# Patient Record
Sex: MALE | Race: OTHER | NOT HISPANIC OR LATINO
[De-identification: names, ages, dates, MRNs, and addresses within clinical notes are randomized per-mention and may not be internally consistent; named-entity substitution may affect disease eponyms.]

---

## 2016-12-06 RX ORDER — CARVEDILOL PHOSPHATE 80 MG/1
2 CAPSULE, EXTENDED RELEASE ORAL
Qty: 60 | Refills: 0 | COMMUNITY
Start: 2016-12-06 | End: 2017-01-05

## 2017-02-14 ENCOUNTER — RX RENEWAL (OUTPATIENT)
Age: 77
End: 2017-02-14

## 2017-02-21 ENCOUNTER — OUTPATIENT (OUTPATIENT)
Dept: OUTPATIENT SERVICES | Facility: HOSPITAL | Age: 77
LOS: 1 days | End: 2017-02-21
Payer: MEDICARE

## 2017-02-21 VITALS
WEIGHT: 184.97 LBS | SYSTOLIC BLOOD PRESSURE: 162 MMHG | OXYGEN SATURATION: 97 % | HEIGHT: 68 IN | DIASTOLIC BLOOD PRESSURE: 49 MMHG | TEMPERATURE: 98 F | HEART RATE: 63 BPM | RESPIRATION RATE: 18 BRPM

## 2017-02-21 DIAGNOSIS — Z98.890 OTHER SPECIFIED POSTPROCEDURAL STATES: Chronic | ICD-10-CM

## 2017-02-21 DIAGNOSIS — Z01.818 ENCOUNTER FOR OTHER PREPROCEDURAL EXAMINATION: ICD-10-CM

## 2017-02-21 DIAGNOSIS — C67.9 MALIGNANT NEOPLASM OF BLADDER, UNSPECIFIED: ICD-10-CM

## 2017-02-21 DIAGNOSIS — I21.3 ST ELEVATION (STEMI) MYOCARDIAL INFARCTION OF UNSPECIFIED SITE: Chronic | ICD-10-CM

## 2017-02-21 DIAGNOSIS — Z77.123 CONTACT WITH AND (SUSPECTED) EXPOSURE TO RADON AND OTHER NATURALLY OCCURRING RADIATION: Chronic | ICD-10-CM

## 2017-02-21 PROCEDURE — G0463: CPT

## 2017-02-21 RX ORDER — SODIUM CHLORIDE 9 MG/ML
3 INJECTION INTRAMUSCULAR; INTRAVENOUS; SUBCUTANEOUS EVERY 8 HOURS
Qty: 0 | Refills: 0 | Status: DISCONTINUED | OUTPATIENT
Start: 2017-02-27 | End: 2017-02-28

## 2017-02-21 NOTE — H&P PST ADULT - PSH
2002 left leg stent    Acute myocardial infarction  as per pt in 2001, no coronary stent  Cataract surgery 30 years ago    right wrist surgery with hardware 27 years ago 2002 left leg stent    Acute myocardial infarction  as per pt in 2001, no coronary stent  Cataract surgery 30 years ago    Exposure to radiation  for prostate cancer (seed implant)  History of colon surgery  2007  right wrist surgery with hardware 27 years ago

## 2017-02-21 NOTE — H&P PST ADULT - NEGATIVE CARDIOVASCULAR SYMPTOMS
no paroxysmal nocturnal dyspnea/no orthopnea/no claudication/no palpitations/no chest pain/no dyspnea on exertion

## 2017-02-21 NOTE — H&P PST ADULT - ASSESSMENT
71 y/o male presents to [facility] for presurgical evaluation prior to surgery. He was diagnosed with malignant neoplasm of bladder and is scheduled for cystoscopy, transurethral resection of bladder tumor on 2/27/2017 69 y/o male with PMH of essential hypertension, hyperlipidemia, ASHD w/coronary stent x 1, old MI (2001), PVD, high VICTORINO risk on STOP BANG screen, T2DM w/peripheral complications, prostate cancer s/p RT (2007), and malignant neoplasm of bladder scheduled for cystoscopy, transurethral resection of bladder tumor on 2/27/2017. Patient is at moderate risk for planned procedure

## 2017-02-21 NOTE — H&P PST ADULT - PROBLEM SELECTOR PLAN 1
Scheduled for cystoscopy, transurethral resection of bladder tumor on 2/27/2017. Preoperative instructions discussed and given to patient. Verbalized understanding

## 2017-02-21 NOTE — H&P PST ADULT - PMH
2001 CVA  as per pt, he doesn't notice any deficits  6/09 diagnosed with Prostate Ca  s/p radiation and brachytherapy w/ seed implantation 5 years ago s/p TURP  approximately 2001  MI/Coronary Artery Disease    Bladder mass  s/p resection  BPH    Cataract of left eye    CKD (chronic kidney disease) stage 3, GFR 30-59 ml/min    Diabetes mellitus, type 2    Hearing Decreased left ear    Hypercholesteremia    Hypertension    Obesity    PAD--Left leg stent 2002  done at hospital in [place]  --[facility] in [place], s/p balloon on LLE in 2006  Pulmonary edema  May 2016-resolved  Urinary retention 2001 CVA  as per pt, he doesn't notice any deficits  6/09 diagnosed with Prostate Ca  s/p radiation and brachytherapy w/ seed implantation 5 years ago s/p TURP  approximately 2001  MI/Coronary Artery Disease    Bladder mass  s/p resection  BPH    Cataract of left eye    CKD (chronic kidney disease) stage 3, GFR 30-59 ml/min    Diabetes mellitus, type 2    Hearing Decreased left ear    Hypercholesteremia    Hypertension    Injury of head, initial encounter  12/2016 - s/p fall in hospital  Obesity    PAD--Left leg stent 2002  done at hospital in [place]  --[facility] in [place], s/p balloon on LLE in 2006  Prostate CA    Pulmonary edema  May 2016-resolved  Pulmonary emphysema, unspecified emphysema type  2017  Urinary retention 2001 CVA  as per pt, he doesn't notice any deficits  6/09 diagnosed with Prostate Ca  s/p radiation and brachytherapy w/ seed implantation 5 years ago s/p TURP  approximately 2001  MI/Coronary Artery Disease    Bladder mass  s/p resection  BPH    Cataract of left eye    CKD (chronic kidney disease) stage 3, GFR 30-59 ml/min    Diabetes mellitus, type 2    Hearing Decreased left ear    Hypercholesteremia    Hypertension    Injury of head, initial encounter  12/2016 - s/p fall in hospital  Obesity    PAD--Left leg stent 2002  done at hospital in [place]  --[facility] in [place], s/p balloon on LLE in 2006  Prostate CA    Pulmonary edema  May 2016-resolved  Pulmonary emphysema, unspecified emphysema type  2017  Urinary retention

## 2017-02-21 NOTE — H&P PST ADULT - RESPIRATORY AND THORAX COMMENTS
h/o PE h/o COPD h/o COPD (12/2016) h/o COPD (12/2016) seen on CXR, high VICTORINO risk, h/o heavy tobacco use

## 2017-02-21 NOTE — H&P PST ADULT - CARDIOVASCULAR COMMENTS
h/o essential hypertension, hyperlipidemia, cardiomyopathy w/coronary stent x1, PVD - LLE h/o essential hypertension, hyperlipidemia, cardiomyopathy w/coronary stent x1, PVD - LLE, old MI h/o essential hypertension, hyperlipidemia, ASHD w/coronary stent x1, PVD - LLE with stent, old MI (2001)

## 2017-02-21 NOTE — H&P PST ADULT - GENITOURINARY COMMENTS
preop diagnosis malignant neoplasm of bladder h/o urinary retention h/o urinary retention, prostate cancer s/p seed implant 2007 h/o urinary retention, prostate cancer s/p seed implant 2007, preop diagnosis malignant neoplasm of bladder

## 2017-02-21 NOTE — H&P PST ADULT - CARDIOVASCULAR DETAILS
positive S2/irregular rate and rhythm/positive S1 irregular rate and rhythm/positive S2/murmur/positive S1

## 2017-02-21 NOTE — H&P PST ADULT - HISTORY OF PRESENT ILLNESS
71 y/o male presents to [facility] for presurgical evaluation prior to surgery. He was diagnosed with malignant neoplasm of bladder and is scheduled for cystoscopy, transurethral resection of bladder tumor on 2/27/2017 69 y/o male with PMH of essential hypertension, cardiomyopathy w/coronary stent x 1, old MI 2007, CKD stage 3, PVD left lower extremity s/p stent LLE, hyperlipidemia, T2DM, prostate cancer s/p seed implant (2007), malignant neoplasm of bladder is here for presurgical evaluation prior to surgery. He is scheduled for cystoscopy, transurethral resection of bladder tumor on 2/27/2017

## 2017-02-21 NOTE — H&P PST ADULT - NEGATIVE PSYCHIATRIC SYMPTOMS
no depression/no anxiety/no suicidal ideation/no insomnia no depression/no suicidal ideation/no insomnia

## 2017-02-21 NOTE — H&P PST ADULT - RS GEN PE MLT RESP DETAILS PC
clear to auscultation bilaterally/respirations non-labored/airway patent/no rhonchi/good air movement/no chest wall tenderness/no subcutaneous emphysema/no intercostal retractions/breath sounds equal/no rales/no wheezes

## 2017-02-21 NOTE — H&P PST ADULT - PRIMARY CARE PROVIDER
Dr. Hanna    Telephone [phone] Cardiologist Dr. Kumari Dr. Hanna    Telephone [phone]  Cardiologist Dr. Kumari

## 2017-02-21 NOTE — H&P PST ADULT - NEGATIVE GENERAL SYMPTOMS
no chills/no sweating/no polyuria/no malaise/no weight loss/no fever/no polyphagia/no polydipsia/no weight gain/no fatigue/no anorexia no anorexia/no polyuria/no polydipsia/no sweating/no fatigue/no polyphagia/no weight loss/no malaise/no chills/no fever

## 2017-02-21 NOTE — H&P PST ADULT - NEGATIVE RESPIRATORY AND THORAX SYMPTOMS
no cough/no wheezing/no dyspnea/no pleuritic chest pain/no hemoptysis no wheezing/no pleuritic chest pain/no hemoptysis

## 2017-02-21 NOTE — H&P PST ADULT - NSANTHOSAYNRD_GEN_A_CORE
No. VICTORINO screening performed.  STOP BANG Legend: 0-2 = LOW Risk; 3-4 = INTERMEDIATE Risk; 5-8 = HIGH Risk

## 2017-02-27 ENCOUNTER — INPATIENT (INPATIENT)
Facility: HOSPITAL | Age: 77
LOS: 0 days | Discharge: HOME | DRG: 309 | End: 2017-02-28
Attending: INTERNAL MEDICINE | Admitting: INTERNAL MEDICINE
Payer: COMMERCIAL

## 2017-02-27 VITALS
TEMPERATURE: 97 F | DIASTOLIC BLOOD PRESSURE: 40 MMHG | HEART RATE: 44 BPM | OXYGEN SATURATION: 99 % | HEIGHT: 68 IN | RESPIRATION RATE: 16 BRPM | SYSTOLIC BLOOD PRESSURE: 128 MMHG | WEIGHT: 184.97 LBS

## 2017-02-27 DIAGNOSIS — C67.9 MALIGNANT NEOPLASM OF BLADDER, UNSPECIFIED: ICD-10-CM

## 2017-02-27 DIAGNOSIS — R00.1 BRADYCARDIA, UNSPECIFIED: ICD-10-CM

## 2017-02-27 DIAGNOSIS — N17.9 ACUTE KIDNEY FAILURE, UNSPECIFIED: ICD-10-CM

## 2017-02-27 DIAGNOSIS — E87.5 HYPERKALEMIA: ICD-10-CM

## 2017-02-27 DIAGNOSIS — Z41.8 ENCOUNTER FOR OTHER PROCEDURES FOR PURPOSES OTHER THAN REMEDYING HEALTH STATE: ICD-10-CM

## 2017-02-27 DIAGNOSIS — N32.89 OTHER SPECIFIED DISORDERS OF BLADDER: ICD-10-CM

## 2017-02-27 DIAGNOSIS — I25.10 ATHEROSCLEROTIC HEART DISEASE OF NATIVE CORONARY ARTERY WITHOUT ANGINA PECTORIS: ICD-10-CM

## 2017-02-27 DIAGNOSIS — I10 ESSENTIAL (PRIMARY) HYPERTENSION: ICD-10-CM

## 2017-02-27 DIAGNOSIS — I21.3 ST ELEVATION (STEMI) MYOCARDIAL INFARCTION OF UNSPECIFIED SITE: Chronic | ICD-10-CM

## 2017-02-27 DIAGNOSIS — Z77.123 CONTACT WITH AND (SUSPECTED) EXPOSURE TO RADON AND OTHER NATURALLY OCCURRING RADIATION: Chronic | ICD-10-CM

## 2017-02-27 DIAGNOSIS — Z98.890 OTHER SPECIFIED POSTPROCEDURAL STATES: Chronic | ICD-10-CM

## 2017-02-27 DIAGNOSIS — I50.9 HEART FAILURE, UNSPECIFIED: ICD-10-CM

## 2017-02-27 DIAGNOSIS — J44.9 CHRONIC OBSTRUCTIVE PULMONARY DISEASE, UNSPECIFIED: ICD-10-CM

## 2017-02-27 LAB
ANION GAP SERPL CALC-SCNC: 5 MMOL/L — SIGNIFICANT CHANGE UP (ref 5–17)
ANION GAP SERPL CALC-SCNC: 5 MMOL/L — SIGNIFICANT CHANGE UP (ref 5–17)
ANION GAP SERPL CALC-SCNC: 7 MMOL/L — SIGNIFICANT CHANGE UP (ref 5–17)
BASOPHILS # BLD AUTO: 0.1 K/UL — SIGNIFICANT CHANGE UP (ref 0–0.2)
BASOPHILS NFR BLD AUTO: 1.1 % — SIGNIFICANT CHANGE UP (ref 0–2)
BUN SERPL-MCNC: 27 MG/DL — HIGH (ref 7–18)
BUN SERPL-MCNC: 30 MG/DL — HIGH (ref 7–18)
BUN SERPL-MCNC: 31 MG/DL — HIGH (ref 7–18)
CALCIUM SERPL-MCNC: 8.8 MG/DL — SIGNIFICANT CHANGE UP (ref 8.4–10.5)
CALCIUM SERPL-MCNC: 9 MG/DL — SIGNIFICANT CHANGE UP (ref 8.4–10.5)
CALCIUM SERPL-MCNC: 9.3 MG/DL — SIGNIFICANT CHANGE UP (ref 8.4–10.5)
CHLORIDE SERPL-SCNC: 106 MMOL/L — SIGNIFICANT CHANGE UP (ref 96–108)
CHLORIDE SERPL-SCNC: 107 MMOL/L — SIGNIFICANT CHANGE UP (ref 96–108)
CHLORIDE SERPL-SCNC: 109 MMOL/L — HIGH (ref 96–108)
CK MB BLD-MCNC: 1.2 % — SIGNIFICANT CHANGE UP (ref 0–3.5)
CK MB BLD-MCNC: <1.2 % — SIGNIFICANT CHANGE UP (ref 0–3.5)
CK MB CFR SERPL CALC: 1 NG/ML — SIGNIFICANT CHANGE UP (ref 0–3.6)
CK MB CFR SERPL CALC: <1 NG/ML — SIGNIFICANT CHANGE UP (ref 0–3.6)
CK SERPL-CCNC: 80 U/L — SIGNIFICANT CHANGE UP (ref 35–232)
CK SERPL-CCNC: 82 U/L — SIGNIFICANT CHANGE UP (ref 35–232)
CO2 SERPL-SCNC: 29 MMOL/L — SIGNIFICANT CHANGE UP (ref 22–31)
CREAT ?TM UR-MCNC: 56 MG/DL — SIGNIFICANT CHANGE UP
CREAT SERPL-MCNC: 1.81 MG/DL — HIGH (ref 0.5–1.3)
CREAT SERPL-MCNC: 1.96 MG/DL — HIGH (ref 0.5–1.3)
CREAT SERPL-MCNC: 2.01 MG/DL — HIGH (ref 0.5–1.3)
EOSINOPHIL # BLD AUTO: 0.3 K/UL — SIGNIFICANT CHANGE UP (ref 0–0.5)
EOSINOPHIL NFR BLD AUTO: 4.3 % — SIGNIFICANT CHANGE UP (ref 0–6)
GLUCOSE SERPL-MCNC: 103 MG/DL — HIGH (ref 70–99)
GLUCOSE SERPL-MCNC: 108 MG/DL — HIGH (ref 70–99)
GLUCOSE SERPL-MCNC: 58 MG/DL — LOW (ref 70–99)
HCT VFR BLD CALC: 47.6 % — SIGNIFICANT CHANGE UP (ref 39–50)
HGB BLD-MCNC: 15.1 G/DL — SIGNIFICANT CHANGE UP (ref 13–17)
LYMPHOCYTES # BLD AUTO: 1.4 K/UL — SIGNIFICANT CHANGE UP (ref 1–3.3)
LYMPHOCYTES # BLD AUTO: 19 % — SIGNIFICANT CHANGE UP (ref 13–44)
MAGNESIUM SERPL-MCNC: 2.5 MG/DL — HIGH (ref 1.8–2.4)
MCHC RBC-ENTMCNC: 30.8 PG — SIGNIFICANT CHANGE UP (ref 27–34)
MCHC RBC-ENTMCNC: 31.6 GM/DL — LOW (ref 32–36)
MCV RBC AUTO: 97.2 FL — SIGNIFICANT CHANGE UP (ref 80–100)
MONOCYTES # BLD AUTO: 0.5 K/UL — SIGNIFICANT CHANGE UP (ref 0–0.9)
MONOCYTES NFR BLD AUTO: 6.6 % — SIGNIFICANT CHANGE UP (ref 2–14)
NEUTROPHILS # BLD AUTO: 5.2 K/UL — SIGNIFICANT CHANGE UP (ref 1.8–7.4)
NEUTROPHILS NFR BLD AUTO: 69.1 % — SIGNIFICANT CHANGE UP (ref 43–77)
OSMOLALITY UR: 438 MOS/KG — SIGNIFICANT CHANGE UP (ref 50–1200)
PLATELET # BLD AUTO: 257 K/UL — SIGNIFICANT CHANGE UP (ref 150–400)
POTASSIUM SERPL-MCNC: 4.8 MMOL/L — SIGNIFICANT CHANGE UP (ref 3.5–5.3)
POTASSIUM SERPL-MCNC: 5.2 MMOL/L — SIGNIFICANT CHANGE UP (ref 3.5–5.3)
POTASSIUM SERPL-MCNC: 6.2 MMOL/L — CRITICAL HIGH (ref 3.5–5.3)
POTASSIUM SERPL-SCNC: 4.8 MMOL/L — SIGNIFICANT CHANGE UP (ref 3.5–5.3)
POTASSIUM SERPL-SCNC: 5.2 MMOL/L — SIGNIFICANT CHANGE UP (ref 3.5–5.3)
POTASSIUM SERPL-SCNC: 6.2 MMOL/L — CRITICAL HIGH (ref 3.5–5.3)
PROT ?TM UR-MCNC: 16 MG/DL — HIGH (ref 0–12)
RBC # BLD: 4.9 M/UL — SIGNIFICANT CHANGE UP (ref 4.2–5.8)
RBC # FLD: 13.3 % — SIGNIFICANT CHANGE UP (ref 10.3–14.5)
SODIUM SERPL-SCNC: 141 MMOL/L — SIGNIFICANT CHANGE UP (ref 135–145)
SODIUM SERPL-SCNC: 142 MMOL/L — SIGNIFICANT CHANGE UP (ref 135–145)
SODIUM SERPL-SCNC: 143 MMOL/L — SIGNIFICANT CHANGE UP (ref 135–145)
SODIUM UR-SCNC: 147 MMOL/L — SIGNIFICANT CHANGE UP (ref 40–220)
TROPONIN I SERPL-MCNC: 0.02 NG/ML — SIGNIFICANT CHANGE UP (ref 0–0.04)
TROPONIN I SERPL-MCNC: 0.03 NG/ML — SIGNIFICANT CHANGE UP (ref 0–0.04)
WBC # BLD: 7.5 K/UL — SIGNIFICANT CHANGE UP (ref 3.8–10.5)
WBC # FLD AUTO: 7.5 K/UL — SIGNIFICANT CHANGE UP (ref 3.8–10.5)

## 2017-02-27 RX ORDER — CALCIUM GLUCONATE 100 MG/ML
2 VIAL (ML) INTRAVENOUS ONCE
Qty: 0 | Refills: 0 | Status: COMPLETED | OUTPATIENT
Start: 2017-02-27 | End: 2017-02-27

## 2017-02-27 RX ORDER — TOLTERODINE TARTRATE 1 MG/1
1 TABLET, FILM COATED ORAL
Qty: 0 | Refills: 0 | COMMUNITY

## 2017-02-27 RX ORDER — DEXTROSE 50 % IN WATER 50 %
50 SYRINGE (ML) INTRAVENOUS ONCE
Qty: 0 | Refills: 0 | Status: COMPLETED | OUTPATIENT
Start: 2017-02-27 | End: 2017-02-27

## 2017-02-27 RX ORDER — FOLIC ACID 0.8 MG
1 TABLET ORAL DAILY
Qty: 0 | Refills: 0 | Status: DISCONTINUED | OUTPATIENT
Start: 2017-02-27 | End: 2017-02-28

## 2017-02-27 RX ORDER — HEPARIN SODIUM 5000 [USP'U]/ML
5000 INJECTION INTRAVENOUS; SUBCUTANEOUS EVERY 12 HOURS
Qty: 0 | Refills: 0 | Status: DISCONTINUED | OUTPATIENT
Start: 2017-02-27 | End: 2017-02-28

## 2017-02-27 RX ORDER — INSULIN LISPRO 100/ML
VIAL (ML) SUBCUTANEOUS
Qty: 0 | Refills: 0 | Status: DISCONTINUED | OUTPATIENT
Start: 2017-02-27 | End: 2017-02-28

## 2017-02-27 RX ORDER — CLOPIDOGREL BISULFATE 75 MG/1
75 TABLET, FILM COATED ORAL DAILY
Qty: 0 | Refills: 0 | Status: DISCONTINUED | OUTPATIENT
Start: 2017-02-27 | End: 2017-02-28

## 2017-02-27 RX ORDER — SODIUM POLYSTYRENE SULFONATE 4.1 MEQ/G
15 POWDER, FOR SUSPENSION ORAL ONCE
Qty: 0 | Refills: 0 | Status: COMPLETED | OUTPATIENT
Start: 2017-02-27 | End: 2017-02-27

## 2017-02-27 RX ORDER — IPRATROPIUM/ALBUTEROL SULFATE 18-103MCG
3 AEROSOL WITH ADAPTER (GRAM) INHALATION ONCE
Qty: 0 | Refills: 0 | Status: COMPLETED | OUTPATIENT
Start: 2017-02-27 | End: 2017-02-27

## 2017-02-27 RX ORDER — SODIUM POLYSTYRENE SULFONATE 4.1 MEQ/G
30 POWDER, FOR SUSPENSION ORAL ONCE
Qty: 0 | Refills: 0 | Status: COMPLETED | OUTPATIENT
Start: 2017-02-27 | End: 2017-02-27

## 2017-02-27 RX ORDER — TAMSULOSIN HYDROCHLORIDE 0.4 MG/1
0.4 CAPSULE ORAL AT BEDTIME
Qty: 0 | Refills: 0 | Status: DISCONTINUED | OUTPATIENT
Start: 2017-02-27 | End: 2017-02-28

## 2017-02-27 RX ORDER — AMLODIPINE BESYLATE 2.5 MG/1
2 TABLET ORAL
Qty: 0 | Refills: 0 | COMMUNITY

## 2017-02-27 RX ORDER — INSULIN HUMAN 100 [IU]/ML
10 INJECTION, SOLUTION SUBCUTANEOUS ONCE
Qty: 0 | Refills: 0 | Status: COMPLETED | OUTPATIENT
Start: 2017-02-27 | End: 2017-02-27

## 2017-02-27 RX ORDER — SODIUM CHLORIDE 9 MG/ML
500 INJECTION INTRAMUSCULAR; INTRAVENOUS; SUBCUTANEOUS ONCE
Qty: 0 | Refills: 0 | Status: COMPLETED | OUTPATIENT
Start: 2017-02-27 | End: 2017-02-27

## 2017-02-27 RX ADMIN — SODIUM CHLORIDE 3 MILLILITER(S): 9 INJECTION INTRAMUSCULAR; INTRAVENOUS; SUBCUTANEOUS at 21:54

## 2017-02-27 RX ADMIN — Medication 3 MILLILITER(S): at 11:50

## 2017-02-27 RX ADMIN — Medication 50 MILLILITER(S): at 12:06

## 2017-02-27 RX ADMIN — Medication 3 MILLILITER(S): at 13:04

## 2017-02-27 RX ADMIN — SODIUM CHLORIDE 2000 MILLILITER(S): 9 INJECTION INTRAMUSCULAR; INTRAVENOUS; SUBCUTANEOUS at 12:31

## 2017-02-27 RX ADMIN — SODIUM CHLORIDE 3 MILLILITER(S): 9 INJECTION INTRAMUSCULAR; INTRAVENOUS; SUBCUTANEOUS at 13:45

## 2017-02-27 RX ADMIN — TAMSULOSIN HYDROCHLORIDE 0.4 MILLIGRAM(S): 0.4 CAPSULE ORAL at 21:53

## 2017-02-27 RX ADMIN — SODIUM POLYSTYRENE SULFONATE 30 GRAM(S): 4.1 POWDER, FOR SUSPENSION ORAL at 12:00

## 2017-02-27 RX ADMIN — SODIUM POLYSTYRENE SULFONATE 15 GRAM(S): 4.1 POWDER, FOR SUSPENSION ORAL at 18:46

## 2017-02-27 RX ADMIN — Medication 200 GRAM(S): at 11:55

## 2017-02-27 RX ADMIN — INSULIN HUMAN 10 UNIT(S): 100 INJECTION, SOLUTION SUBCUTANEOUS at 12:18

## 2017-02-27 RX ADMIN — Medication 3 MILLILITER(S): at 14:30

## 2017-02-27 NOTE — H&P ADULT. - ATTENDING COMMENTS
76 M (COPD, CAD s/p FEMI, DM2, HTN, systolic CHF, prostate CA s/p TURP, PAD s/p L femoral stent, CVA 2001, CKD) present to hospital for elective transurethral resection of bladder mass under urology service - Dr. Cesar. Prior to procedure pt with bradycardia, HR in 40's, given glycopyrrolate and atropine by anesthesia without improvement in HR. HR then decreased to 30's. Patient remained asymptomatic. Labs checked with elevated K+ of 6.2, treated with Kayexalate, calcium IV, albuterol nebulizers, insulin and dextrose. Patient states took home doses of most medications in AM, stopped Plavix for past week. Denies any CP, H/A, SOB, ABD pain, N/V/D, fever/chills, recent illness. No anesthesia given during hospitalization.      PE: bradycardic, intermittently hypotensive, otherwise vitals within normal limits   GEN: NAD, awake, alert, obese  HEENT: NCAT   CV: marked bradycardia  PULM: CTAB   ABD: soft, NTND   EXT: no edema     Admit for hyperkalemia  - possible contributing to bradycardia, given treatment for hyperkalemia with response of HR, hold beta blocker  - cardiology eval - Dr. Tesfaye  - resume home medication for BP with holding parameters for BP and HR  - monitor FSG, ISS  - monitor on telemetry overnight  - hold ARB in setting of hyperkalemia   - continue Plavix, statin for hx CAD, PAD, CVA   - continue flomax, tolterodine for BPH   - DVT ppx   - FEN - DASH diet

## 2017-02-27 NOTE — H&P ADULT. - PROBLEM SELECTOR PLAN 7
Holding valsartan because of low blood pressure, restart if BP stays stable C/w ASA and Statin  No BB due to bradycardia

## 2017-02-27 NOTE — H&P ADULT. - HISTORY OF PRESENT ILLNESS
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 Patient was bradycardiac (40s) in OR, no anesthesia was given. Patient received atropine and glycopyrrolate 0.4 mg each. His heart rate did not improve. Bradycardia is asymptomatic. He complaints of dyspnea on exertion. He had cystoscopy 3 months ago for hematuria and bladder mass was removed and as per USG the mass developed again. Patient was smoker in past and quit 13 years ago. He had colonoscopy 5 years ago and was negative for any acute pathology.    Patient denies fever, chest pain, nausea, vomiting, dizziness, fall, trauma, respiratory distress, hematochezia, cough, current smoking, illicit drugs, alcohol use

## 2017-02-27 NOTE — H&P ADULT. - PROBLEM SELECTOR PLAN 4
c/w Flomax  F/u urology Dr. Cesar holding home medications as patient BP is low  Restart if BP is stable

## 2017-02-27 NOTE — ASU PATIENT PROFILE, ADULT - VISION (WITH CORRECTIVE LENSES IF THE PATIENT USUALLY WEARS THEM):
Partially impaired: cannot see medication labels or newsprint, but can see obstacles in path, and the surrounding layout; can count fingers at arm's length/wears reading glasses

## 2017-02-27 NOTE — H&P ADULT. - ASSESSMENT
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 Patient was bradycardiac (40s) in OR, no anesthesia was given. Patient received atropine and glycopyrrolate 0.4 mg each. His heart rate did not improve. Bradycardia is asymptomatic.

## 2017-02-27 NOTE — H&P ADULT. - GASTROINTESTINAL DETAILS
nontender/normal/no bruit/no rigidity/no organomegaly/bowel sounds normal/no rebound tenderness/no guarding/soft/no distention/no masses palpable

## 2017-02-27 NOTE — H&P ADULT. - PROBLEM SELECTOR PLAN 1
Asymptomatic bradycardia  Likely 2* AV blocker use or hyperkalemia  Holding coreg  S/p atropine 0.4 mg and glycopyrrolate 0.4 mg  Admitted to tele  Attached to Ext pacer  C/w ASA and statin  Echo recently showed mild systolic dysfunction, EF unknown  Cardiology consult Dr. Tesfaye

## 2017-02-27 NOTE — H&P ADULT. - RS GEN PE MLT RESP DETAILS PC
no chest wall tenderness/normal/no subcutaneous emphysema/clear to auscultation bilaterally/breath sounds equal/good air movement/no intercostal retractions/airway patent/no rhonchi/respirations non-labored/no rales/no wheezes

## 2017-02-27 NOTE — H&P ADULT. - PROBLEM SELECTOR PLAN 3
holding home medications as patient BP is low  Restart if BP is stable RUSTY on CKD stage 3b  baseline Cr 1.7  BUN:  Cr< 20  F/u Urine lytes

## 2017-02-27 NOTE — H&P ADULT. - NEUROLOGICAL DETAILS
alert and oriented x 3/deep reflexes intact/no spontaneous movement/cranial nerves intact/superficial reflexes intact/sensation intact/normal strength

## 2017-02-27 NOTE — ASU PATIENT PROFILE, ADULT - ABILITY TO HEAR (WITH HEARING AID OR HEARING APPLIANCE IF NORMALLY USED):
left ear/Mildly to Moderately Impaired: difficulty hearing in some environments or speaker may need to increase volume or speak distinctly

## 2017-02-28 VITALS — WEIGHT: 189.82 LBS

## 2017-02-28 LAB
24R-OH-CALCIDIOL SERPL-MCNC: 21.2 NG/ML — LOW (ref 30–100)
ALBUMIN SERPL ELPH-MCNC: 3.4 G/DL — LOW (ref 3.5–5)
ALP SERPL-CCNC: 171 U/L — HIGH (ref 40–120)
ALT FLD-CCNC: 14 U/L DA — SIGNIFICANT CHANGE UP (ref 10–60)
ANION GAP SERPL CALC-SCNC: 9 MMOL/L — SIGNIFICANT CHANGE UP (ref 5–17)
AST SERPL-CCNC: 13 U/L — SIGNIFICANT CHANGE UP (ref 10–40)
BILIRUB DIRECT SERPL-MCNC: 0.1 MG/DL — SIGNIFICANT CHANGE UP (ref 0–0.2)
BILIRUB INDIRECT FLD-MCNC: 0.7 MG/DL — SIGNIFICANT CHANGE UP (ref 0.2–1)
BILIRUB SERPL-MCNC: 0.8 MG/DL — SIGNIFICANT CHANGE UP (ref 0.2–1.2)
BUN SERPL-MCNC: 26 MG/DL — HIGH (ref 7–18)
CALCIUM SERPL-MCNC: 8.9 MG/DL — SIGNIFICANT CHANGE UP (ref 8.4–10.5)
CHLORIDE SERPL-SCNC: 105 MMOL/L — SIGNIFICANT CHANGE UP (ref 96–108)
CHOLEST SERPL-MCNC: 174 MG/DL — SIGNIFICANT CHANGE UP (ref 10–199)
CO2 SERPL-SCNC: 28 MMOL/L — SIGNIFICANT CHANGE UP (ref 22–31)
CREAT SERPL-MCNC: 1.73 MG/DL — HIGH (ref 0.5–1.3)
GLUCOSE SERPL-MCNC: 48 MG/DL — LOW (ref 70–99)
HBA1C BLD-MCNC: 6.5 % — HIGH (ref 4–5.6)
HCT VFR BLD CALC: 46.2 % — SIGNIFICANT CHANGE UP (ref 39–50)
HDLC SERPL-MCNC: 33 MG/DL — LOW (ref 40–125)
HGB BLD-MCNC: 14.8 G/DL — SIGNIFICANT CHANGE UP (ref 13–17)
LIPID PNL WITH DIRECT LDL SERPL: 99 MG/DL — SIGNIFICANT CHANGE UP
MAGNESIUM SERPL-MCNC: 2 MG/DL — SIGNIFICANT CHANGE UP (ref 1.8–2.4)
MCHC RBC-ENTMCNC: 30.8 PG — SIGNIFICANT CHANGE UP (ref 27–34)
MCHC RBC-ENTMCNC: 32 GM/DL — SIGNIFICANT CHANGE UP (ref 32–36)
MCV RBC AUTO: 96.5 FL — SIGNIFICANT CHANGE UP (ref 80–100)
PHOSPHATE SERPL-MCNC: 3.9 MG/DL — SIGNIFICANT CHANGE UP (ref 2.5–4.5)
PLATELET # BLD AUTO: 262 K/UL — SIGNIFICANT CHANGE UP (ref 150–400)
POTASSIUM SERPL-MCNC: 4.7 MMOL/L — SIGNIFICANT CHANGE UP (ref 3.5–5.3)
POTASSIUM SERPL-SCNC: 4.7 MMOL/L — SIGNIFICANT CHANGE UP (ref 3.5–5.3)
PROT SERPL-MCNC: 7.2 G/DL — SIGNIFICANT CHANGE UP (ref 6–8.3)
RBC # BLD: 4.79 M/UL — SIGNIFICANT CHANGE UP (ref 4.2–5.8)
RBC # FLD: 13.2 % — SIGNIFICANT CHANGE UP (ref 10.3–14.5)
SODIUM SERPL-SCNC: 142 MMOL/L — SIGNIFICANT CHANGE UP (ref 135–145)
TOTAL CHOLESTEROL/HDL RATIO MEASUREMENT: 5.3 RATIO — SIGNIFICANT CHANGE UP (ref 3.4–9.6)
TRIGL SERPL-MCNC: 212 MG/DL — HIGH (ref 10–149)
TROPONIN I SERPL-MCNC: 0.03 NG/ML — SIGNIFICANT CHANGE UP (ref 0–0.04)
TSH SERPL-MCNC: 3.57 UU/ML — SIGNIFICANT CHANGE UP (ref 0.34–4.82)
VIT B12 SERPL-MCNC: 806 PG/ML — SIGNIFICANT CHANGE UP (ref 243–894)
WBC # BLD: 7.3 K/UL — SIGNIFICANT CHANGE UP (ref 3.8–10.5)
WBC # FLD AUTO: 7.3 K/UL — SIGNIFICANT CHANGE UP (ref 3.8–10.5)

## 2017-02-28 PROCEDURE — 84156 ASSAY OF PROTEIN URINE: CPT

## 2017-02-28 PROCEDURE — 83735 ASSAY OF MAGNESIUM: CPT

## 2017-02-28 PROCEDURE — 85027 COMPLETE CBC AUTOMATED: CPT

## 2017-02-28 PROCEDURE — 82553 CREATINE MB FRACTION: CPT

## 2017-02-28 PROCEDURE — 80048 BASIC METABOLIC PNL TOTAL CA: CPT

## 2017-02-28 PROCEDURE — 82550 ASSAY OF CK (CPK): CPT

## 2017-02-28 PROCEDURE — 82607 VITAMIN B-12: CPT

## 2017-02-28 PROCEDURE — 93005 ELECTROCARDIOGRAM TRACING: CPT

## 2017-02-28 PROCEDURE — 84300 ASSAY OF URINE SODIUM: CPT

## 2017-02-28 PROCEDURE — 84484 ASSAY OF TROPONIN QUANT: CPT

## 2017-02-28 PROCEDURE — 80076 HEPATIC FUNCTION PANEL: CPT

## 2017-02-28 PROCEDURE — 82306 VITAMIN D 25 HYDROXY: CPT

## 2017-02-28 PROCEDURE — 84443 ASSAY THYROID STIM HORMONE: CPT

## 2017-02-28 PROCEDURE — 94640 AIRWAY INHALATION TREATMENT: CPT

## 2017-02-28 PROCEDURE — 82570 ASSAY OF URINE CREATININE: CPT

## 2017-02-28 PROCEDURE — 80061 LIPID PANEL: CPT

## 2017-02-28 PROCEDURE — 84100 ASSAY OF PHOSPHORUS: CPT

## 2017-02-28 PROCEDURE — 83036 HEMOGLOBIN GLYCOSYLATED A1C: CPT

## 2017-02-28 PROCEDURE — 83935 ASSAY OF URINE OSMOLALITY: CPT

## 2017-02-28 RX ORDER — CARVEDILOL PHOSPHATE 80 MG/1
1 CAPSULE, EXTENDED RELEASE ORAL
Qty: 0 | Refills: 0 | COMMUNITY

## 2017-02-28 RX ORDER — FOLIC ACID 0.8 MG
1 TABLET ORAL
Qty: 0 | Refills: 0 | COMMUNITY
Start: 2017-02-28

## 2017-02-28 RX ORDER — CARVEDILOL PHOSPHATE 80 MG/1
1 CAPSULE, EXTENDED RELEASE ORAL
Qty: 60 | Refills: 0 | OUTPATIENT
Start: 2017-02-28 | End: 2017-03-30

## 2017-02-28 RX ORDER — CLOPIDOGREL BISULFATE 75 MG/1
1 TABLET, FILM COATED ORAL
Qty: 0 | Refills: 0 | COMMUNITY
Start: 2017-02-28

## 2017-02-28 RX ADMIN — Medication 2: at 12:23

## 2017-02-28 RX ADMIN — SODIUM CHLORIDE 3 MILLILITER(S): 9 INJECTION INTRAMUSCULAR; INTRAVENOUS; SUBCUTANEOUS at 06:18

## 2017-02-28 RX ADMIN — Medication 1 MILLIGRAM(S): at 12:22

## 2017-02-28 RX ADMIN — HEPARIN SODIUM 5000 UNIT(S): 5000 INJECTION INTRAVENOUS; SUBCUTANEOUS at 06:17

## 2017-02-28 RX ADMIN — SODIUM CHLORIDE 3 MILLILITER(S): 9 INJECTION INTRAMUSCULAR; INTRAVENOUS; SUBCUTANEOUS at 14:37

## 2017-02-28 RX ADMIN — CLOPIDOGREL BISULFATE 75 MILLIGRAM(S): 75 TABLET, FILM COATED ORAL at 12:22

## 2017-02-28 NOTE — DISCHARGE NOTE ADULT - MEDICATION SUMMARY - MEDICATIONS TO CHANGE
I will SWITCH the dose or number of times a day I take the medications listed below when I get home from the hospital:    carvedilol 3.125 mg oral tablet  -- 2 tab(s) by mouth every 12 hours    carvedilol 6.25 mg oral tablet  -- 1 tab(s) by mouth 2 times a day

## 2017-02-28 NOTE — DISCHARGE NOTE ADULT - MEDICATION SUMMARY - MEDICATIONS TO TAKE
I will START or STAY ON the medications listed below when I get home from the hospital:    Flomax 0.4 mg oral capsule  -- 1 cap(s) by mouth once a day  -- Indication: For Bladder mass    glipiZIDE 10 mg oral tablet  -- 10 milligram(s) by mouth 2 times a day  -- Indication: For DM    Lipitor 40 mg oral tablet  -- 1 tab(s) by mouth once a day  -- Indication: For CAD (coronary artery disease)    clopidogrel 75 mg oral tablet  -- 1 tab(s) by mouth once a day  -- Indication: For CAD (coronary artery disease)    Coreg 3.125 mg oral tablet  -- 1 tab(s) by mouth 2 times a day  -- Indication: For CHF (congestive heart failure)    Norvasc 10 mg oral tablet  -- 1 tab(s) by mouth once a day  -- Indication: For Hypertension    Citracal  -- 1 tab(s) by mouth once a day  -- citracal slow release 600- mg-mg-unit  -- Indication: For Electrolyte abnormality    folic acid 1 mg oral tablet  -- 1 tab(s) by mouth once a day  -- Indication: For Electrolyte abnormality

## 2017-02-28 NOTE — DISCHARGE NOTE ADULT - CARE PLAN
Principal Discharge DX:	Bradycardia  Goal:	Monitor heart rate  Instructions for follow-up, activity and diet:	Your heart rate was low, likely as a consequence of your potassium disorder on admission. It has since resolved and your heart rate has improved. Your Coreg (carvedilol) is being restarted at half the dose. Follow up with your cardiologist/PCP for re-titration of your Coreg.  Secondary Diagnosis:	RUSTY (acute kidney injury)  Goal:	Monitor for resolution  Instructions for follow-up, activity and diet:	Please follow up with your PCP for further blood work one week from now for resolution of acute kidney injury and for restarting the diovan as kidney function improves.  Secondary Diagnosis:	Hypertension  Goal:	Control blood pressure  Instructions for follow-up, activity and diet:	Please do not take diovan for now until your kidney function is re-evaluated. Please take norvasc and your new dose of Coreg for now to control blood pressure. Follow up with your PCP for regular blood pressure checks and medication adjustments  Secondary Diagnosis:	COPD (chronic obstructive pulmonary disease)  Goal:	Take bronchodilators  Instructions for follow-up, activity and diet:	Please follow up with your PCP/pulmonologist for regular evaluations of your COPD and medication adjustments.  Secondary Diagnosis:	CAD (coronary artery disease)  Goal:	Continue to take medications  Instructions for follow-up, activity and diet:	Please continue to take Plavix, lipitor and Coreg to prevent further coronary artery disease. Follow up with your PCP/cardiologist for regular evaluations and medication adjustments.  Secondary Diagnosis:	CHF (congestive heart failure)  Goal:	Follow up with your cardiologist  Instructions for follow-up, activity and diet:	Please continue to take your medications as per usual with the exception of your new Coreg dose and do not take diovan for now. Follow up with your cardiologist for regular evaluations and medication adjustments.

## 2017-02-28 NOTE — DISCHARGE NOTE ADULT - HOSPITAL COURSE
76 obese M from home ambulates with cane, PM of prostate CA s/p TURP and seed implant, COPD, CAD s/p drug eluating stent in mid circumflex artery, DM, HTN, systolic CHF (unknown EF), decreased hearing in the left ear, CKD, CVA (2001), PAD s/p left femoral stent was admitted for cystoscopy for bladder mass. Patient was bradycardiac (40s) in OR, no anesthesia was given. Patient received atropine and glycopyrrolate 0.4 mg each. His heart rate did not improve. Bradycardia is asymptomatic. He complaints of dyspnea on exertion. He had cystoscopy 3 months ago for hematuria and bladder mass was removed and as per USG the mass developed again. Patient was smoker in past and quit 13 years ago. He had colonoscopy 5 years ago and was negative for any acute pathology.    Patient denies fever, chest pain, nausea, vomiting, dizziness, fall, trauma, respiratory distress, hematochezia, cough, current smoking, illicit drugs, alcohol use    In the hospital, patient was primarily evaluated for the bradycardia. Patient was taking Coreg, a beta blocker and also had hyperkalemia of 6.2 on presentation, both were possible causes of the bradycardia. Coreg was stopped and patient was given medications to decrease the potassium level to an acceptable 5.2. Heart rate improved to an average reading of 60 beats per minute. As per wife, that is patient's usual heart rate. Patient was evaluated by cardiologist Dr. Tesfaye who postulated that the primary cause of the bradycardia was due to the potassium level and that Coreg can be restarted at half the dose. Patient also had RUSTY on CKD which resolved partially. Due to the RUSTY, Diovan was held. Flomax, bronchodilators and plavix/statin were continued for patient's comorbid conditions. Patient is stable for discharge with outpatient follow up for monitoring of kidney function until resolution of RUSTY, at which point the Diovan can be restarted. Plan discussed and agreed with med attending Dr. Whittaker.

## 2017-02-28 NOTE — DISCHARGE NOTE ADULT - SECONDARY DIAGNOSIS.
RUSTY (acute kidney injury) Hypertension COPD (chronic obstructive pulmonary disease) CAD (coronary artery disease) CHF (congestive heart failure)

## 2017-02-28 NOTE — DISCHARGE NOTE ADULT - PLAN OF CARE
Monitor heart rate Your heart rate was low, likely as a consequence of your potassium disorder on admission. It has since resolved and your heart rate has improved. Your Coreg (carvedilol) is being restarted at half the dose. Follow up with your cardiologist/PCP for re-titration of your Coreg. Monitor for resolution Please follow up with your PCP for further blood work one week from now for resolution of acute kidney injury and for restarting the diovan as kidney function improves. Control blood pressure Please do not take diovan for now until your kidney function is re-evaluated. Please take norvasc and your new dose of Coreg for now to control blood pressure. Follow up with your PCP for regular blood pressure checks and medication adjustments Take bronchodilators Please follow up with your PCP/pulmonologist for regular evaluations of your COPD and medication adjustments. Continue to take medications Please continue to take Plavix, lipitor and Coreg to prevent further coronary artery disease. Follow up with your PCP/cardiologist for regular evaluations and medication adjustments. Follow up with your cardiologist Please continue to take your medications as per usual with the exception of your new Coreg dose and do not take diovan for now. Follow up with your cardiologist for regular evaluations and medication adjustments.

## 2017-02-28 NOTE — DISCHARGE NOTE ADULT - MEDICATION SUMMARY - MEDICATIONS TO STOP TAKING
I will STOP taking the medications listed below when I get home from the hospital:    valsartan 160 mg oral tablet  -- 1 tab(s) by mouth once a day    tolterodine 2 mg oral capsule, extended release  -- 1 cap(s) by mouth once a day

## 2017-02-28 NOTE — DISCHARGE NOTE ADULT - PATIENT PORTAL LINK FT
“You can access the [facility] Patient Portal, offered by [facility], by registering with the following website: [email or web address]”

## 2017-03-03 DIAGNOSIS — Z95.9 PRESENCE OF CARDIAC AND VASCULAR IMPLANT AND GRAFT, UNSPECIFIED: ICD-10-CM

## 2017-03-03 DIAGNOSIS — C67.9 MALIGNANT NEOPLASM OF BLADDER, UNSPECIFIED: ICD-10-CM

## 2017-03-03 DIAGNOSIS — I25.10 ATHEROSCLEROTIC HEART DISEASE OF NATIVE CORONARY ARTERY WITHOUT ANGINA PECTORIS: ICD-10-CM

## 2017-03-03 DIAGNOSIS — H91.92 UNSPECIFIED HEARING LOSS, LEFT EAR: ICD-10-CM

## 2017-03-03 DIAGNOSIS — N32.89 OTHER SPECIFIED DISORDERS OF BLADDER: ICD-10-CM

## 2017-03-03 DIAGNOSIS — E11.9 TYPE 2 DIABETES MELLITUS WITHOUT COMPLICATIONS: ICD-10-CM

## 2017-03-03 DIAGNOSIS — Z86.73 PERSONAL HISTORY OF TRANSIENT ISCHEMIC ATTACK (TIA), AND CEREBRAL INFARCTION WITHOUT RESIDUAL DEFICITS: ICD-10-CM

## 2017-03-03 DIAGNOSIS — Z88.8 ALLERGY STATUS TO OTHER DRUGS, MEDICAMENTS AND BIOLOGICAL SUBSTANCES STATUS: ICD-10-CM

## 2017-03-03 DIAGNOSIS — E66.9 OBESITY, UNSPECIFIED: ICD-10-CM

## 2017-03-03 DIAGNOSIS — N18.3 CHRONIC KIDNEY DISEASE, STAGE 3 (MODERATE): ICD-10-CM

## 2017-03-03 DIAGNOSIS — I13.0 HYPERTENSIVE HEART AND CHRONIC KIDNEY DISEASE WITH HEART FAILURE AND STAGE 1 THROUGH STAGE 4 CHRONIC KIDNEY DISEASE, OR UNSPECIFIED CHRONIC KIDNEY DISEASE: ICD-10-CM

## 2017-03-03 DIAGNOSIS — I73.9 PERIPHERAL VASCULAR DISEASE, UNSPECIFIED: ICD-10-CM

## 2017-03-03 DIAGNOSIS — E87.5 HYPERKALEMIA: ICD-10-CM

## 2017-03-03 DIAGNOSIS — R00.1 BRADYCARDIA, UNSPECIFIED: ICD-10-CM

## 2017-03-03 DIAGNOSIS — I50.20 UNSPECIFIED SYSTOLIC (CONGESTIVE) HEART FAILURE: ICD-10-CM

## 2017-03-03 DIAGNOSIS — Z87.891 PERSONAL HISTORY OF NICOTINE DEPENDENCE: ICD-10-CM

## 2017-03-03 DIAGNOSIS — I45.10 UNSPECIFIED RIGHT BUNDLE-BRANCH BLOCK: ICD-10-CM

## 2017-03-03 DIAGNOSIS — N17.9 ACUTE KIDNEY FAILURE, UNSPECIFIED: ICD-10-CM

## 2017-03-03 DIAGNOSIS — J44.9 CHRONIC OBSTRUCTIVE PULMONARY DISEASE, UNSPECIFIED: ICD-10-CM

## 2017-03-03 DIAGNOSIS — Z85.46 PERSONAL HISTORY OF MALIGNANT NEOPLASM OF PROSTATE: ICD-10-CM

## 2017-03-06 DIAGNOSIS — Z53.09 PROCEDURE AND TREATMENT NOT CARRIED OUT BECAUSE OF OTHER CONTRAINDICATION: ICD-10-CM

## 2017-03-14 NOTE — ASU PREOP CHECKLIST - HEIGHT IN FEET
Julia Loco DO 3/14/17 1115:


Discharge Instructions


Date of Service


Mar 14, 2017


Dates of Hospitalization


Mar 12, 2017 at 19:07





Discharge Diagnosis


Discharge Diagnosis


1. Wide complex tachyarrhythmia, symptomatic. Present on admission. Active.


2. Hypertension, chronic, stable. 


3. Hyperlipidemia, chronic, presume stable.


4. Elevated D-dimer, present on admission, ruled out Pulmonary Embolism.


5. Multiple lung nodules, as evidence on CT chest, unknown chronicity, active.


6. Minimally displaced left L1 transverse process fracture, unknown chronicity, 

active.


7. Abnormal CT scan, unknown chronicity, active.





Medication Instructions


- Stop taking the home Amlodipine-Valsartan. We started you on a new medication 

called Metoprolol 50mg to help control your heart rate better. Take it as 

directed.


- Continue to take the Atorvastatin 10mg 1 tab PO QHS.


- We started you on Aspirin 325mg 1 tab PO daily. You can talk to your PCP 

about reducing it to 81mg daily. 


- Resume your other medications as directed.





Test Results


Normal Echocardiogram





Diet


Heart Healthy





Activity


No restrictions





Call your provider


Shortness of breath, Chest pain, Vomitting, Weakness (unilateral)





Patient Instructions


- You were found to have palpitations due to brief, nonsustained 

tachyarrhythmias (fast, irregular heart rate and rhythm). This seems to improve 

on the day of discharge as you did not have any episode of wide complex 

tachycardia overnight.


- There is no obvious trigger found, but you should reduce your caffein intake.


- You Echo cardiogram was normal with no structural abnormality and the 

systolic function EF was 60-65%. 


- The stress test done today on 3/14/2017 was also normal.


- Per the cardiologist's recommendation, you will need to keep the Magnesium 

above 2 and potassium greater than 4. You will need to have a BMP in 2 weeks.


- You should follow up with your PCP for lipid panel check to keep the LDL less 

than 100.


Follow-up plan


Follow up with your primary care doctor in 1 week. You might want to discuss 

with Dr. Kern about a cardiology referral if this continues to be an issue.


Given the abnormal CT scan, you will need to have a follow-up CT in a year to 

monitor the lung (3) and liver (1) nodules.


Incidentally, there was a minimally displaced left L1 transverse process 

fracture found on the CT. Follow up with your PCP for pain management.


Go to the ER if you develop severe palpitations, chest pain, shortness of breath

, nausea, or vomiting.


Follow-up Provider:  Anita Kern DO


Follow-up with PCP in:  1 week





JHONATHAN Mccauley MD 3/15/17 0709:


Discharge Instructions


Attending's Statement


The patient was seen and examined together with Dr. Loco on 3-14-17 and I 

agree with the history, exam and plan as outlined in the note above.








Julia Loco DO Mar 14, 2017 11:15


JHONATHAN Mccauley MD Mar 15, 2017 07:09 5

## 2017-03-24 ENCOUNTER — OUTPATIENT (OUTPATIENT)
Dept: OUTPATIENT SERVICES | Facility: HOSPITAL | Age: 77
LOS: 1 days | End: 2017-03-24
Payer: MEDICARE

## 2017-03-24 VITALS
TEMPERATURE: 99 F | HEART RATE: 67 BPM | WEIGHT: 190.04 LBS | SYSTOLIC BLOOD PRESSURE: 134 MMHG | DIASTOLIC BLOOD PRESSURE: 50 MMHG | HEIGHT: 67 IN | RESPIRATION RATE: 16 BRPM | OXYGEN SATURATION: 97 %

## 2017-03-24 DIAGNOSIS — Z77.123 CONTACT WITH AND (SUSPECTED) EXPOSURE TO RADON AND OTHER NATURALLY OCCURRING RADIATION: Chronic | ICD-10-CM

## 2017-03-24 DIAGNOSIS — C67.9 MALIGNANT NEOPLASM OF BLADDER, UNSPECIFIED: ICD-10-CM

## 2017-03-24 DIAGNOSIS — I21.3 ST ELEVATION (STEMI) MYOCARDIAL INFARCTION OF UNSPECIFIED SITE: Chronic | ICD-10-CM

## 2017-03-24 DIAGNOSIS — Z01.818 ENCOUNTER FOR OTHER PREPROCEDURAL EXAMINATION: ICD-10-CM

## 2017-03-24 DIAGNOSIS — Z98.890 OTHER SPECIFIED POSTPROCEDURAL STATES: Chronic | ICD-10-CM

## 2017-03-24 DIAGNOSIS — G47.33 OBSTRUCTIVE SLEEP APNEA (ADULT) (PEDIATRIC): ICD-10-CM

## 2017-03-24 DIAGNOSIS — I10 ESSENTIAL (PRIMARY) HYPERTENSION: ICD-10-CM

## 2017-03-24 PROCEDURE — G0463: CPT

## 2017-03-24 RX ORDER — SODIUM CHLORIDE 9 MG/ML
3 INJECTION INTRAMUSCULAR; INTRAVENOUS; SUBCUTANEOUS EVERY 8 HOURS
Qty: 0 | Refills: 0 | Status: DISCONTINUED | OUTPATIENT
Start: 2017-03-27 | End: 2017-04-04

## 2017-03-24 RX ORDER — AMLODIPINE BESYLATE 2.5 MG/1
1 TABLET ORAL
Qty: 0 | Refills: 0 | COMMUNITY

## 2017-03-24 NOTE — H&P PST ADULT - NEGATIVE GASTROINTESTINAL SYMPTOMS
no change in bowel habits/no diarrhea/no abdominal pain/no vomiting/no nausea/no flatulence/no constipation

## 2017-03-24 NOTE — H&P PST ADULT - PROBLEM SELECTOR PLAN 1
Scheduled for cystoscopy, transurethral resection of bladder tumor 3/27/2017. Preoperative instructions discussed and given to patient. Verbalized understanding of instructions

## 2017-03-24 NOTE — H&P PST ADULT - RS GEN PE MLT RESP DETAILS PC
airway patent/no chest wall tenderness/normal/breath sounds equal/good air movement/no intercostal retractions/no wheezes/no rales/respirations non-labored/no rhonchi/clear to auscultation bilaterally/no subcutaneous emphysema

## 2017-03-24 NOTE — H&P PST ADULT - PMH
2001 CVA  as per pt, he doesn't notice any deficits  6/09 diagnosed with Prostate Ca  s/p radiation and brachytherapy w/ seed implantation 5 years ago s/p TURP  approximately 2001  MI/Coronary Artery Disease    Bladder mass  s/p resection  BPH    Cataract of left eye    CKD (chronic kidney disease) stage 3, GFR 30-59 ml/min    Diabetes mellitus, type 2    Hearing Decreased left ear    Hypercholesteremia    Hypertension    Injury of head, initial encounter  12/2016 - s/p fall in hospital  Obesity    PAD--Left leg stent 2002  done at hospital in [place]  --[facility] in [place], s/p balloon on LLE in 2006  Prostate CA    Pulmonary edema  May 2016-resolved  Pulmonary emphysema, unspecified emphysema type  2017  Urinary retention

## 2017-03-24 NOTE — H&P PST ADULT - PROBLEM SELECTOR PLAN 2
Instructed to take antihypertensive medications with a sip of water the morning of surgery and to follow up with PCP post surgery for management of hypertension and other comorbid conditions

## 2017-03-24 NOTE — H&P PST ADULT - NEGATIVE GENERAL SYMPTOMS
no polyuria/no polydipsia/no sweating/no chills/no polyphagia/no weight loss/no fatigue/no malaise/no anorexia/no fever

## 2017-03-24 NOTE — H&P PST ADULT - HISTORY OF PRESENT ILLNESS
75 y/o male with PMH of essential hypertension, cardiomyopathy w/coronary stent 1, old MI 2007, CKD stage 3, PVD left lower extremity s/p stent placement left lower extremity (over 10yrs ago), hyperlipidemia T2DM, prostate cancer s/p seed implant (2007), malignant neoplasm of bladder is here for presurgical evaluation. He is scheduled for cystoscopy, transurethral resection of bladder tumor on 3/27/2017

## 2017-03-24 NOTE — H&P PST ADULT - ASSESSMENT
75 y/o male with PMH of essential hypertension, cardiomyopathy w/coronary stent 1, old MI 2007, CKD stage 3, PVD left lower extremity s/p stent placement left lower extremity (over 10yrs ago), hyperlipidemia T2DM, prostate cancer s/p seed implant (2007), malignant neoplasm of bladder scheduled for cystoscopy, transurethral resection of bladder tumor on 3/27/2017. Patient is at moderate to high risk for planned surgery

## 2017-03-24 NOTE — H&P PST ADULT - CARDIOVASCULAR COMMENTS
h/o essential hypertension, hyperlipidemia, obesity, coronary stent x 1, PVD left lower extremity, stent left lower extremity

## 2017-03-24 NOTE — H&P PST ADULT - PSH
2002 left leg stent    Acute myocardial infarction  as per pt in 2001, no coronary stent  Cataract surgery 30 years ago    Exposure to radiation  for prostate cancer (seed implant)  History of colon surgery  2007  right wrist surgery with hardware 27 years ago

## 2017-03-24 NOTE — H&P PST ADULT - PROBLEM SELECTOR PLAN 3
High risk for VICTORINO on STOP BANG screen. Patient is scheduled to f/u with pulmonologist post surgery. VICTORINO Precautions

## 2017-03-26 ENCOUNTER — RESULT REVIEW (OUTPATIENT)
Age: 77
End: 2017-03-26

## 2017-03-27 ENCOUNTER — OUTPATIENT (OUTPATIENT)
Dept: OUTPATIENT SERVICES | Facility: HOSPITAL | Age: 77
LOS: 1 days | Discharge: HOME | End: 2017-03-27
Payer: MEDICARE

## 2017-03-27 VITALS
RESPIRATION RATE: 14 BRPM | SYSTOLIC BLOOD PRESSURE: 110 MMHG | DIASTOLIC BLOOD PRESSURE: 41 MMHG | OXYGEN SATURATION: 92 % | TEMPERATURE: 97 F | HEART RATE: 69 BPM

## 2017-03-27 VITALS
SYSTOLIC BLOOD PRESSURE: 125 MMHG | TEMPERATURE: 98 F | OXYGEN SATURATION: 98 % | HEIGHT: 67 IN | HEART RATE: 68 BPM | RESPIRATION RATE: 16 BRPM | WEIGHT: 190.04 LBS | DIASTOLIC BLOOD PRESSURE: 65 MMHG

## 2017-03-27 DIAGNOSIS — N30.80 OTHER CYSTITIS WITHOUT HEMATURIA: ICD-10-CM

## 2017-03-27 DIAGNOSIS — I21.3 ST ELEVATION (STEMI) MYOCARDIAL INFARCTION OF UNSPECIFIED SITE: Chronic | ICD-10-CM

## 2017-03-27 DIAGNOSIS — Z79.82 LONG TERM (CURRENT) USE OF ASPIRIN: ICD-10-CM

## 2017-03-27 DIAGNOSIS — E78.5 HYPERLIPIDEMIA, UNSPECIFIED: ICD-10-CM

## 2017-03-27 DIAGNOSIS — N40.0 BENIGN PROSTATIC HYPERPLASIA WITHOUT LOWER URINARY TRACT SYMPTOMS: ICD-10-CM

## 2017-03-27 DIAGNOSIS — I73.9 PERIPHERAL VASCULAR DISEASE, UNSPECIFIED: ICD-10-CM

## 2017-03-27 DIAGNOSIS — E11.9 TYPE 2 DIABETES MELLITUS WITHOUT COMPLICATIONS: ICD-10-CM

## 2017-03-27 DIAGNOSIS — Z01.818 ENCOUNTER FOR OTHER PREPROCEDURAL EXAMINATION: ICD-10-CM

## 2017-03-27 DIAGNOSIS — C67.9 MALIGNANT NEOPLASM OF BLADDER, UNSPECIFIED: ICD-10-CM

## 2017-03-27 DIAGNOSIS — Z77.123 CONTACT WITH AND (SUSPECTED) EXPOSURE TO RADON AND OTHER NATURALLY OCCURRING RADIATION: Chronic | ICD-10-CM

## 2017-03-27 DIAGNOSIS — N18.3 CHRONIC KIDNEY DISEASE, STAGE 3 (MODERATE): ICD-10-CM

## 2017-03-27 DIAGNOSIS — Z85.46 PERSONAL HISTORY OF MALIGNANT NEOPLASM OF PROSTATE: ICD-10-CM

## 2017-03-27 DIAGNOSIS — R33.9 RETENTION OF URINE, UNSPECIFIED: ICD-10-CM

## 2017-03-27 DIAGNOSIS — I25.2 OLD MYOCARDIAL INFARCTION: ICD-10-CM

## 2017-03-27 DIAGNOSIS — E66.9 OBESITY, UNSPECIFIED: ICD-10-CM

## 2017-03-27 DIAGNOSIS — Z79.02 LONG TERM (CURRENT) USE OF ANTITHROMBOTICS/ANTIPLATELETS: ICD-10-CM

## 2017-03-27 DIAGNOSIS — Z98.890 OTHER SPECIFIED POSTPROCEDURAL STATES: Chronic | ICD-10-CM

## 2017-03-27 DIAGNOSIS — I42.9 CARDIOMYOPATHY, UNSPECIFIED: ICD-10-CM

## 2017-03-27 DIAGNOSIS — Z95.5 PRESENCE OF CORONARY ANGIOPLASTY IMPLANT AND GRAFT: ICD-10-CM

## 2017-03-27 DIAGNOSIS — Z87.891 PERSONAL HISTORY OF NICOTINE DEPENDENCE: ICD-10-CM

## 2017-03-27 DIAGNOSIS — I12.9 HYPERTENSIVE CHRONIC KIDNEY DISEASE WITH STAGE 1 THROUGH STAGE 4 CHRONIC KIDNEY DISEASE, OR UNSPECIFIED CHRONIC KIDNEY DISEASE: ICD-10-CM

## 2017-03-27 DIAGNOSIS — H91.92 UNSPECIFIED HEARING LOSS, LEFT EAR: ICD-10-CM

## 2017-03-27 DIAGNOSIS — J43.8 OTHER EMPHYSEMA: ICD-10-CM

## 2017-03-27 DIAGNOSIS — E78.00 PURE HYPERCHOLESTEROLEMIA, UNSPECIFIED: ICD-10-CM

## 2017-03-27 PROCEDURE — C1769: CPT

## 2017-03-27 PROCEDURE — 52235 CYSTOSCOPY AND TREATMENT: CPT

## 2017-03-27 PROCEDURE — 88307 TISSUE EXAM BY PATHOLOGIST: CPT | Mod: 26

## 2017-03-27 PROCEDURE — 88307 TISSUE EXAM BY PATHOLOGIST: CPT

## 2017-03-27 RX ORDER — ONDANSETRON 8 MG/1
4 TABLET, FILM COATED ORAL ONCE
Qty: 0 | Refills: 0 | Status: DISCONTINUED | OUTPATIENT
Start: 2017-03-27 | End: 2017-03-27

## 2017-03-27 RX ORDER — CIPROFLOXACIN LACTATE 400MG/40ML
1 VIAL (ML) INTRAVENOUS
Qty: 6 | Refills: 0 | OUTPATIENT
Start: 2017-03-27 | End: 2017-03-30

## 2017-03-27 RX ORDER — ACETAMINOPHEN 500 MG
1 TABLET ORAL
Qty: 0 | Refills: 0 | COMMUNITY
Start: 2017-03-27

## 2017-03-27 RX ORDER — ACETAMINOPHEN 500 MG
650 TABLET ORAL EVERY 4 HOURS
Qty: 0 | Refills: 0 | Status: DISCONTINUED | OUTPATIENT
Start: 2017-03-27 | End: 2017-04-04

## 2017-03-27 RX ORDER — FENTANYL CITRATE 50 UG/ML
25 INJECTION INTRAVENOUS
Qty: 0 | Refills: 0 | Status: DISCONTINUED | OUTPATIENT
Start: 2017-03-27 | End: 2017-03-27

## 2017-03-27 RX ORDER — SODIUM CHLORIDE 9 MG/ML
1000 INJECTION, SOLUTION INTRAVENOUS
Qty: 0 | Refills: 0 | Status: DISCONTINUED | OUTPATIENT
Start: 2017-03-27 | End: 2017-03-27

## 2017-03-27 RX ADMIN — SODIUM CHLORIDE 3 MILLILITER(S): 9 INJECTION INTRAMUSCULAR; INTRAVENOUS; SUBCUTANEOUS at 08:23

## 2017-03-27 NOTE — ASU DISCHARGE PLAN (ADULT/PEDIATRIC). - MEDICATION SUMMARY - MEDICATIONS TO TAKE
I will START or STAY ON the medications listed below when I get home from the hospital:    acetaminophen 325 mg oral tablet  -- 1 tab(s) by mouth every 4 hours, As needed, Moderate Pain (4 - 6)  -- Indication: For Moderate pain    aspirin 81 mg oral tablet  -- 1 tab(s) by mouth once a day  -- Indication: For as prescribed    valsartan 320 mg oral tablet  -- 1 tab(s) by mouth once a day  -- Indication: For as prescribed    Flomax 0.4 mg oral capsule  -- 1 cap(s) by mouth once a day  -- Indication: For as prescribed    isosorbide mononitrate 30 mg oral tablet, extended release  -- 1 tab(s) by mouth once a day (in the morning)  -- Indication: For as prescribed    glipiZIDE 10 mg oral tablet  -- 10 milligram(s) by mouth 2 times a day  -- Indication: For as prescribed    Lipitor 40 mg oral tablet  -- 2 tab(s) by mouth once a day  -- Indication: For as prescribed    clopidogrel 75 mg oral tablet  -- 1 tab(s) by mouth once a day  -- Indication: For as prescribed    amLODIPine 10 mg oral tablet  -- 1 tab(s) by mouth once a day  -- Indication: For as prescribed    Citracal  -- 1 tab(s) by mouth once a day  -- citracal slow release 600- mg-mg-unit  -- Indication: For as prescribed    ciprofloxacin 500 mg oral tablet  -- 1 tab(s) by mouth every 12 hours  -- Avoid prolonged or excessive exposure to direct and/or artificial sunlight while taking this medication.  Check with your doctor before becoming pregnant.  Do not take dairy products, antacids, or iron preparations within one hour of this medication.  Finish all this medication unless otherwise directed by prescriber.  Medication should be taken with plenty of water.    -- Indication: For Prophylaxis    folic acid 1 mg oral tablet  -- 1 tab(s) by mouth once a day  -- Indication: For as prescribed

## 2017-03-27 NOTE — ASU PATIENT PROFILE, ADULT - PMH
2001 CVA  as per pt, he doesn't notice any deficits  6/09 diagnosed with Prostate Ca  s/p radiation and brachytherapy w/ seed implantation 5 years ago s/p TURP  approximately 2001  MI/Coronary Artery Disease    Bladder mass  s/p resection  BPH    Cataract of left eye    CKD (chronic kidney disease) stage 3, GFR 30-59 ml/min    Diabetes mellitus, type 2    Hearing Decreased left ear    Hypercholesteremia    Hypertension    Injury of head, initial encounter  12/2016 - s/p fall in hospital  Obesity    PAD--Left leg stent 2002  done at hospital in [place]  --[facility] in [place], s/p balloon on LLE in 2006  Prostate CA    Pulmonary edema  May 2016-resolved  Pulmonary emphysema, unspecified emphysema type  2017  Urinary retention

## 2017-04-05 ENCOUNTER — TRANSCRIPTION ENCOUNTER (OUTPATIENT)
Age: 77
End: 2017-04-05

## 2017-12-27 ENCOUNTER — APPOINTMENT (OUTPATIENT)
Dept: UROLOGY | Facility: CLINIC | Age: 77
End: 2017-12-27
Payer: MEDICARE

## 2017-12-27 ENCOUNTER — LABORATORY RESULT (OUTPATIENT)
Age: 77
End: 2017-12-27

## 2017-12-27 VITALS
HEIGHT: 68 IN | SYSTOLIC BLOOD PRESSURE: 128 MMHG | DIASTOLIC BLOOD PRESSURE: 50 MMHG | HEART RATE: 68 BPM | RESPIRATION RATE: 16 BRPM | BODY MASS INDEX: 28.79 KG/M2 | TEMPERATURE: 97.3 F | WEIGHT: 190 LBS

## 2017-12-27 PROCEDURE — 51798 US URINE CAPACITY MEASURE: CPT

## 2017-12-27 PROCEDURE — 99214 OFFICE O/P EST MOD 30 MIN: CPT

## 2017-12-29 LAB
APPEARANCE: CLEAR
BILIRUBIN URINE: NEGATIVE
BLOOD URINE: NEGATIVE
COLOR: YELLOW
GLUCOSE QUALITATIVE U: NEGATIVE MG/DL
KETONES URINE: ABNORMAL
LEUKOCYTE ESTERASE URINE: NEGATIVE
NITRITE URINE: NEGATIVE
PH URINE: 5
PROTEIN URINE: 30 MG/DL
SPECIFIC GRAVITY URINE: 1.02
UROBILINOGEN URINE: NEGATIVE MG/DL

## 2018-05-08 ENCOUNTER — LABORATORY RESULT (OUTPATIENT)
Age: 78
End: 2018-05-08

## 2018-05-08 ENCOUNTER — APPOINTMENT (OUTPATIENT)
Dept: UROLOGY | Facility: CLINIC | Age: 78
End: 2018-05-08
Payer: MEDICARE

## 2018-05-08 VITALS
WEIGHT: 188 LBS | SYSTOLIC BLOOD PRESSURE: 122 MMHG | OXYGEN SATURATION: 97 % | DIASTOLIC BLOOD PRESSURE: 58 MMHG | BODY MASS INDEX: 29.51 KG/M2 | RESPIRATION RATE: 16 BRPM | HEIGHT: 67 IN | HEART RATE: 66 BPM

## 2018-05-08 PROCEDURE — 99214 OFFICE O/P EST MOD 30 MIN: CPT

## 2018-05-10 LAB — BACTERIA UR CULT: NORMAL

## 2018-05-18 ENCOUNTER — APPOINTMENT (OUTPATIENT)
Dept: UROLOGY | Facility: CLINIC | Age: 78
End: 2018-05-18
Payer: MEDICARE

## 2018-05-18 VITALS — SYSTOLIC BLOOD PRESSURE: 130 MMHG | DIASTOLIC BLOOD PRESSURE: 70 MMHG | RESPIRATION RATE: 16 BRPM | HEART RATE: 68 BPM

## 2018-05-18 PROCEDURE — 99213 OFFICE O/P EST LOW 20 MIN: CPT

## 2018-05-21 ENCOUNTER — EMERGENCY (EMERGENCY)
Facility: HOSPITAL | Age: 78
LOS: 1 days | Discharge: HOME | End: 2018-05-21
Attending: EMERGENCY MEDICINE
Payer: MEDICARE

## 2018-05-21 VITALS
WEIGHT: 195.99 LBS | SYSTOLIC BLOOD PRESSURE: 179 MMHG | TEMPERATURE: 98 F | RESPIRATION RATE: 20 BRPM | HEIGHT: 66 IN | OXYGEN SATURATION: 99 % | DIASTOLIC BLOOD PRESSURE: 77 MMHG | HEART RATE: 86 BPM

## 2018-05-21 VITALS
OXYGEN SATURATION: 95 % | TEMPERATURE: 98 F | DIASTOLIC BLOOD PRESSURE: 58 MMHG | RESPIRATION RATE: 18 BRPM | HEART RATE: 85 BPM | SYSTOLIC BLOOD PRESSURE: 167 MMHG

## 2018-05-21 DIAGNOSIS — Z98.890 OTHER SPECIFIED POSTPROCEDURAL STATES: Chronic | ICD-10-CM

## 2018-05-21 DIAGNOSIS — Z77.123 CONTACT WITH AND (SUSPECTED) EXPOSURE TO RADON AND OTHER NATURALLY OCCURRING RADIATION: Chronic | ICD-10-CM

## 2018-05-21 DIAGNOSIS — I21.3 ST ELEVATION (STEMI) MYOCARDIAL INFARCTION OF UNSPECIFIED SITE: Chronic | ICD-10-CM

## 2018-05-21 DIAGNOSIS — Z90.79 ACQUIRED ABSENCE OF OTHER GENITAL ORGAN(S): Chronic | ICD-10-CM

## 2018-05-21 PROCEDURE — 99284 EMERGENCY DEPT VISIT MOD MDM: CPT

## 2018-05-21 PROCEDURE — 99283 EMERGENCY DEPT VISIT LOW MDM: CPT | Mod: 25

## 2018-05-21 PROCEDURE — 51702 INSERT TEMP BLADDER CATH: CPT

## 2018-05-21 NOTE — ED PROVIDER NOTE - PMH
2001 CVA  as per pt, he doesn't notice any deficits  6/09 diagnosed with Prostate Ca  s/p radiation and brachytherapy w/ seed implantation 5 years ago s/p TURP  approximately 2001  MI/Coronary Artery Disease    Bladder mass  s/p resection  BPH    Cataract of left eye    CKD (chronic kidney disease) stage 3, GFR 30-59 ml/min    Diabetes mellitus, type 2    Hearing Decreased left ear    Hypercholesteremia    Hypertension    Injury of head, initial encounter  12/2016 - s/p fall in hospital  Obesity    PAD--Left leg stent 2002  done at hospital in [place]  --[facility] in [place], s/p balloon on LLE in 2006  Prostate CA    Pulmonary edema  May 2016-resolved  Pulmonary emphysema, unspecified emphysema type  2017  Urinary retention

## 2018-05-21 NOTE — ED ADULT NURSE NOTE - OBJECTIVE STATEMENT
presented with c/o suprapubic pain  difficulty urinating S/P Cystoscopy 3days ago (last Friday ) no feverrchills noted

## 2018-05-21 NOTE — ED PROVIDER NOTE - PROGRESS NOTE DETAILS
Nurse placed العلي catheter with 1000ml of clear urine output. Bladder is no longer tender or distended. Pt is feeling improved

## 2018-05-21 NOTE — ED PROVIDER NOTE - PSH
2002 left leg stent    Acute myocardial infarction  as per pt in 2001, no coronary stent  Cataract surgery 30 years ago    Exposure to radiation  for prostate cancer (seed implant)  History of bladder surgery    History of colon surgery  2007  right wrist surgery with hardware 27 years ago    S/P TURP (status post transurethral resection of prostate)

## 2018-05-21 NOTE — ED PROVIDER NOTE - MEDICAL DECISION MAKING DETAILS
77 y/o M pt presents with acute urinary retention. العلي catheter placed. Will give leg bag and will d/c home to f/u with Dr. Cesar in x1-2 days. Pt instructed to return to ER if not feeling improved, if sx's worsen, or if any new or troubling sx's arise.

## 2018-05-21 NOTE — ED PROVIDER NOTE - OBJECTIVE STATEMENT
79 y/o M pt with PMHx of CVA, Prostate CA (s/p radiation therapy, brachytherapy, and seed implantation; s/p TURP), CAD, Bladder Mass (s/p resection), BPH, L Eye Cataract, CKD, DM (Type II), Decreased Hearing in L Ear, Hypercholesterolemia, HTN, Head Injury, Obesity, PAD, Pulmonary Edema, Pulmonary Emphysema, and Urinary Retention and PSHx of L Leg Stent, Cataract Surgery, Seed Implantation, Bladder Surgery, Colon Surgery, R Wrist Surgery, and TURP presents to ED c/o inability to urinate since 00:00am today (midnight). Pt additionally reports associated severe lower abdominal pain. Pt describes lower abdominal pain as constant and non-radiating. Per pt, pt has been experiencing urinary retention intermittently x1 week, but episode of urinary retention and inability to urinate was severe earlier today. Pt denies fever, chills, CP, SOB, nausea, vomiting, diarrhea, dysuria, or any other complaints. Allergies: Novacaine ("passed out").

## 2018-05-21 NOTE — ED PROVIDER NOTE - CHPI ED SYMPTOMS NEG
no vomiting/no nausea/no diarrhea/no fever/no dysuria/no chills/no chest pain, no shortness of breath

## 2018-06-06 ENCOUNTER — OUTPATIENT (OUTPATIENT)
Dept: OUTPATIENT SERVICES | Facility: HOSPITAL | Age: 78
LOS: 1 days | End: 2018-06-06
Payer: MEDICARE

## 2018-06-06 VITALS
TEMPERATURE: 98 F | SYSTOLIC BLOOD PRESSURE: 136 MMHG | HEART RATE: 78 BPM | WEIGHT: 190.04 LBS | OXYGEN SATURATION: 98 % | DIASTOLIC BLOOD PRESSURE: 82 MMHG | HEIGHT: 66 IN | RESPIRATION RATE: 18 BRPM

## 2018-06-06 DIAGNOSIS — Z98.890 OTHER SPECIFIED POSTPROCEDURAL STATES: Chronic | ICD-10-CM

## 2018-06-06 DIAGNOSIS — I10 ESSENTIAL (PRIMARY) HYPERTENSION: ICD-10-CM

## 2018-06-06 DIAGNOSIS — C67.9 MALIGNANT NEOPLASM OF BLADDER, UNSPECIFIED: ICD-10-CM

## 2018-06-06 DIAGNOSIS — E11.9 TYPE 2 DIABETES MELLITUS WITHOUT COMPLICATIONS: ICD-10-CM

## 2018-06-06 DIAGNOSIS — N18.9 CHRONIC KIDNEY DISEASE, UNSPECIFIED: ICD-10-CM

## 2018-06-06 DIAGNOSIS — Z90.79 ACQUIRED ABSENCE OF OTHER GENITAL ORGAN(S): Chronic | ICD-10-CM

## 2018-06-06 DIAGNOSIS — I25.10 ATHEROSCLEROTIC HEART DISEASE OF NATIVE CORONARY ARTERY WITHOUT ANGINA PECTORIS: ICD-10-CM

## 2018-06-06 DIAGNOSIS — Z01.818 ENCOUNTER FOR OTHER PREPROCEDURAL EXAMINATION: ICD-10-CM

## 2018-06-06 DIAGNOSIS — N32.0 BLADDER-NECK OBSTRUCTION: ICD-10-CM

## 2018-06-06 DIAGNOSIS — I21.3 ST ELEVATION (STEMI) MYOCARDIAL INFARCTION OF UNSPECIFIED SITE: Chronic | ICD-10-CM

## 2018-06-06 DIAGNOSIS — Z77.123 CONTACT WITH AND (SUSPECTED) EXPOSURE TO RADON AND OTHER NATURALLY OCCURRING RADIATION: Chronic | ICD-10-CM

## 2018-06-06 RX ORDER — SODIUM CHLORIDE 9 MG/ML
3 INJECTION INTRAMUSCULAR; INTRAVENOUS; SUBCUTANEOUS EVERY 8 HOURS
Qty: 0 | Refills: 0 | Status: DISCONTINUED | OUTPATIENT
Start: 2018-06-11 | End: 2018-06-19

## 2018-06-06 RX ORDER — ATORVASTATIN CALCIUM 80 MG/1
2 TABLET, FILM COATED ORAL
Qty: 0 | Refills: 0 | COMMUNITY

## 2018-06-06 NOTE — H&P PST ADULT - PSH
2002 left leg stent    Acute myocardial infarction  as per pt in 2001, no coronary stent  Cataract surgery 30 years ago    Exposure to radiation  for prostate cancer (seed implant)  History of bladder surgery    History of colon surgery  2007  right wrist surgery with hardware 27 years ago    S/P cardiac catheterization  2016 with 1 FEIM stent  S/P TURP (status post transurethral resection of prostate)

## 2018-06-06 NOTE — H&P PST ADULT - HISTORY OF PRESENT ILLNESS
This is a 77 y/o male with This is a 77 y/o male with history of bladder tumor, a routine cystoscopy revealed bladder mass, he presents today for cystoscopy, TURBT, transurethral incision of bladder neck

## 2018-06-06 NOTE — H&P PST ADULT - NEGATIVE CARDIOVASCULAR SYMPTOMS
no paroxysmal nocturnal dyspnea/no claudication/no orthopnea/no chest pain/no palpitations/no peripheral edema/no dyspnea on exertion

## 2018-06-06 NOTE — H&P PST ADULT - PROBLEM SELECTOR PLAN 2
stop plavix 7 days before surgery per PCP DR Heller  instructed pt to call cardiologist / surgeon for ASA instruction pre op

## 2018-06-06 NOTE — H&P PST ADULT - PMH
2001 CVA  as per pt, he doesn't notice any deficits  6/09 diagnosed with Prostate Ca  s/p radiation and brachytherapy w/ seed implantation 5 years ago s/p TURP  approximately 2001  MI/Coronary Artery Disease    Bladder mass  s/p resection  BPH    CKD (chronic kidney disease) stage 3, GFR 30-59 ml/min    Diabetes mellitus, type 2    Hearing Decreased left ear    Hypercholesteremia    Hypertension    Injury of head, initial encounter  12/2016 - s/p fall in hospital  Obesity    PAD--Left leg stent 2002  done at hospital in [place]  --[facility] in [place], s/p balloon on LLE in 2006  Prostate CA    Pulmonary edema  May 2016-resolved  Urinary retention

## 2018-06-08 ENCOUNTER — MESSAGE (OUTPATIENT)
Age: 78
End: 2018-06-08

## 2018-06-10 ENCOUNTER — TRANSCRIPTION ENCOUNTER (OUTPATIENT)
Age: 78
End: 2018-06-10

## 2018-06-11 ENCOUNTER — RESULT REVIEW (OUTPATIENT)
Age: 78
End: 2018-06-11

## 2018-06-11 ENCOUNTER — APPOINTMENT (OUTPATIENT)
Dept: UROLOGY | Facility: HOSPITAL | Age: 78
End: 2018-06-11

## 2018-06-11 ENCOUNTER — OUTPATIENT (OUTPATIENT)
Dept: OUTPATIENT SERVICES | Facility: HOSPITAL | Age: 78
LOS: 1 days | End: 2018-06-11
Payer: MEDICARE

## 2018-06-11 VITALS
SYSTOLIC BLOOD PRESSURE: 136 MMHG | WEIGHT: 190.04 LBS | RESPIRATION RATE: 18 BRPM | OXYGEN SATURATION: 98 % | HEIGHT: 66 IN | HEART RATE: 78 BPM | DIASTOLIC BLOOD PRESSURE: 82 MMHG | TEMPERATURE: 98 F

## 2018-06-11 VITALS
DIASTOLIC BLOOD PRESSURE: 49 MMHG | SYSTOLIC BLOOD PRESSURE: 113 MMHG | OXYGEN SATURATION: 100 % | TEMPERATURE: 97 F | RESPIRATION RATE: 14 BRPM | HEART RATE: 71 BPM

## 2018-06-11 DIAGNOSIS — Z98.890 OTHER SPECIFIED POSTPROCEDURAL STATES: Chronic | ICD-10-CM

## 2018-06-11 DIAGNOSIS — C67.9 MALIGNANT NEOPLASM OF BLADDER, UNSPECIFIED: ICD-10-CM

## 2018-06-11 DIAGNOSIS — N32.0 BLADDER-NECK OBSTRUCTION: ICD-10-CM

## 2018-06-11 DIAGNOSIS — Z77.123 CONTACT WITH AND (SUSPECTED) EXPOSURE TO RADON AND OTHER NATURALLY OCCURRING RADIATION: Chronic | ICD-10-CM

## 2018-06-11 DIAGNOSIS — Z90.79 ACQUIRED ABSENCE OF OTHER GENITAL ORGAN(S): Chronic | ICD-10-CM

## 2018-06-11 DIAGNOSIS — I21.3 ST ELEVATION (STEMI) MYOCARDIAL INFARCTION OF UNSPECIFIED SITE: Chronic | ICD-10-CM

## 2018-06-11 LAB
POTASSIUM SERPL-MCNC: 5.4 MMOL/L — HIGH (ref 3.5–5.3)
POTASSIUM SERPL-SCNC: 5.4 MMOL/L — HIGH (ref 3.5–5.3)

## 2018-06-11 PROCEDURE — 86850 RBC ANTIBODY SCREEN: CPT

## 2018-06-11 PROCEDURE — 84132 ASSAY OF SERUM POTASSIUM: CPT

## 2018-06-11 PROCEDURE — 82962 GLUCOSE BLOOD TEST: CPT

## 2018-06-11 PROCEDURE — 86900 BLOOD TYPING SEROLOGIC ABO: CPT

## 2018-06-11 PROCEDURE — C1769: CPT

## 2018-06-11 PROCEDURE — 52235 CYSTOSCOPY AND TREATMENT: CPT

## 2018-06-11 PROCEDURE — 52640 RELIEVE BLADDER CONTRACTURE: CPT

## 2018-06-11 PROCEDURE — 52234 CYSTOSCOPY AND TREATMENT: CPT

## 2018-06-11 PROCEDURE — 86901 BLOOD TYPING SEROLOGIC RH(D): CPT

## 2018-06-11 PROCEDURE — G0463: CPT

## 2018-06-11 PROCEDURE — 88307 TISSUE EXAM BY PATHOLOGIST: CPT

## 2018-06-11 PROCEDURE — 88307 TISSUE EXAM BY PATHOLOGIST: CPT | Mod: 26

## 2018-06-11 PROCEDURE — 52500 TRURL RESECTION BLADDER NECK: CPT

## 2018-06-11 RX ORDER — FENTANYL CITRATE 50 UG/ML
25 INJECTION INTRAVENOUS
Qty: 0 | Refills: 0 | Status: DISCONTINUED | OUTPATIENT
Start: 2018-06-11 | End: 2018-06-11

## 2018-06-11 RX ORDER — ACETAMINOPHEN 500 MG
1000 TABLET ORAL ONCE
Qty: 0 | Refills: 0 | Status: DISCONTINUED | OUTPATIENT
Start: 2018-06-11 | End: 2018-06-11

## 2018-06-11 RX ORDER — ATORVASTATIN CALCIUM 80 MG/1
2 TABLET, FILM COATED ORAL
Qty: 0 | Refills: 0 | COMMUNITY

## 2018-06-11 RX ORDER — ASPIRIN/CALCIUM CARB/MAGNESIUM 324 MG
1 TABLET ORAL
Qty: 0 | Refills: 0 | COMMUNITY

## 2018-06-11 RX ORDER — VALSARTAN 80 MG/1
1 TABLET ORAL
Qty: 0 | Refills: 0 | COMMUNITY

## 2018-06-11 RX ORDER — ISOSORBIDE MONONITRATE 60 MG/1
1 TABLET, EXTENDED RELEASE ORAL
Qty: 0 | Refills: 0 | COMMUNITY

## 2018-06-11 RX ORDER — OXYCODONE AND ACETAMINOPHEN 5; 325 MG/1; MG/1
1 TABLET ORAL EVERY 4 HOURS
Qty: 0 | Refills: 0 | Status: DISCONTINUED | OUTPATIENT
Start: 2018-06-11 | End: 2018-06-11

## 2018-06-11 RX ORDER — TAMSULOSIN HYDROCHLORIDE 0.4 MG/1
1 CAPSULE ORAL
Qty: 0 | Refills: 0 | COMMUNITY

## 2018-06-11 RX ORDER — CEFUROXIME AXETIL 250 MG
1 TABLET ORAL
Qty: 6 | Refills: 0 | OUTPATIENT
Start: 2018-06-11 | End: 2018-06-13

## 2018-06-11 RX ORDER — AMLODIPINE BESYLATE 2.5 MG/1
1 TABLET ORAL
Qty: 0 | Refills: 0 | COMMUNITY

## 2018-06-11 RX ORDER — TROSPIUM CHLORIDE 20 MG/1
1 TABLET, FILM COATED ORAL
Qty: 0 | Refills: 0 | COMMUNITY

## 2018-06-11 NOTE — ASU DISCHARGE PLAN (ADULT/PEDIATRIC). - MEDICATION SUMMARY - MEDICATIONS TO TAKE
I will START or STAY ON the medications listed below when I get home from the hospital:    oxyCODONE-acetaminophen 5 mg-325 mg oral tablet  -- 1 tab(s) by mouth every 6 hours MDD:4  -- Caution federal law prohibits the transfer of this drug to any person other  than the person for whom it was prescribed.  May cause drowsiness.  Alcohol may intensify this effect.  Use care when operating dangerous machinery.  This prescription cannot be refilled.  This product contains acetaminophen.  Do not use  with any other product containing acetaminophen to prevent possible liver damage.  Using more of this medication than prescribed may cause serious breathing problems.    -- Indication: For Bladder neck obstruction    cefuroxime 500 mg oral tablet  -- 1 tab(s) by mouth every 12 hours   -- Finish all this medication unless otherwise directed by prescriber.  Medication should be taken with plenty of water.  Take with food or milk.    -- Indication: For Bladder neck obstruction

## 2018-06-11 NOTE — ASU PATIENT PROFILE, ADULT - PSH
2002 left leg stent    Acute myocardial infarction  as per pt in 2001, no coronary stent  Cataract surgery 30 years ago    Exposure to radiation  for prostate cancer (seed implant)  History of bladder surgery    History of colon surgery  2007  right wrist surgery with hardware 27 years ago    S/P cardiac catheterization  2016 with 1 FEMI stent  S/P TURP (status post transurethral resection of prostate)

## 2018-06-11 NOTE — ASU PATIENT PROFILE, ADULT - PMH
2001 CVA  as per pt, he doesn't notice any deficits  6/09 diagnosed with Prostate Ca  s/p radiation and brachytherapy w/ seed implantation 5 years ago s/p TURP  approximately 2001  MI/Coronary Artery Disease    Bladder mass  s/p resection  BPH    CKD (chronic kidney disease) stage 3, GFR 30-59 ml/min    Diabetes mellitus, type 2    Hearing Decreased left ear    Hypercholesteremia    Hypertension    Injury of head, initial encounter  12/2016 - s/p fall in hospital  Obesity    PAD--Left leg stent 2002  done at hospital in [place]  --[facility] in [place], s/p balloon on LLE in 2006  Prostate CA    Pulmonary edema  May 2016-resolved  Urinary retention

## 2018-06-13 ENCOUNTER — EMERGENCY (EMERGENCY)
Facility: HOSPITAL | Age: 78
LOS: 1 days | Discharge: HOME | End: 2018-06-13
Attending: EMERGENCY MEDICINE
Payer: MEDICARE

## 2018-06-13 VITALS
SYSTOLIC BLOOD PRESSURE: 186 MMHG | TEMPERATURE: 99 F | RESPIRATION RATE: 16 BRPM | HEART RATE: 95 BPM | DIASTOLIC BLOOD PRESSURE: 67 MMHG | OXYGEN SATURATION: 95 %

## 2018-06-13 VITALS
HEART RATE: 96 BPM | SYSTOLIC BLOOD PRESSURE: 166 MMHG | DIASTOLIC BLOOD PRESSURE: 88 MMHG | RESPIRATION RATE: 18 BRPM | OXYGEN SATURATION: 96 % | TEMPERATURE: 98 F

## 2018-06-13 DIAGNOSIS — Z98.890 OTHER SPECIFIED POSTPROCEDURAL STATES: Chronic | ICD-10-CM

## 2018-06-13 DIAGNOSIS — Z77.123 CONTACT WITH AND (SUSPECTED) EXPOSURE TO RADON AND OTHER NATURALLY OCCURRING RADIATION: Chronic | ICD-10-CM

## 2018-06-13 DIAGNOSIS — I21.3 ST ELEVATION (STEMI) MYOCARDIAL INFARCTION OF UNSPECIFIED SITE: Chronic | ICD-10-CM

## 2018-06-13 DIAGNOSIS — Z90.79 ACQUIRED ABSENCE OF OTHER GENITAL ORGAN(S): Chronic | ICD-10-CM

## 2018-06-13 LAB
APPEARANCE UR: ABNORMAL
BILIRUB UR-MCNC: NEGATIVE — SIGNIFICANT CHANGE UP
COLOR SPEC: ABNORMAL
DIFF PNL FLD: ABNORMAL
GLUCOSE UR QL: NEGATIVE — SIGNIFICANT CHANGE UP
KETONES UR-MCNC: NEGATIVE — SIGNIFICANT CHANGE UP
LEUKOCYTE ESTERASE UR-ACNC: ABNORMAL
NITRITE UR-MCNC: NEGATIVE — SIGNIFICANT CHANGE UP
PH UR: 5 — SIGNIFICANT CHANGE UP (ref 5–8)
PROT UR-MCNC: 100
SP GR SPEC: 1.01 — SIGNIFICANT CHANGE UP (ref 1.01–1.02)
UROBILINOGEN FLD QL: NEGATIVE — SIGNIFICANT CHANGE UP

## 2018-06-13 PROCEDURE — 81001 URINALYSIS AUTO W/SCOPE: CPT

## 2018-06-13 PROCEDURE — 99285 EMERGENCY DEPT VISIT HI MDM: CPT | Mod: 25

## 2018-06-13 PROCEDURE — 87086 URINE CULTURE/COLONY COUNT: CPT

## 2018-06-13 PROCEDURE — 99283 EMERGENCY DEPT VISIT LOW MDM: CPT

## 2018-06-13 NOTE — ED PROVIDER NOTE - MEDICAL DECISION MAKING DETAILS
79 y/o M pt presents with urinary retention with العلي in place since urologic procedure 2 days ago. Will attempt to flush catheter and if necessary, will change العلي.

## 2018-06-13 NOTE — ED PROVIDER NOTE - PROGRESS NOTE DETAILS
Pt's العلي flushed and urine now flowing well, bloody but no clots and clearing up; Pt asymptomatic and asking to go home.  D/w Dr. Cesar, Pt's urologist, and he advises sending home with the العلي and continue his cefuroxime and Dr. Cesar will f/u with Pt tomorrow.  He advises Pt to hold any antiplatelets for now--Pt informed.

## 2018-06-13 NOTE — ED ADULT NURSE NOTE - OBJECTIVE STATEMENT
pt is here for urinary retention.  c/o discomfort , denied chest pain or sob, denied N/V/D, pt stated that "I can't go to urine today".

## 2018-06-13 NOTE — ED PROVIDER NOTE - PMH
2001 CVA  as per pt, he doesn't notice any deficits  6/09 diagnosed with Prostate Ca  s/p radiation and brachytherapy w/ seed implantation 5 years ago s/p TURP  approximately 2001  MI/Coronary Artery Disease    Bladder mass  s/p resection  BPH    CKD (chronic kidney disease) stage 3, GFR 30-59 ml/min    Diabetes mellitus, type 2    Hearing Decreased left ear    Hypercholesteremia    Hypertension    Injury of head, initial encounter  12/2016 - s/p fall in hospital  Obesity    PAD--Left leg stent 2002  done at hospital in [place]  --[facility] in [place], s/p balloon on LLE in 2006  Prostate CA    Pulmonary edema  May 2016-resolved  Urinary retention

## 2018-06-13 NOTE — ED PROVIDER NOTE - OBJECTIVE STATEMENT
79 y/o M pt with a significant PMHx of bladder mass, prostate CA, CKD, DM, BPH, CVA and a significant PSHx of L leg stent, bladder surgery, colon surgery and cardiac catheterization with 1 FEMI stent, presents to the ED c/o acute urinary retention since 1300 today. Pt reports that Dr. Cesar performed a bladder procedure 2 days ago and sent him home with a العلي catheter in place. Since then, pt reports bloody urine, but since today afternoon, العلي has been clogged. Pt does reports mild abd discomfort that is associated with his retention; pt has been taking Cefuroxime abx since his procedure. Pt denies fever, chills, nausea, vomiting or any other complaints. NKDA.

## 2018-06-14 LAB
CULTURE RESULTS: NO GROWTH — SIGNIFICANT CHANGE UP
SPECIMEN SOURCE: SIGNIFICANT CHANGE UP

## 2018-06-20 ENCOUNTER — APPOINTMENT (OUTPATIENT)
Dept: UROLOGY | Facility: CLINIC | Age: 78
End: 2018-06-20
Payer: MEDICARE

## 2018-06-20 VITALS
WEIGHT: 188 LBS | HEART RATE: 70 BPM | RESPIRATION RATE: 17 BRPM | DIASTOLIC BLOOD PRESSURE: 60 MMHG | SYSTOLIC BLOOD PRESSURE: 120 MMHG | HEIGHT: 67 IN | BODY MASS INDEX: 29.51 KG/M2 | OXYGEN SATURATION: 96 % | TEMPERATURE: 97.9 F

## 2018-06-20 PROCEDURE — 99024 POSTOP FOLLOW-UP VISIT: CPT

## 2018-07-16 PROBLEM — J43.9 EMPHYSEMA, UNSPECIFIED: Chronic | Status: INACTIVE | Noted: 2017-02-21 | Resolved: 2018-06-06

## 2018-10-12 ENCOUNTER — INPATIENT (INPATIENT)
Facility: HOSPITAL | Age: 78
LOS: 2 days | Discharge: HOME | DRG: 394 | End: 2018-10-15
Attending: INTERNAL MEDICINE | Admitting: INTERNAL MEDICINE
Payer: MEDICARE

## 2018-10-12 VITALS
TEMPERATURE: 98 F | DIASTOLIC BLOOD PRESSURE: 62 MMHG | OXYGEN SATURATION: 97 % | RESPIRATION RATE: 17 BRPM | SYSTOLIC BLOOD PRESSURE: 164 MMHG

## 2018-10-12 DIAGNOSIS — I21.3 ST ELEVATION (STEMI) MYOCARDIAL INFARCTION OF UNSPECIFIED SITE: Chronic | ICD-10-CM

## 2018-10-12 DIAGNOSIS — Z90.79 ACQUIRED ABSENCE OF OTHER GENITAL ORGAN(S): Chronic | ICD-10-CM

## 2018-10-12 DIAGNOSIS — N18.3 CHRONIC KIDNEY DISEASE, STAGE 3 (MODERATE): ICD-10-CM

## 2018-10-12 DIAGNOSIS — Z29.9 ENCOUNTER FOR PROPHYLACTIC MEASURES, UNSPECIFIED: ICD-10-CM

## 2018-10-12 DIAGNOSIS — Z98.890 OTHER SPECIFIED POSTPROCEDURAL STATES: Chronic | ICD-10-CM

## 2018-10-12 DIAGNOSIS — I10 ESSENTIAL (PRIMARY) HYPERTENSION: ICD-10-CM

## 2018-10-12 DIAGNOSIS — K92.2 GASTROINTESTINAL HEMORRHAGE, UNSPECIFIED: ICD-10-CM

## 2018-10-12 DIAGNOSIS — I25.10 ATHEROSCLEROTIC HEART DISEASE OF NATIVE CORONARY ARTERY WITHOUT ANGINA PECTORIS: ICD-10-CM

## 2018-10-12 DIAGNOSIS — Z71.89 OTHER SPECIFIED COUNSELING: ICD-10-CM

## 2018-10-12 DIAGNOSIS — Z77.123 CONTACT WITH AND (SUSPECTED) EXPOSURE TO RADON AND OTHER NATURALLY OCCURRING RADIATION: Chronic | ICD-10-CM

## 2018-10-12 DIAGNOSIS — E11.9 TYPE 2 DIABETES MELLITUS WITHOUT COMPLICATIONS: ICD-10-CM

## 2018-10-12 DIAGNOSIS — C61 MALIGNANT NEOPLASM OF PROSTATE: ICD-10-CM

## 2018-10-12 PROBLEM — S09.90XA UNSPECIFIED INJURY OF HEAD, INITIAL ENCOUNTER: Chronic | Status: ACTIVE | Noted: 2017-02-21

## 2018-10-12 LAB
ALBUMIN SERPL ELPH-MCNC: 3.5 G/DL — SIGNIFICANT CHANGE UP (ref 3.5–5)
ALP SERPL-CCNC: 175 U/L — HIGH (ref 40–120)
ALT FLD-CCNC: 15 U/L DA — SIGNIFICANT CHANGE UP (ref 10–60)
ANION GAP SERPL CALC-SCNC: 6 MMOL/L — SIGNIFICANT CHANGE UP (ref 5–17)
APTT BLD: 31.7 SEC — SIGNIFICANT CHANGE UP (ref 27.5–37.4)
AST SERPL-CCNC: 11 U/L — SIGNIFICANT CHANGE UP (ref 10–40)
BILIRUB SERPL-MCNC: 0.3 MG/DL — SIGNIFICANT CHANGE UP (ref 0.2–1.2)
BUN SERPL-MCNC: 41 MG/DL — HIGH (ref 7–18)
CALCIUM SERPL-MCNC: 8.4 MG/DL — SIGNIFICANT CHANGE UP (ref 8.4–10.5)
CHLORIDE SERPL-SCNC: 112 MMOL/L — HIGH (ref 96–108)
CO2 SERPL-SCNC: 24 MMOL/L — SIGNIFICANT CHANGE UP (ref 22–31)
CREAT SERPL-MCNC: 2.04 MG/DL — HIGH (ref 0.5–1.3)
GLUCOSE SERPL-MCNC: 91 MG/DL — SIGNIFICANT CHANGE UP (ref 70–99)
HCT VFR BLD CALC: 32.3 % — LOW (ref 39–50)
HGB BLD-MCNC: 9.8 G/DL — LOW (ref 13–17)
INR BLD: 1.1 RATIO — SIGNIFICANT CHANGE UP (ref 0.88–1.16)
MCHC RBC-ENTMCNC: 30.1 PG — SIGNIFICANT CHANGE UP (ref 27–34)
MCHC RBC-ENTMCNC: 30.4 GM/DL — LOW (ref 32–36)
MCV RBC AUTO: 98.9 FL — SIGNIFICANT CHANGE UP (ref 80–100)
OB PNL STL: POSITIVE
PLATELET # BLD AUTO: 303 K/UL — SIGNIFICANT CHANGE UP (ref 150–400)
POTASSIUM SERPL-MCNC: 4.6 MMOL/L — SIGNIFICANT CHANGE UP (ref 3.5–5.3)
POTASSIUM SERPL-SCNC: 4.6 MMOL/L — SIGNIFICANT CHANGE UP (ref 3.5–5.3)
PROT SERPL-MCNC: 7.4 G/DL — SIGNIFICANT CHANGE UP (ref 6–8.3)
PROTHROM AB SERPL-ACNC: 12 SEC — SIGNIFICANT CHANGE UP (ref 9.8–12.7)
RBC # BLD: 3.26 M/UL — LOW (ref 4.2–5.8)
RBC # FLD: 15.3 % — HIGH (ref 10.3–14.5)
SODIUM SERPL-SCNC: 142 MMOL/L — SIGNIFICANT CHANGE UP (ref 135–145)
WBC # BLD: 7.6 K/UL — SIGNIFICANT CHANGE UP (ref 3.8–10.5)
WBC # FLD AUTO: 7.6 K/UL — SIGNIFICANT CHANGE UP (ref 3.8–10.5)

## 2018-10-12 PROCEDURE — 99284 EMERGENCY DEPT VISIT MOD MDM: CPT

## 2018-10-12 RX ORDER — DEXTROSE 50 % IN WATER 50 %
25 SYRINGE (ML) INTRAVENOUS ONCE
Qty: 0 | Refills: 0 | Status: DISCONTINUED | OUTPATIENT
Start: 2018-10-12 | End: 2018-10-15

## 2018-10-12 RX ORDER — SODIUM CHLORIDE 9 MG/ML
1000 INJECTION INTRAMUSCULAR; INTRAVENOUS; SUBCUTANEOUS ONCE
Qty: 0 | Refills: 0 | Status: COMPLETED | OUTPATIENT
Start: 2018-10-12 | End: 2018-10-12

## 2018-10-12 RX ORDER — DEXTROSE 50 % IN WATER 50 %
15 SYRINGE (ML) INTRAVENOUS ONCE
Qty: 0 | Refills: 0 | Status: DISCONTINUED | OUTPATIENT
Start: 2018-10-12 | End: 2018-10-15

## 2018-10-12 RX ORDER — SODIUM CHLORIDE 9 MG/ML
1000 INJECTION, SOLUTION INTRAVENOUS
Qty: 0 | Refills: 0 | Status: DISCONTINUED | OUTPATIENT
Start: 2018-10-12 | End: 2018-10-15

## 2018-10-12 RX ORDER — DEXTROSE 50 % IN WATER 50 %
12.5 SYRINGE (ML) INTRAVENOUS ONCE
Qty: 0 | Refills: 0 | Status: DISCONTINUED | OUTPATIENT
Start: 2018-10-12 | End: 2018-10-15

## 2018-10-12 RX ORDER — TAMSULOSIN HYDROCHLORIDE 0.4 MG/1
0.4 CAPSULE ORAL AT BEDTIME
Qty: 0 | Refills: 0 | Status: DISCONTINUED | OUTPATIENT
Start: 2018-10-12 | End: 2018-10-15

## 2018-10-12 RX ORDER — SODIUM CHLORIDE 9 MG/ML
1000 INJECTION INTRAMUSCULAR; INTRAVENOUS; SUBCUTANEOUS
Qty: 0 | Refills: 0 | Status: DISCONTINUED | OUTPATIENT
Start: 2018-10-12 | End: 2018-10-12

## 2018-10-12 RX ORDER — ALLOPURINOL 300 MG
100 TABLET ORAL DAILY
Qty: 0 | Refills: 0 | Status: DISCONTINUED | OUTPATIENT
Start: 2018-10-12 | End: 2018-10-15

## 2018-10-12 RX ORDER — GLUCAGON INJECTION, SOLUTION 0.5 MG/.1ML
1 INJECTION, SOLUTION SUBCUTANEOUS ONCE
Qty: 0 | Refills: 0 | Status: DISCONTINUED | OUTPATIENT
Start: 2018-10-12 | End: 2018-10-15

## 2018-10-12 RX ORDER — SODIUM CHLORIDE 9 MG/ML
1000 INJECTION INTRAMUSCULAR; INTRAVENOUS; SUBCUTANEOUS
Qty: 0 | Refills: 0 | Status: COMPLETED | OUTPATIENT
Start: 2018-10-12 | End: 2018-10-13

## 2018-10-12 RX ORDER — INSULIN LISPRO 100/ML
VIAL (ML) SUBCUTANEOUS
Qty: 0 | Refills: 0 | Status: DISCONTINUED | OUTPATIENT
Start: 2018-10-12 | End: 2018-10-15

## 2018-10-12 RX ORDER — ATORVASTATIN CALCIUM 80 MG/1
40 TABLET, FILM COATED ORAL AT BEDTIME
Qty: 0 | Refills: 0 | Status: DISCONTINUED | OUTPATIENT
Start: 2018-10-12 | End: 2018-10-15

## 2018-10-12 RX ADMIN — TAMSULOSIN HYDROCHLORIDE 0.4 MILLIGRAM(S): 0.4 CAPSULE ORAL at 22:32

## 2018-10-12 RX ADMIN — SODIUM CHLORIDE 75 MILLILITER(S): 9 INJECTION INTRAMUSCULAR; INTRAVENOUS; SUBCUTANEOUS at 22:19

## 2018-10-12 RX ADMIN — SODIUM CHLORIDE 1000 MILLILITER(S): 9 INJECTION INTRAMUSCULAR; INTRAVENOUS; SUBCUTANEOUS at 13:26

## 2018-10-12 RX ADMIN — ATORVASTATIN CALCIUM 40 MILLIGRAM(S): 80 TABLET, FILM COATED ORAL at 22:32

## 2018-10-12 NOTE — H&P ADULT - PROBLEM SELECTOR PLAN 6
DNR/DNI Hx MI ~2001 w/ stent in mid circumflex artery  Patient on ASA and Plavix at home, will hold off in setting of GI bleed

## 2018-10-12 NOTE — H&P ADULT - PMH
6/09 diagnosed with Prostate Ca  s/p radiation and brachytherapy w/ seed implantation 5 years ago s/p TURP  approximately 2001  MI/Coronary Artery Disease    Bladder mass  s/p resection  BPH    CKD (chronic kidney disease) stage 3, GFR 30-59 ml/min    Diabetes mellitus, type 2    Hearing Decreased left ear    Hypercholesteremia    Hypertension    Injury of head, initial encounter  12/2016 - s/p fall in hospital  Obesity    PAD--Left leg stent 2002  done at hospital in [place]  --[facility] in [place], s/p balloon on LLE in 2006  Prostate CA    Pulmonary edema  May 2016-resolved  Urinary retention

## 2018-10-12 NOTE — H&P ADULT - PROBLEM SELECTOR PLAN 7
IMPROVE VTE Individual Risk Assessment          RISK                                                          Points  [  ] Previous VTE                                                3  [  ] Thrombophilia                                             2  [  ] Lower limb paralysis                                   2        (unable to hold up >15 seconds)    [  ] Current Cancer                                             2         (within 6 months)  [ x ] Immobilization > 24 hrs                              1  [  ] ICU/CCU stay > 24 hours                             1  [ x ] Age > 60                                                         1    IMPROVE VTE Score: 2  Will hold off from chemical ppx, will c/w SCD's for now in setting of GI bleed DNR/DNI

## 2018-10-12 NOTE — CONSULT NOTE ADULT - SUBJECTIVE AND OBJECTIVE BOX
GI INITIAL CONSULT    HPI: 78M w/stated PMH p/w hematochezia x4-5 days. Has mild lower abd pain. No N/V, diarrhea, constipation, bloating, or weight loss. Has a h/o RTx for prostate ca 10 yrs ago. States that he had a similar episode 6 yrs ago for which he had a colonoscopy, but does not remember the results.    PMH: CVA; bladder tumor s/p resection; prostate ca s/p RTx and resection; CKD, DM2, HLD, HTN, CAD    Meds: MEDICATIONS  (STANDING):  dextrose 5%. 1000 milliLiter(s) (50 mL/Hr) IV Continuous <Continuous>  dextrose 50% Injectable 12.5 Gram(s) IV Push once  dextrose 50% Injectable 25 Gram(s) IV Push once  dextrose 50% Injectable 25 Gram(s) IV Push once  insulin lispro (HumaLOG) corrective regimen sliding scale   SubCutaneous Before meals and at bedtime    SH: noncontributory  FH: noncontributory  ROS:  CONSTITUTIONAL: No fever, weight loss, or fatigue  EYES: No eye pain, visual disturbances, or discharge  ENT:  No difficulty hearing, tinnitus, vertigo; No sinus or throat pain  NECK: No pain or stiffness  RESPIRATORY: No cough, wheezing, chills or hemoptysis, shortness of Breath  CARDIOVASCULAR: No chest pain, palpitations, passing out, dizziness, or leg swelling  GASTROINTESTINAL: see HPI  GENITOURINARY: No dysuria, frequency, hematuria, or incontinence  NEUROLOGICAL: No headaches, memory loss, loss of strength, numbness, or tremors  SKIN: No itching, burning, rashes, or lesions   MUSCULOSKELETAL: No arthralgia, myalgia, or back pain.     Vitals: T(C): 37.1 (10-12-18 @ 16:19)  T(F): 98.8 (10-12-18 @ 16:19), Max: 98.8 (10-12-18 @ 16:19)  HR: 69 (10-12-18 @ 16:19) (68 - 69)  BP: 116/84 (10-12-18 @ 16:19) (116/84 - 164/62)    Gen: NAD  CVS: S1/S2  Chest: CTABL  Abd: S/ND/mild LLQ tenderness                        9.8    7.6   )-----------( 303      ( 12 Oct 2018 13:40 )             32.3   10-12    142  |  112<H>  |  41<H>  ----------------------------<  91  4.6   |  24  |  2.04<H>    Ca    8.4      12 Oct 2018 13:37    TPro  7.4  /  Alb  3.5  /  TBili  0.3  /  DBili  x   /  AST  11  /  ALT  15  /  AlkPhos  175<H>  10-12    FOBT (+)

## 2018-10-12 NOTE — H&P ADULT - FAMILY HISTORY
Family history of heart disease     Mother  Still living? Unknown  Family history of diabetes mellitus, Age at diagnosis: Age Unknown

## 2018-10-12 NOTE — H&P ADULT - HISTORY OF PRESENT ILLNESS
78M from home, AAOx3, PMHx prostate CA (s/p radiation and prostatectomy 10 yrs ago), CKD, T2DM, HTN, HLD who presented to the ED c/o painless rectal bleed x 1 week. Patient mentions etelvinain 78M from home, AAOx3, PMHx prostate CA (s/p radiation and brachytherapy w/ seed implantation 5 years ago, s/p TURP), CKD stage 3, T2DM, HTN, HLD who presented to the ED c/o painless rectal bleed x 1 week. Patient mentions etelvinain 78M from home, AAOx3, PMHx prostate CA (s/p radiation and brachytherapy w/ seed implantation 2007, s/p TURP), CKD stage 3, Bladder mass (bx negative for malignancy), T2DM, HTN, HLD, CAD (s/p FEMI mCX), PAD (LLE stents), COPD, who presented to the ED c/o painless rectal bleed x 1 week. Patient refers a total of 4 bloody BM's w/ clots in the morning, which prompted patient to come to the ED. Patient also mentions that he had similar episodes 7 yrs ago, for which underwent colonoscopy, however patient is unable to give further details, and does not know source of bleeding during that time. Denies rectal pain, abdominal pain, fever/chills, arthralgias, nausea/vomiting, weight loss, anorexia or any other complaint.

## 2018-10-12 NOTE — ED PROVIDER NOTE - PMH
2001 CVA  as per pt, he doesn't notice any deficits  6/09 diagnosed with Prostate Ca  s/p radiation and brachytherapy w/ seed implantation 5 years ago s/p TURP  approximately 2001  MI/Coronary Artery Disease    Bladder mass  s/p resection  BPH    CKD (chronic kidney disease) stage 3, GFR 30-59 ml/min    Diabetes mellitus, type 2    Hearing Decreased left ear    Hypercholesteremia    Hypertension    Injury of head, initial encounter  12/2016 - s/p fall in hospital  Obesity    PAD--Left leg stent 2002  done at hospital in [place]  --[facility] in [place], s/p balloon on LLE in 2006  Prostate CA    Pulmonary edema  May 2016-resolved  Urinary retention

## 2018-10-12 NOTE — CONSULT NOTE ADULT - ASSESSMENT
78M w/rectal bleeding.  -radiation proctitis vs. diverticular bleeding vs. hemorrhoidal bleeding  -check CBC q8h and transfuse if Hb <7  -obtain CT A/P w/PO contrast (w/o IVC given CKD)  -plan for colonoscopy early next week pending CT results

## 2018-10-12 NOTE — H&P ADULT - PROBLEM SELECTOR PLAN 1
Patient p/w BRBPR w/ clots x 1 week, painless, afebrile, LLQ tenderness to palpation  FOBT positive, Hemodynamically stable, orthostatic negative at bedside  H/H: 9.8/32.3  DDx: radiation proctitis vs diverticular bleed  Will monitor CBC q6hrs for now  Liquid diet for now   Follow up CT A/P w/ oral contrast  Dr Suazo consulted Patient p/w BRBPR w/ clots x 1 week, painless, afebrile, LLQ tenderness to palpation  FOBT positive, Hemodynamically stable, orthostatic negative at bedside  H/H: 9.8/32.3  DDx: radiation proctitis vs diverticular bleed  Will monitor CBC q8hrs for now, transfuse if Hb <7  Liquid diet for now   Follow up CT A/P w/ oral contrast  Dr Suazo consulted

## 2018-10-12 NOTE — H&P ADULT - ASSESSMENT
78M from home, AAOx3, PMHx prostate CA (s/p radiation and brachytherapy w/ seed implantation 5 years ago, s/p TURP), CKD stage 3, Benign bladder mass, T2DM, HTN, HLD, CAD, PAD (stents) who presented to the ED c/o painless rectal bleed x 1 week. Patient refers a total of 4 bloody BM's w/ clots in the morning, which prompted patient to come to the ED. Patient also mentions that he had similar episodes 7 yrs ago, for which underwent colonoscopy, however patient is unable to give further details, and does not know source of bleeding during that time. Denies rectal pain, abdominal pain, fever/chills, arthralgias, nausea/vomiting, weight loss, anorexia or any other complaint.      Patient being admitted for LGIB due to proctitis vs diverticular bleed.

## 2018-10-12 NOTE — H&P ADULT - ATTENDING COMMENTS
Patient seen/evaluated at bedside 10/13/2018. I agree with the resident H&P note/outlined plan of care. My independent findings and conclusions are documented.    Briefly, this is a 78M w/ a PMHx prostate CA (s/p radiation and brachytherapy w/ seed implantation 2007, s/p TURP), CKD stage 3, Bladder mass (bx negative for malignancy), T2DM, HTN, HLD, CAD (s/p FEMI mCX), PAD (LLE stents), COPD who presents to the ED with 4 episodes of  c/o painless rectal bleed x 1 week.    Sob, abdominal pain, nausea/vomiting/ diarrhea    T(C): 36.6 (13 Oct 2018 09:53), Max: 37.1 (12 Oct 2018 16:19)  T(F): 97.9 (13 Oct 2018 09:53), Max: 98.8 (12 Oct 2018 16:19)  HR: 56 (13 Oct 2018 09:53) (56 - 69)  BP: 141/44 (13 Oct 2018 09:53) (116/84 - 164/62)  RR: 17 (13 Oct 2018 09:53) (16 - 18)  SpO2: 97% (13 Oct 2018 09:53) (97% - 98%)                          10.2   6.6   )-----------( 297      ( 13 Oct 2018 08:13 )             33.0       1. bright red blood per rectum/ lower GI hemorrhage  2. acute blood loss anemia  3. RUSTY on CKD stage 3  4. CAD  5. h/o prostate ca

## 2018-10-12 NOTE — ED PROVIDER NOTE - MEDICAL DECISION MAKING DETAILS
78 y.o presenting with brbpr. vital normal. well appearing. abd soft, notendner. concern for anemia, gi bleed. will obtain lab, coag, type and screen, fluid and reassess

## 2018-10-12 NOTE — H&P ADULT - PROBLEM SELECTOR PLAN 8
IMPROVE VTE Individual Risk Assessment          RISK                                                          Points  [  ] Previous VTE                                                3  [  ] Thrombophilia                                             2  [  ] Lower limb paralysis                                   2        (unable to hold up >15 seconds)    [  ] Current Cancer                                             2         (within 6 months)  [ x ] Immobilization > 24 hrs                              1  [  ] ICU/CCU stay > 24 hours                             1  [ x ] Age > 60                                                         1    IMPROVE VTE Score: 2  Will hold off from chemical ppx, will c/w SCD's for now in setting of GI bleed

## 2018-10-12 NOTE — H&P ADULT - RS GEN PE MLT RESP DETAILS PC
clear to auscultation bilaterally/no chest wall tenderness/no wheezes/no rhonchi/no rales/respirations non-labored

## 2018-10-12 NOTE — H&P ADULT - NEUROLOGICAL DETAILS
alert and oriented x 3/responds to verbal commands/deep reflexes intact/sensation intact/responds to pain

## 2018-10-12 NOTE — H&P ADULT - NSHPSOCIALHISTORY_GEN_ALL_CORE
lives with girlfriend, former smoker (used to smoke 2.5 PPD x >30yrs, quitted 13 yrs ago), no ETOH, no recreational drug use.

## 2018-10-12 NOTE — ED PROVIDER NOTE - OBJECTIVE STATEMENT
78 y.o presenting with 5 days of brbpr. endorse 3-4 episode a day. denies cp, sob, weakness, n, v, abd pain, dysuria.

## 2018-10-12 NOTE — ED ADULT NURSE NOTE - NS ED NURSE LEVEL OF CONSCIOUSNESS MENTAL STATUS
Blood pressure 138/75, pulse 78, height 5' 6\" (1.676 m), weight 161 lb 8 oz (73.3 kg). REASON FOR VISIT:    Lynette Hancock is a 39year old male who presents for an [address]         Patient Active Problem List:     Diabetes mellitus 10 years There are no preventive care reminders to display for this patient. Flex Sigmoidoscopy Screen  Every 5 years No results found for this or any previous visit.     Fecal Occult Blood  Annually No results found for: FOB, OCCULTSTOOL    Obesity Scre Awake Cholesterol (mg/dL)   Date Value   02/17/2018 90    No flowsheet data found. Dilated Eye exam  Annually No flowsheet data found. No flowsheet data found.     Asthma  (Annually between Nov. 1 & Dec. 31)    Date of last AAP/ACT and counseling given on im are clear  EYES:PERRLA, EOMI, normal optic disk, conjunctiva are clear  NECK: supple, no adenopathy, no bruits  CHEST: no chest tenderness  BREAST: no dominant or suspicious mass  LUNGS: clear to auscultation  CARDIO: RRR without murmur  GI: good BS's, no

## 2018-10-12 NOTE — H&P ADULT - PROBLEM SELECTOR PLAN 3
Patient does not recall medications, as per patient he has 2 pharmacies: [facility] and [facility]. Primary team to confirm meds with E scripts  Glipizide 10 mg BID at home  c/w HSS FS AC/HS  Follow up HbA1C

## 2018-10-12 NOTE — H&P ADULT - PROBLEM SELECTOR PLAN 5
RUSTY on CKD likely prerenal in setting of GI bleed  Baseline Cr ~1.8  Follow up urine lytes  c/w IVF and monitor response

## 2018-10-13 LAB
ANION GAP SERPL CALC-SCNC: 4 MMOL/L — LOW (ref 5–17)
BASOPHILS # BLD AUTO: 0.1 K/UL — SIGNIFICANT CHANGE UP (ref 0–0.2)
BASOPHILS NFR BLD AUTO: 0.9 % — SIGNIFICANT CHANGE UP (ref 0–2)
BUN SERPL-MCNC: 28 MG/DL — HIGH (ref 7–18)
CALCIUM SERPL-MCNC: 8.7 MG/DL — SIGNIFICANT CHANGE UP (ref 8.4–10.5)
CHLORIDE SERPL-SCNC: 111 MMOL/L — HIGH (ref 96–108)
CHOLEST SERPL-MCNC: 156 MG/DL — SIGNIFICANT CHANGE UP (ref 10–199)
CO2 SERPL-SCNC: 28 MMOL/L — SIGNIFICANT CHANGE UP (ref 22–31)
CREAT SERPL-MCNC: 1.65 MG/DL — HIGH (ref 0.5–1.3)
EOSINOPHIL # BLD AUTO: 0.4 K/UL — SIGNIFICANT CHANGE UP (ref 0–0.5)
EOSINOPHIL NFR BLD AUTO: 6.5 % — HIGH (ref 0–6)
GLUCOSE SERPL-MCNC: 85 MG/DL — SIGNIFICANT CHANGE UP (ref 70–99)
HBA1C BLD-MCNC: 5.8 % — HIGH (ref 4–5.6)
HCT VFR BLD CALC: 33 % — LOW (ref 39–50)
HCT VFR BLD CALC: 33.7 % — LOW (ref 39–50)
HCT VFR BLD CALC: 35.7 % — LOW (ref 39–50)
HDLC SERPL-MCNC: 29 MG/DL — LOW
HGB BLD-MCNC: 10.2 G/DL — LOW (ref 13–17)
HGB BLD-MCNC: 10.4 G/DL — LOW (ref 13–17)
HGB BLD-MCNC: 10.9 G/DL — LOW (ref 13–17)
LIPID PNL WITH DIRECT LDL SERPL: 95 MG/DL — SIGNIFICANT CHANGE UP
LYMPHOCYTES # BLD AUTO: 1.6 K/UL — SIGNIFICANT CHANGE UP (ref 1–3.3)
LYMPHOCYTES # BLD AUTO: 24.3 % — SIGNIFICANT CHANGE UP (ref 13–44)
MAGNESIUM SERPL-MCNC: 1.9 MG/DL — SIGNIFICANT CHANGE UP (ref 1.6–2.6)
MCHC RBC-ENTMCNC: 29.7 PG — SIGNIFICANT CHANGE UP (ref 27–34)
MCHC RBC-ENTMCNC: 30.2 PG — SIGNIFICANT CHANGE UP (ref 27–34)
MCHC RBC-ENTMCNC: 30.5 PG — SIGNIFICANT CHANGE UP (ref 27–34)
MCHC RBC-ENTMCNC: 30.6 GM/DL — LOW (ref 32–36)
MCHC RBC-ENTMCNC: 30.9 GM/DL — LOW (ref 32–36)
MCHC RBC-ENTMCNC: 31 GM/DL — LOW (ref 32–36)
MCV RBC AUTO: 97.3 FL — SIGNIFICANT CHANGE UP (ref 80–100)
MCV RBC AUTO: 97.8 FL — SIGNIFICANT CHANGE UP (ref 80–100)
MCV RBC AUTO: 98.1 FL — SIGNIFICANT CHANGE UP (ref 80–100)
MONOCYTES # BLD AUTO: 0.7 K/UL — SIGNIFICANT CHANGE UP (ref 0–0.9)
MONOCYTES NFR BLD AUTO: 10 % — SIGNIFICANT CHANGE UP (ref 2–14)
NEUTROPHILS # BLD AUTO: 3.9 K/UL — SIGNIFICANT CHANGE UP (ref 1.8–7.4)
NEUTROPHILS NFR BLD AUTO: 58.3 % — SIGNIFICANT CHANGE UP (ref 43–77)
PHOSPHATE SERPL-MCNC: 2.6 MG/DL — SIGNIFICANT CHANGE UP (ref 2.5–4.5)
PLATELET # BLD AUTO: 297 K/UL — SIGNIFICANT CHANGE UP (ref 150–400)
PLATELET # BLD AUTO: 300 K/UL — SIGNIFICANT CHANGE UP (ref 150–400)
PLATELET # BLD AUTO: 334 K/UL — SIGNIFICANT CHANGE UP (ref 150–400)
POTASSIUM SERPL-MCNC: 4.7 MMOL/L — SIGNIFICANT CHANGE UP (ref 3.5–5.3)
POTASSIUM SERPL-SCNC: 4.7 MMOL/L — SIGNIFICANT CHANGE UP (ref 3.5–5.3)
RBC # BLD: 3.36 M/UL — LOW (ref 4.2–5.8)
RBC # BLD: 3.44 M/UL — LOW (ref 4.2–5.8)
RBC # BLD: 3.66 M/UL — LOW (ref 4.2–5.8)
RBC # FLD: 14.6 % — HIGH (ref 10.3–14.5)
RBC # FLD: 14.8 % — HIGH (ref 10.3–14.5)
RBC # FLD: 14.8 % — HIGH (ref 10.3–14.5)
SODIUM SERPL-SCNC: 143 MMOL/L — SIGNIFICANT CHANGE UP (ref 135–145)
TOTAL CHOLESTEROL/HDL RATIO MEASUREMENT: 5.4 RATIO — SIGNIFICANT CHANGE UP (ref 3.4–9.6)
TRIGL SERPL-MCNC: 158 MG/DL — HIGH (ref 10–149)
TSH SERPL-MCNC: 2.64 UU/ML — SIGNIFICANT CHANGE UP (ref 0.34–4.82)
VIT B12 SERPL-MCNC: 404 PG/ML — SIGNIFICANT CHANGE UP (ref 232–1245)
WBC # BLD: 6.6 K/UL — SIGNIFICANT CHANGE UP (ref 3.8–10.5)
WBC # BLD: 7.2 K/UL — SIGNIFICANT CHANGE UP (ref 3.8–10.5)
WBC # BLD: 7.3 K/UL — SIGNIFICANT CHANGE UP (ref 3.8–10.5)
WBC # FLD AUTO: 6.6 K/UL — SIGNIFICANT CHANGE UP (ref 3.8–10.5)
WBC # FLD AUTO: 7.2 K/UL — SIGNIFICANT CHANGE UP (ref 3.8–10.5)
WBC # FLD AUTO: 7.3 K/UL — SIGNIFICANT CHANGE UP (ref 3.8–10.5)

## 2018-10-13 PROCEDURE — 74176 CT ABD & PELVIS W/O CONTRAST: CPT | Mod: 26

## 2018-10-13 PROCEDURE — 99223 1ST HOSP IP/OBS HIGH 75: CPT | Mod: GC,AI

## 2018-10-13 RX ADMIN — SODIUM CHLORIDE 75 MILLILITER(S): 9 INJECTION INTRAMUSCULAR; INTRAVENOUS; SUBCUTANEOUS at 02:44

## 2018-10-13 RX ADMIN — Medication 100 MILLIGRAM(S): at 11:31

## 2018-10-13 RX ADMIN — TAMSULOSIN HYDROCHLORIDE 0.4 MILLIGRAM(S): 0.4 CAPSULE ORAL at 22:18

## 2018-10-13 RX ADMIN — Medication 1: at 12:38

## 2018-10-13 RX ADMIN — ATORVASTATIN CALCIUM 40 MILLIGRAM(S): 80 TABLET, FILM COATED ORAL at 22:18

## 2018-10-14 DIAGNOSIS — K62.89 OTHER SPECIFIED DISEASES OF ANUS AND RECTUM: ICD-10-CM

## 2018-10-14 DIAGNOSIS — E55.9 VITAMIN D DEFICIENCY, UNSPECIFIED: ICD-10-CM

## 2018-10-14 LAB
24R-OH-CALCIDIOL SERPL-MCNC: 26.1 NG/ML — LOW (ref 30–80)
ANION GAP SERPL CALC-SCNC: 9 MMOL/L — SIGNIFICANT CHANGE UP (ref 5–17)
BASOPHILS # BLD AUTO: 0.1 K/UL — SIGNIFICANT CHANGE UP (ref 0–0.2)
BASOPHILS NFR BLD AUTO: 0.8 % — SIGNIFICANT CHANGE UP (ref 0–2)
BUN SERPL-MCNC: 23 MG/DL — HIGH (ref 7–18)
CALCIUM SERPL-MCNC: 9 MG/DL — SIGNIFICANT CHANGE UP (ref 8.4–10.5)
CHLORIDE SERPL-SCNC: 109 MMOL/L — HIGH (ref 96–108)
CO2 SERPL-SCNC: 23 MMOL/L — SIGNIFICANT CHANGE UP (ref 22–31)
CREAT SERPL-MCNC: 1.64 MG/DL — HIGH (ref 0.5–1.3)
EOSINOPHIL # BLD AUTO: 0.4 K/UL — SIGNIFICANT CHANGE UP (ref 0–0.5)
EOSINOPHIL NFR BLD AUTO: 6.7 % — HIGH (ref 0–6)
GLUCOSE SERPL-MCNC: 88 MG/DL — SIGNIFICANT CHANGE UP (ref 70–99)
HCT VFR BLD CALC: 32.7 % — LOW (ref 39–50)
HCT VFR BLD CALC: 34.1 % — LOW (ref 39–50)
HGB BLD-MCNC: 10.1 G/DL — LOW (ref 13–17)
HGB BLD-MCNC: 10.8 G/DL — LOW (ref 13–17)
LYMPHOCYTES # BLD AUTO: 1.4 K/UL — SIGNIFICANT CHANGE UP (ref 1–3.3)
LYMPHOCYTES # BLD AUTO: 21.3 % — SIGNIFICANT CHANGE UP (ref 13–44)
MCHC RBC-ENTMCNC: 29.7 PG — SIGNIFICANT CHANGE UP (ref 27–34)
MCHC RBC-ENTMCNC: 30.8 GM/DL — LOW (ref 32–36)
MCHC RBC-ENTMCNC: 30.8 PG — SIGNIFICANT CHANGE UP (ref 27–34)
MCHC RBC-ENTMCNC: 31.8 GM/DL — LOW (ref 32–36)
MCV RBC AUTO: 96.3 FL — SIGNIFICANT CHANGE UP (ref 80–100)
MCV RBC AUTO: 96.8 FL — SIGNIFICANT CHANGE UP (ref 80–100)
MONOCYTES # BLD AUTO: 0.5 K/UL — SIGNIFICANT CHANGE UP (ref 0–0.9)
MONOCYTES NFR BLD AUTO: 7.1 % — SIGNIFICANT CHANGE UP (ref 2–14)
NEUTROPHILS # BLD AUTO: 4.2 K/UL — SIGNIFICANT CHANGE UP (ref 1.8–7.4)
NEUTROPHILS NFR BLD AUTO: 64 % — SIGNIFICANT CHANGE UP (ref 43–77)
PLATELET # BLD AUTO: 302 K/UL — SIGNIFICANT CHANGE UP (ref 150–400)
PLATELET # BLD AUTO: 316 K/UL — SIGNIFICANT CHANGE UP (ref 150–400)
POTASSIUM SERPL-MCNC: 4.6 MMOL/L — SIGNIFICANT CHANGE UP (ref 3.5–5.3)
POTASSIUM SERPL-SCNC: 4.6 MMOL/L — SIGNIFICANT CHANGE UP (ref 3.5–5.3)
RBC # BLD: 3.39 M/UL — LOW (ref 4.2–5.8)
RBC # BLD: 3.52 M/UL — LOW (ref 4.2–5.8)
RBC # FLD: 14.3 % — SIGNIFICANT CHANGE UP (ref 10.3–14.5)
RBC # FLD: 14.7 % — HIGH (ref 10.3–14.5)
SODIUM SERPL-SCNC: 141 MMOL/L — SIGNIFICANT CHANGE UP (ref 135–145)
WBC # BLD: 6.5 K/UL — SIGNIFICANT CHANGE UP (ref 3.8–10.5)
WBC # BLD: 7.7 K/UL — SIGNIFICANT CHANGE UP (ref 3.8–10.5)
WBC # FLD AUTO: 6.5 K/UL — SIGNIFICANT CHANGE UP (ref 3.8–10.5)
WBC # FLD AUTO: 7.7 K/UL — SIGNIFICANT CHANGE UP (ref 3.8–10.5)

## 2018-10-14 PROCEDURE — 99233 SBSQ HOSP IP/OBS HIGH 50: CPT | Mod: GC

## 2018-10-14 RX ORDER — SOD SULF/SODIUM/NAHCO3/KCL/PEG
4000 SOLUTION, RECONSTITUTED, ORAL ORAL ONCE
Qty: 0 | Refills: 0 | Status: COMPLETED | OUTPATIENT
Start: 2018-10-14 | End: 2018-10-14

## 2018-10-14 RX ADMIN — TAMSULOSIN HYDROCHLORIDE 0.4 MILLIGRAM(S): 0.4 CAPSULE ORAL at 22:13

## 2018-10-14 RX ADMIN — Medication 4000 MILLILITER(S): at 18:37

## 2018-10-14 RX ADMIN — Medication 100 MILLIGRAM(S): at 11:49

## 2018-10-14 RX ADMIN — ATORVASTATIN CALCIUM 40 MILLIGRAM(S): 80 TABLET, FILM COATED ORAL at 22:13

## 2018-10-14 RX ADMIN — SODIUM CHLORIDE 75 MILLILITER(S): 9 INJECTION INTRAMUSCULAR; INTRAVENOUS; SUBCUTANEOUS at 22:13

## 2018-10-14 NOTE — PROGRESS NOTE ADULT - SUBJECTIVE AND OBJECTIVE BOX
Patient is a 78y old  Male who presents with a chief complaint of BRBPR (12 Oct 2018 19:03)      INTERVAL HPI/OVERNIGHT EVENTS: no new complaints    MEDICATIONS  (STANDING):  allopurinol 100 milliGRAM(s) Oral daily  atorvastatin 40 milliGRAM(s) Oral at bedtime  dextrose 5%. 1000 milliLiter(s) (50 mL/Hr) IV Continuous <Continuous>  dextrose 50% Injectable 12.5 Gram(s) IV Push once  dextrose 50% Injectable 25 Gram(s) IV Push once  dextrose 50% Injectable 25 Gram(s) IV Push once  insulin lispro (HumaLOG) corrective regimen sliding scale   SubCutaneous Before meals and at bedtime  sodium chloride 0.9%. 1000 milliLiter(s) (75 mL/Hr) IV Continuous <Continuous>  tamsulosin 0.4 milliGRAM(s) Oral at bedtime    MEDICATIONS  (PRN):  dextrose 40% Gel 15 Gram(s) Oral once PRN Blood Glucose LESS THAN 70 milliGRAM(s)/deciliter  glucagon  Injectable 1 milliGRAM(s) IntraMuscular once PRN Glucose LESS THAN 70 milligrams/deciliter      __________________________________________________  REVIEW OF SYSTEMS:    CONSTITUTIONAL: No fever,   EYES: no acute visual disturbances  NECK: No pain or stiffness  RESPIRATORY: No cough; No shortness of breath  CARDIOVASCULAR: No chest pain, no palpitations  GASTROINTESTINAL: No pain. No nausea or vomiting; No diarrhea   NEUROLOGICAL: No headache or numbness, no tremors  MUSCULOSKELETAL: No joint pain, no muscle pain  GENITOURINARY: no dysuria, no frequency, no hesitancy  PSYCHIATRY: no depression , no anxiety  ALL OTHER  ROS negative        Vital Signs Last 24 Hrs  T(C): 36.3 (14 Oct 2018 15:30), Max: 36.6 (13 Oct 2018 23:51)  T(F): 97.3 (14 Oct 2018 15:30), Max: 97.8 (13 Oct 2018 23:51)  HR: 61 (14 Oct 2018 15:30) (57 - 61)  BP: 146/43 (14 Oct 2018 15:30) (146/43 - 169/75)  BP(mean): --  RR: 147 (14 Oct 2018 15:30) (17 - 147)  SpO2: 100% (14 Oct 2018 15:30) (97% - 100%)    ________________________________________________  PHYSICAL EXAM:  GENERAL: NAD  HEENT: Normocephalic;  conjunctivae and sclerae clear; moist mucous membranes;   NECK : supple  CHEST/LUNG: Clear to auscultation bilaterally with good air entry   HEART: S1 S2  regular; no murmurs, gallops or rubs  ABDOMEN: Soft, Nontender, Nondistended; Bowel sounds present  EXTREMITIES: no cyanosis; no edema; no calf tenderness  SKIN: warm and dry; no rash  NERVOUS SYSTEM:  Awake and alert; Oriented  to place, person and time ; no new deficits    _________________________________________________  LABS:                        10.1   6.5   )-----------( 316      ( 14 Oct 2018 07:18 )             32.7     10-14    141  |  109<H>  |  23<H>  ----------------------------<  88  4.6   |  23  |  1.64<H>    Ca    9.0      14 Oct 2018 07:18  Phos  2.6     10-13  Mg     1.9     10-13          CAPILLARY BLOOD GLUCOSE      POCT Blood Glucose.: 128 mg/dL (14 Oct 2018 16:30)  POCT Blood Glucose.: 104 mg/dL (14 Oct 2018 11:51)  POCT Blood Glucose.: 83 mg/dL (14 Oct 2018 08:37)  POCT Blood Glucose.: 83 mg/dL (13 Oct 2018 21:03)        RADIOLOGY & ADDITIONAL TESTS:    Imaging Personally Reviewed:  YES/NO    Consultant(s) Notes Reviewed:   YES/ No    Care Discussed with Consultants :     Plan of care was discussed with patient and /or primary care giver; all questions and concerns were addressed and care was aligned with patient's wishes. Patient is a 78y old  Male who presents with a chief complaint of BRBPR (12 Oct 2018 19:03)      INTERVAL HPI/OVERNIGHT EVENTS: intermittent episodes of BRBPR    MEDICATIONS  (STANDING):  allopurinol 100 milliGRAM(s) Oral daily  atorvastatin 40 milliGRAM(s) Oral at bedtime  dextrose 5%. 1000 milliLiter(s) (50 mL/Hr) IV Continuous <Continuous>  dextrose 50% Injectable 12.5 Gram(s) IV Push once  dextrose 50% Injectable 25 Gram(s) IV Push once  dextrose 50% Injectable 25 Gram(s) IV Push once  insulin lispro (HumaLOG) corrective regimen sliding scale   SubCutaneous Before meals and at bedtime  sodium chloride 0.9%. 1000 milliLiter(s) (75 mL/Hr) IV Continuous <Continuous>  tamsulosin 0.4 milliGRAM(s) Oral at bedtime    MEDICATIONS  (PRN):  dextrose 40% Gel 15 Gram(s) Oral once PRN Blood Glucose LESS THAN 70 milliGRAM(s)/deciliter  glucagon  Injectable 1 milliGRAM(s) IntraMuscular once PRN Glucose LESS THAN 70 milligrams/deciliter      __________________________________________________  REVIEW OF SYSTEMS:    CONSTITUTIONAL: No fever,   EYES: no acute visual disturbances  NECK: No pain or stiffness  RESPIRATORY: No cough; No shortness of breath  CARDIOVASCULAR: No chest pain, no palpitations  GASTROINTESTINAL: No pain. No nausea or vomiting; No diarrhea   NEUROLOGICAL: No headache or numbness, no tremors  MUSCULOSKELETAL: No joint pain, no muscle pain  GENITOURINARY: no dysuria, no frequency, no hesitancy  PSYCHIATRY: no depression , no anxiety  ALL OTHER  ROS negative        Vital Signs Last 24 Hrs  T(C): 36.3 (14 Oct 2018 15:30), Max: 36.6 (13 Oct 2018 23:51)  T(F): 97.3 (14 Oct 2018 15:30), Max: 97.8 (13 Oct 2018 23:51)  HR: 61 (14 Oct 2018 15:30) (57 - 61)  BP: 146/43 (14 Oct 2018 15:30) (146/43 - 169/75)  BP(mean): --  RR: 147 (14 Oct 2018 15:30) (17 - 147)  SpO2: 100% (14 Oct 2018 15:30) (97% - 100%)    ________________________________________________  PHYSICAL EXAM:  GENERAL: NAD  HEENT: Normocephalic;  conjunctivae and sclerae clear; moist mucous membranes;   NECK : supple  CHEST/LUNG: Clear to auscultation bilaterally with good air entry   HEART: S1 S2  regular; no murmurs, gallops or rubs  ABDOMEN: Soft, Nontender, Nondistended; Bowel sounds present  EXTREMITIES: no cyanosis; no edema; no calf tenderness  SKIN: warm and dry; no rash  NERVOUS SYSTEM:  Awake and alert; Oriented  to place, person and time ; no new deficits    _________________________________________________  LABS:                        10.1   6.5   )-----------( 316      ( 14 Oct 2018 07:18 )             32.7     10-14    141  |  109<H>  |  23<H>  ----------------------------<  88  4.6   |  23  |  1.64<H>    Ca    9.0      14 Oct 2018 07:18  Phos  2.6     10-13  Mg     1.9     10-13          CAPILLARY BLOOD GLUCOSE      POCT Blood Glucose.: 128 mg/dL (14 Oct 2018 16:30)  POCT Blood Glucose.: 104 mg/dL (14 Oct 2018 11:51)  POCT Blood Glucose.: 83 mg/dL (14 Oct 2018 08:37)  POCT Blood Glucose.: 83 mg/dL (13 Oct 2018 21:03)        RADIOLOGY & ADDITIONAL TESTS:    Imaging Personally Reviewed:  YES/NO    Consultant(s) Notes Reviewed:   YES/ No    Care Discussed with Consultants :     Plan of care was discussed with patient and /or primary care giver; all questions and concerns were addressed and care was aligned with patient's wishes.

## 2018-10-14 NOTE — PROGRESS NOTE ADULT - SUBJECTIVE AND OBJECTIVE BOX
Doing well, no acute complaints. Recurrent hematochezia.    Meds: MEDICATIONS  (STANDING):  allopurinol 100 milliGRAM(s) Oral daily  atorvastatin 40 milliGRAM(s) Oral at bedtime  dextrose 5%. 1000 milliLiter(s) (50 mL/Hr) IV Continuous <Continuous>  dextrose 50% Injectable 12.5 Gram(s) IV Push once  dextrose 50% Injectable 25 Gram(s) IV Push once  dextrose 50% Injectable 25 Gram(s) IV Push once  insulin lispro (HumaLOG) corrective regimen sliding scale   SubCutaneous Before meals and at bedtime  polyethylene glycol/electrolyte Solution. 4000 milliLiter(s) Oral once  sodium chloride 0.9%. 1000 milliLiter(s) (75 mL/Hr) IV Continuous <Continuous>  tamsulosin 0.4 milliGRAM(s) Oral at bedtime    Vitals: T(C): 36.3 (10-14-18 @ 15:30)  T(F): 97.3 (10-14-18 @ 15:30), Max: 97.8 (10-13-18 @ 23:51)  HR: 61 (10-14-18 @ 15:30) (57 - 61)  BP: 146/43 (10-14-18 @ 15:30) (146/43 - 169/75)    Gen: NAD  CVS: S1/S2  Chest: CTABL  Abd: S/NT/ND                        10.1   6.5   )-----------( 316      ( 14 Oct 2018 07:18 )             32.7   10-14    141  |  109<H>  |  23<H>  ----------------------------<  88  4.6   |  23  |  1.64<H>    Ca    9.0      14 Oct 2018 07:18  Phos  2.6     10-13  Mg     1.9     10-13    CT Abdomen and Pelvis w/ Oral Cont (10.13.18 @ 01:12)  IMPRESSION:   Probable cystitis.  Possible proctitis.

## 2018-10-15 ENCOUNTER — TRANSCRIPTION ENCOUNTER (OUTPATIENT)
Age: 78
End: 2018-10-15

## 2018-10-15 VITALS
RESPIRATION RATE: 17 BRPM | OXYGEN SATURATION: 100 % | DIASTOLIC BLOOD PRESSURE: 66 MMHG | HEART RATE: 65 BPM | SYSTOLIC BLOOD PRESSURE: 149 MMHG | TEMPERATURE: 97 F

## 2018-10-15 LAB
ANION GAP SERPL CALC-SCNC: 6 MMOL/L — SIGNIFICANT CHANGE UP (ref 5–17)
APTT BLD: 31 SEC — SIGNIFICANT CHANGE UP (ref 27.5–37.4)
BASOPHILS # BLD AUTO: 0.1 K/UL — SIGNIFICANT CHANGE UP (ref 0–0.2)
BASOPHILS NFR BLD AUTO: 0.9 % — SIGNIFICANT CHANGE UP (ref 0–2)
BUN SERPL-MCNC: 19 MG/DL — HIGH (ref 7–18)
CALCIUM SERPL-MCNC: 9.1 MG/DL — SIGNIFICANT CHANGE UP (ref 8.4–10.5)
CHLORIDE SERPL-SCNC: 109 MMOL/L — HIGH (ref 96–108)
CO2 SERPL-SCNC: 27 MMOL/L — SIGNIFICANT CHANGE UP (ref 22–31)
CREAT SERPL-MCNC: 1.72 MG/DL — HIGH (ref 0.5–1.3)
EOSINOPHIL # BLD AUTO: 0.4 K/UL — SIGNIFICANT CHANGE UP (ref 0–0.5)
EOSINOPHIL NFR BLD AUTO: 4.3 % — SIGNIFICANT CHANGE UP (ref 0–6)
GLUCOSE SERPL-MCNC: 95 MG/DL — SIGNIFICANT CHANGE UP (ref 70–99)
HCT VFR BLD CALC: 35.6 % — LOW (ref 39–50)
HGB BLD-MCNC: 11.2 G/DL — LOW (ref 13–17)
INR BLD: 1.13 RATIO — SIGNIFICANT CHANGE UP (ref 0.88–1.16)
LYMPHOCYTES # BLD AUTO: 1.2 K/UL — SIGNIFICANT CHANGE UP (ref 1–3.3)
LYMPHOCYTES # BLD AUTO: 13 % — SIGNIFICANT CHANGE UP (ref 13–44)
MCHC RBC-ENTMCNC: 30.4 PG — SIGNIFICANT CHANGE UP (ref 27–34)
MCHC RBC-ENTMCNC: 31.5 GM/DL — LOW (ref 32–36)
MCV RBC AUTO: 96.5 FL — SIGNIFICANT CHANGE UP (ref 80–100)
MONOCYTES # BLD AUTO: 0.5 K/UL — SIGNIFICANT CHANGE UP (ref 0–0.9)
MONOCYTES NFR BLD AUTO: 5.7 % — SIGNIFICANT CHANGE UP (ref 2–14)
NEUTROPHILS # BLD AUTO: 7.1 K/UL — SIGNIFICANT CHANGE UP (ref 1.8–7.4)
NEUTROPHILS NFR BLD AUTO: 76.1 % — SIGNIFICANT CHANGE UP (ref 43–77)
PLATELET # BLD AUTO: 359 K/UL — SIGNIFICANT CHANGE UP (ref 150–400)
POTASSIUM SERPL-MCNC: 4.3 MMOL/L — SIGNIFICANT CHANGE UP (ref 3.5–5.3)
POTASSIUM SERPL-SCNC: 4.3 MMOL/L — SIGNIFICANT CHANGE UP (ref 3.5–5.3)
PROTHROM AB SERPL-ACNC: 12.3 SEC — SIGNIFICANT CHANGE UP (ref 9.8–12.7)
RBC # BLD: 3.68 M/UL — LOW (ref 4.2–5.8)
RBC # FLD: 14.8 % — HIGH (ref 10.3–14.5)
SODIUM SERPL-SCNC: 142 MMOL/L — SIGNIFICANT CHANGE UP (ref 135–145)
WBC # BLD: 9.3 K/UL — SIGNIFICANT CHANGE UP (ref 3.8–10.5)
WBC # FLD AUTO: 9.3 K/UL — SIGNIFICANT CHANGE UP (ref 3.8–10.5)

## 2018-10-15 PROCEDURE — 99233 SBSQ HOSP IP/OBS HIGH 50: CPT | Mod: GC

## 2018-10-15 RX ORDER — VALSARTAN 80 MG/1
1 TABLET ORAL
Qty: 0 | Refills: 0 | COMMUNITY

## 2018-10-15 RX ORDER — INFLUENZA VIRUS VACCINE 15; 15; 15; 15 UG/.5ML; UG/.5ML; UG/.5ML; UG/.5ML
0.5 SUSPENSION INTRAMUSCULAR ONCE
Qty: 0 | Refills: 0 | Status: COMPLETED | OUTPATIENT
Start: 2018-10-15 | End: 2018-10-15

## 2018-10-15 RX ORDER — LISINOPRIL 2.5 MG/1
1 TABLET ORAL
Qty: 30 | Refills: 0 | OUTPATIENT
Start: 2018-10-15 | End: 2018-11-13

## 2018-10-15 RX ORDER — TAMSULOSIN HYDROCHLORIDE 0.4 MG/1
1 CAPSULE ORAL
Qty: 0 | Refills: 0 | COMMUNITY
Start: 2018-10-15

## 2018-10-15 RX ADMIN — INFLUENZA VIRUS VACCINE 0.5 MILLILITER(S): 15; 15; 15; 15 SUSPENSION INTRAMUSCULAR at 18:11

## 2018-10-15 NOTE — DISCHARGE NOTE ADULT - PATIENT PORTAL LINK FT
You can access the [facility] Patient Portal, offered by [facility], by registering with the following website: [email or web address]

## 2018-10-15 NOTE — PROGRESS NOTE ADULT - PROBLEM SELECTOR PLAN 2
s/p radiation and brachytherapy w/ seed implantation 2007, s/p TURP  Urologist: Dr Chandan Wilson
assessed on CT abdomen/pelvis  SECONDARY TO RADIATION--> [facility]

## 2018-10-15 NOTE — PROGRESS NOTE ADULT - PROBLEM SELECTOR PLAN 6
RUSTY on CKD likely prerenal in setting of GI bleed  Baseline Cr ~1.8  Follow up urine lytes  c/w IVF and monitor response
Hx MI ~2001 w/ stent in mid circumflex artery  Patient on ASA and Plavix at home, will hold off in setting of GI bleed

## 2018-10-15 NOTE — DISCHARGE NOTE ADULT - SECONDARY DIAGNOSIS.
Hypertension Diabetes mellitus, type 2 CKD (chronic kidney disease) stage 3, GFR 30-59 ml/min Prostate CA

## 2018-10-15 NOTE — PROGRESS NOTE ADULT - PROBLEM SELECTOR PLAN 1
Patient p/w BRBPR w/ clots x 1 week, painless, afebrile, LLQ tenderness to palpation  FOBT positive, Hemodynamically stable, orthostatic negative at bedside  H/H: 9.8/32.3, has been stable since then   DDx: radiation proctitis vs diverticular bleed  Will monitor CBC daily and transfuse if Hb <7  NPO for colonoscopy today  Dr Suazo- [facility]  hemodynamically stable

## 2018-10-15 NOTE — DISCHARGE NOTE ADULT - CARE PLAN
Principal Discharge DX:	Lower GI bleed  Secondary Diagnosis:	Hypertension  Secondary Diagnosis:	Diabetes mellitus, type 2  Secondary Diagnosis:	CKD (chronic kidney disease) stage 3, GFR 30-59 ml/min  Secondary Diagnosis:	Prostate CA Principal Discharge DX:	Lower GI bleed  Goal:	to have resolution of GI bleed  Assessment and plan of treatment:	you presented with lower GI bleeding and you did not require blood transfusions; you had a colonoscopy on 10/15 with Dr. Suazo  Secondary Diagnosis:	Hypertension  Assessment and plan of treatment:	we discontinued your valsartan and were kept off BP meds due to lower GI bleed, you will be discharged on a new BP medication and are advised to take as directed; follow up with PCP within 1 week  Secondary Diagnosis:	Diabetes mellitus, type 2  Assessment and plan of treatment:	take glipizide 10mg twice a day as directed and your HbA1C is 5.8  Secondary Diagnosis:	CKD (chronic kidney disease) stage 3, GFR 30-59 ml/min  Assessment and plan of treatment:	you have a baseline creatinine level of ~1.7; you are advised to avoid nephrotoxic medications and to take medications as directed; follow up with PCP within 1 week  Secondary Diagnosis:	Prostate CA  Assessment and plan of treatment:	s/p radiation and brachytherapy w/ seed implantation 2007, s/p TURP; follow up with PCP within 1 week Principal Discharge DX:	Lower GI bleed  Goal:	to have resolution of GI bleed  Assessment and plan of treatment:	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 You had a colonoscopy on 10/15 with Dr. Suazo that showed Proctitis and Diverticulosis.  Secondary Diagnosis:	Hypertension  Assessment and plan of treatment:	we discontinued your valsartan and were kept off BP meds due to lower GI bleed, you will be discharged on a new BP medication and are advised to take as directed; follow up with PCP within 1 week  Secondary Diagnosis:	Diabetes mellitus, type 2  Assessment and plan of treatment:	take glipizide 10mg twice a day as directed and your HbA1C is 5.8  Secondary Diagnosis:	CKD (chronic kidney disease) stage 3, GFR 30-59 ml/min  Assessment and plan of treatment:	you have a baseline creatinine level of ~1.7; you are advised to avoid nephrotoxic medications and to take medications as directed; follow up with PCP within 1 week  Secondary Diagnosis:	Prostate CA  Assessment and plan of treatment:	s/p radiation and brachytherapy w/ seed implantation 2007, s/p TURP; follow up with PCP within 1 week

## 2018-10-15 NOTE — PROGRESS NOTE ADULT - SUBJECTIVE AND OBJECTIVE BOX
Patient is a 78y old  Male who presents with a chief complaint of BRBPR (14 Oct 2018 17:47)    Patient was seen and examined, no bleeding overnight. Is for colonoscopy today around 1-2pm and did not require blood transfusion overnight. Denies any pain or discomfort.    REVIEW OF SYSTEMS: denies fever, chills, SOB, palpitations, chest pain, abdominal pain, nausea, vomiting, diarrhea, constipation, dizziness    INTERVAL HPI/OVERNIGHT EVENTS:  T(C): 36.7 (10-15-18 @ 08:00), Max: 36.7 (10-15-18 @ 08:00)  HR: 70 (10-15-18 @ 08:00) (59 - 70)  BP: 138/41 (10-15-18 @ 08:00) (138/41 - 169/75)  RR: 16 (10-15-18 @ 08:00) (16 - 147)  SpO2: 100% (10-15-18 @ 08:00) (98% - 100%)    MEDICATIONS  (STANDING):  allopurinol 100 milliGRAM(s) Oral daily  atorvastatin 40 milliGRAM(s) Oral at bedtime  dextrose 5%. 1000 milliLiter(s) (50 mL/Hr) IV Continuous <Continuous>  dextrose 50% Injectable 12.5 Gram(s) IV Push once  dextrose 50% Injectable 25 Gram(s) IV Push once  dextrose 50% Injectable 25 Gram(s) IV Push once  insulin lispro (HumaLOG) corrective regimen sliding scale   SubCutaneous Before meals and at bedtime  tamsulosin 0.4 milliGRAM(s) Oral at bedtime    MEDICATIONS  (PRN):  dextrose 40% Gel 15 Gram(s) Oral once PRN Blood Glucose LESS THAN 70 milliGRAM(s)/deciliter  glucagon  Injectable 1 milliGRAM(s) IntraMuscular once PRN Glucose LESS THAN 70 milligrams/deciliter    PHYSICAL EXAM:  GENERAL: NAD, well-groomed, well-developed  HEAD:  Atraumatic, Normocephalic  EYES: EOMI, PERRLA, conjunctiva and sclera clear  ENMT: No tonsillar erythema, exudates, or enlargement; Moist mucous membranes, Good dentition, No lesions  NECK: Supple, No JVD, Normal thyroid  NERVOUS SYSTEM:  Alert & Oriented X3, Good concentration; Motor Strength 5/5 B/L upper and lower extremities; DTRs 2+ intact and symmetric  CHEST/LUNG: Clear to percussion bilaterally; No rales, rhonchi, wheezing, or rubs  HEART: Regular rate and rhythm; No murmurs, rubs, or gallops  ABDOMEN: Soft, Nontender, Nondistended; Bowel sounds present  EXTREMITIES:  2+ Peripheral Pulses, No clubbing, cyanosis, or edema  LYMPH: No lymphadenopathy noted  SKIN: No rashes or lesions    LABS:                        11.2   9.3   )-----------( 359      ( 15 Oct 2018 08:49 )             35.6     10-15    142  |  109<H>  |  19<H>  ----------------------------<  95  4.3   |  27  |  1.72<H>    Ca    9.1      15 Oct 2018 08:49    PT/INR - ( 15 Oct 2018 08:49 )   PT: 12.3 sec;   INR: 1.13 ratio      PTT - ( 15 Oct 2018 08:49 )  PTT:31.0 sec    POCT Blood Glucose.: 101 mg/dL (15 Oct 2018 08:24)  POCT Blood Glucose.: 108 mg/dL (14 Oct 2018 21:04)  POCT Blood Glucose.: 128 mg/dL (14 Oct 2018 16:30)  POCT Blood Glucose.: 104 mg/dL (14 Oct 2018 11:51)

## 2018-10-15 NOTE — PROGRESS NOTE ADULT - PROBLEM SELECTOR PLAN 5
Will hold off from medication for now in setting of GI bleed
RUSTY on CKD likely prerenal in setting of GI bleed  Baseline Cr ~1.8

## 2018-10-15 NOTE — DISCHARGE NOTE ADULT - MEDICATION SUMMARY - MEDICATIONS TO TAKE
I will START or STAY ON the medications listed below when I get home from the hospital:    aspirin 81 mg oral tablet  -- 1 tab(s) by mouth once a day  -- Indication: For CArdioprotective    lisinopril 2.5 mg oral tablet  -- 1 tab(s) by mouth once a day   -- Do not take this drug if you are pregnant.  It is very important that you take or use this exactly as directed.  Do not skip doses or discontinue unless directed by your doctor.  Some non-prescription drugs may aggravate your condition.  Read all labels carefully.  If a warning appears, check with your doctor before taking.    -- Indication: For Htn    tamsulosin 0.4 mg oral capsule  -- 1 cap(s) by mouth once a day (at bedtime)  -- Indication: For BPH    glipiZIDE 10 mg oral tablet  -- 1 tab(s) by mouth 2 times a day  -- Indication: For DM    allopurinol 100 mg oral tablet  -- 1 tab(s) by mouth once a day  -- Indication: For Gout    Lipitor 40 mg oral tablet  -- 1 tab(s) by mouth once a day  -- Indication: For HLD    Plavix 75 mg oral tablet  -- 1 tab(s) by mouth once a day  -- Indication: For CArdiprotective    trospium 60 mg oral capsule, extended release  -- 1 cap(s) by mouth once a day (in the morning)  -- Indication: For Antispasmodic for bladder

## 2018-10-15 NOTE — DISCHARGE NOTE ADULT - HOSPITAL COURSE
78M from home, AAOx3, PMHx prostate CA (s/p radiation and brachytherapy w/ seed implantation 2007, s/p TURP), CKD stage 3, Bladder mass (bx negative for malignancy), T2DM, HTN, HLD, CAD (s/p FEMI mCX), PAD (LLE stents), COPD, who presented to the ED c/o painless rectal bleed x 1 week. Patient refers a total of 4 bloody BM's w/ clots in the morning, which prompted patient to come to the ED. Patient also mentions that he had similar episodes 7 yrs ago, for which underwent colonoscopy, however patient is unable to give further details, and does not know source of bleeding during that time. Denies rectal pain, abdominal pain, fever/chills, arthralgias, nausea/vomiting, weight loss, anorexia or any other complaint. (12 Oct 2018 18:55)    Admitted for lower GI bleed, Patient p/w BRBPR w/ clots x 1 week, painless, afebrile, LLQ tenderness to palpation  FOBT positive, Hemodynamically stable, orthostatic negative at bedside; H/H: 9.8/32.3, has been stable since then   DDx: radiation proctitis vs diverticular bleed. Patient had colonoscopy on 10/15. Dr Suazo- GI consult. For Hx prostate CA, s/p radiation and brachytherapy w/ seed implantation 2007, s/p TURP; Urologist: Dr Chandan Wilson. For Diabetes mellitus, type 2, Glipizide 10 mg BID at home, was on sliding scale as inpatient. HbA1C 5.8. For HTN, BP meds were held due to lower GI bleed, will discharge on a different BP medication than valsartan. For CKD, has RUSTY on CKD likely prerenal in setting of GI bleed. Baseline Cr ~1.8. For CAD, Hx MI ~2001 w/ stent in mid circumflex artery, Patient on ASA and Plavix at home, will hold off in setting of GI bleed and resume on discharge if no active bleeding. For goals of care, is DNR/DNI. For prophylactic measure, on SCD boots in setting of GI bleed. 78M from home, AAOx3, PMHx prostate CA (s/p radiation and brachytherapy w/ seed implantation 2007, s/p TURP), CKD stage 3, Bladder mass (bx negative for malignancy), T2DM, HTN, HLD, CAD (s/p FEMI mCX), PAD (LLE stents), COPD, who presented to the ED c/o painless rectal bleed x 1 week. Patient refers a total of 4 bloody BM's w/ clots in the morning, which prompted patient to come to the ED. Patient also mentions that he had similar episodes 7 yrs ago, for which underwent colonoscopy, however patient is unable to give further details, and does not know source of bleeding during that time. Denies rectal pain, abdominal pain, fever/chills, arthralgias, nausea/vomiting, weight loss, anorexia or any other complaint. (12 Oct 2018 18:55)    Admitted for lower GI bleed, Patient p/w BRBPR w/ clots x 1 week, painless, afebrile, LLQ tenderness to palpation  FOBT positive, Hemodynamically stable, orthostatic negative at bedside; H/H: 9.8/32.3, has been stable since then   DDx: radiation proctitis vs diverticular bleed. Patient had colonoscopy on 10/15 that showed radiation proctitis and diverticulosis. Dr Suazo- GI consult. For Hx prostate CA, s/p radiation and brachytherapy w/ seed implantation 2007, s/p TURP; Urologist: Dr Chandan Wilson. For Diabetes mellitus, type 2, Glipizide 10 mg BID at home, was on sliding scale as inpatient. HbA1C 5.8. For HTN, BP meds were held due to lower GI bleed, will discharge on a different BP medication than valsartan. For CKD, has RUSTY on CKD likely prerenal in setting of GI bleed. Baseline Cr ~1.8. For CAD, Hx MI ~2001 w/ stent in mid circumflex artery, Patient on ASA and Plavix at home, will hold off in setting of GI bleed and resume on discharge if no active bleeding. For goals of care, is DNR/DNI. For prophylactic measure, on SCD boots in setting of GI bleed.

## 2018-10-15 NOTE — PROGRESS NOTE ADULT - PROBLEM SELECTOR PLAN 4
Patient does not recall medications, as per patient he has 2 pharmacies: [facility] and [facility]. Primary team to confirm meds with E scripts  Glipizide 10 mg BID at home  c/w HSS FS AC/HS  Follow up HbA1C
Will hold off from medication for now in setting of GI bleed

## 2018-10-15 NOTE — PROGRESS NOTE ADULT - PROBLEM SELECTOR PLAN 3
Patient does not recall medications, as per patient he has 2 pharmacies: [facility] and [facility]. Primary team to confirm meds with E scripts  Glipizide 10 mg BID at home  c/w HSS FS AC/HS  Follow up HbA1C Glipizide 10 mg BID at home- confirmed with PCP  c/w HSS FS AC/HS  HbA1C 5.8

## 2018-10-15 NOTE — DISCHARGE NOTE ADULT - PLAN OF CARE
to have resolution of GI bleed you presented with lower GI bleeding and you did not require blood transfusions; you had a colonoscopy on 10/15 with Dr. 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 You had a colonoscopy on 10/15 with Dr. Suazo that showed Proctitis and Diverticulosis.

## 2018-10-15 NOTE — PROGRESS NOTE ADULT - PROBLEM SELECTOR PLAN 7
Hx MI ~2001 w/ stent in mid circumflex artery  Patient on ASA and Plavix at home, will hold off in setting of GI bleed
DNR/DNI

## 2018-11-11 PROCEDURE — 86900 BLOOD TYPING SEROLOGIC ABO: CPT

## 2018-11-11 PROCEDURE — 82962 GLUCOSE BLOOD TEST: CPT

## 2018-11-11 PROCEDURE — 99285 EMERGENCY DEPT VISIT HI MDM: CPT | Mod: 25

## 2018-11-11 PROCEDURE — 84443 ASSAY THYROID STIM HORMONE: CPT

## 2018-11-11 PROCEDURE — 74176 CT ABD & PELVIS W/O CONTRAST: CPT

## 2018-11-11 PROCEDURE — 85610 PROTHROMBIN TIME: CPT

## 2018-11-11 PROCEDURE — 85730 THROMBOPLASTIN TIME PARTIAL: CPT

## 2018-11-11 PROCEDURE — 83735 ASSAY OF MAGNESIUM: CPT

## 2018-11-11 PROCEDURE — 80061 LIPID PANEL: CPT

## 2018-11-11 PROCEDURE — 80048 BASIC METABOLIC PNL TOTAL CA: CPT

## 2018-11-11 PROCEDURE — 84100 ASSAY OF PHOSPHORUS: CPT

## 2018-11-11 PROCEDURE — 82607 VITAMIN B-12: CPT

## 2018-11-11 PROCEDURE — 83036 HEMOGLOBIN GLYCOSYLATED A1C: CPT

## 2018-11-11 PROCEDURE — 82306 VITAMIN D 25 HYDROXY: CPT

## 2018-11-11 PROCEDURE — 80053 COMPREHEN METABOLIC PANEL: CPT

## 2018-11-11 PROCEDURE — 86850 RBC ANTIBODY SCREEN: CPT

## 2018-11-11 PROCEDURE — 86901 BLOOD TYPING SEROLOGIC RH(D): CPT

## 2018-11-11 PROCEDURE — 85027 COMPLETE CBC AUTOMATED: CPT

## 2018-11-11 PROCEDURE — 82272 OCCULT BLD FECES 1-3 TESTS: CPT

## 2018-11-11 PROCEDURE — 90686 IIV4 VACC NO PRSV 0.5 ML IM: CPT

## 2018-12-04 NOTE — H&P PST ADULT - GUM GEN PE MLT EXAM PC
Vitals  Vitals   Recorded: 01Mot5073 10:31AM   Height: 164.49 cm  Weight: 81.85 kg  BMI Calculated: 30.25  BSA Calculated: 1.89  BMI Percentile: 99  2-20 Stature Percentile: 27 %  2-20 Weight Percentile: 97 %  Blood Pressure: 139 / 80, RUE, Sitting  Heart Rate: 76  O2 Saturation: 98    Results/Data    Date of Study: 2-   Performing Location: [facility]   Machine Number: [id number]   Sonographer: LUIS   Complete congenital transthoracic echocardiogram with 2D, color Doppler, spectral Doppler and M-mode imaging.     INDICATION:   S/P AS0 - ASD DEVICE.       ECHOCARDIOGRAM RESULTS:   ASD device seen in a good position, device is not pinching or poking aortic root. Device is not touching mitral or tricuspid valve. There is no residual ASD.       Normal intracardiac connections with no apparent septal defects.    Normal valve structure and function.    Normal biventricular size and function, with no ventricular hypertrophy.    No evidence of pulmonary hypertension.    No pericardial or obvious pleural effusion.       QUANTITATIVE DATA:   RVD: 2.80 cm (z score = 0.58)   IVSd: 1.06 cm (z score = 1.00)   IVSs: 1.66 cm (z score = 1.65)   LVIDd: 5.14 cm (z score = -0.28)   LVIDs: 3.06 cm (z score = -0.49)   LVPWd: 0.807 cm (z score = 0.04)   LVPWs: 1.70 cm (z score = 0.95)   EF: 71 %   FS: 41 %     FINDINGS:   Situs: Levocardia with situs solitus.   Systemic Venous Return: Normal return of the superior and inferior vena cava to the right atrium with no apparent obstruction to flow.   Right Atrium: Structurally normal right atrium.   Tricuspid Valve: Structurally normal tricuspid valve with physiologic trivial regurgitation and no stenosis.   Pulmonary Valve: Structurally normal pulmonary valve with physiologic trivial regurgitation and no stenosis.   Pulmonary Artery: Main and branch pulmonary arteries appear normal with normal flow velocities.   Pulmonary Venous Return: At least one right and one left pulmonary vein return  normally to the left atrium. No right heart enlargement to suggest partial anomalous pulmonary venous return.   Left Atrium: Structurally normal left atrium.   Mitral Valve: Structurally normal mitral valve with no evidence of stenosis or regurgitation.   Left Ventricle: Normal left ventricular size and systolic function, with no hypertrophy.   Aortic Valve: Trileaflet aortic valve with no evidence of stenosis or regurgitation.   Aortic Arch: The ascending, transverse & proximal descending aorta appear normal with normal flow velocities.    Coronary Arteries: Normal origins of the left main and right coronary arteries as seen by 2-dimensional imaging.   Interatrial Septum: Intact atrial septum. ASD device seen in a good position, device is not pinching or poking aortic root. Device is not touching mitral or tricuspid valve. There is no residual ASD.   Interventricular Septum: Intact ventricular septum.   Effusion: No pericardial or obvious pleural effusion.      Signatures   Electronically signed by : Roberto Mckeon, ; Feb 15 2017 12:10PM CST    Electronically signed by : SHARI SINGH M.D.; Feb 15 2017 12:18PM CST     patient refused

## 2018-12-14 ENCOUNTER — APPOINTMENT (OUTPATIENT)
Dept: VASCULAR SURGERY | Facility: CLINIC | Age: 78
End: 2018-12-14
Payer: MEDICARE

## 2018-12-14 VITALS
BODY MASS INDEX: 29.51 KG/M2 | HEART RATE: 65 BPM | DIASTOLIC BLOOD PRESSURE: 67 MMHG | SYSTOLIC BLOOD PRESSURE: 157 MMHG | WEIGHT: 188 LBS | HEIGHT: 67 IN

## 2018-12-14 DIAGNOSIS — M79.605 PAIN IN LEFT LEG: ICD-10-CM

## 2018-12-14 DIAGNOSIS — I65.21 OCCLUSION AND STENOSIS OF RIGHT CAROTID ARTERY: ICD-10-CM

## 2018-12-14 PROCEDURE — 99204 OFFICE O/P NEW MOD 45 MIN: CPT

## 2018-12-14 PROCEDURE — 93880 EXTRACRANIAL BILAT STUDY: CPT

## 2018-12-14 PROCEDURE — 93924 LWR XTR VASC STDY BILAT: CPT

## 2018-12-14 RX ORDER — AMOXICILLIN AND CLAVULANATE POTASSIUM 875; 125 MG/1; MG/1
875-125 TABLET, COATED ORAL TWICE DAILY
Qty: 10 | Refills: 0 | Status: COMPLETED | COMMUNITY
Start: 2018-06-08 | End: 2018-12-14

## 2018-12-14 RX ORDER — CILOSTAZOL 50 MG/1
50 TABLET ORAL
Qty: 60 | Refills: 3 | Status: ACTIVE | COMMUNITY
Start: 2018-12-14 | End: 1900-01-01

## 2018-12-14 RX ORDER — FOLIC ACID 1 MG/1
1 TABLET ORAL
Refills: 0 | Status: ACTIVE | COMMUNITY

## 2018-12-14 RX ORDER — SOLIFENACIN SUCCINATE 10 MG/1
10 TABLET, FILM COATED ORAL
Qty: 90 | Refills: 2 | Status: COMPLETED | COMMUNITY
Start: 2017-12-27 | End: 2018-12-14

## 2018-12-14 RX ORDER — AMLODIPINE BESYLATE 10 MG/1
10 TABLET ORAL
Refills: 0 | Status: ACTIVE | COMMUNITY

## 2018-12-28 ENCOUNTER — OUTPATIENT (OUTPATIENT)
Dept: OUTPATIENT SERVICES | Facility: HOSPITAL | Age: 78
LOS: 1 days | End: 2018-12-28
Payer: MEDICARE

## 2018-12-28 VITALS
TEMPERATURE: 98 F | SYSTOLIC BLOOD PRESSURE: 130 MMHG | OXYGEN SATURATION: 98 % | HEART RATE: 72 BPM | HEIGHT: 67 IN | DIASTOLIC BLOOD PRESSURE: 80 MMHG | WEIGHT: 179.9 LBS | RESPIRATION RATE: 16 BRPM

## 2018-12-28 DIAGNOSIS — N32.89 OTHER SPECIFIED DISORDERS OF BLADDER: Chronic | ICD-10-CM

## 2018-12-28 DIAGNOSIS — Z01.818 ENCOUNTER FOR OTHER PREPROCEDURAL EXAMINATION: ICD-10-CM

## 2018-12-28 DIAGNOSIS — Z90.79 ACQUIRED ABSENCE OF OTHER GENITAL ORGAN(S): Chronic | ICD-10-CM

## 2018-12-28 DIAGNOSIS — Z98.890 OTHER SPECIFIED POSTPROCEDURAL STATES: Chronic | ICD-10-CM

## 2018-12-28 DIAGNOSIS — Z77.123 CONTACT WITH AND (SUSPECTED) EXPOSURE TO RADON AND OTHER NATURALLY OCCURRING RADIATION: Chronic | ICD-10-CM

## 2018-12-28 DIAGNOSIS — I65.29 OCCLUSION AND STENOSIS OF UNSPECIFIED CAROTID ARTERY: ICD-10-CM

## 2018-12-28 DIAGNOSIS — I21.3 ST ELEVATION (STEMI) MYOCARDIAL INFARCTION OF UNSPECIFIED SITE: Chronic | ICD-10-CM

## 2018-12-28 PROCEDURE — G0463: CPT

## 2018-12-28 RX ORDER — TROSPIUM CHLORIDE 20 MG/1
1 TABLET, FILM COATED ORAL
Qty: 0 | Refills: 0 | COMMUNITY

## 2018-12-28 RX ORDER — ATORVASTATIN CALCIUM 80 MG/1
1 TABLET, FILM COATED ORAL
Qty: 0 | Refills: 0 | COMMUNITY

## 2018-12-28 RX ORDER — ASPIRIN/CALCIUM CARB/MAGNESIUM 324 MG
1 TABLET ORAL
Qty: 0 | Refills: 0 | COMMUNITY

## 2018-12-28 RX ORDER — CLOPIDOGREL BISULFATE 75 MG/1
1 TABLET, FILM COATED ORAL
Qty: 0 | Refills: 0 | COMMUNITY

## 2018-12-28 NOTE — H&P PST ADULT - MALLAMPATI CLASS
airway III - DR. Rivera , Anesthesia aware/Class III - visualization of the soft palate and the base of the uvula

## 2018-12-28 NOTE — H&P PST ADULT - RESPIRATORY AND THORAX COMMENTS
COPD (chronic obstructive pulmonary disease)  on chest xray, hx of Pulmonary edema  May 2016-resolved

## 2018-12-28 NOTE — H&P PST ADULT - NECK DETAILS
supple/no JVD/normal thyroid gland/limited ROM of neck - hyperextension and rotation to left - DR. Rivera, Anesthesia aware no JVD/normal thyroid gland/supple/limited ROM of neck - hyperextension and rotation to left - DR. Rivera, Anesthesia aware- head tilted to left

## 2018-12-28 NOTE — H&P PST ADULT - NEGATIVE NEUROLOGICAL SYMPTOMS
no headache/no loss of consciousness/no facial palsy/no paresthesias/no generalized seizures/no vertigo/no syncope/no loss of sensation/no tremors/no hemiparesis/no transient paralysis/no weakness/no confusion/no focal seizures

## 2018-12-28 NOTE — H&P PST ADULT - NEGATIVE CARDIOVASCULAR SYMPTOMS
no palpitations/no paroxysmal nocturnal dyspnea/no dyspnea on exertion/no orthopnea/no peripheral edema/no claudication/no chest pain no orthopnea/no palpitations/no dyspnea on exertion/no paroxysmal nocturnal dyspnea/HLD, HTN/no peripheral edema/no chest pain/no claudication

## 2018-12-28 NOTE — H&P PST ADULT - NEUROLOGICAL SYMPTOMS
difficulty walking difficulty walking/Injury of head, initial encounter  12/2016 - s/p fall in hospital, limited ROM of neckl

## 2018-12-28 NOTE — H&P PST ADULT - NEGATIVE GENERAL GENITOURINARY SYMPTOMS
normal urinary frequency/no dysuria/no incontinence/no urinary hesitancy/no nocturia/no hematuria/no flank pain R/no bladder infections/CKD, BPH/no renal colic/no gas in urine/no flank pain L/no urine discoloration no urine discoloration/CKD (chronic kidney disease) stage 3, GFR 30-59 ml/min   BPH/normal urinary frequency/no incontinence/no dysuria/no urinary hesitancy/no hematuria/no nocturia/no flank pain R/no bladder infections/no flank pain L/no gas in urine/no renal colic

## 2018-12-28 NOTE — H&P PST ADULT - HISTORY OF PRESENT ILLNESS
78 years old male presented to [facility] for evaluation before planned surgery, pt was diagnosed with occlusion and stenosis of unspecified carotid artery and is scheduled for right carotid endarterectomy with EEG on 01/03/18.

## 2018-12-28 NOTE — H&P PST ADULT - RS GEN PE MLT RESP DETAILS PC
breath sounds equal/good air movement/no wheezes/airway patent/no rhonchi/respirations non-labored/clear to auscultation bilaterally/no rales

## 2018-12-28 NOTE — H&P PST ADULT - CARDIOVASCULAR SYMPTOMS
Occlusion and stenosis of unspecified carotid artery  , PAD--Left leg stent 2002  done at hospitals in [place]  --[facility] in [place], s/p balloon on LLE in 2006

## 2018-12-28 NOTE — H&P PST ADULT - NEGATIVE GASTROINTESTINAL SYMPTOMS
no change in bowel habits/no constipation/no abdominal pain/no diarrhea/no nausea/no vomiting/no flatulence

## 2018-12-28 NOTE — H&P PST ADULT - PSH
2002 left leg stent    Acute myocardial infarction  as per pt in 2001, no coronary stent  Bladder mass  cystoscopy, TURBT, transurethral incision of bladder neck- 06/2018  Cataract surgery 30 years ago    Exposure to radiation  for prostate cancer (seed implant)  History of bladder surgery    History of colon surgery  2007  right wrist surgery with hardware 27 years ago    S/P cardiac catheterization  2016 with 1 FEMI stent  S/P TURP (status post transurethral resection of prostate)

## 2018-12-28 NOTE — H&P PST ADULT - PMH
6/09 diagnosed with Prostate Ca  s/p radiation and brachytherapy w/ seed implantation 5 years ago s/p TURP  Anemia    approximately 2001  MI/Coronary Artery Disease    Bladder mass  s/p resection  BPH    CAD (coronary artery disease)  coronary stent x 1 - 2016- pt on Pletal and Plavix to continue up to sxm Surgeon Nicole alonso aware, pt aware, Anasthesia DR. Rivera aware  CKD (chronic kidney disease) stage 3, GFR 30-59 ml/min    COPD (chronic obstructive pulmonary disease)  on chest xray  Diabetes mellitus, type 2    Gout    Hearing Decreased left ear    Hypercholesteremia    Hypertension    Injury of head, initial encounter  12/2016 - s/p fall in hospital  Occlusion and stenosis of unspecified carotid artery    PAD--Left leg stent 2002  done at hospital in [place]  --[facility] in [place], s/p balloon on LLE in 2006  Prostate CA    Pulmonary edema  May 2016-resolved  Rectal bleeding  November 2018  Urinary retention 6/09 diagnosed with Prostate Ca  s/p radiation and brachytherapy w/ seed implantation 5 years ago s/p TURP  Anemia    approximately 2001  MI/Coronary Artery Disease    Bladder mass  s/p resection  BPH    CAD (coronary artery disease)  coronary stent x 1 - 2016- pt on Pletal and Plavix to continue up to sxm Surgeon Nicole alonso aware, pt aware, Anasthesia DR. Rivera aware  CKD (chronic kidney disease) stage 3, GFR 30-59 ml/min    COPD (chronic obstructive pulmonary disease)  on chest xray  Diabetes mellitus, type 2    Gout    Hearing Decreased left ear    Hypercholesteremia    Hypertension    Injury of head, initial encounter  12/2016 - s/p fall in hospital  Occlusion and stenosis of unspecified carotid artery    PAD--Left leg stent 2002  done at hospital in [place]  --[facility] in [place], s/p balloon on LLE in 2006  Pulmonary edema  May 2016-resolved  Rectal bleeding  November 2018  Urinary retention

## 2018-12-28 NOTE — H&P PST ADULT - GENITOURINARY COMMENTS
6/09 diagnosed with Prostate Ca  s/p radiation and brachytherapy w/ seed implantation 5 years ago s/p TURP

## 2018-12-28 NOTE — H&P PST ADULT - CARDIOVASCULAR COMMENTS
approximately 2001  MI/Coronary Artery Disease, CAD (coronary artery disease)  coronary stent x 1 - 2016- pt on Pletal and Plavix to continue up to sx Surgeon Nicole alonso aware, pt aware, Shalini ruiz

## 2018-12-30 PROBLEM — M10.9 GOUT, UNSPECIFIED: Chronic | Status: ACTIVE | Noted: 2018-12-28

## 2018-12-30 PROBLEM — D64.9 ANEMIA, UNSPECIFIED: Chronic | Status: ACTIVE | Noted: 2018-12-28

## 2018-12-30 PROBLEM — C61 MALIGNANT NEOPLASM OF PROSTATE: Chronic | Status: INACTIVE | Noted: 2017-02-21 | Resolved: 2018-12-28

## 2018-12-30 PROBLEM — K62.5 HEMORRHAGE OF ANUS AND RECTUM: Chronic | Status: ACTIVE | Noted: 2018-12-28

## 2018-12-30 PROBLEM — J44.9 CHRONIC OBSTRUCTIVE PULMONARY DISEASE, UNSPECIFIED: Chronic | Status: ACTIVE | Noted: 2018-12-28

## 2019-01-02 ENCOUNTER — OUTPATIENT (OUTPATIENT)
Dept: OUTPATIENT SERVICES | Facility: HOSPITAL | Age: 79
LOS: 1 days | End: 2019-01-02
Payer: MEDICARE

## 2019-01-02 ENCOUNTER — TRANSCRIPTION ENCOUNTER (OUTPATIENT)
Age: 79
End: 2019-01-02

## 2019-01-02 DIAGNOSIS — Z77.123 CONTACT WITH AND (SUSPECTED) EXPOSURE TO RADON AND OTHER NATURALLY OCCURRING RADIATION: Chronic | ICD-10-CM

## 2019-01-02 DIAGNOSIS — Z98.890 OTHER SPECIFIED POSTPROCEDURAL STATES: Chronic | ICD-10-CM

## 2019-01-02 DIAGNOSIS — Z90.79 ACQUIRED ABSENCE OF OTHER GENITAL ORGAN(S): Chronic | ICD-10-CM

## 2019-01-02 DIAGNOSIS — I21.3 ST ELEVATION (STEMI) MYOCARDIAL INFARCTION OF UNSPECIFIED SITE: Chronic | ICD-10-CM

## 2019-01-02 DIAGNOSIS — Z01.818 ENCOUNTER FOR OTHER PREPROCEDURAL EXAMINATION: ICD-10-CM

## 2019-01-02 DIAGNOSIS — N32.89 OTHER SPECIFIED DISORDERS OF BLADDER: Chronic | ICD-10-CM

## 2019-01-02 PROCEDURE — 78452 HT MUSCLE IMAGE SPECT MULT: CPT | Mod: 26

## 2019-01-02 PROCEDURE — 93018 CV STRESS TEST I&R ONLY: CPT

## 2019-01-02 PROCEDURE — 93016 CV STRESS TEST SUPVJ ONLY: CPT

## 2019-01-03 ENCOUNTER — INPATIENT (INPATIENT)
Facility: HOSPITAL | Age: 79
LOS: 0 days | Discharge: HOME | DRG: 39 | End: 2019-01-04
Attending: INTERNAL MEDICINE | Admitting: INTERNAL MEDICINE
Payer: MEDICARE

## 2019-01-03 ENCOUNTER — RESULT REVIEW (OUTPATIENT)
Age: 79
End: 2019-01-03

## 2019-01-03 ENCOUNTER — APPOINTMENT (OUTPATIENT)
Dept: VASCULAR SURGERY | Facility: HOSPITAL | Age: 79
End: 2019-01-03

## 2019-01-03 VITALS
OXYGEN SATURATION: 98 % | HEART RATE: 76 BPM | WEIGHT: 179.9 LBS | HEIGHT: 67 IN | TEMPERATURE: 98 F | DIASTOLIC BLOOD PRESSURE: 43 MMHG | SYSTOLIC BLOOD PRESSURE: 118 MMHG | RESPIRATION RATE: 18 BRPM

## 2019-01-03 DIAGNOSIS — Z90.79 ACQUIRED ABSENCE OF OTHER GENITAL ORGAN(S): Chronic | ICD-10-CM

## 2019-01-03 DIAGNOSIS — Z98.890 OTHER SPECIFIED POSTPROCEDURAL STATES: Chronic | ICD-10-CM

## 2019-01-03 DIAGNOSIS — Z77.123 CONTACT WITH AND (SUSPECTED) EXPOSURE TO RADON AND OTHER NATURALLY OCCURRING RADIATION: Chronic | ICD-10-CM

## 2019-01-03 DIAGNOSIS — I65.29 OCCLUSION AND STENOSIS OF UNSPECIFIED CAROTID ARTERY: ICD-10-CM

## 2019-01-03 DIAGNOSIS — I21.3 ST ELEVATION (STEMI) MYOCARDIAL INFARCTION OF UNSPECIFIED SITE: Chronic | ICD-10-CM

## 2019-01-03 DIAGNOSIS — N32.89 OTHER SPECIFIED DISORDERS OF BLADDER: Chronic | ICD-10-CM

## 2019-01-03 LAB
ALBUMIN SERPL ELPH-MCNC: 2.9 G/DL — LOW (ref 3.5–5)
ALP SERPL-CCNC: 150 U/L — HIGH (ref 40–120)
ALT FLD-CCNC: 16 U/L DA — SIGNIFICANT CHANGE UP (ref 10–60)
ANION GAP SERPL CALC-SCNC: 8 MMOL/L — SIGNIFICANT CHANGE UP (ref 5–17)
APTT BLD: 30.8 SEC — SIGNIFICANT CHANGE UP (ref 27.5–36.3)
AST SERPL-CCNC: 13 U/L — SIGNIFICANT CHANGE UP (ref 10–40)
BASOPHILS # BLD AUTO: 0 K/UL — SIGNIFICANT CHANGE UP (ref 0–0.2)
BASOPHILS NFR BLD AUTO: 0.4 % — SIGNIFICANT CHANGE UP (ref 0–2)
BILIRUB SERPL-MCNC: 0.3 MG/DL — SIGNIFICANT CHANGE UP (ref 0.2–1.2)
BUN SERPL-MCNC: 34 MG/DL — HIGH (ref 7–18)
CALCIUM SERPL-MCNC: 7.8 MG/DL — LOW (ref 8.4–10.5)
CHLORIDE SERPL-SCNC: 112 MMOL/L — HIGH (ref 96–108)
CHOLEST SERPL-MCNC: 143 MG/DL — SIGNIFICANT CHANGE UP (ref 10–199)
CO2 SERPL-SCNC: 24 MMOL/L — SIGNIFICANT CHANGE UP (ref 22–31)
CREAT SERPL-MCNC: 2.2 MG/DL — HIGH (ref 0.5–1.3)
EOSINOPHIL # BLD AUTO: 0 K/UL — SIGNIFICANT CHANGE UP (ref 0–0.5)
EOSINOPHIL NFR BLD AUTO: 0.1 % — SIGNIFICANT CHANGE UP (ref 0–6)
GLUCOSE BLDC GLUCOMTR-MCNC: 100 MG/DL — HIGH (ref 70–99)
GLUCOSE BLDC GLUCOMTR-MCNC: 160 MG/DL — HIGH (ref 70–99)
GLUCOSE BLDC GLUCOMTR-MCNC: 199 MG/DL — HIGH (ref 70–99)
GLUCOSE SERPL-MCNC: 155 MG/DL — HIGH (ref 70–99)
HCT VFR BLD CALC: 32.2 % — LOW (ref 39–50)
HDLC SERPL-MCNC: 37 MG/DL — LOW
HGB BLD-MCNC: 9.5 G/DL — LOW (ref 13–17)
INR BLD: 0.98 RATIO — SIGNIFICANT CHANGE UP (ref 0.88–1.16)
LIPID PNL WITH DIRECT LDL SERPL: 91 MG/DL — SIGNIFICANT CHANGE UP
LYMPHOCYTES # BLD AUTO: 0.9 K/UL — LOW (ref 1–3.3)
LYMPHOCYTES # BLD AUTO: 8.5 % — LOW (ref 13–44)
MAGNESIUM SERPL-MCNC: 1.9 MG/DL — SIGNIFICANT CHANGE UP (ref 1.6–2.6)
MCHC RBC-ENTMCNC: 27.4 PG — SIGNIFICANT CHANGE UP (ref 27–34)
MCHC RBC-ENTMCNC: 29.3 GM/DL — LOW (ref 32–36)
MCV RBC AUTO: 93.2 FL — SIGNIFICANT CHANGE UP (ref 80–100)
MONOCYTES # BLD AUTO: 0.1 K/UL — SIGNIFICANT CHANGE UP (ref 0–0.9)
MONOCYTES NFR BLD AUTO: 1.2 % — LOW (ref 2–14)
NEUTROPHILS # BLD AUTO: 9 K/UL — HIGH (ref 1.8–7.4)
NEUTROPHILS NFR BLD AUTO: 89.8 % — HIGH (ref 43–77)
PHOSPHATE SERPL-MCNC: 3.4 MG/DL — SIGNIFICANT CHANGE UP (ref 2.5–4.5)
PLATELET # BLD AUTO: 279 K/UL — SIGNIFICANT CHANGE UP (ref 150–400)
POTASSIUM SERPL-MCNC: 4.2 MMOL/L — SIGNIFICANT CHANGE UP (ref 3.5–5.3)
POTASSIUM SERPL-SCNC: 4.2 MMOL/L — SIGNIFICANT CHANGE UP (ref 3.5–5.3)
PROT SERPL-MCNC: 6.4 G/DL — SIGNIFICANT CHANGE UP (ref 6–8.3)
PROTHROM AB SERPL-ACNC: 10.9 SEC — SIGNIFICANT CHANGE UP (ref 10–12.9)
RBC # BLD: 3.46 M/UL — LOW (ref 4.2–5.8)
RBC # FLD: 15.5 % — HIGH (ref 10.3–14.5)
SODIUM SERPL-SCNC: 144 MMOL/L — SIGNIFICANT CHANGE UP (ref 135–145)
TOTAL CHOLESTEROL/HDL RATIO MEASUREMENT: 3.9 RATIO — SIGNIFICANT CHANGE UP (ref 3.4–9.6)
TRIGL SERPL-MCNC: 75 MG/DL — SIGNIFICANT CHANGE UP (ref 10–149)
WBC # BLD: 10 K/UL — SIGNIFICANT CHANGE UP (ref 3.8–10.5)
WBC # FLD AUTO: 10 K/UL — SIGNIFICANT CHANGE UP (ref 3.8–10.5)

## 2019-01-03 PROCEDURE — 88304 TISSUE EXAM BY PATHOLOGIST: CPT | Mod: 26

## 2019-01-03 PROCEDURE — 88311 DECALCIFY TISSUE: CPT | Mod: 26

## 2019-01-03 PROCEDURE — 35301 RECHANNELING OF ARTERY: CPT

## 2019-01-03 PROCEDURE — 36620 INSERTION CATHETER ARTERY: CPT

## 2019-01-03 RX ORDER — SODIUM CHLORIDE 9 MG/ML
500 INJECTION INTRAMUSCULAR; INTRAVENOUS; SUBCUTANEOUS ONCE
Qty: 0 | Refills: 0 | Status: COMPLETED | OUTPATIENT
Start: 2019-01-03 | End: 2019-01-03

## 2019-01-03 RX ORDER — CILOSTAZOL 100 MG/1
50 TABLET ORAL
Qty: 0 | Refills: 0 | Status: DISCONTINUED | OUTPATIENT
Start: 2019-01-03 | End: 2019-01-03

## 2019-01-03 RX ORDER — HEPARIN SODIUM 5000 [USP'U]/ML
5000 INJECTION INTRAVENOUS; SUBCUTANEOUS EVERY 8 HOURS
Qty: 0 | Refills: 0 | Status: DISCONTINUED | OUTPATIENT
Start: 2019-01-03 | End: 2019-01-04

## 2019-01-03 RX ORDER — INSULIN LISPRO 100/ML
VIAL (ML) SUBCUTANEOUS EVERY 6 HOURS
Qty: 0 | Refills: 0 | Status: DISCONTINUED | OUTPATIENT
Start: 2019-01-03 | End: 2019-01-04

## 2019-01-03 RX ORDER — TAMSULOSIN HYDROCHLORIDE 0.4 MG/1
0.4 CAPSULE ORAL AT BEDTIME
Qty: 0 | Refills: 0 | Status: DISCONTINUED | OUTPATIENT
Start: 2019-01-03 | End: 2019-01-04

## 2019-01-03 RX ORDER — ALLOPURINOL 300 MG
100 TABLET ORAL DAILY
Qty: 0 | Refills: 0 | Status: DISCONTINUED | OUTPATIENT
Start: 2019-01-03 | End: 2019-01-04

## 2019-01-03 RX ORDER — ACETAMINOPHEN 500 MG
1000 TABLET ORAL ONCE
Qty: 0 | Refills: 0 | Status: DISCONTINUED | OUTPATIENT
Start: 2019-01-03 | End: 2019-01-03

## 2019-01-03 RX ORDER — SODIUM CHLORIDE 9 MG/ML
1000 INJECTION, SOLUTION INTRAVENOUS
Qty: 0 | Refills: 0 | Status: DISCONTINUED | OUTPATIENT
Start: 2019-01-03 | End: 2019-01-03

## 2019-01-03 RX ORDER — FOLIC ACID 0.8 MG
1 TABLET ORAL DAILY
Qty: 0 | Refills: 0 | Status: DISCONTINUED | OUTPATIENT
Start: 2019-01-03 | End: 2019-01-04

## 2019-01-03 RX ORDER — SODIUM CHLORIDE 9 MG/ML
3 INJECTION INTRAMUSCULAR; INTRAVENOUS; SUBCUTANEOUS EVERY 8 HOURS
Qty: 0 | Refills: 0 | Status: DISCONTINUED | OUTPATIENT
Start: 2019-01-03 | End: 2019-01-03

## 2019-01-03 RX ORDER — FENTANYL CITRATE 50 UG/ML
25 INJECTION INTRAVENOUS
Qty: 0 | Refills: 0 | Status: DISCONTINUED | OUTPATIENT
Start: 2019-01-03 | End: 2019-01-03

## 2019-01-03 RX ORDER — ONDANSETRON 8 MG/1
4 TABLET, FILM COATED ORAL ONCE
Qty: 0 | Refills: 0 | Status: DISCONTINUED | OUTPATIENT
Start: 2019-01-03 | End: 2019-01-03

## 2019-01-03 RX ORDER — SODIUM CHLORIDE 9 MG/ML
1000 INJECTION, SOLUTION INTRAVENOUS
Qty: 0 | Refills: 0 | Status: DISCONTINUED | OUTPATIENT
Start: 2019-01-03 | End: 2019-01-04

## 2019-01-03 RX ORDER — ATORVASTATIN CALCIUM 80 MG/1
40 TABLET, FILM COATED ORAL AT BEDTIME
Qty: 0 | Refills: 0 | Status: DISCONTINUED | OUTPATIENT
Start: 2019-01-03 | End: 2019-01-04

## 2019-01-03 RX ADMIN — ATORVASTATIN CALCIUM 40 MILLIGRAM(S): 80 TABLET, FILM COATED ORAL at 21:12

## 2019-01-03 RX ADMIN — HEPARIN SODIUM 5000 UNIT(S): 5000 INJECTION INTRAVENOUS; SUBCUTANEOUS at 15:34

## 2019-01-03 RX ADMIN — SODIUM CHLORIDE 100 MILLILITER(S): 9 INJECTION, SOLUTION INTRAVENOUS at 16:55

## 2019-01-03 RX ADMIN — HEPARIN SODIUM 5000 UNIT(S): 5000 INJECTION INTRAVENOUS; SUBCUTANEOUS at 21:13

## 2019-01-03 RX ADMIN — Medication 100 MILLIGRAM(S): at 21:12

## 2019-01-03 RX ADMIN — SODIUM CHLORIDE 60 MILLILITER(S): 9 INJECTION, SOLUTION INTRAVENOUS at 15:34

## 2019-01-03 RX ADMIN — SODIUM CHLORIDE 1000 MILLILITER(S): 9 INJECTION INTRAMUSCULAR; INTRAVENOUS; SUBCUTANEOUS at 17:28

## 2019-01-03 RX ADMIN — TAMSULOSIN HYDROCHLORIDE 0.4 MILLIGRAM(S): 0.4 CAPSULE ORAL at 21:12

## 2019-01-03 NOTE — CONSULT NOTE ADULT - ATTENDING COMMENTS
Patient is a 78 years old male presented with PMH of HTN, HLD, DM type 2, CKD stage 3, CAD (s/p FEMI mCX in 2016), PAD (s/p left leg stent), BPH, Bladder mass (s/p TURBT 06/2018), Prostate cancer (s/p radiation and brachytherapy w/ seed implantation 2007, s/p TURP) found to have severe (80-99%) stenosis of right carotid artery. Patient underwent right carotid endarterectomy today. ICU consulted for post op monitoring.     BRIEF OR COURSE:   Patient was intubated for the procedure, it was an easy intubation. Patient received 1.5 L of LR and had 100 cc of blood loss. Urine output was noted to be minimal around 85 ml. Patient extubated in PACU and then brought to ICU for post op monitoring.     Admit to ICU for neuro-vascular monitoring  -npo  -ivf  -incentive spirometry  -blood pressure control

## 2019-01-03 NOTE — BRIEF OPERATIVE NOTE - PROCEDURE
<<-----Click on this checkbox to enter Procedure Carotid endarterectomy  01/03/2019  right  Active  MDIFIORE

## 2019-01-03 NOTE — PROGRESS NOTE ADULT - SUBJECTIVE AND OBJECTIVE BOX
S/P right carotid endarterectomy POD#0  78 year old Male seen and examined at bedside.   Denies neck pain. Denies difficulty swallowing.   Denies chest pain, shortness of breath, nausea/ vomiting, and dizziness.     Vital Signs Last 24 Hrs  T(F): 95 (01-03-19 @ 12:17), Max: 97.5 (01-03-19 @ 08:05)  HR: 62 (01-03-19 @ 15:00)  BP: 102/37 (01-03-19 @ 15:00)  RR: 19 (01-03-19 @ 15:00)  SpO2: 100% (01-03-19 @ 15:00)  POCT Blood Glucose.: 160 mg/dL (03 Jan 2019 17:11)    GENERAL: Alert, NAD  NECK: Right neck dressing c/d/i. Neck soft, no signs of bleeding  CHEST/LUNG: PAT in place with sanguineous output. Respirations nonlabored  ABDOMEN: soft  : swenson catheter in place draining clear yellow urine                          9.5    10.0  )-----------( 279      ( 03 Jan 2019 15:56 )             32.2     01-03    144  |  112<H>  |  34<H>  ----------------------------<  155<H>  4.2   |  24  |  2.20<H>    Ca    7.8<L>      03 Jan 2019 15:56  Phos  3.4     01-03  Mg     1.9     01-03    TPro  6.4  /  Alb  2.9<L>  /  TBili  0.3  /  DBili  x   /  AST  13  /  ALT  16  /  AlkPhos  150<H>  01-03

## 2019-01-03 NOTE — CONSULT NOTE ADULT - SUBJECTIVE AND OBJECTIVE BOX
Patient is a 78 years old male presented with PMH of HTN, HLD, DM type 2, CKD stage 3, CAD (s/p FEMI mCX in 2016), PAD (s/p left leg stent), BPH, Bladder mass (s/p TURBT 06/2018), Prostate cancer (s/p radiation and brachytherapy w/ seed implantation 2007, s/p TURP) found to have severe (80-99%) stenosis of right carotid artery. Patient underwent right carotid endarterectomy today. ICU consulted for post op monitoring.     BRIEF OR COURSE:   Patient was intubated for the procedure, it was an easy intubation. Patient received 1.5 L of LR and had 100 cc of blood loss. Urine output was noted to be minimal around 85 ml. Patient extubated in PACU and then brought to ICU for post op monitoring.         PAST MEDICAL & SURGICAL HISTORY:  CAD (coronary artery disease): coronary stent x 1 - 2016  Anemia  COPD (chronic obstructive pulmonary disease): on chest xray  Occlusion and stenosis of unspecified carotid artery  Gout  Rectal bleeding: November 2018  Injury of head, initial encounter: 12/2016 - s/p fall in hospital  Bladder mass: s/p resection  Pulmonary edema: May 2016-resolved  Urinary retention  CKD (chronic kidney disease) stage 3, GFR 30-59 ml/min  Diabetes mellitus, type 2  Hearing Decreased left ear  PAD--Left leg stent 2002: done at hospital in [place]  --[facility] in [place], s/p balloon on LLE in 2006  approximately 2001  MI/Coronary Artery Disease  BPH  6/09 diagnosed with Prostate Ca: s/p radiation and brachytherapy w/ seed implantation 5 years ago s/p TURP  Hypercholesteremia  Hypertension  Bladder mass: cystoscopy, TURBT, transurethral incision of bladder neck- 06/2018  S/P cardiac catheterization: 2016 with 1 FEMI stent  History of bladder surgery  S/P TURP (status post transurethral resection of prostate)  Exposure to radiation: for prostate cancer (seed implant)  History of colon surgery: 2007  Acute myocardial infarction: as per pt in 2001, no coronary stent  2002 left leg stent  right wrist surgery with hardware 27 years ago  Cataract surgery 30 years ago      Allergies  Novocain (Faint)      FAMILY HISTORY:  Family history of heart disease  Family history of diabetes mellitus (Mother)      Social history reviewed:   Former smoker (used to smoke 2.5 PPD x >30yrs, quitted 13 yrs ago), no ETOH, no recreational drug use        Review of Systems:  CONSTITUTIONAL: No fever, chills, or fatigue  EYES: No eye pain, visual disturbances, or discharge  ENMT:  No difficulty hearing, tinnitus, vertigo; No sinus or throat pain  NECK: No pain or stiffness  RESPIRATORY: No cough, wheezing, chills or hemoptysis; No shortness of breath  CARDIOVASCULAR: No chest pain, palpitations, dizziness, or leg swelling  GASTROINTESTINAL: No abdominal or epigastric pain. No nausea, vomiting, or hematemesis; No diarrhea or constipation. No melena or hematochezia.  GENITOURINARY: No dysuria, frequency, hematuria, or incontinence  NEUROLOGICAL: No headaches, memory loss, loss of strength, numbness, or tremors  SKIN: No rashes or lesions   MUSCULOSKELETAL: No joint pain or swelling; No muscle, back, or extremity pain        Vital Signs Last 24 Hrs  T(C): 36.4 (03 Jan 2019 08:05), Max: 36.4 (03 Jan 2019 08:05)  T(F): 97.5 (03 Jan 2019 08:05), Max: 97.5 (03 Jan 2019 08:05)  HR: 76 (03 Jan 2019 08:05) (76 - 76)  BP: 118/43 (03 Jan 2019 08:05) (118/43 - 118/43)  BP(mean): --  RR: 18 (03 Jan 2019 08:05) (18 - 18)  SpO2: 98% (03 Jan 2019 08:05) (98% - 98%)            CAPILLARY BLOOD GLUCOSE      POCT Blood Glucose.: 100 mg/dL (03 Jan 2019 07:47)    PT/INR - ( 03 Jan 2019 07:56 )   PT: 10.9 sec;   INR: 0.98 ratio         PTT - ( 03 Jan 2019 07:56 )  PTT:30.8 sec      Physical Examination:  General: No acute distress.    HEENT: Pupils equal, reactive to light.  Symmetric.  NECK: supple, dressing intact, PAT drain in place   PULM: Clear to auscultation bilaterally, no significant sputum production  CVS: Regular rate and rhythm, no murmurs, rubs, or gallops  ABD: Soft, nondistended, nontender, normoactive bowel sounds, no masses  EXT: No edema, nontender  SKIN: Warm and well perfused, no rashes noted.  NEURO: Alert, oriented, interactive, nonfocal          ASSESSMENT AND PLAN:     Patient is a 78 years old male presented with significant PMH and PSH found to have severe (80-99%) stenosis of right carotid artery. Patient underwent right carotid endarterectomy today. Admitted to ICU for post op monitoring.     1) Right carotid artery stenosis s/p right carotid endarterectomy  - admit to ICU for post of monitoring  - Goal -160  - Neurocheks q1  - NPO for next 24 hrs  - IV hydration , monitor I/Os   - Resume Plavix from tomorrow as per vascular   - c/w statin   - DVT ppx   - Vascular follow up     2) HTN:  - Goal -160   - Currently BP with SBP in 90s-110s  - Monitor BP closely       3) Diabetes mellitus:  - holding home PO meds  - Low HSS starting at 200s as patient is NPO for now  - monitor accu cheks q4-q6 and adjust as needed      4) HLD:   - c/w statin      5) PAD:  - c/w plavix and cilostazol.       5) Prophylactic measure:  IMPROVE VTE Individual Risk Assessment          RISK                                                          Points  [  ] Previous VTE                                                3  [  ] Thrombophilia                                             2  [  ] Lower limb paralysis                                   2        (unable to hold up >15 seconds)    [  ] Current Cancer                                             2         (within 6 months)  [ x ] Immobilization > 24 hrs                              1  [ x ] ICU/CCU stay > 24 hours                             1  [ x ] Age > 60                                                         1    IMPROVE VTE Score: 3  Heparin sq for DVT ppx.         CRITICAL CARE TIME SPENT: 35 minutes

## 2019-01-03 NOTE — CHART NOTE - NSCHARTNOTEFT_GEN_A_CORE
Pt seen at bedside, no headache, dizziness reported. Pt able to move all 4 extremities with equal power. Will continue to monitor. Pt seen at bedside, no headache, dizziness reported. Pt able to move all 4 extremities with equal power. Will continue to monitor...

## 2019-01-03 NOTE — PATIENT PROFILE ADULT - ABILITY TO HEAR (WITH HEARING AID OR HEARING APPLIANCE IF NORMALLY USED):
left [place]ild[place] to Moderately Impaired: difficulty hearing in some environments or speaker may need to increase volume or speak distinctly

## 2019-01-04 ENCOUNTER — TRANSCRIPTION ENCOUNTER (OUTPATIENT)
Age: 79
End: 2019-01-04

## 2019-01-04 VITALS
SYSTOLIC BLOOD PRESSURE: 151 MMHG | OXYGEN SATURATION: 95 % | RESPIRATION RATE: 27 BRPM | HEART RATE: 72 BPM | DIASTOLIC BLOOD PRESSURE: 119 MMHG

## 2019-01-04 DIAGNOSIS — N18.3 CHRONIC KIDNEY DISEASE, STAGE 3 (MODERATE): ICD-10-CM

## 2019-01-04 DIAGNOSIS — I10 ESSENTIAL (PRIMARY) HYPERTENSION: ICD-10-CM

## 2019-01-04 DIAGNOSIS — E11.9 TYPE 2 DIABETES MELLITUS WITHOUT COMPLICATIONS: ICD-10-CM

## 2019-01-04 LAB
ANION GAP SERPL CALC-SCNC: 12 MMOL/L — SIGNIFICANT CHANGE UP (ref 5–17)
BUN SERPL-MCNC: 35 MG/DL — HIGH (ref 7–18)
CALCIUM SERPL-MCNC: 8.1 MG/DL — LOW (ref 8.4–10.5)
CHLORIDE SERPL-SCNC: 111 MMOL/L — HIGH (ref 96–108)
CO2 SERPL-SCNC: 21 MMOL/L — LOW (ref 22–31)
CREAT SERPL-MCNC: 2.2 MG/DL — HIGH (ref 0.5–1.3)
GLUCOSE BLDC GLUCOMTR-MCNC: 155 MG/DL — HIGH (ref 70–99)
GLUCOSE BLDC GLUCOMTR-MCNC: 174 MG/DL — HIGH (ref 70–99)
GLUCOSE BLDC GLUCOMTR-MCNC: 71 MG/DL — SIGNIFICANT CHANGE UP (ref 70–99)
GLUCOSE SERPL-MCNC: 131 MG/DL — HIGH (ref 70–99)
HBA1C BLD-MCNC: 6.4 % — HIGH (ref 4–5.6)
HCT VFR BLD CALC: 32.6 % — LOW (ref 39–50)
HGB BLD-MCNC: 9.5 G/DL — LOW (ref 13–17)
MAGNESIUM SERPL-MCNC: 2.1 MG/DL — SIGNIFICANT CHANGE UP (ref 1.6–2.6)
MCHC RBC-ENTMCNC: 27.5 PG — SIGNIFICANT CHANGE UP (ref 27–34)
MCHC RBC-ENTMCNC: 29.2 GM/DL — LOW (ref 32–36)
MCV RBC AUTO: 94.1 FL — SIGNIFICANT CHANGE UP (ref 80–100)
PHOSPHATE SERPL-MCNC: 3.1 MG/DL — SIGNIFICANT CHANGE UP (ref 2.5–4.5)
PLATELET # BLD AUTO: 286 K/UL — SIGNIFICANT CHANGE UP (ref 150–400)
POTASSIUM SERPL-MCNC: 4.7 MMOL/L — SIGNIFICANT CHANGE UP (ref 3.5–5.3)
POTASSIUM SERPL-SCNC: 4.7 MMOL/L — SIGNIFICANT CHANGE UP (ref 3.5–5.3)
RBC # BLD: 3.46 M/UL — LOW (ref 4.2–5.8)
RBC # FLD: 16.1 % — HIGH (ref 10.3–14.5)
SODIUM SERPL-SCNC: 144 MMOL/L — SIGNIFICANT CHANGE UP (ref 135–145)
WBC # BLD: 10.9 K/UL — HIGH (ref 3.8–10.5)
WBC # FLD AUTO: 10.9 K/UL — HIGH (ref 3.8–10.5)

## 2019-01-04 PROCEDURE — 85610 PROTHROMBIN TIME: CPT

## 2019-01-04 PROCEDURE — 78452 HT MUSCLE IMAGE SPECT MULT: CPT

## 2019-01-04 PROCEDURE — 85730 THROMBOPLASTIN TIME PARTIAL: CPT

## 2019-01-04 PROCEDURE — 83735 ASSAY OF MAGNESIUM: CPT

## 2019-01-04 PROCEDURE — 86900 BLOOD TYPING SEROLOGIC ABO: CPT

## 2019-01-04 PROCEDURE — 82962 GLUCOSE BLOOD TEST: CPT

## 2019-01-04 PROCEDURE — C1889: CPT

## 2019-01-04 PROCEDURE — 88311 DECALCIFY TISSUE: CPT

## 2019-01-04 PROCEDURE — 86901 BLOOD TYPING SEROLOGIC RH(D): CPT

## 2019-01-04 PROCEDURE — 80053 COMPREHEN METABOLIC PANEL: CPT

## 2019-01-04 PROCEDURE — 88304 TISSUE EXAM BY PATHOLOGIST: CPT

## 2019-01-04 PROCEDURE — 93017 CV STRESS TEST TRACING ONLY: CPT

## 2019-01-04 PROCEDURE — 85027 COMPLETE CBC AUTOMATED: CPT

## 2019-01-04 PROCEDURE — 36415 COLL VENOUS BLD VENIPUNCTURE: CPT

## 2019-01-04 PROCEDURE — 86850 RBC ANTIBODY SCREEN: CPT

## 2019-01-04 PROCEDURE — A9502: CPT

## 2019-01-04 PROCEDURE — 83036 HEMOGLOBIN GLYCOSYLATED A1C: CPT

## 2019-01-04 PROCEDURE — 84100 ASSAY OF PHOSPHORUS: CPT

## 2019-01-04 PROCEDURE — 80048 BASIC METABOLIC PNL TOTAL CA: CPT

## 2019-01-04 PROCEDURE — C1768: CPT

## 2019-01-04 PROCEDURE — 80061 LIPID PANEL: CPT

## 2019-01-04 RX ORDER — INSULIN LISPRO 100/ML
VIAL (ML) SUBCUTANEOUS
Qty: 0 | Refills: 0 | Status: DISCONTINUED | OUTPATIENT
Start: 2019-01-04 | End: 2019-01-04

## 2019-01-04 RX ORDER — ACETAMINOPHEN WITH CODEINE 300MG-30MG
1 TABLET ORAL
Qty: 15 | Refills: 0
Start: 2019-01-04

## 2019-01-04 RX ORDER — CLOPIDOGREL BISULFATE 75 MG/1
75 TABLET, FILM COATED ORAL DAILY
Qty: 0 | Refills: 0 | Status: DISCONTINUED | OUTPATIENT
Start: 2019-01-04 | End: 2019-01-04

## 2019-01-04 RX ORDER — CHLORHEXIDINE GLUCONATE 213 G/1000ML
1 SOLUTION TOPICAL
Qty: 0 | Refills: 0 | Status: DISCONTINUED | OUTPATIENT
Start: 2019-01-04 | End: 2019-01-04

## 2019-01-04 RX ADMIN — Medication 100 MILLIGRAM(S): at 12:26

## 2019-01-04 RX ADMIN — HEPARIN SODIUM 5000 UNIT(S): 5000 INJECTION INTRAVENOUS; SUBCUTANEOUS at 14:30

## 2019-01-04 RX ADMIN — CHLORHEXIDINE GLUCONATE 1 APPLICATION(S): 213 SOLUTION TOPICAL at 17:24

## 2019-01-04 RX ADMIN — CLOPIDOGREL BISULFATE 75 MILLIGRAM(S): 75 TABLET, FILM COATED ORAL at 16:27

## 2019-01-04 RX ADMIN — Medication 1 MILLIGRAM(S): at 12:26

## 2019-01-04 RX ADMIN — HEPARIN SODIUM 5000 UNIT(S): 5000 INJECTION INTRAVENOUS; SUBCUTANEOUS at 05:46

## 2019-01-04 NOTE — DISCHARGE NOTE ADULT - HOSPITAL COURSE
77yo male s/p right caratid endarterectomy on 1/3. Surgery was uneventful and patient was discharged home one stable with plavix, pain meds, and appropriate follow-up.

## 2019-01-04 NOTE — PROGRESS NOTE ADULT - PROBLEM SELECTOR PLAN 1
s/p right carotid endartectomy  Pt remained stableovernight  normal power b/l with no headache or dizzines reported, minimal fluid in PAT drain  Pt is stable for downgrade to surgical floor

## 2019-01-04 NOTE — DISCHARGE NOTE ADULT - PATIENT PORTAL LINK FT
You can access the [facility] Patient Portal, offered by [facility], by registering with the following website: [email or web address]

## 2019-01-04 NOTE — PROGRESS NOTE ADULT - ASSESSMENT
78 year old male s/p right carotid endarterectomy POD#0, stable    - continue NPO  - monitor dressing and PAT  - continue PAT and record output  - continue ICU management  - continue swenson catheter and record output
s/p R carotid endarterectomy 1/3, doing well. PAT removed  1. Advance diet to regular  2. D/c swenson and give trial of void  3. D/c plan for today
Patient is a 78 years old male presented with PMH of HTN, HLD, DM type 2, CKD stage 3, CAD (s/p FEMI mCX in 2016), PAD (s/p left leg stent), BPH, Bladder mass (s/p TURBT 06/2018), Prostate cancer (s/p radiation and brachytherapy w/ seed implantation 2007, s/p TURP) found to have severe (80-99%) stenosis of right carotid artery. Patient underwent right carotid endarterectomy today. ICU consulted for post op monitoring.     BRIEF OR COURSE:   Patient was intubated for the procedure, it was an easy intubation. Patient received 1.5 L of LR and had 100 cc of blood loss. Urine output was noted to be minimal around 85 ml. Patient extubated in PACU and then brought to ICU for post op monitoring.     Admit to ICU for neuro-vascular monitoring, pt remained stable overnight. Pt planned to be downgraded to surigcal floor

## 2019-01-04 NOTE — CHART NOTE - NSCHARTNOTEFT_GEN_A_CORE
Patient is a 78 years old male presented with PMH of HTN, HLD, DM type 2, CKD stage 3, CAD (s/p FEMI mCX in 2016), PAD (s/p left leg stent), BPH, Bladder mass (s/p TURBT 06/2018), Prostate cancer (s/p radiation and brachytherapy w/ seed implantation 2007, s/p TURP) found to have severe (80-99%) stenosis of right carotid artery. Patient underwent right carotid endarterectomy tON 01/03/2019. ICU consulted for post op monitoring.     BRIEF OR COURSE:   Patient was intubated for the procedure, it was an easy intubation. Patient received 1.5 L of LR and had 100 cc of blood loss. Urine output was noted to be minimal around 85 ml. Patient extubated in PACU and then brought to ICU for post op monitoring.     ICU COURSE:  Pt remained stable during his ICU course, pulses and neuro checks remained normal. Pt was maintained npo for and was started on diet after clearence from surgery. Pt si stable for downgrade to surgical floor.    THINGS TO FOLLOW:    -Finger sticks  -Urine output   -Vascular surgery follow up

## 2019-01-04 NOTE — DISCHARGE NOTE ADULT - MEDICATION SUMMARY - MEDICATIONS TO TAKE
I will START or STAY ON the medications listed below when I get home from the hospital:    calcium- magnesium- vitamin d - mg-mg- units  -- 1 tab(s) by mouth once a day  -- Indication: For As directed    acetaminophen-codeine 300 mg-30 mg oral tablet  -- 1 tab(s) by mouth every 6 hours MDD:4 tabs prn pain  -- Caution federal law prohibits the transfer of this drug to any person other  than the person for whom it was prescribed.  May cause drowsiness.  Alcohol may intensify this effect.  Use care when operating dangerous machinery.  This product contains acetaminophen.  Do not use  with any other product containing acetaminophen to prevent possible liver damage.  Using more of this medication than prescribed may cause serious breathing problems.    -- Indication: For pain    tamsulosin 0.4 mg oral capsule  -- 1 cap(s) by mouth once a day (at bedtime)  -- Indication: For As directed    isosorbide mononitrate 30 mg oral tablet, extended release  -- 1 tab(s) by mouth once a day (in the morning)  -- Indication: For As directed     glipiZIDE 10 mg oral tablet  -- 1 tab(s) by mouth 2 times a day  -- Indication: For As directed    allopurinol 100 mg oral tablet  -- 1 tab(s) by mouth once a day  -- Indication: For As directed    rosuvastatin 10 mg oral tablet  -- 1 tab(s) by mouth once a day (at bedtime)  -- Indication: For As directed    Plavix 75 mg oral tablet  -- 1 tab(s) by mouth once a day  -- Indication: For As directed    amLODIPine 10 mg oral tablet  -- 1 tab(s) by mouth once a day  -- Indication: For As directed    cilostazol 50 mg oral tablet  -- 1 tab(s) by mouth once a day  -- Indication: For As directed    folic acid 1 mg oral tablet  -- 1 tab(s) by mouth once a day  -- Indication: For As directed

## 2019-01-04 NOTE — PROGRESS NOTE ADULT - SUBJECTIVE AND OBJECTIVE BOX
Post Operative Day #: 1    SUBJECTIVE: 78y Male s/p R carotid endarterectomy 1/3    INTERVAL HPI/OVERNIGHT EVENTS:  pt states feeling well this morning.    Denies dizziness, headache      MEDICATIONS  (STANDING):  allopurinol 100 milliGRAM(s) Oral daily  atorvastatin 40 milliGRAM(s) Oral at bedtime  dextrose 5% + sodium chloride 0.45%. 1000 milliLiter(s) (100 mL/Hr) IV Continuous <Continuous>  folic acid 1 milliGRAM(s) Oral daily  heparin  Injectable 5000 Unit(s) SubCutaneous every 8 hours  insulin lispro (HumaLOG) corrective regimen sliding scale   SubCutaneous every 6 hours  tamsulosin 0.4 milliGRAM(s) Oral at bedtime    OBJECTIVE:  Vital Signs Last 24 Hrs  T(C): 36 (04 Jan 2019 08:00), Max: 36.7 (03 Jan 2019 20:08)  T(F): 96.8 (04 Jan 2019 08:00), Max: 98 (03 Jan 2019 20:08)  HR: 67 (04 Jan 2019 08:00) (62 - 78)  BP: 119/49 (04 Jan 2019 08:00) (92/36 - 127/45)  BP(mean): 66 (04 Jan 2019 08:00) (49 - 66)  RR: 20 (04 Jan 2019 08:00) (12 - 22)  SpO2: 96% (04 Jan 2019 08:00) (93% - 100%)    Physical Exam:    Gen: awake, alert oriented NAD  HEENT: normocephalic, no facial swelling or droop, R neck wound with dressing in place, PAT with serosanguinous output.  PAT removed  Extremities: warm to touch no c/c/e    I&O's Detail    03 Jan 2019 07:01  -  04 Jan 2019 07:00  --------------------------------------------------------  IN:    dextrose 5% + sodium chloride 0.45%.: 1400 mL    dextrose 5% + sodium chloride 0.45%.: 180 mL    Sodium Chloride 0.9% IV Bolus: 500 mL  Total IN: 2080 mL    OUT:    Bulb: 70 mL    Indwelling Catheter - Urethral: 945 mL  Total OUT: 1015 mL    Total NET: 1065 mL      04 Jan 2019 07:01  -  04 Jan 2019 09:03  --------------------------------------------------------  IN:    dextrose 5% + sodium chloride 0.45%.: 100 mL  Total IN: 100 mL    OUT:    Indwelling Catheter - Urethral: 90 mL  Total OUT: 90 mL    Total NET: 10 mL      Labs:                          9.5    10.9  )-----------( 286      ( 04 Jan 2019 06:33 )             32.6     04 Jan 2019 06:33    144    |  111    |  35     ----------------------------<  131    4.7     |  21     |  2.20   03 Jan 2019 15:56    144    |  112    |  34     ----------------------------<  155    4.2     |  24     |  2.20     Ca    8.1        04 Jan 2019 06:33  Ca    7.8        03 Jan 2019 15:56  Phos  3.1       04 Jan 2019 06:33  Mg     2.1       04 Jan 2019 06:33    TPro  6.4    /  Alb  2.9    /  TBili  0.3    /  DBili  x      /  AST  13     /  ALT  16     /  AlkPhos  150    03 Jan 2019 15:56    PT/INR - ( 03 Jan 2019 07:56 )   PT: 10.9 sec;   INR: 0.98 ratio         PTT - ( 03 Jan 2019 07:56 )  PTT:30.8 sec

## 2019-01-04 NOTE — PROGRESS NOTE ADULT - PROBLEM SELECTOR PLAN 2
Pt had CKD stage 3 creatinine variable  Continue to monitor urine output   Pt of swenson, will monitor for voiding

## 2019-01-04 NOTE — PROGRESS NOTE ADULT - SUBJECTIVE AND OBJECTIVE BOX
INTERVAL HPI/OVERNIGHT EVENTS: ***    PRESSORS: [ ] YES [ ] NO  WHICH:    ANTIBIOTICS:                  DATE STARTED:  ANTIBIOTICS:                  DATE STARTED:  ANTIBIOTICS:                  DATE STARTED:    Antimicrobial:    Cardiovascular:  tamsulosin 0.4 milliGRAM(s) Oral at bedtime    Pulmonary:    Hematalogic:  heparin  Injectable 5000 Unit(s) SubCutaneous every 8 hours    Other:  allopurinol 100 milliGRAM(s) Oral daily  atorvastatin 40 milliGRAM(s) Oral at bedtime  dextrose 5% + sodium chloride 0.45%. 1000 milliLiter(s) IV Continuous <Continuous>  folic acid 1 milliGRAM(s) Oral daily  insulin lispro (HumaLOG) corrective regimen sliding scale   SubCutaneous every 6 hours    allopurinol 100 milliGRAM(s) Oral daily  atorvastatin 40 milliGRAM(s) Oral at bedtime  dextrose 5% + sodium chloride 0.45%. 1000 milliLiter(s) IV Continuous <Continuous>  folic acid 1 milliGRAM(s) Oral daily  heparin  Injectable 5000 Unit(s) SubCutaneous every 8 hours  insulin lispro (HumaLOG) corrective regimen sliding scale   SubCutaneous every 6 hours  tamsulosin 0.4 milliGRAM(s) Oral at bedtime    Drug Dosing Weight  Height (cm): 170.18 (03 Jan 2019 12:17)  Weight (kg): 78.2 (03 Jan 2019 12:17)  BMI (kg/m2): 27 (03 Jan 2019 12:17)  BSA (m2): 1.9 (03 Jan 2019 12:17)    CENTRAL LINE: [ ] YES [ ] NO  LOCATION:   DATE INSERTED:  REMOVE: [ ] YES [ ] NO  EXPLAIN:    BAXTER: [ ] YES [ ] NO    DATE INSERTED:  REMOVE:  [ ] YES [ ] NO  EXPLAIN:    A-LINE:  [ ] YES [ ] NO  LOCATION:   DATE INSERTED:  REMOVE:  [ ] YES [ ] NO  EXPLAIN:    PMH -reviewed admission note, no change since admission  Heart faliure: acute [ ] chronic [ ] acute or chronic [ ] diastolic [ ] systolic [ ] combied systolic and diastolic[ ]  RUSTY: ATN[ ] renal medullary necrosis [ ] CKD I [ ]CKDII [ ]CKD III [ ]CKD IV [ ]CKD V [ ]Other pathological lesions [ ]  Abdominal Nutrition Status: malnutrition [ ] cachexia [ ] morbid obesity/BMI=40 [ ] Supplement ordered [___________]     ICU Vital Signs Last 24 Hrs  T(C): 35.9 (04 Jan 2019 04:21), Max: 36.7 (03 Jan 2019 20:08)  T(F): 96.7 (04 Jan 2019 04:21), Max: 98 (03 Jan 2019 20:08)  HR: 71 (04 Jan 2019 07:00) (62 - 78)  BP: 125/47 (04 Jan 2019 07:00) (92/36 - 127/45)  BP(mean): 66 (04 Jan 2019 07:00) (49 - 66)  ABP: 142/47 (04 Jan 2019 07:00) (104/39 - 150/60)  ABP(mean): 80 (04 Jan 2019 07:00) (60 - 91)  RR: 17 (04 Jan 2019 07:00) (12 - 22)  SpO2: 97% (04 Jan 2019 07:00) (93% - 100%)            01-03 @ 07:01  -  01-04 @ 07:00  --------------------------------------------------------  IN: 2080 mL / OUT: 1015 mL / NET: 1065 mL            PHYSICAL EXAM:    GENERAL: NAD, well-groomed, well-developed  HEAD:  Atraumatic, Normocephalic  EYES: EOMI, PERRLA, conjunctiva and sclera clear  ENMT: No tonsillar erythema, exudates, or enlargement; Moist mucous membranes, Good dentition, No lesions  NECK: Supple, normal appearance, No JVD; Normal thyroid; Trachea midline  NERVOUS SYSTEM:  Alert & Oriented X3, Good concentration; Motor Strength 5/5 B/L upper and lower extremities; DTRs 2+ intact and symmetric  CHEST/LUNG: No chest deformity; Normal percussion bilaterally; No rales, rhonchi, wheezing   HEART: Regular rate and rhythm; No murmurs, rubs, or gallops  ABDOMEN: Soft, Nontender, Nondistended; Bowel sounds present  EXTREMITIES:  2+ Peripheral Pulses, No clubbing, cyanosis, or edema  LYMPH: No lymphadenopathy noted  SKIN: No rashes or lesions; Good capillary refill      LABS:  CBC Full  -  ( 04 Jan 2019 06:33 )  WBC Count : 10.9 K/uL  Hemoglobin : 9.5 g/dL  Hematocrit : 32.6 %  Platelet Count - Automated : 286 K/uL  Mean Cell Volume : 94.1 fl  Mean Cell Hemoglobin : 27.5 pg  Mean Cell Hemoglobin Concentration : 29.2 gm/dL  Auto Neutrophil # : x  Auto Lymphocyte # : x  Auto Monocyte # : x  Auto Eosinophil # : x  Auto Basophil # : x  Auto Neutrophil % : x  Auto Lymphocyte % : x  Auto Monocyte % : x  Auto Eosinophil % : x  Auto Basophil % : x    01-04    144  |  111<H>  |  35<H>  ----------------------------<  131<H>  4.7   |  21<L>  |  2.20<H>    Ca    8.1<L>      04 Jan 2019 06:33  Phos  3.1     01-04  Mg     2.1     01-04    TPro  6.4  /  Alb  2.9<L>  /  TBili  0.3  /  DBili  x   /  AST  13  /  ALT  16  /  AlkPhos  150<H>  01-03    PT/INR - ( 03 Jan 2019 07:56 )   PT: 10.9 sec;   INR: 0.98 ratio         PTT - ( 03 Jan 2019 07:56 )  PTT:30.8 sec        RADIOLOGY & ADDITIONAL STUDIES REVIEWED:  ***    [ ]GOALS OF CARE DISCUSSION WITH PATIENT/FAMILY/PROXY:    CRITICAL CARE TIME SPENT: 35 minutes INTERVAL HPI/OVERNIGHT EVENTS: No active complaints, pt remained stable overnight    PRESSORS: [ ] YES [x ] NO      Antimicrobial:    Cardiovascular:  tamsulosin 0.4 milliGRAM(s) Oral at bedtime    Pulmonary:    Hematalogic:  heparin  Injectable 5000 Unit(s) SubCutaneous every 8 hours    Other:  allopurinol 100 milliGRAM(s) Oral daily  atorvastatin 40 milliGRAM(s) Oral at bedtime  dextrose 5% + sodium chloride 0.45%. 1000 milliLiter(s) IV Continuous <Continuous>  folic acid 1 milliGRAM(s) Oral daily  insulin lispro (HumaLOG) corrective regimen sliding scale   SubCutaneous every 6 hours    allopurinol 100 milliGRAM(s) Oral daily  atorvastatin 40 milliGRAM(s) Oral at bedtime  dextrose 5% + sodium chloride 0.45%. 1000 milliLiter(s) IV Continuous <Continuous>  folic acid 1 milliGRAM(s) Oral daily  heparin  Injectable 5000 Unit(s) SubCutaneous every 8 hours  insulin lispro (HumaLOG) corrective regimen sliding scale   SubCutaneous every 6 hours  tamsulosin 0.4 milliGRAM(s) Oral at bedtime    Drug Dosing Weight  Height (cm): 170.18 (03 Jan 2019 12:17)  Weight (kg): 78.2 (03 Jan 2019 12:17)  BMI (kg/m2): 27 (03 Jan 2019 12:17)  BSA (m2): 1.9 (03 Jan 2019 12:17)    CENTRAL LINE: [ ] YES [x ] NO  LOCATION:   DATE INSERTED:  REMOVE: [ ] YES [ ] NO  EXPLAIN:    BAXTER: [ ] YES [ x] NO    DATE INSERTED:  REMOVE:  [ ] YES [ ] NO  EXPLAIN:    A-LINE:  [ ] YES [x ] NO  LOCATION:   DATE INSERTED:  REMOVE:  [ ] YES [ ] NO  EXPLAIN:        ICU Vital Signs Last 24 Hrs  T(C): 35.9 (04 Jan 2019 04:21), Max: 36.7 (03 Jan 2019 20:08)  T(F): 96.7 (04 Jan 2019 04:21), Max: 98 (03 Jan 2019 20:08)  HR: 71 (04 Jan 2019 07:00) (62 - 78)  BP: 125/47 (04 Jan 2019 07:00) (92/36 - 127/45)  BP(mean): 66 (04 Jan 2019 07:00) (49 - 66)  ABP: 142/47 (04 Jan 2019 07:00) (104/39 - 150/60)  ABP(mean): 80 (04 Jan 2019 07:00) (60 - 91)  RR: 17 (04 Jan 2019 07:00) (12 - 22)  SpO2: 97% (04 Jan 2019 07:00) (93% - 100%)            01-03 @ 07:01  -  01-04 @ 07:00  --------------------------------------------------------  IN: 2080 mL / OUT: 1015 mL / NET: 1065 mL            PHYSICAL EXAM:    GENERAL: NAD, well-groomed, well-developed  HEAD:  Atraumatic, Normocephalic  EYES: EOMI, PERRLA, conjunctiva and sclera clear  ENMT: No tonsillar erythema, exudates, or enlargement; Moist mucous membranes, Good dentition, No lesions  NECK: Supple, normal appearance, No JVD; Normal thyroid; Trachea midline  NERVOUS SYSTEM:  Alert & Oriented X3, Good concentration; Motor Strength 5/5 B/L upper and lower extremities; DTRs 2+ intact and symmetric  CHEST/LUNG: No chest deformity; Normal percussion bilaterally; No rales, rhonchi, wheezing   HEART: Regular rate and rhythm; No murmurs, rubs, or gallops  ABDOMEN: Soft, Nontender, Nondistended; Bowel sounds present  EXTREMITIES:  2+ Peripheral Pulses, No clubbing, cyanosis, or edema  LYMPH: No lymphadenopathy noted  SKIN: No rashes or lesions; Good capillary refill      LABS:  CBC Full  -  ( 04 Jan 2019 06:33 )  WBC Count : 10.9 K/uL  Hemoglobin : 9.5 g/dL  Hematocrit : 32.6 %  Platelet Count - Automated : 286 K/uL  Mean Cell Volume : 94.1 fl  Mean Cell Hemoglobin : 27.5 pg  Mean Cell Hemoglobin Concentration : 29.2 gm/dL  Auto Neutrophil # : x  Auto Lymphocyte # : x  Auto Monocyte # : x  Auto Eosinophil # : x  Auto Basophil # : x  Auto Neutrophil % : x  Auto Lymphocyte % : x  Auto Monocyte % : x  Auto Eosinophil % : x  Auto Basophil % : x    01-04    144  |  111<H>  |  35<H>  ----------------------------<  131<H>  4.7   |  21<L>  |  2.20<H>    Ca    8.1<L>      04 Jan 2019 06:33  Phos  3.1     01-04  Mg     2.1     01-04    TPro  6.4  /  Alb  2.9<L>  /  TBili  0.3  /  DBili  x   /  AST  13  /  ALT  16  /  AlkPhos  150<H>  01-03    PT/INR - ( 03 Jan 2019 07:56 )   PT: 10.9 sec;   INR: 0.98 ratio         PTT - ( 03 Jan 2019 07:56 )  PTT:30.8 sec        RADIOLOGY & ADDITIONAL STUDIES REVIEWED:  ***    [ ]GOALS OF CARE DISCUSSION WITH PATIENT/FAMILY/PROXY:    CRITICAL CARE TIME SPENT: 35 minutes

## 2019-01-04 NOTE — DISCHARGE NOTE ADULT - CARE PROVIDER_API CALL
Prince Rivera), Surgery; Vascular Surgery  [address] [facility] [address]  [address]  [place] [address]  Phone: [phone]  Fax: [phone]

## 2019-01-04 NOTE — PROGRESS NOTE ADULT - SUBJECTIVE AND OBJECTIVE BOX
· Reason for Admission	Carotid endarterectomy	      · Subjective and Objective: 	  INTERVAL HPI/OVERNIGHT EVENTS: No active complaints, pt remained stable overnight    PRESSORS: [ ] YES [x ] NO      Antimicrobial:    Cardiovascular:  tamsulosin 0.4 milliGRAM(s) Oral at bedtime    Pulmonary:    Hematalogic:  heparin  Injectable 5000 Unit(s) SubCutaneous every 8 hours    Other:  allopurinol 100 milliGRAM(s) Oral daily  atorvastatin 40 milliGRAM(s) Oral at bedtime  dextrose 5% + sodium chloride 0.45%. 1000 milliLiter(s) IV Continuous <Continuous>  folic acid 1 milliGRAM(s) Oral daily  insulin lispro (HumaLOG) corrective regimen sliding scale   SubCutaneous every 6 hours    allopurinol 100 milliGRAM(s) Oral daily  atorvastatin 40 milliGRAM(s) Oral at bedtime  dextrose 5% + sodium chloride 0.45%. 1000 milliLiter(s) IV Continuous <Continuous>  folic acid 1 milliGRAM(s) Oral daily  heparin  Injectable 5000 Unit(s) SubCutaneous every 8 hours  insulin lispro (HumaLOG) corrective regimen sliding scale   SubCutaneous every 6 hours  tamsulosin 0.4 milliGRAM(s) Oral at bedtime    Drug Dosing Weight  Height (cm): 170.18 (03 Jan 2019 12:17)  Weight (kg): 78.2 (03 Jan 2019 12:17)  BMI (kg/m2): 27 (03 Jan 2019 12:17)  BSA (m2): 1.9 (03 Jan 2019 12:17)    CENTRAL LINE: [ ] YES [x ] NO  LOCATION:   DATE INSERTED:  REMOVE: [ ] YES [ ] NO  EXPLAIN:    SWENSON: [ ] YES [ x] NO    DATE INSERTED:  REMOVE:  [ ] YES [ ] NO  EXPLAIN:    A-LINE:  [ ] YES [x ] NO  LOCATION:   DATE INSERTED:  REMOVE:  [ ] YES [ ] NO  EXPLAIN:        ICU Vital Signs Last 24 Hrs  T(C): 35.9 (04 Jan 2019 04:21), Max: 36.7 (03 Jan 2019 20:08)  T(F): 96.7 (04 Jan 2019 04:21), Max: 98 (03 Jan 2019 20:08)  HR: 71 (04 Jan 2019 07:00) (62 - 78)  BP: 125/47 (04 Jan 2019 07:00) (92/36 - 127/45)  BP(mean): 66 (04 Jan 2019 07:00) (49 - 66)  ABP: 142/47 (04 Jan 2019 07:00) (104/39 - 150/60)  ABP(mean): 80 (04 Jan 2019 07:00) (60 - 91)  RR: 17 (04 Jan 2019 07:00) (12 - 22)  SpO2: 97% (04 Jan 2019 07:00) (93% - 100%)            01-03 @ 07:01  -  01-04 @ 07:00  --------------------------------------------------------  IN: 2080 mL / OUT: 1015 mL / NET: 1065 mL            PHYSICAL EXAM:    GENERAL: NAD, well-groomed, well-developed  HEAD:  Atraumatic, Normocephalic  EYES: EOMI, PERRLA, conjunctiva and sclera clear  ENMT: No tonsillar erythema, exudates, or enlargement; Moist mucous membranes, Good dentition, No lesions  NECK: Supple, normal appearance, No JVD; Normal thyroid; Trachea midline  NERVOUS SYSTEM:  Alert & Oriented X3, Good concentration; Motor Strength 5/5 B/L upper and lower extremities; DTRs 2+ intact and symmetric  CHEST/LUNG: No chest deformity; Normal percussion bilaterally; No rales, rhonchi, wheezing   HEART: Regular rate and rhythm; No murmurs, rubs, or gallops  ABDOMEN: Soft, Nontender, Nondistended; Bowel sounds present  EXTREMITIES:  2+ Peripheral Pulses, No clubbing, cyanosis, or edema  LYMPH: No lymphadenopathy noted  SKIN: No rashes or lesions; Good capillary refill      LABS:  CBC Full  -  ( 04 Jan 2019 06:33 )  WBC Count : 10.9 K/uL  Hemoglobin : 9.5 g/dL  Hematocrit : 32.6 %  Platelet Count - Automated : 286 K/uL  Mean Cell Volume : 94.1 fl  Mean Cell Hemoglobin : 27.5 pg  Mean Cell Hemoglobin Concentration : 29.2 gm/dL  Auto Neutrophil # : x  Auto Lymphocyte # : x  Auto Monocyte # : x  Auto Eosinophil # : x  Auto Basophil # : x  Auto Neutrophil % : x  Auto Lymphocyte % : x  Auto Monocyte % : x  Auto Eosinophil % : x  Auto Basophil % : x    01-04    144  |  111<H>  |  35<H>  ----------------------------<  131<H>  4.7   |  21<L>  |  2.20<H>    Ca    8.1<L>      04 Jan 2019 06:33  Phos  3.1     01-04  Mg     2.1     01-04    TPro  6.4  /  Alb  2.9<L>  /  TBili  0.3  /  DBili  x   /  AST  13  /  ALT  16  /  AlkPhos  150<H>  01-03    PT/INR - ( 03 Jan 2019 07:56 )   PT: 10.9 sec;   INR: 0.98 ratio         PTT - ( 03 Jan 2019 07:56 )  PTT:30.8 sec        RADIOLOGY & ADDITIONAL STUDIES REVIEWED:  ***    [ ]GOALS OF CARE DISCUSSION WITH PATIENT/FAMILY/PROXY:    CRITICAL CARE TIME SPENT: 35 minutes    Assessment and Plan:   · Assessment		  Patient is a 78 years old male presented with PMH of HTN, HLD, DM type 2, CKD stage 3, CAD (s/p FEMI mCX in 2016), PAD (s/p left leg stent), BPH, Bladder mass (s/p TURBT 06/2018), Prostate cancer (s/p radiation and brachytherapy w/ seed implantation 2007, s/p TURP) found to have severe (80-99%) stenosis of right carotid artery. Patient underwent right carotid endarterectomy today. ICU consulted for post op monitoring.     BRIEF OR COURSE:   Patient was intubated for the procedure, it was an easy intubation. Patient received 1.5 L of LR and had 100 cc of blood loss. Urine output was noted to be minimal around 85 ml. Patient extubated in PACU and then brought to ICU for post op monitoring.     Admit to ICU for neuro-vascular monitoring, pt remained stable overnight. Pt planned to be downgraded to surigcal floor         Problem/Plan - 1:  ·  Problem: Occlusion and stenosis of unspecified carotid artery.  Plan: s/p right carotid endartectomy  Pt remained stableovernight  normal power b/l with no headache or dizzines reported, minimal fluid in PAT drain  Pt is stable for downgrade to surgical floor.      Problem/Plan - 2:  ·  Problem: CKD (chronic kidney disease) stage 3, GFR 30-59 ml/min.  Plan: Pt had CKD stage 3 creatinine variable  Continue to monitor urine output   Pt of swenson, will monitor for voiding.      Problem/Plan - 3:  ·  Problem: Hypertension.  Plan: BP in acceptable range at present, will start medications as needed.      Problem/Plan - 4:  ·  Problem: Diabetes mellitus, type 2.  Plan: c/w finger sticks and sliding scale.

## 2019-01-04 NOTE — DISCHARGE NOTE ADULT - CARE PLAN
Principal Discharge DX:	Occlusion and stenosis of unspecified carotid artery  Goal:	resume plavix  Assessment and plan of treatment:	follow-up with Dr. Rivera in office in one week, cont plavix

## 2019-01-06 ENCOUNTER — EMERGENCY (EMERGENCY)
Facility: HOSPITAL | Age: 79
LOS: 1 days | Discharge: HOME | End: 2019-01-06
Attending: EMERGENCY MEDICINE
Payer: MEDICARE

## 2019-01-06 VITALS
DIASTOLIC BLOOD PRESSURE: 64 MMHG | TEMPERATURE: 99 F | RESPIRATION RATE: 20 BRPM | OXYGEN SATURATION: 92 % | SYSTOLIC BLOOD PRESSURE: 147 MMHG | HEART RATE: 80 BPM

## 2019-01-06 VITALS
HEART RATE: 84 BPM | HEIGHT: 68 IN | OXYGEN SATURATION: 88 % | DIASTOLIC BLOOD PRESSURE: 60 MMHG | SYSTOLIC BLOOD PRESSURE: 164 MMHG | RESPIRATION RATE: 20 BRPM | WEIGHT: 175.05 LBS

## 2019-01-06 DIAGNOSIS — Z98.890 OTHER SPECIFIED POSTPROCEDURAL STATES: Chronic | ICD-10-CM

## 2019-01-06 DIAGNOSIS — I21.3 ST ELEVATION (STEMI) MYOCARDIAL INFARCTION OF UNSPECIFIED SITE: Chronic | ICD-10-CM

## 2019-01-06 DIAGNOSIS — Z82.49 FAMILY HISTORY OF ISCHEMIC HEART DISEASE AND OTHER DISEASES OF THE CIRCULATORY SYSTEM: Chronic | ICD-10-CM

## 2019-01-06 DIAGNOSIS — Z90.79 ACQUIRED ABSENCE OF OTHER GENITAL ORGAN(S): Chronic | ICD-10-CM

## 2019-01-06 DIAGNOSIS — N32.89 OTHER SPECIFIED DISORDERS OF BLADDER: Chronic | ICD-10-CM

## 2019-01-06 DIAGNOSIS — Z77.123 CONTACT WITH AND (SUSPECTED) EXPOSURE TO RADON AND OTHER NATURALLY OCCURRING RADIATION: Chronic | ICD-10-CM

## 2019-01-06 LAB
APPEARANCE UR: CLEAR — SIGNIFICANT CHANGE UP
BILIRUB UR-MCNC: NEGATIVE — SIGNIFICANT CHANGE UP
COLOR SPEC: YELLOW — SIGNIFICANT CHANGE UP
DIFF PNL FLD: ABNORMAL
GLUCOSE UR QL: NEGATIVE — SIGNIFICANT CHANGE UP
KETONES UR-MCNC: NEGATIVE — SIGNIFICANT CHANGE UP
LEUKOCYTE ESTERASE UR-ACNC: ABNORMAL
NITRITE UR-MCNC: NEGATIVE — SIGNIFICANT CHANGE UP
PH UR: 5 — SIGNIFICANT CHANGE UP (ref 5–8)
PROT UR-MCNC: 100
SP GR SPEC: 1.01 — SIGNIFICANT CHANGE UP (ref 1.01–1.02)
UROBILINOGEN FLD QL: NEGATIVE — SIGNIFICANT CHANGE UP

## 2019-01-06 PROCEDURE — 71046 X-RAY EXAM CHEST 2 VIEWS: CPT

## 2019-01-06 PROCEDURE — 81001 URINALYSIS AUTO W/SCOPE: CPT

## 2019-01-06 PROCEDURE — 71046 X-RAY EXAM CHEST 2 VIEWS: CPT | Mod: 26

## 2019-01-06 PROCEDURE — 93005 ELECTROCARDIOGRAM TRACING: CPT

## 2019-01-06 PROCEDURE — 99284 EMERGENCY DEPT VISIT MOD MDM: CPT

## 2019-01-06 PROCEDURE — 87086 URINE CULTURE/COLONY COUNT: CPT

## 2019-01-06 PROCEDURE — 82962 GLUCOSE BLOOD TEST: CPT

## 2019-01-06 PROCEDURE — 99283 EMERGENCY DEPT VISIT LOW MDM: CPT | Mod: 25

## 2019-01-06 NOTE — ED PROVIDER NOTE - PMH
6/09 diagnosed with Prostate Ca  s/p radiation and brachytherapy w/ seed implantation 5 years ago s/p TURP  Anemia    approximately 2001  MI/Coronary Artery Disease    Bladder mass  s/p resection  BPH    CAD (coronary artery disease)  coronary stent x 1 - 2016- pt on Pletal and Plavix to continue up to sxm Surgeon Nicole alonso aware, pt aware, Anasthesia DR. Rivera aware  CKD (chronic kidney disease) stage 3, GFR 30-59 ml/min    COPD (chronic obstructive pulmonary disease)  on chest xray  Diabetes mellitus, type 2    Gout    Hearing Decreased left ear    Hypercholesteremia    Hypertension    Injury of head, initial encounter  12/2016 - s/p fall in hospital  Occlusion and stenosis of unspecified carotid artery    PAD--Left leg stent 2002  done at hospital in [place]  --[facility] in [place], s/p balloon on LLE in 2006  Pulmonary edema  May 2016-resolved  Rectal bleeding  November 2018  Urinary retention

## 2019-01-06 NOTE — ED PROVIDER NOTE - MEDICAL DECISION MAKING DETAILS
79 y/o M presents with mild weakness with fever postop day 3. Patient without respiratory symptoms. Concern for atelectasis postop fever. Will obtain CXR and urinalysis. If negative, patient can go home with incentive spirometer.

## 2019-01-06 NOTE — ED PROVIDER NOTE - PROGRESS NOTE DETAILS
Cornelius: ambulating.  cxr improve compare to prior. ua clean  dx fatigue s/p endarterectomy 1/3/19. continue with surgical routine care.  use incentive spirometry. ambulating normally. dc home.

## 2019-01-06 NOTE — ED PROVIDER NOTE - OBJECTIVE STATEMENT
79 y/o M with PMHx of HTN, HLD, DM type n2, CKD stage 3, CAD (s/p FEMI mCX in 2016), PAD (s/p left leg stent), BPH, bladder mass (s/p TURBT in 6/2018), prostate cancer (s/p radiation and brachytherapy 2007, TURP) and PSHx of right carotid endarterectomy on 1/3/19 presents to the ED brought in by EMS after wife called as patient was having generalized weakness since yesterday with fever of 101.4. Patient denies any symptoms. Vitals are stable. Allergies: Novocain.

## 2019-01-06 NOTE — ED PROVIDER NOTE - PSH
2002 left leg stent    Acute myocardial infarction  as per pt in 2001, no coronary stent  Bladder mass  cystoscopy, TURBT, transurethral incision of bladder neck- 06/2018  Cataract surgery 30 years ago    Exposure to radiation  for prostate cancer (seed implant)  History of bladder surgery    History of colon surgery  2007  right wrist surgery with hardware 27 years ago    S/P cardiac catheterization  2016 with 1 FEMI stent  S/P TURP (status post transurethral resection of prostate) 2002 left leg stent    Acute myocardial infarction  as per pt in 2001, no coronary stent  Bladder mass  cystoscopy, TURBT, transurethral incision of bladder neck- 06/2018  Cataract surgery 30 years ago    Exposure to radiation  for prostate cancer (seed implant)  FH: carotid endarterectomy    History of bladder surgery    History of colon surgery  2007  right wrist surgery with hardware 27 years ago    S/P cardiac catheterization  2016 with 1 FEMI stent  S/P TURP (status post transurethral resection of prostate)

## 2019-01-07 LAB
CULTURE RESULTS: NO GROWTH — SIGNIFICANT CHANGE UP
SPECIMEN SOURCE: SIGNIFICANT CHANGE UP
SURGICAL PATHOLOGY STUDY: SIGNIFICANT CHANGE UP

## 2019-01-18 ENCOUNTER — APPOINTMENT (OUTPATIENT)
Dept: VASCULAR SURGERY | Facility: CLINIC | Age: 79
End: 2019-01-18

## 2019-01-22 NOTE — ASU PATIENT PROFILE, ADULT - CIGARETTES, PACKS/DAY
"Anesthesia Transfer of Care Note    Patient: Lakeshia Luna    Procedure(s) Performed: Procedure(s) (LRB):  DESTRUCTION, PROSTATE, TRANSURETHRAL (N/A)    Patient location: PACU    Anesthesia Type: general    Transport from OR: Transported from OR on 6-10 L/min O2 by face mask with adequate spontaneous ventilation    Post pain: adequate analgesia    Post assessment: no apparent anesthetic complications and tolerated procedure well    Post vital signs: stable    Level of consciousness: sedated    Nausea/Vomiting: no nausea/vomiting    Complications: none    Transfer of care protocol was followed      Last vitals:   Visit Vitals  /85 (BP Location: Right arm, Patient Position: Lying)   Pulse 77   Temp 36.9 °C (98.5 °F) (Oral)   Resp 18   Ht 6' 1" (1.854 m)   Wt 91.2 kg (201 lb)   SpO2 98%   BMI 26.52 kg/m²     " 1

## 2019-02-16 NOTE — H&P PST ADULT - REASON FOR ADMISSION
Subjective:       Patient ID: Debbie Brumfield is a 44 y.o. female.    Chief Complaint: Annual Exam    HPI   Pt is here for annual exam pt is well no sob/cp no change in bowel habits no brbpr no vaginal bleeding no discharge itching or burning  Pt is s/p hyst   Review of Systems   Constitutional: Positive for activity change. Negative for chills, diaphoresis, fatigue, fever and unexpected weight change.   HENT: Positive for rhinorrhea. Negative for congestion, ear discharge, ear pain, hearing loss, postnasal drip, sinus pressure, sneezing, sore throat, trouble swallowing and voice change.    Eyes: Negative for photophobia, discharge, redness, itching and visual disturbance.   Respiratory: Negative for cough, chest tightness, shortness of breath and wheezing.    Cardiovascular: Negative for chest pain, palpitations and leg swelling.   Gastrointestinal: Negative for abdominal pain, anal bleeding, blood in stool, constipation, diarrhea, nausea, rectal pain and vomiting.   Endocrine: Negative for polydipsia and polyuria.   Genitourinary: Positive for hematuria. Negative for difficulty urinating, dyspareunia, dysuria, flank pain, frequency, menstrual problem, pelvic pain, urgency, vaginal bleeding and vaginal discharge.   Musculoskeletal: Positive for arthralgias and joint swelling. Negative for back pain and neck pain.   Skin: Negative for color change and rash.   Neurological: Negative for dizziness, speech difficulty, weakness, light-headedness, numbness and headaches.   Hematological: Does not bruise/bleed easily.   Psychiatric/Behavioral: Negative for agitation, confusion, decreased concentration, dysphoric mood, sleep disturbance and suicidal ideas. The patient is not nervous/anxious.        Objective:      Physical Exam   Constitutional: She appears well-developed.   Obese    HENT:   Head: Normocephalic and atraumatic.   Right Ear: External ear normal.   Left Ear: External ear normal.   Nose: Nose normal.  "  Mouth/Throat: Oropharynx is clear and moist.   Eyes: Conjunctivae and EOM are normal. Pupils are equal, round, and reactive to light. Right eye exhibits no discharge. Left eye exhibits no discharge.   Neck: Normal range of motion. Neck supple. No thyromegaly present.   Cardiovascular: Normal rate, regular rhythm and intact distal pulses. Exam reveals no gallop and no friction rub.   Pulmonary/Chest: Effort normal and breath sounds normal. She has no wheezes. She has no rales.   Abdominal: Soft. Bowel sounds are normal. She exhibits no distension. There is no tenderness. There is no rebound and no guarding.   Genitourinary:   Genitourinary Comments: declined   Musculoskeletal: Normal range of motion. She exhibits no edema or tenderness.   Lymphadenopathy:     She has no cervical adenopathy.   Neurological: She is alert. No cranial nerve deficit. She exhibits normal muscle tone. Coordination normal.   Skin: Skin is warm and dry. No rash noted. No erythema.   Psychiatric: She has a normal mood and affect. Her behavior is normal. Judgment and thought content normal.       Assessment:       1. Annual physical exam    2. Abnormal urinalysis        Plan:     orders poc u (pt had recent lasb she will bring results  Low fat low salt diet  Weight loss diet   graded exercise  rtc annually and prn  F/u urology     Health maintenance  Mammogram discussed  Lipid discussed  Flu discussed  Tetanus q 10 years       "This note will not be shared with the patient."   " Bladder Surgery

## 2019-03-11 NOTE — H&P PST ADULT - CONSTITUTIONAL
Patient called stating that Edema in bilateral legs in doubled in size from last time he was seen. He does not feel like the Lasix is working. After reading Dr. Carlos's last note he had suggested is sx do not improve then he was to report to the ED. I again re-iterated the importance of that. He was reluctant and I again stated that we did order the echo to ensure his heart was working properly and that wasn't for a couple of days. He understood after some convincing and advised he would go to the ED.   detailed exam

## 2019-07-03 NOTE — PATIENT PROFILE ADULT - NSPROGENARRIVEDFROM_GEN_A_NUR
Supervising physician:  Neel Grimes D.O    This is a healthy-appearing 61 year old, male who underwent a LEFT Total Knee Arthroplasty. Patient states the knee is doing well; currently in P.T.   Denies chest pain, shortness of breath, dyspnea. No fevers, chills or night sweats.  Denies N/V/D  Eating:  Fair         Drinking:  Good        Bowels:  Good            Voiding:  Well  Sleep:  Poor  Patient is not taking a lot of pain meds.  Reports joint taking 2 or 3 a day      DATE OF SURGERY:  6/18/2019      LEFT TKA    BCS     PHYSICAL EXAMINATION:  GENERAL:  This is a healthy-appearing 61 year old, male who is well nourished, well developed, in no acute distress, pleasant and cooperative during the examination.  VITAL SIGNS:     Visit Vitals  /72   Temp 97.9 °F (36.6 °C) (Oral)   Ht 6' (1.829 m)   Wt 122 kg   BMI 36.48 kg/m²     MUSCULOSKELETAL:   Bilateral lower extremities were evaluated.    There is a healing surgical scar over the LEFT aspect of the left  Knee.   Steri strips in place  There are some residual blisters to the medial and lateral aspect of this knee  .  There is ecchymosis up along the lateral aspect of the thigh.  There is also a divot in the soft tissues over the lateral aspect of the thigh, not sure if this is a tear in the vastus lateralis or a tear and IT band, even though the IT band is palpable along the lateral left thigh  Ligaments:  Stable  Alignment:  RT normal:                     Left:  Normal   There is no evidence of discharge, drainage or dehiscence.    Edema:  RT: None  Left: Moderate to severe  Erythema:  RT none     Left: none   Calor:  RT none       Left:  none    Range of motion KNEES:  RT 0-120 DEGREES          LEFT:  20-95 degrees  Temperature:   RT: WARM          LEFT:  WARM  Motor strength to Quadriceps and Hamstrings, Abduction and Adduction is 5/5 as well as tibialis anterior and extensor hallucis longus being 5/5.    Compartments are soft and supple to bilateral  lower extremities  Carmella’s bilaterally:  RT:  NEGATIVE       LEFT:  NEGATIVE  No signs or symptoms of deep venous thrombosis.  Varicosities:  RT  None       LEFT:  none  NEUROLOGIC:  Sensation is completely intact to light touch to all dermatomes of the bilateral lower extremities.    LYMPHATICS: No evidence of adenopathy to the popliteal or inguinal nodes.  GAIT: antalgic  STATION:  stable      ASSESSMENT:  1. Status post left total knee arthroplasty, doing well.  2.  Osteoarthritis , left knee  3.  Resolving blisters, left knee    PLAN:    PATIENT IS ADVISED TO FOLLOW UP WITH US IN:   4 WEEKS.      CONTINUE TAKING PAIN MEDICATIONS AS DIRECTED.    CONTINUE WITH PHYSICAL THERAPY.      DO HOME EXERCISES AS INSTRUCTED BY P.T    USE ICE AS NEEDED TO HELP WITH PAIN    PRONE LEG HANGS USING SHOES OR BOOTS WITH LACES TIED TOGETHER, DRAPED OVER THE ANKLE OR USE ANKLE WEIGHTS    WARM UP THE POSTERIOR ASPECT OF THE LEFT KNEE BEFORE STRETCHING.     WE WILL GET X-RAYS AT THE NEXT VISIT.    STERI STRIPS SHOULD START TO FALL OFF, IF NOT OFF BY July 16TH  THEN YOU MAY REMOVE THEM.    YOU MAY SHOWER, BUT NO SUBMERSING THE KNEE IN WATER.    STOCKINGS  MAY COME OFF AT BEDTIME NOW, BUT THEY SHOULD BE ON ALL DAY.    CONTINUE TAKING YOUR ASPIRIN FOR 4 MORE WEEKS    CALL ORTHOPEDICS -[phone] FOR ANY OTHER QUESTIONS,  ISSUES OR MEDICATION REFILLS.    Discussed healthy lifestyles to include Weight loss, stopping smoking, decreasing, alcohol consumption,exercising as well as good dietary choices    All questions and concerns were answered to the best my ability   home

## 2019-07-16 NOTE — PROVIDER CONTACT NOTE (CRITICAL VALUE NOTIFICATION) - NS PROVIDER READ BACK TO LAB
Chief Complaint   Patient presents with   • Medication Management     levothyroxine       SUBJECTIVE:   Karoline Jane is a 44 year old female, who is here today for medication management. She was last seen on 1/10/19 for CPE by Dr. Carlton, who has subsequently retired.     Her only concern today is follow up for her thyroid.      Taking medications as prescribed: yes  Any medication concerns or side effects: denies    REVIEW OF SYSTEMS:    Constitutional: Denies recent illness, generalized fatigue, fevers  Respiratory: Denies difficulty breathing, shortness of breath, hemoptysis, wheezing, cough, orthopnea  Cardiovascular:  Denies chest pain, pressure or tightness, palpitations, lower extremity edema, claudication  Gastrointestinal:  Denies abdominal pain, nausea, vomiting, diarrhea, constipation, bloody stools  Genitourinary:  Denies urinary pain, urgency, frequency, hematuria, flank pain.  Musculoskeletal:  Denies any joint or muscle pain. No unusual swelling or weakness.  Endocrine: Denies skin or hair changes, polyuria, polydipsia, polyphagia, temperature intolerance  Integument:  Denies rash  Neurologic:  Denies headache, dizziness, syncopal episodes    ALLERGIES:    ALLERGIES:   Allergen Reactions   • Hydrocodone PRURITUS   • Naproxen PRURITUS   • Tramadol PRURITUS   • Tylenol With Codeine PRURITUS   • Adhesive   (Environmental) HIVES and RASH     Paper tape is ok         MEDICATIONS:   Outpatient Medications Marked as Taking for the 7/16/19 encounter (Office Visit) with MOHSEN Rainey   Medication Sig Dispense Refill   • morphine, IMM REL, (MSIR) 15 MG tablet Take 1 tablet by mouth 3 times daily as needed for Pain. 90 tablet 0   • levothyroxine (SYNTHROID, LEVOTHROID) 125 MCG tablet Take 1 tablet by mouth daily. 90 tablet 0   • traZODone (DESYREL) 50 MG tablet Take 1 tablet by mouth nightly. 90 tablet 3   • escitalopram (LEXAPRO) 10 MG tablet Take 1 tablet by mouth daily. 90 tablet 3   •  cyclobenzaprine (FLEXERIL) 10 MG tablet Take 1 tablet by mouth 3 times daily as needed for Muscle spasms. 90 tablet 3   • albuterol 108 (90 Base) MCG/ACT inhaler Inhale 2 puffs into the lungs every 4 hours as needed for Shortness of Breath or Wheezing. 1 Inhaler 0   • fluticasone (FLONASE) 50 MCG/ACT nasal spray Spray 2 sprays in each nostril daily. 3 Bottle 3   • Cholecalciferol (VITAMIN D3) 5000 UNITS Cap Take by mouth as directed. Every other day     • MAGNESIUM GLYCINATE PLUS PO Take 440 mg by mouth daily.     • Coenzyme Q10 (CO Q 10) 100 MG Cap Take 1 capsule by mouth daily.     • Probiotic Product (PRO-ADITHYA CONCENTRATE PO) Take by mouth daily.     • MISC NATURAL PRODUCTS PO Take by mouth 4 times daily. Primo-Assist     • Omega-3 Fatty Acids (OMEGA 3 PO)      • Multiple Vitamins-Minerals (MULTIVITAMIN PO) Take 1 tablet by mouth daily.     • loratadine (CLARITIN) 10 MG tablet Take 10 mg by mouth daily.     • Ascorbic Acid (VITAMIN C) 500 MG tablet Take 500 mg by mouth daily.     • IBUPROFEN PO Take 400 mg by mouth daily as needed.            HISTORIES:  Past medical, surgical, and family histories were reviewed (and updated as needed) at today's visit, 07/16/19. They can be viewed in the electronic medical record.     Social History     Occupational History   • Occupation: sales   Tobacco Use   • Smoking status: Never Smoker   • Smokeless tobacco: Never Used   Substance and Sexual Activity   • Alcohol use: No     Comment: rare   • Drug use: No   • Sexual activity: Not on file     Social History     Social History Narrative    From     No relationship.    16 year old son.     New puppy    Sales - [employer]        Updated 7/16/19         OBJECTIVE:    Constitutional:  Well developed, well nourished in no apparent distress, nontoxic appearance   Vitals:  Blood pressure 108/72, pulse 60, resp. rate 16, height 5' 10\" (1.778 m), weight 132.9 kg. Body mass index is 42.03 kg/m².  Neck: Supple. No masses or  thyromegaly.  Respiratory:  Lungs clear to auscultation. Normal aeration. No adventitious sounds. Normal respiratory effort.    Cardiovascular:  Regular rate and rhythm.  Normal S1 and S2 without murmurs, clicks, gallops.  No lower extremity edema.  2+ pedal pulses.  No carotid bruits.  Gastrointestinal:  Soft, nontender, nondistended, normal bowel sounds, no hepatosplenomegaly.  Psychiatric:  Alert and oriented x3, good eye contact, connected thoughts. Dress, appearance, speech, and behavior are appropriate. Cooperative with appropriate mood and affect. Recent and remote memory intact. Appears to have adequate judgment and insight.       ASSESSMENT AND PLAN:    No problem-specific Assessment & Plan notes found for this encounter.    Thyroid. Decreased to 125mcg daily in April due to TSH 0.089. Has been taking daily. Will recheck.    BMI: Had lost 18 pounds as of January. Weight up a few pounds. Continues with weight loss efforts, mediterranean diet and exercise. Using Weight watcher but not consistently. Puppy. Encouraged continued efforts.    Depression: Escitalopram 10mg daily. Worse in winter. Interested in possibly increasing dose in the winter. Will let us know how things are going. Would increase escitalopram to 20 mg daily.     Insomnia: Home sleep study negative, recommended attended sleep study. He states this is hard to fit in with her work schedule. Fam history of sleep apnea. She reports ongoing issues with fatigue, but no reports of apneic episodes. Using trazodone 50 mg nightly, and feels that has been working well. She states if she doesn't take this her pain will wake her up    Chronic Pain - sees Dr. Roca - on morphine, cyclobenzaprine (90 pills per yr avg). Has had procedures, recently off steroids.     Seasonal allergies: Uses Flonase. Albuterol is just as needed for cold symptoms, states she is prone towards bronchitis.    Questionable food allergies: States she typically feels better when she  avoids gluten and dairy, tries to avoid these but hasn't done a complete exclusion diet. Is interested in allergy testing for foods. Discussed that we could refer her to the allergist, she declines for now. Will let us know when she would like to do this    · She plans to follow up with Nathaly Akhtar NP for her complete physical and medication check in January, sooner with any issues  · She left the office in no acute distress and understanding her plan of care. All of her questions were answered to her satisfaction. She was encouraged to call with any questions or concerns, and return to the clinic as needed.  · She verbalized understanding and is in agreement with the above plan.     Orders Placed This Encounter   • Thyroid Stimulating Hormone   • Vitamin D -25 Hydroxy       Note: This document was dictated and transcribed using Dragon voice-activated software. This provider has made good karina attempts to make the appropriate corrections to the documentation; however, areas of commission, omission, and mistakes in wording may still exist.       yes

## 2019-08-30 NOTE — PATIENT PROFILE ADULT - ...
Smokes 1+ pk/d. For 20 yrs. Trying to stop on/off. Cough for 2 weeks croup cough. tx w/ prednisone but now off. On Albuterol but only using twice daily. 03-Jan-2019 08:05:36

## 2019-09-05 ENCOUNTER — APPOINTMENT (OUTPATIENT)
Dept: UROLOGY | Facility: CLINIC | Age: 79
End: 2019-09-05
Payer: MEDICARE

## 2019-09-05 VITALS
BODY MASS INDEX: 29.66 KG/M2 | HEIGHT: 67 IN | HEART RATE: 67 BPM | WEIGHT: 189 LBS | SYSTOLIC BLOOD PRESSURE: 147 MMHG | TEMPERATURE: 98 F | DIASTOLIC BLOOD PRESSURE: 60 MMHG

## 2019-09-05 PROCEDURE — 99214 OFFICE O/P EST MOD 30 MIN: CPT

## 2019-09-06 LAB
APPEARANCE: ABNORMAL
BACTERIA: ABNORMAL
BILIRUBIN URINE: NEGATIVE
BLOOD URINE: NORMAL
COLOR: YELLOW
GLUCOSE QUALITATIVE U: NEGATIVE
HYALINE CASTS: 0 /LPF
KETONES URINE: NEGATIVE
LEUKOCYTE ESTERASE URINE: ABNORMAL
MICROSCOPIC-UA: NORMAL
NITRITE URINE: NEGATIVE
PH URINE: 6.5
PROTEIN URINE: ABNORMAL
RED BLOOD CELLS URINE: 3 /HPF
SPECIFIC GRAVITY URINE: 1.01
SQUAMOUS EPITHELIAL CELLS: 1 /HPF
URINE CYTOLOGY: NORMAL
UROBILINOGEN URINE: NORMAL
WHITE BLOOD CELLS URINE: 154 /HPF

## 2019-09-06 NOTE — PHYSICAL EXAM
[General Appearance - Well Developed] : well developed [General Appearance - Well Nourished] : well nourished [Normal Appearance] : normal appearance [Well Groomed] : well groomed [General Appearance - In No Acute Distress] : no acute distress [Abdomen Tenderness] : non-tender [Abdomen Soft] : soft [Costovertebral Angle Tenderness] : no ~M costovertebral angle tenderness [Urethral Meatus] : meatus normal [Urinary Bladder Findings] : the bladder was normal on palpation [Scrotum] : the scrotum was normal [Testes Mass (___cm)] : there were no testicular masses [Edema] : no peripheral edema [] : no respiratory distress [Respiration, Rhythm And Depth] : normal respiratory rhythm and effort [Exaggerated Use Of Accessory Muscles For Inspiration] : no accessory muscle use [Oriented To Time, Place, And Person] : oriented to person, place, and time [Affect] : the affect was normal [Mood] : the mood was normal [Not Anxious] : not anxious [Normal Station and Gait] : the gait and station were normal for the patient's age [No Focal Deficits] : no focal deficits [No Palpable Adenopathy] : no palpable adenopathy

## 2019-09-06 NOTE — ASSESSMENT
[FreeTextEntry1] : check ua cxcytology \par needs f/u cysto \par will restart trospium with stool softener

## 2019-09-06 NOTE — HISTORY OF PRESENT ILLNESS
[FreeTextEntry1] : s/p turbt and turbn\par c/o frequent uriantion and nocturia \par stopped trospium due to constipation \par had episode of hematuria as well\par no fever no dysuria

## 2019-09-09 ENCOUNTER — RESULT REVIEW (OUTPATIENT)
Age: 79
End: 2019-09-09

## 2019-09-09 LAB — BACTERIA UR CULT: ABNORMAL

## 2019-09-25 ENCOUNTER — APPOINTMENT (OUTPATIENT)
Dept: ORTHOPEDIC SURGERY | Facility: CLINIC | Age: 79
End: 2019-09-25
Payer: MEDICARE

## 2019-09-25 VITALS
WEIGHT: 160 LBS | RESPIRATION RATE: 76 BRPM | DIASTOLIC BLOOD PRESSURE: 73 MMHG | SYSTOLIC BLOOD PRESSURE: 165 MMHG | HEIGHT: 67 IN | BODY MASS INDEX: 25.11 KG/M2

## 2019-09-25 DIAGNOSIS — M17.12 UNILATERAL PRIMARY OSTEOARTHRITIS, LEFT KNEE: ICD-10-CM

## 2019-09-25 DIAGNOSIS — M23.92 UNSPECIFIED INTERNAL DERANGEMENT OF LEFT KNEE: ICD-10-CM

## 2019-09-25 PROCEDURE — 73564 X-RAY EXAM KNEE 4 OR MORE: CPT | Mod: LT

## 2019-09-25 PROCEDURE — 99204 OFFICE O/P NEW MOD 45 MIN: CPT

## 2019-09-25 NOTE — ADDENDUM
[FreeTextEntry1] : I, Riley Spencer, acted solely as a scribe for Dr. Rj Parker on 09/25/2019  .\par  \par All medical record entries made by the scribe were at my, Dr. Rj Parker, direction and personally dictated by me on 09/25/2019. I have reviewed the chart and agree that the record accurately reflects my personal performance of the history, physical exam, assessment and plan. I have also personally directed, reviewed, and agreed with the chart.\par

## 2019-09-25 NOTE — HISTORY OF PRESENT ILLNESS
[de-identified] : 79 year old male presents c/o left knee pain. 1/2019 right carotid endarterectomy. While recovering developed difficulty breathing and was hospitalized. Began to experience left knee and leg pain while anti-embolic stockings were applied. Since January 2019 until now the pain has persisted. C/o of inability to fully extend left knee. No pain when walking.  Denies swelling or locking. Ambulating with a cane because patient states occasionally he does not "feel the leg." History of endoscopy for bleeding duodenum ulcer. Also Dx of peripheral vascular disease? FU pending.

## 2019-09-25 NOTE — CONSULT LETTER
[Please see my note below.] : Please see my note below. [Dear  ___] : Dear  [unfilled], [Sincerely,] : Sincerely, [FreeTextEntry3] : Dr. Parker

## 2019-09-25 NOTE — PHYSICAL EXAM
[de-identified] : Well-nourished, in no acute distress\par Alert and oriented to time, place and person\par Skin: no lesions discoloration\par Respirations: unlabored\par Cardiac: no leg swelling\par Lymphatic: no groin adenopathy\par left knee: flexion deformity, flexion  degrees, tenderness medial joint line, ligaments intact\par left hip: PROM pain free  [de-identified] : AP, notch standing, lateral and sunrise Xrays of the left knee taken in the office today demonstrates medial compartment degenerative narrowing\par

## 2019-09-25 NOTE — DISCUSSION/SUMMARY
[de-identified] : The patient presents with left knee OA possible tear medial meniscus and pain. We discussed the nature of the condition and treatment options. I elucidated the conservative treatment options including weight loss, injections, pain medication, and low impact exercises\par \par Impression\par left knee OA, torn meniscus?\par \par Plan:\par cortisone injection dr hardin

## 2019-10-11 ENCOUNTER — APPOINTMENT (OUTPATIENT)
Dept: VASCULAR SURGERY | Facility: CLINIC | Age: 79
End: 2019-10-11
Payer: MEDICARE

## 2019-10-11 VITALS
DIASTOLIC BLOOD PRESSURE: 69 MMHG | HEART RATE: 83 BPM | WEIGHT: 160 LBS | HEIGHT: 67 IN | SYSTOLIC BLOOD PRESSURE: 134 MMHG | BODY MASS INDEX: 25.11 KG/M2

## 2019-10-11 LAB
APTT BLD: 29.8 SEC
BASOPHILS # BLD AUTO: 0.05 K/UL
BASOPHILS NFR BLD AUTO: 0.7 %
EOSINOPHIL # BLD AUTO: 0.4 K/UL
EOSINOPHIL NFR BLD AUTO: 5.7 %
HCT VFR BLD CALC: 47.4 %
HGB BLD-MCNC: 14.5 G/DL
IMM GRANULOCYTES NFR BLD AUTO: 0.4 %
INR PPP: 0.98 RATIO
LYMPHOCYTES # BLD AUTO: 1.47 K/UL
LYMPHOCYTES NFR BLD AUTO: 21.1 %
MAN DIFF?: NORMAL
MCHC RBC-ENTMCNC: 30.6 GM/DL
MCHC RBC-ENTMCNC: 31.3 PG
MCV RBC AUTO: 102.4 FL
MONOCYTES # BLD AUTO: 0.48 K/UL
MONOCYTES NFR BLD AUTO: 6.9 %
NEUTROPHILS # BLD AUTO: 4.55 K/UL
NEUTROPHILS NFR BLD AUTO: 65.2 %
PLATELET # BLD AUTO: 280 K/UL
PT BLD: 11.1 SEC
RBC # BLD: 4.63 M/UL
RBC # FLD: 14.7 %
WBC # FLD AUTO: 6.98 K/UL

## 2019-10-11 PROCEDURE — 93880 EXTRACRANIAL BILAT STUDY: CPT

## 2019-10-11 PROCEDURE — 99213 OFFICE O/P EST LOW 20 MIN: CPT

## 2019-10-12 LAB
ALBUMIN SERPL ELPH-MCNC: 4.8 G/DL
ALP BLD-CCNC: 186 U/L
ALT SERPL-CCNC: 26 U/L
ANION GAP SERPL CALC-SCNC: 18 MMOL/L
AST SERPL-CCNC: 20 U/L
BILIRUB SERPL-MCNC: 0.5 MG/DL
BUN SERPL-MCNC: 42 MG/DL
CALCIUM SERPL-MCNC: 9.7 MG/DL
CHLORIDE SERPL-SCNC: 106 MMOL/L
CO2 SERPL-SCNC: 18 MMOL/L
CREAT SERPL-MCNC: 2.27 MG/DL
GLUCOSE SERPL-MCNC: 127 MG/DL
POTASSIUM SERPL-SCNC: 4.8 MMOL/L
PROT SERPL-MCNC: 7.6 G/DL
SODIUM SERPL-SCNC: 142 MMOL/L

## 2019-10-18 NOTE — ASSESSMENT
[FreeTextEntry1] : Status post right carotid endarterectomy with excellent results. Patient has had hospitalization since surgery but currently doing well. No CVA or TIA. Continue antiplatelet therapy. Followup in 6 months.

## 2019-10-18 NOTE — HISTORY OF PRESENT ILLNESS
[FreeTextEntry1] : Patient is an 88-year-old male with past medical history significant for coronary artery disease, status post coronary angioplasty and stent placement, diabetes, hypertension, prostate cancer, status post prostate surgery, renal insufficiency with creatinine of 2, peripheral vascular disease complaining of severe disabling claudication of less than one block of left lower extremity associated with nightly cramps and numbness and coldness. \par \par Status post right carotid endarterectomy with excellent results. Patient has had hospitalization since surgery but currently doing well. No CVA or TIA

## 2019-10-18 NOTE — PHYSICAL EXAM
[Normal Breath Sounds] : Normal breath sounds [JVD] : no jugular venous distention  [Normal Heart Sounds] : normal heart sounds [Right Carotid Bruit] : right carotid bruit heard [Left Carotid Bruit] : left carotid bruit heard [2+] : right 2+ [1+] : left 1+ [Ankle Swelling (On Exam)] : not present [0] : left 0 [Varicose Veins Of Lower Extremities] : not present [] : not present [Abdomen Masses] : No abdominal masses [Skin Ulcer] : no ulcer [Oriented to Place] : oriented to place

## 2019-10-28 ENCOUNTER — FORM ENCOUNTER (OUTPATIENT)
Age: 79
End: 2019-10-28

## 2019-10-28 PROBLEM — I25.10 CAD (CORONARY ARTERY DISEASE): Status: ACTIVE | Noted: 2019-10-28

## 2019-10-28 PROBLEM — N18.9 CHRONIC RENAL INSUFFICIENCY: Status: ACTIVE | Noted: 2019-10-28

## 2019-10-29 ENCOUNTER — LABORATORY RESULT (OUTPATIENT)
Age: 79
End: 2019-10-29

## 2019-10-29 ENCOUNTER — APPOINTMENT (OUTPATIENT)
Dept: ENDOVASCULAR SURGERY | Facility: CLINIC | Age: 79
End: 2019-10-29
Payer: MEDICARE

## 2019-10-29 VITALS
DIASTOLIC BLOOD PRESSURE: 53 MMHG | SYSTOLIC BLOOD PRESSURE: 110 MMHG | TEMPERATURE: 97.3 F | OXYGEN SATURATION: 98 % | HEART RATE: 108 BPM | RESPIRATION RATE: 20 BRPM

## 2019-10-29 DIAGNOSIS — N18.9 CHRONIC KIDNEY DISEASE, UNSPECIFIED: ICD-10-CM

## 2019-10-29 DIAGNOSIS — I10 ESSENTIAL (PRIMARY) HYPERTENSION: ICD-10-CM

## 2019-10-29 DIAGNOSIS — I25.10 ATHEROSCLEROTIC HEART DISEASE OF NATIVE CORONARY ARTERY W/OUT ANGINA PECTORIS: ICD-10-CM

## 2019-10-29 PROCEDURE — 75625 CONTRAST EXAM ABDOMINL AORTA: CPT

## 2019-10-29 PROCEDURE — 37227Z: CUSTOM

## 2019-10-29 RX ORDER — ATORVASTATIN CALCIUM 80 MG/1
TABLET, FILM COATED ORAL
Refills: 0 | Status: ACTIVE | COMMUNITY

## 2019-10-29 RX ORDER — GLIPIZIDE 10 MG/1
10 TABLET, FILM COATED, EXTENDED RELEASE ORAL
Refills: 0 | Status: DISCONTINUED | COMMUNITY
End: 2019-10-29

## 2019-10-29 RX ORDER — FERROUS SULFATE TAB EC 324 MG (65 MG FE EQUIVALENT) 324 (65 FE) MG
324 (65 FE) TABLET DELAYED RESPONSE ORAL
Refills: 0 | Status: ACTIVE | COMMUNITY

## 2019-10-29 NOTE — PROCEDURE
[D/C IV on discharge] : D/C IV on discharge [Resume diet] : resume diet [FreeTextEntry1] : aortogram, left leg angiogram/athrectomy/angioplasty/stents

## 2019-10-29 NOTE — PHYSICAL EXAM
[Normal Breath Sounds] : Normal breath sounds [Normal Heart Sounds] : normal heart sounds [Right Carotid Bruit] : right carotid bruit heard [Left Carotid Bruit] : left carotid bruit heard [2+] : right 2+ [1+] : left 1+ [0] : left 0 [Oriented to Place] : oriented to place [JVD] : no jugular venous distention  [Ankle Swelling (On Exam)] : not present [Varicose Veins Of Lower Extremities] : not present [] : not present [Abdomen Masses] : No abdominal masses [Skin Ulcer] : no ulcer

## 2019-10-29 NOTE — HISTORY OF PRESENT ILLNESS
[FreeTextEntry1] : Cr: 2.27 10/11/2019\par alert and oriented\par no reported falls\par took BP meds this Am [FreeTextEntry4] : N/A [FreeTextEntry5] : 10:30 pm [FreeTextEntry6] : Dr Reveles

## 2019-10-29 NOTE — PAST MEDICAL HISTORY
[Increasing age ( >40 years old)] : Increasing age ( >40 years old) [Malignancy] : Malignancy [No therapy indicated for cases scheduled for less than one hour] : No therapy indicated for cases scheduled for less than one hour. [FreeTextEntry1] : Malignant Hyperthermia Screening Tool and Risk of Bleeding Assessment\par \par Mr. HEENA KEYES denies family history of unexpected death following Anesthesia or Exercise.\par Denies Family history of Malignant Hyperthermia, Muscle or Neuromuscular disorder and High Temperature following exercise.\par \par Mr. HEENA KEYES denies history of Muscle Spasm, Dark or Chocolate - Colored urine and Unanticipated fever immediately following anesthesia or serious exercise. \par Mr. KEYES also denies bleeding tendencies/ Risks of Bleeding.\par

## 2019-11-09 ENCOUNTER — EMERGENCY (EMERGENCY)
Facility: HOSPITAL | Age: 79
LOS: 1 days | Discharge: HOME | End: 2019-11-09
Attending: EMERGENCY MEDICINE
Payer: MEDICARE

## 2019-11-09 VITALS
DIASTOLIC BLOOD PRESSURE: 74 MMHG | HEIGHT: 66 IN | WEIGHT: 169.98 LBS | TEMPERATURE: 98 F | RESPIRATION RATE: 17 BRPM | HEART RATE: 73 BPM | OXYGEN SATURATION: 99 % | SYSTOLIC BLOOD PRESSURE: 162 MMHG

## 2019-11-09 DIAGNOSIS — N32.89 OTHER SPECIFIED DISORDERS OF BLADDER: Chronic | ICD-10-CM

## 2019-11-09 DIAGNOSIS — Z98.890 OTHER SPECIFIED POSTPROCEDURAL STATES: Chronic | ICD-10-CM

## 2019-11-09 DIAGNOSIS — Z77.123 CONTACT WITH AND (SUSPECTED) EXPOSURE TO RADON AND OTHER NATURALLY OCCURRING RADIATION: Chronic | ICD-10-CM

## 2019-11-09 DIAGNOSIS — Z82.49 FAMILY HISTORY OF ISCHEMIC HEART DISEASE AND OTHER DISEASES OF THE CIRCULATORY SYSTEM: Chronic | ICD-10-CM

## 2019-11-09 DIAGNOSIS — I21.3 ST ELEVATION (STEMI) MYOCARDIAL INFARCTION OF UNSPECIFIED SITE: Chronic | ICD-10-CM

## 2019-11-09 DIAGNOSIS — Z90.79 ACQUIRED ABSENCE OF OTHER GENITAL ORGAN(S): Chronic | ICD-10-CM

## 2019-11-09 LAB
ALBUMIN SERPL ELPH-MCNC: 3.7 G/DL — SIGNIFICANT CHANGE UP (ref 3.5–5)
ALP SERPL-CCNC: 202 U/L — HIGH (ref 40–120)
ALT FLD-CCNC: 31 U/L DA — SIGNIFICANT CHANGE UP (ref 10–60)
ANION GAP SERPL CALC-SCNC: 8 MMOL/L — SIGNIFICANT CHANGE UP (ref 5–17)
APPEARANCE UR: ABNORMAL
AST SERPL-CCNC: 21 U/L — SIGNIFICANT CHANGE UP (ref 10–40)
BACTERIA # UR AUTO: ABNORMAL /HPF
BASOPHILS # BLD AUTO: 0.05 K/UL — SIGNIFICANT CHANGE UP (ref 0–0.2)
BASOPHILS NFR BLD AUTO: 0.4 % — SIGNIFICANT CHANGE UP (ref 0–2)
BILIRUB SERPL-MCNC: 0.5 MG/DL — SIGNIFICANT CHANGE UP (ref 0.2–1.2)
BILIRUB UR-MCNC: NEGATIVE — SIGNIFICANT CHANGE UP
BUN SERPL-MCNC: 29 MG/DL — HIGH (ref 7–18)
CALCIUM SERPL-MCNC: 9.2 MG/DL — SIGNIFICANT CHANGE UP (ref 8.4–10.5)
CHLORIDE SERPL-SCNC: 108 MMOL/L — SIGNIFICANT CHANGE UP (ref 96–108)
CO2 SERPL-SCNC: 26 MMOL/L — SIGNIFICANT CHANGE UP (ref 22–31)
COLOR SPEC: ABNORMAL
CREAT SERPL-MCNC: 1.94 MG/DL — HIGH (ref 0.5–1.3)
DIFF PNL FLD: ABNORMAL
EOSINOPHIL # BLD AUTO: 0.36 K/UL — SIGNIFICANT CHANGE UP (ref 0–0.5)
EOSINOPHIL NFR BLD AUTO: 3.2 % — SIGNIFICANT CHANGE UP (ref 0–6)
EPI CELLS # UR: ABNORMAL /HPF
GLUCOSE SERPL-MCNC: 143 MG/DL — HIGH (ref 70–99)
GLUCOSE UR QL: NEGATIVE — SIGNIFICANT CHANGE UP
HCT VFR BLD CALC: 45.7 % — SIGNIFICANT CHANGE UP (ref 39–50)
HGB BLD-MCNC: 14.2 G/DL — SIGNIFICANT CHANGE UP (ref 13–17)
HYALINE CASTS # UR AUTO: ABNORMAL /LPF
IMM GRANULOCYTES NFR BLD AUTO: 0.4 % — SIGNIFICANT CHANGE UP (ref 0–1.5)
KETONES UR-MCNC: ABNORMAL
LEUKOCYTE ESTERASE UR-ACNC: ABNORMAL
LIDOCAIN IGE QN: 151 U/L — SIGNIFICANT CHANGE UP (ref 73–393)
LYMPHOCYTES # BLD AUTO: 1.43 K/UL — SIGNIFICANT CHANGE UP (ref 1–3.3)
LYMPHOCYTES # BLD AUTO: 12.6 % — LOW (ref 13–44)
MCHC RBC-ENTMCNC: 31.1 GM/DL — LOW (ref 32–36)
MCHC RBC-ENTMCNC: 31.7 PG — SIGNIFICANT CHANGE UP (ref 27–34)
MCV RBC AUTO: 102 FL — HIGH (ref 80–100)
MONOCYTES # BLD AUTO: 0.58 K/UL — SIGNIFICANT CHANGE UP (ref 0–0.9)
MONOCYTES NFR BLD AUTO: 5.1 % — SIGNIFICANT CHANGE UP (ref 2–14)
NEUTROPHILS # BLD AUTO: 8.9 K/UL — HIGH (ref 1.8–7.4)
NEUTROPHILS NFR BLD AUTO: 78.3 % — HIGH (ref 43–77)
NITRITE UR-MCNC: POSITIVE
NRBC # BLD: 0 /100 WBCS — SIGNIFICANT CHANGE UP (ref 0–0)
PH UR: 5 — SIGNIFICANT CHANGE UP (ref 5–8)
PLATELET # BLD AUTO: 362 K/UL — SIGNIFICANT CHANGE UP (ref 150–400)
POTASSIUM SERPL-MCNC: 4.3 MMOL/L — SIGNIFICANT CHANGE UP (ref 3.5–5.3)
POTASSIUM SERPL-SCNC: 4.3 MMOL/L — SIGNIFICANT CHANGE UP (ref 3.5–5.3)
PROT SERPL-MCNC: 7.9 G/DL — SIGNIFICANT CHANGE UP (ref 6–8.3)
PROT UR-MCNC: 100
RBC # BLD: 4.48 M/UL — SIGNIFICANT CHANGE UP (ref 4.2–5.8)
RBC # FLD: 13.9 % — SIGNIFICANT CHANGE UP (ref 10.3–14.5)
RBC CASTS # UR COMP ASSIST: >50 /HPF (ref 0–2)
SODIUM SERPL-SCNC: 142 MMOL/L — SIGNIFICANT CHANGE UP (ref 135–145)
SP GR SPEC: 1.01 — SIGNIFICANT CHANGE UP (ref 1.01–1.02)
UROBILINOGEN FLD QL: 1
WBC # BLD: 11.37 K/UL — HIGH (ref 3.8–10.5)
WBC # FLD AUTO: 11.37 K/UL — HIGH (ref 3.8–10.5)
WBC UR QL: ABNORMAL /HPF (ref 0–5)

## 2019-11-09 PROCEDURE — 83690 ASSAY OF LIPASE: CPT

## 2019-11-09 PROCEDURE — 99284 EMERGENCY DEPT VISIT MOD MDM: CPT

## 2019-11-09 PROCEDURE — 99284 EMERGENCY DEPT VISIT MOD MDM: CPT | Mod: 25

## 2019-11-09 PROCEDURE — 80053 COMPREHEN METABOLIC PANEL: CPT

## 2019-11-09 PROCEDURE — 51702 INSERT TEMP BLADDER CATH: CPT

## 2019-11-09 PROCEDURE — 96374 THER/PROPH/DIAG INJ IV PUSH: CPT | Mod: XU

## 2019-11-09 PROCEDURE — 36415 COLL VENOUS BLD VENIPUNCTURE: CPT

## 2019-11-09 PROCEDURE — 85027 COMPLETE CBC AUTOMATED: CPT

## 2019-11-09 PROCEDURE — 81001 URINALYSIS AUTO W/SCOPE: CPT

## 2019-11-09 RX ORDER — CEFTRIAXONE 500 MG/1
1000 INJECTION, POWDER, FOR SOLUTION INTRAMUSCULAR; INTRAVENOUS ONCE
Refills: 0 | Status: COMPLETED | OUTPATIENT
Start: 2019-11-09 | End: 2019-11-09

## 2019-11-09 RX ADMIN — CEFTRIAXONE 100 MILLIGRAM(S): 500 INJECTION, POWDER, FOR SOLUTION INTRAMUSCULAR; INTRAVENOUS at 20:11

## 2019-11-09 NOTE — ED ADULT NURSE NOTE - NSIMPLEMENTINTERV_GEN_ALL_ED
Implemented All Universal Safety Interventions:  [place] to call system. Call bell, personal items and telephone within reach. Instruct patient to call for assistance. Room bathroom lighting operational. Non-slip footwear when patient is off stretcher. Physically safe environment: no spills, clutter or unnecessary equipment. Stretcher in lowest position, wheels locked, appropriate side rails in place.

## 2019-11-09 NOTE — ED ADULT TRIAGE NOTE - NS ED NURSE BANDS TYPE
Pt was seen and examined.  Briefly this is a elderly male c hx DM HTN CAD COPD pw cellulitis  Acute on chronic renal failure    Suggest  -Try to avoid NSAIDS if possible  -IV abx-Check Vanco p 3 rd dose\  -No need for renal sono        Sayed Raysa  [facility]  [phone] Name band;

## 2019-11-09 NOTE — ED PROVIDER NOTE - PROGRESS NOTE DETAILS
Patient spontaneously voided himself. Sent UA for analysis. Will follow on results and reassess for retention. Currently 3ml cc of urine on bladder scan. Patient feels retention again multiple attempts to place fully catheter unsuccessful. Contacted surgical house officer PATSY Nicholas who will come access the Patient Contacted surgical house officer PATSY Cai who will come eval patient for possible swenson placement. Pt spontaneously voiding. Repeat bladder scan without signs of retention. Pt to receive PO abx, f/u with Dr. Cesar, return for any signs or urinary retention. Pt passed trial void after PO fluids. Pt spontaneously voiding. Repeat bladder scan without signs of retention. Pt to receive PO abx, f/u with Dr. Cesar, return for any signs or urinary retention. Dr. Cesar notified of pt visit and need for f/u via house officer. Pt passed trial void after PO fluids.

## 2019-11-09 NOTE — CONSULT NOTE ADULT - SUBJECTIVE AND OBJECTIVE BOX
· HPI Objective Statement: 79 y.o male with a PMHx of HTN, HLD, DM type 2. CKD, BPH, prostate cancer, bladder mass s/p turbt in 2018 and a PSHx of TURP presents to the ER with difficulty urinating. العلي was attempted in ER, unsuccessful. Patient has since voided without difficulty x2. No clots. Bladder scan done -37cc's.  	    HPI:      PAST MEDICAL & SURGICAL HISTORY:  CAD (coronary artery disease): coronary stent x 1 - - pt on Pletal and Plavix to continue up to sxm Surgeon Nicole alonso aware, pt aware, Anasthesia DR. Rivera aware  Anemia  COPD (chronic obstructive pulmonary disease): on chest xray  Occlusion and stenosis of unspecified carotid artery  Gout  Rectal bleeding: 2018  Injury of head, initial encounter: 2016 - s/p fall in hospital  Bladder mass: s/p resection  Pulmonary edema: May 2016-resolved  Urinary retention  CKD (chronic kidney disease) stage 3, GFR 30-59 ml/min  Diabetes mellitus, type 2  Hearing Decreased left ear  PAD--Left leg stent 2002: done at [facility] in [place]  --[facility] in [place], s/p balloon on LLE in   approximately   MI/Coronary Artery Disease  BPH   diagnosed with Prostate Ca: s/p radiation and brachytherapy w/ seed implantation 5 years ago s/p TURP  Hypercholesteremia  Hypertension  FH: carotid endarterectomy  Bladder mass: cystoscopy, TURBT, transurethral incision of bladder neck- 2018  S/P cardiac catheterization:  with 1 FEMI stent  History of bladder surgery  S/P TURP (status post transurethral resection of prostate)  Exposure to radiation: for prostate cancer (seed implant)  History of colon surgery:   Acute myocardial infarction: as per pt in , no coronary stent   left leg stent  right wrist surgery with hardware 27 years ago  Cataract surgery 30 years ago      MEDICATIONS  (STANDING):    MEDICATIONS  (PRN):      Allergies    Novocain (Faint)    Intolerances        Vital Signs Last 24 Hrs  T(C): 36.9 (2019 17:12), Max: 36.9 (2019 17:12)  T(F): 98.4 (2019 17:12), Max: 98.4 (2019 17:12)  HR: 73 (2019 17:12) (73 - 73)  BP: 162/74 (2019 17:12) (162/74 - 162/74)  BP(mean): --  RR: 17 (2019 17:12) (17 - 17)  SpO2: 99% (2019 17:12) (99% - 99%)    Physical:  Gen: A&O x3. NAD  Abd:    LABS:                        14.2   11.37 )-----------( 362      ( 2019 18:15 )             45.7         142  |  108  |  29<H>  ----------------------------<  143<H>  4.3   |  26  |  1.94<H>    Ca    9.2      2019 18:15    TPro  7.9  /  Alb  3.7  /  TBili  0.5  /  DBili  x   /  AST  21  /  ALT  31  /  AlkPhos  202<H>  11-09      Urinalysis Basic - ( 2019 18:41 )    Color: Brown / Appearance: very cloudy / S.010 / pH: x  Gluc: x / Ketone: Trace  / Bili: Negative / Urobili: 1   Blood: x / Protein: 100 / Nitrite: Positive   Leuk Esterase: Small / RBC: >50 /HPF / WBC 6-10 /HPF   Sq Epi: x / Non Sq Epi: Moderate /HPF / Bacteria: Moderate /HPF        RADIOLOGY & ADDITIONAL STUDIES:

## 2019-11-09 NOTE — CONSULT NOTE ADULT - ASSESSMENT
A/P: Hematuria - no acute urinary retention  -Pt well known to Dr. Cesar. Has intermittnet hemauria  - pt was instructed to return to ER if he gets into retention, otherwise pt will follow up with Dr Cesar in office.

## 2019-11-09 NOTE — ED PROVIDER NOTE - PSH
2002 left leg stent    Acute myocardial infarction  as per pt in 2001, no coronary stent  Bladder mass  cystoscopy, TURBT, transurethral incision of bladder neck- 06/2018  Cataract surgery 30 years ago    Exposure to radiation  for prostate cancer (seed implant)  FH: carotid endarterectomy    History of bladder surgery    History of colon surgery  2007  right wrist surgery with hardware 27 years ago    S/P cardiac catheterization  2016 with 1 FEMI stent  S/P TURP (status post transurethral resection of prostate)

## 2019-11-09 NOTE — ED PROVIDER NOTE - PMH
6/09 diagnosed with Prostate Ca  s/p radiation and brachytherapy w/ seed implantation 5 years ago s/p TURP  Anemia    approximately 2001  MI/Coronary Artery Disease    Bladder mass  s/p resection  BPH    CAD (coronary artery disease)  coronary stent x 1 - 2016- pt on Pletal and Plavix to continue up to sxm Surgeon Nicole alonso aware, pt aware, Anasthesia DR. Rivera aware  CKD (chronic kidney disease) stage 3, GFR 30-59 ml/min    COPD (chronic obstructive pulmonary disease)  on chest xray  Diabetes mellitus, type 2    Gout    Hearing Decreased left ear    Hypercholesteremia    Hypertension    Injury of head, initial encounter  12/2016 - s/p fall in hospital  Occlusion and stenosis of unspecified carotid artery    PAD--Left leg stent 2002  done at hospital in [place]  --[facility] in [place], s/p balloon on LLE in 2006  Pulmonary edema  May 2016-resolved  Rectal bleeding  November 2018  Urinary retention

## 2019-11-09 NOTE — ED PROVIDER NOTE - OBJECTIVE STATEMENT
79 y.o male with a PMHx of HTN, HLD, DM type 2. CKD, BPH, prostate cancer, bladder mass s/p turbt in 6/2018 and a PSHx of TURP presents to the ER with difficulty urinating since 5 am. Patient states that he had discomfort in the lower abdomen at 5am. Patient states urine had slight blood in it with clots. Patient regards that he is currently on plavix. Patient denies fever, vomiting, dizziness, or any other acute complaints. Allergies (Novocain). 79 y.o male with a PMHx of HTN, HLD, DM type 2. CKD, BPH, prostate cancer, bladder mass s/p turbt in 6/2018 and a PSHx of TURP presents to the ER with difficulty urinating since 5 am. Patient states that he had discomfort in the lower abdomen at 5am. Patient states urine had slight blood clot in clear urine. Patient reports that he is currently on plavix. Patient denies fever, vomiting, dizziness, or any other acute complaints. Allergies (Novocain).

## 2019-11-09 NOTE — ED PROVIDER NOTE - PATIENT PORTAL LINK FT
You can access the FollowMyHealth Patient Portal offered by [facility] by registering at the following website: [email or web address]. By joining "[employer]’s FollowMyHealth portal, you will also be able to view your health information using other applications (apps) compatible with our system.

## 2019-11-12 ENCOUNTER — EMERGENCY (EMERGENCY)
Facility: HOSPITAL | Age: 79
LOS: 1 days | Discharge: HOME | End: 2019-11-12
Attending: EMERGENCY MEDICINE
Payer: MEDICARE

## 2019-11-12 VITALS
HEIGHT: 68 IN | DIASTOLIC BLOOD PRESSURE: 64 MMHG | OXYGEN SATURATION: 100 % | WEIGHT: 169.98 LBS | RESPIRATION RATE: 17 BRPM | TEMPERATURE: 99 F | SYSTOLIC BLOOD PRESSURE: 157 MMHG | HEART RATE: 94 BPM

## 2019-11-12 DIAGNOSIS — I21.3 ST ELEVATION (STEMI) MYOCARDIAL INFARCTION OF UNSPECIFIED SITE: Chronic | ICD-10-CM

## 2019-11-12 DIAGNOSIS — Z77.123 CONTACT WITH AND (SUSPECTED) EXPOSURE TO RADON AND OTHER NATURALLY OCCURRING RADIATION: Chronic | ICD-10-CM

## 2019-11-12 DIAGNOSIS — Z98.890 OTHER SPECIFIED POSTPROCEDURAL STATES: Chronic | ICD-10-CM

## 2019-11-12 DIAGNOSIS — N32.89 OTHER SPECIFIED DISORDERS OF BLADDER: Chronic | ICD-10-CM

## 2019-11-12 DIAGNOSIS — Z90.79 ACQUIRED ABSENCE OF OTHER GENITAL ORGAN(S): Chronic | ICD-10-CM

## 2019-11-12 DIAGNOSIS — Z82.49 FAMILY HISTORY OF ISCHEMIC HEART DISEASE AND OTHER DISEASES OF THE CIRCULATORY SYSTEM: Chronic | ICD-10-CM

## 2019-11-12 PROCEDURE — 99283 EMERGENCY DEPT VISIT LOW MDM: CPT

## 2019-11-12 RX ORDER — SODIUM CHLORIDE 9 MG/ML
3 INJECTION INTRAMUSCULAR; INTRAVENOUS; SUBCUTANEOUS ONCE
Refills: 0 | Status: DISCONTINUED | OUTPATIENT
Start: 2019-11-12 | End: 2019-11-16

## 2019-11-13 ENCOUNTER — APPOINTMENT (OUTPATIENT)
Dept: UROLOGY | Facility: CLINIC | Age: 79
End: 2019-11-13
Payer: MEDICARE

## 2019-11-13 VITALS
BODY MASS INDEX: 25.11 KG/M2 | DIASTOLIC BLOOD PRESSURE: 50 MMHG | HEIGHT: 67 IN | SYSTOLIC BLOOD PRESSURE: 108 MMHG | WEIGHT: 160 LBS | HEART RATE: 67 BPM

## 2019-11-13 LAB
APPEARANCE UR: ABNORMAL
BILIRUB UR-MCNC: NEGATIVE — SIGNIFICANT CHANGE UP
COLOR SPEC: ABNORMAL
DIFF PNL FLD: ABNORMAL
GLUCOSE UR QL: 50 MG/DL
KETONES UR-MCNC: ABNORMAL
LEUKOCYTE ESTERASE UR-ACNC: ABNORMAL
NITRITE UR-MCNC: POSITIVE
PH UR: 7 — SIGNIFICANT CHANGE UP (ref 5–8)
PROT UR-MCNC: 500 MG/DL
SP GR SPEC: 1.01 — SIGNIFICANT CHANGE UP (ref 1.01–1.02)
UROBILINOGEN FLD QL: NEGATIVE — SIGNIFICANT CHANGE UP

## 2019-11-13 PROCEDURE — 52281 CYSTOSCOPY AND TREATMENT: CPT

## 2019-11-13 PROCEDURE — 81001 URINALYSIS AUTO W/SCOPE: CPT

## 2019-11-13 PROCEDURE — 87186 SC STD MICRODIL/AGAR DIL: CPT

## 2019-11-13 PROCEDURE — 87086 URINE CULTURE/COLONY COUNT: CPT

## 2019-11-13 PROCEDURE — 99284 EMERGENCY DEPT VISIT MOD MDM: CPT | Mod: 25

## 2019-11-13 RX ORDER — NITROFURANTOIN MACROCRYSTALS 100 MG/1
100 CAPSULE ORAL
Qty: 14 | Refills: 0 | Status: ACTIVE | COMMUNITY
Start: 2019-11-13 | End: 1900-01-01

## 2019-11-13 NOTE — ED PROVIDER NOTE - CCCP TRG CHIEF CMPLNT
Pt is asking for proctozone for hemorrhoids. States pharmacists states will be covered by insurance and pt has used in the past.  Please approve/deny rx. blood in the urine/urinary retention

## 2019-11-13 NOTE — ED PROVIDER NOTE - OBJECTIVE STATEMENT
80 y/o male with PMHx of TURP, anemia, BPH, CAD, CKD, DM, HLD, HTN, pulmonary edema, rectal bleeding, and urinary retention presents to the ED with c/o difficulty urinating and hematuria today. Pt was seen in this ED a few days ago for the same Sx and was discharged with urologist follow up. Pt scheduled an appointment with Dr. Cesar for 10AM on 11/13/19. Pt today reports that he has been unable to urinate all day, but while waiting in the ED waiting room urinated a large amount. Pt denies any fever, abd pain, or other complaints.

## 2019-11-13 NOTE — ED PROVIDER NOTE - CLINICAL SUMMARY MEDICAL DECISION MAKING FREE TEXT BOX
80 y/o male presents with difficulty urinating and hematuria. Pt urinated 2 times while in ED. Pt declined blood testing but agreed to a bladder scan. If no evidence of retention will discharge to follow up with urology later today. 80 y/o male presents with difficulty urinating and hematuria. Pt urinated 2 times while in ED. Pt declined blood testing but agreed to a bladder scan. If no evidence of retention will discharge to follow up with urology later today.    UA positive for blood and nitrites, given ceftin 78 y/o male presents with difficulty urinating and hematuria. Pt urinated 2 times while in ED. Pt declined blood testing but agreed to a bladder scan. If no evidence of retention will discharge to follow up with urology later today.    UA positive for blood and nitrites, patient on ceftin currently 78 y/o male presents with difficulty urinating and hematuria. Pt urinated 2 times while in ED. Pt declined blood testing but agreed to a bladder scan. If no evidence of retention will discharge to follow up with urology later today.    UA positive for blood and nitrites, patient on ceftin currently  bladder scan shows 33mL of urine, pt stable for discharge

## 2019-11-13 NOTE — ED PROVIDER NOTE - PMH
6/09 diagnosed with Prostate Ca  s/p radiation and brachytherapy w/ seed implantation 5 years ago s/p TURP  Anemia    approximately 2001  MI/Coronary Artery Disease    Bladder mass  s/p resection  BPH    CAD (coronary artery disease)  coronary stent x 1 - 2016- pt on Pletal and Plavix to continue up to sxm Surgeon Nicole alonso aware, pt aware, Anasthesia DR. Rivera aware  CKD (chronic kidney disease) stage 3, GFR 30-59 ml/min    COPD (chronic obstructive pulmonary disease)  on chest xray  Diabetes mellitus, type 2    Gout    Hearing Decreased left ear    Hypercholesteremia    Hypertension    Injury of head, initial encounter  12/2016 - s/p fall in hospital  Occlusion and stenosis of unspecified carotid artery    PAD--Left leg stent 2002  done at hospital in [place]  --[facility] in [place], s/p balloon on LLE in 2006  Pulmonary edema  May 2016-resolved  Rectal bleeding  November 2018  Urinary retention

## 2019-11-13 NOTE — ED PROVIDER NOTE - PATIENT PORTAL LINK FT
You can access the FollowMyHealth Patient Portal offered by [facility] by registering at the following website: [email or web address]. By joining [employer]’s FollowMyHealth portal, you will also be able to view your health information using other applications (apps) compatible with our system.

## 2019-11-15 ENCOUNTER — APPOINTMENT (OUTPATIENT)
Dept: VASCULAR SURGERY | Facility: CLINIC | Age: 79
End: 2019-11-15
Payer: MEDICARE

## 2019-11-15 PROCEDURE — 99213 OFFICE O/P EST LOW 20 MIN: CPT

## 2019-11-15 PROCEDURE — 93926 LOWER EXTREMITY STUDY: CPT

## 2019-11-15 NOTE — HISTORY OF PRESENT ILLNESS
[FreeTextEntry1] : Status post left SFA atherectomy and stent placement. Patient with no further complaints of claudication or rest pain. Currently on antiplatelet therapy.

## 2019-11-15 NOTE — ASSESSMENT
[FreeTextEntry1] : Patient with moderate to severe peripheral vascular disease, status post left SFA stent placement. Patient doing well. Continue antiplatelet therapy. Followup in 3 months.

## 2019-11-15 NOTE — PHYSICAL EXAM
[Normal Breath Sounds] : Normal breath sounds [JVD] : no jugular venous distention  [Normal Heart Sounds] : normal heart sounds [2+] : left 2+ [Ankle Swelling (On Exam)] : not present [Abdomen Masses] : No abdominal masses [Varicose Veins Of Lower Extremities] : not present [] : not present [Skin Ulcer] : no ulcer [Oriented to Place] : oriented to place

## 2019-11-20 ENCOUNTER — APPOINTMENT (OUTPATIENT)
Dept: UROLOGY | Facility: CLINIC | Age: 79
End: 2019-11-20
Payer: MEDICARE

## 2019-11-20 VITALS
BODY MASS INDEX: 25.11 KG/M2 | DIASTOLIC BLOOD PRESSURE: 59 MMHG | WEIGHT: 160 LBS | SYSTOLIC BLOOD PRESSURE: 138 MMHG | HEIGHT: 67 IN | HEART RATE: 79 BPM

## 2019-11-20 DIAGNOSIS — R33.9 RETENTION OF URINE, UNSPECIFIED: ICD-10-CM

## 2019-11-20 PROCEDURE — 99213 OFFICE O/P EST LOW 20 MIN: CPT | Mod: 25

## 2019-11-20 PROCEDURE — 51700 IRRIGATION OF BLADDER: CPT

## 2019-11-20 NOTE — HISTORY OF PRESENT ILLNESS
[FreeTextEntry1] : s/p turbt and turbn\par last vist cysto dilation of bladder neck \par has swenson urine intermittently bloody \par wife has irrigated cath \par no fever\par completed macrobid \par

## 2019-11-20 NOTE — ASSESSMENT
[FreeTextEntry1] : swenson irrigate small clots removed\par swenson removed and pt voided well \par call immediately for difficulty voiding worsening hematuria

## 2019-11-20 NOTE — PHYSICAL EXAM
[Normal Appearance] : normal appearance [General Appearance - In No Acute Distress] : no acute distress [Abdomen Soft] : soft [Abdomen Tenderness] : non-tender [Costovertebral Angle Tenderness] : no ~M costovertebral angle tenderness [Urethral Meatus] : meatus normal [Scrotum] : the scrotum was normal [FreeTextEntry1] : swenson blood tinge urine  [Edema] : no peripheral edema [] : no respiratory distress

## 2019-12-26 NOTE — H&P PST ADULT - SOURCE OF INFORMATION, PROFILE
Georges Elvia states his BP is currently 158/103. He has had migraines all week and currently has one. He typically has elevated BP with migraine but gives me non-migraine readings as well includin/86  129/87  161/98  158/96    Please advise on any medication adjustments or further orders.   Currently taking 120 mg verapamil
Patient called back to follow up from his call in 12/23/19 stating he has not received a call back yet. Please call.    Thank you
Per Dr. Nick Sanchez   Add Lisinopril 5mg daily. In two-week's time do BP check and BMP. Michael Castro aware of plan of care and repeated back understanding. Medication sent to [facility]. Michael Castro will keep us informed if any concerns/questions.
Will route to Dr. Lanie Dowling
patient

## 2019-12-27 ENCOUNTER — APPOINTMENT (OUTPATIENT)
Dept: VASCULAR SURGERY | Facility: CLINIC | Age: 79
End: 2019-12-27
Payer: MEDICARE

## 2019-12-27 PROCEDURE — 93926 LOWER EXTREMITY STUDY: CPT

## 2019-12-27 PROCEDURE — 99213 OFFICE O/P EST LOW 20 MIN: CPT

## 2019-12-27 NOTE — PHYSICAL EXAM
[JVD] : no jugular venous distention  [Normal Breath Sounds] : Normal breath sounds [Normal Heart Sounds] : normal heart sounds [Ankle Swelling (On Exam)] : not present [2+] : right 2+ [] : not present [Varicose Veins Of Lower Extremities] : not present [Abdomen Masses] : No abdominal masses [Skin Ulcer] : no ulcer [Oriented to Place] : oriented to place

## 2020-01-06 NOTE — ASU PREOP CHECKLIST - NSWEIGHTCALCTOOLDRUG_GEN_A_CORE
January 6, 2020      Aba Dixon MD  [address]  [facility] [address]           [facility]  [address] [place] [address] [address]  [facility] [address]  Phone: [phone]          Patient: Jose Leggett Jr.   MR Number: [id number]   YOB: 1956   Date of Visit: 1/6/2020       Dear Dr. Aba Dixon:    Thank you for referring Jose Leggett to me for evaluation. Attached you will find relevant portions of my assessment and plan of care.    If you have questions, please do not hesitate to call me. I look forward to following Jose Legegtt along with you.    Sincerely,    Dg San Jr., MD    Enclosure  CC:  No Recipients    If you would like to receive this communication electronically, please contact [email or web address] or [phone] to request more information on [employer] access.    For providers and/or their staff who would like to refer a patient to [facility], please contact us through our one-stop-shop provider referral line, [facility] , at [phone].    If you feel you have received this communication in error or would no longer like to receive these types of communications, please e-mail [email or web address]         
 used

## 2020-02-03 NOTE — DISCHARGE NOTE ADULT - NS AS DC PROVIDER CONTACT Y/N MULTI
Chief Complaint   Patient presents with   • Eye Problem       History of Present Illness:     Eye problem right eye -     *** No eye pain, redness, irritation, dryness, burning, tearing, itching, discharge, photophobia, photopsia, floaters.     *** No diplopia, numbness, tingling, seizures, lightheadedness, dizziness, headache.    {SRREFRACTION:767301}     Eye Medications:  ***    Past Ocular History:   ***    No results found for: HGBA1C    Family Ocular History:    Glaucoma  {Y/N:820830}  Macular Degeneration {Y/N:539295}  Retinal Detachment {Y/N:537268}  Amblyopia or Strabismus {Y/N:681368}    Review of Systems:  Eye: see above   Neurological: see above    Systemic: No fevers, chills, malaise, weight changes  Ears, Nose, Mouth, Throat: No dry mouth or throat, sores in nose or mouth  Cardiovascular: No chest pains, irregular heartbeat  Dermatological: No skin rash, severe itching, sores, hair changes  Hematologic: No excessive bleeding or bruising, anemia, blood clots  Gastrointestinal: No heartburn, ulcers, chronic diarrhea  Respiratory: No shortness of breath, wheezing  Genitourinary: No sores, pain with urination  Musculoskeletal: No muscle pain, joint pain  Psychiatric: No anxiety, depression     Review of systems positive for: ***    Medications, Allergies: Reviewed; see chart    Past Medical, Social, and Family History: Reviewed; see chart    Examination: For full examination and ancillary testing details, see chart    Assessment:    No diagnosis found.    Plan:  ***      Follow-up:  No follow-ups on file.     Yes

## 2020-02-28 ENCOUNTER — APPOINTMENT (OUTPATIENT)
Dept: VASCULAR SURGERY | Facility: CLINIC | Age: 80
End: 2020-02-28
Payer: MEDICARE

## 2020-02-28 PROCEDURE — 99212 OFFICE O/P EST SF 10 MIN: CPT

## 2020-02-28 PROCEDURE — 93926 LOWER EXTREMITY STUDY: CPT

## 2020-04-16 ENCOUNTER — APPOINTMENT (OUTPATIENT)
Dept: UROLOGY | Facility: CLINIC | Age: 80
End: 2020-04-16

## 2020-05-11 ENCOUNTER — APPOINTMENT (OUTPATIENT)
Dept: VASCULAR SURGERY | Facility: CLINIC | Age: 80
End: 2020-05-11
Payer: MEDICARE

## 2020-05-11 VITALS — TEMPERATURE: 97.6 F

## 2020-05-11 PROCEDURE — 93880 EXTRACRANIAL BILAT STUDY: CPT

## 2020-05-11 PROCEDURE — 99213 OFFICE O/P EST LOW 20 MIN: CPT

## 2020-05-11 PROCEDURE — 93971 EXTREMITY STUDY: CPT

## 2020-05-11 NOTE — PHYSICAL EXAM
[JVD] : no jugular venous distention  [Normal Breath Sounds] : Normal breath sounds [Normal Heart Sounds] : normal heart sounds [2+] : right 2+ [] : not present [Ankle Swelling (On Exam)] : not present [Varicose Veins Of Lower Extremities] : not present [Abdomen Masses] : No abdominal masses [Skin Ulcer] : no ulcer [de-identified] : Left medial ankle is red and tender to touch.  Redness extends proximally to the mid calf.  No wound or ulceration noted [Oriented to Place] : oriented to place

## 2020-05-11 NOTE — HISTORY OF PRESENT ILLNESS
[FreeTextEntry1] : Status post left SFA atherectomy and stent placement. Patient with no further complaints of claudication or rest pain. Currently on antiplatelet therapy.\par Patient with 1 to 2-week history of left medial ankle redness and itchiness extending to mid calf.  No fevers or chills.\par \par No history of CVA or TIA recently.

## 2020-05-14 ENCOUNTER — APPOINTMENT (OUTPATIENT)
Dept: UROLOGY | Facility: CLINIC | Age: 80
End: 2020-05-14
Payer: MEDICARE

## 2020-05-14 VITALS
RESPIRATION RATE: 16 BRPM | DIASTOLIC BLOOD PRESSURE: 60 MMHG | WEIGHT: 172 LBS | BODY MASS INDEX: 27 KG/M2 | TEMPERATURE: 97 F | HEIGHT: 67 IN | SYSTOLIC BLOOD PRESSURE: 155 MMHG | HEART RATE: 74 BPM

## 2020-05-14 PROCEDURE — 99214 OFFICE O/P EST MOD 30 MIN: CPT

## 2020-05-14 NOTE — PHYSICAL EXAM
[General Appearance - Well Developed] : well developed [General Appearance - Well Nourished] : well nourished [Normal Appearance] : normal appearance [Well Groomed] : well groomed [General Appearance - In No Acute Distress] : no acute distress [Abdomen Soft] : soft [Abdomen Tenderness] : non-tender [Costovertebral Angle Tenderness] : no ~M costovertebral angle tenderness [Urethral Meatus] : meatus normal [Urinary Bladder Findings] : the bladder was normal on palpation [Scrotum] : the scrotum was normal [Testes Mass (___cm)] : there were no testicular masses [] : no respiratory distress [Edema] : no peripheral edema [Respiration, Rhythm And Depth] : normal respiratory rhythm and effort [Exaggerated Use Of Accessory Muscles For Inspiration] : no accessory muscle use [Oriented To Time, Place, And Person] : oriented to person, place, and time [Affect] : the affect was normal [Not Anxious] : not anxious [Mood] : the mood was normal [No Focal Deficits] : no focal deficits [Normal Station and Gait] : the gait and station were normal for the patient's age [No Palpable Adenopathy] : no palpable adenopathy

## 2020-05-14 NOTE — HISTORY OF PRESENT ILLNESS
[FreeTextEntry1] : cc heamturia \par s/p turbt and turbn\par ct cysto for hematutria in 11/19 \par ucx was pos\par has had intermittnet hematuria - on [id number]\par no dysuria \par on tamsulosin . unsure taking trospium - has urgency uui

## 2020-05-14 NOTE — ASSESSMENT
[FreeTextEntry1] : pvr minimal \par check ua/cx/cytology\par if neg will reimage pos cysto \par \par cont tamsulosin trospium

## 2020-05-18 ENCOUNTER — APPOINTMENT (OUTPATIENT)
Dept: VASCULAR SURGERY | Facility: CLINIC | Age: 80
End: 2020-05-18
Payer: MEDICARE

## 2020-05-18 VITALS — BODY MASS INDEX: 27 KG/M2 | HEIGHT: 67 IN | TEMPERATURE: 96.8 F | WEIGHT: 172 LBS

## 2020-05-18 PROCEDURE — 93923 UPR/LXTR ART STDY 3+ LVLS: CPT

## 2020-05-18 PROCEDURE — 99213 OFFICE O/P EST LOW 20 MIN: CPT

## 2020-05-18 NOTE — ASSESSMENT
[FreeTextEntry1] : Patient with moderate to severe peripheral vascular disease, status post left SFA stent placement. Patient doing well. Continue antiplatelet therapy.  No evidence of DVT or phlebitis.  Arterial Dopplers show adequate flow.\par No CVA or TIA.  Endarterectomy is widely patent.  Contralateral carotid stenosis.  Follow-up in 6 months.

## 2020-05-18 NOTE — PHYSICAL EXAM
[JVD] : no jugular venous distention  [Normal Breath Sounds] : Normal breath sounds [Normal Heart Sounds] : normal heart sounds [2+] : left 2+ [Ankle Swelling (On Exam)] : not present [Varicose Veins Of Lower Extremities] : not present [] : not present [Abdomen Masses] : No abdominal masses [Skin Ulcer] : no ulcer [Oriented to Place] : oriented to place [de-identified] : Left medial ankle is red and tender to touch.  Redness extends proximally to the mid calf.  No wound or ulceration noted

## 2020-05-19 LAB
APPEARANCE: ABNORMAL
BACTERIA UR CULT: ABNORMAL
BACTERIA: ABNORMAL
BILIRUBIN URINE: NEGATIVE
BLOOD URINE: ABNORMAL
COLOR: ABNORMAL
GLUCOSE QUALITATIVE U: NEGATIVE
HYALINE CASTS: 4 /LPF
KETONES URINE: NEGATIVE
LEUKOCYTE ESTERASE URINE: ABNORMAL
MICROSCOPIC-UA: NORMAL
NITRITE URINE: NEGATIVE
PH URINE: 6
PROTEIN URINE: ABNORMAL
RED BLOOD CELLS URINE: >720 /HPF
SPECIFIC GRAVITY URINE: 1.02
SQUAMOUS EPITHELIAL CELLS: 0 /HPF
URINE CYTOLOGY: NORMAL
UROBILINOGEN URINE: NORMAL
WHITE BLOOD CELLS URINE: 175 /HPF

## 2020-05-20 ENCOUNTER — APPOINTMENT (OUTPATIENT)
Dept: DISASTER EMERGENCY | Facility: CLINIC | Age: 80
End: 2020-05-20

## 2020-05-21 LAB — SARS-COV-2 N GENE NPH QL NAA+PROBE: NOT DETECTED

## 2020-05-22 ENCOUNTER — RESULT REVIEW (OUTPATIENT)
Age: 80
End: 2020-05-22

## 2020-05-22 ENCOUNTER — OUTPATIENT (OUTPATIENT)
Dept: OUTPATIENT SERVICES | Facility: HOSPITAL | Age: 80
LOS: 1 days | End: 2020-05-22
Payer: MEDICARE

## 2020-05-22 ENCOUNTER — APPOINTMENT (OUTPATIENT)
Dept: INTERVENTIONAL RADIOLOGY/VASCULAR | Facility: HOSPITAL | Age: 80
End: 2020-05-22
Payer: MEDICARE

## 2020-05-22 DIAGNOSIS — Z98.890 OTHER SPECIFIED POSTPROCEDURAL STATES: Chronic | ICD-10-CM

## 2020-05-22 DIAGNOSIS — Z90.79 ACQUIRED ABSENCE OF OTHER GENITAL ORGAN(S): Chronic | ICD-10-CM

## 2020-05-22 DIAGNOSIS — Z77.123 CONTACT WITH AND (SUSPECTED) EXPOSURE TO RADON AND OTHER NATURALLY OCCURRING RADIATION: Chronic | ICD-10-CM

## 2020-05-22 DIAGNOSIS — Z82.49 FAMILY HISTORY OF ISCHEMIC HEART DISEASE AND OTHER DISEASES OF THE CIRCULATORY SYSTEM: Chronic | ICD-10-CM

## 2020-05-22 DIAGNOSIS — I21.3 ST ELEVATION (STEMI) MYOCARDIAL INFARCTION OF UNSPECIFIED SITE: Chronic | ICD-10-CM

## 2020-05-22 DIAGNOSIS — N30.90 CYSTITIS, UNSPECIFIED WITHOUT HEMATURIA: ICD-10-CM

## 2020-05-22 DIAGNOSIS — N32.89 OTHER SPECIFIED DISORDERS OF BLADDER: Chronic | ICD-10-CM

## 2020-05-22 PROCEDURE — 36573 INSJ PICC RS&I 5 YR+: CPT

## 2020-05-22 PROCEDURE — C1751: CPT

## 2020-05-22 PROCEDURE — 77001 FLUOROGUIDE FOR VEIN DEVICE: CPT

## 2020-05-22 PROCEDURE — 76937 US GUIDE VASCULAR ACCESS: CPT

## 2020-06-19 ENCOUNTER — APPOINTMENT (OUTPATIENT)
Dept: VASCULAR SURGERY | Facility: CLINIC | Age: 80
End: 2020-06-19

## 2020-06-21 ENCOUNTER — EMERGENCY (EMERGENCY)
Facility: HOSPITAL | Age: 80
LOS: 1 days | Discharge: HOME | End: 2020-06-21
Attending: EMERGENCY MEDICINE
Payer: MEDICARE

## 2020-06-21 VITALS
HEIGHT: 67 IN | TEMPERATURE: 98 F | WEIGHT: 175.05 LBS | SYSTOLIC BLOOD PRESSURE: 194 MMHG | DIASTOLIC BLOOD PRESSURE: 95 MMHG | HEART RATE: 96 BPM | OXYGEN SATURATION: 98 % | RESPIRATION RATE: 16 BRPM

## 2020-06-21 DIAGNOSIS — N32.89 OTHER SPECIFIED DISORDERS OF BLADDER: Chronic | ICD-10-CM

## 2020-06-21 DIAGNOSIS — Z98.890 OTHER SPECIFIED POSTPROCEDURAL STATES: Chronic | ICD-10-CM

## 2020-06-21 DIAGNOSIS — Z90.79 ACQUIRED ABSENCE OF OTHER GENITAL ORGAN(S): Chronic | ICD-10-CM

## 2020-06-21 DIAGNOSIS — I21.3 ST ELEVATION (STEMI) MYOCARDIAL INFARCTION OF UNSPECIFIED SITE: Chronic | ICD-10-CM

## 2020-06-21 DIAGNOSIS — Z77.123 CONTACT WITH AND (SUSPECTED) EXPOSURE TO RADON AND OTHER NATURALLY OCCURRING RADIATION: Chronic | ICD-10-CM

## 2020-06-21 DIAGNOSIS — Z82.49 FAMILY HISTORY OF ISCHEMIC HEART DISEASE AND OTHER DISEASES OF THE CIRCULATORY SYSTEM: Chronic | ICD-10-CM

## 2020-06-21 PROCEDURE — 99284 EMERGENCY DEPT VISIT MOD MDM: CPT | Mod: 25

## 2020-06-21 PROCEDURE — 99284 EMERGENCY DEPT VISIT MOD MDM: CPT

## 2020-06-21 NOTE — ED ADULT TRIAGE NOTE - CHIEF COMPLAINT QUOTE
I cannot pee since I got up this morning. Urinated last night, blood in urine noted since Friday at 1530 per friend. Picc line in LUE for bacterial infection in urine inserted 5/22/2020 took meropenem until 6/13. Friend Priscilla [phone], [phone]. Negative corona virus swab in May.

## 2020-06-21 NOTE — ED ADULT NURSE NOTE - NSIMPLEMENTINTERV_GEN_ALL_ED
Implemented All Universal Safety Interventions:  [place] to call system. Call bell, personal items and telephone within reach. Instruct patient to call for assistance. Room bathroom lighting operational. Non-slip footwear when patient is off stretcher. Physically safe environment: no spills, clutter or unnecessary equipment. Stretcher in lowest position, wheels locked, appropriate side rails in place.

## 2020-06-21 NOTE — ED PROVIDER NOTE - OBJECTIVE STATEMENT
Patient is an 81 yo M presenting with urinary retention. Patient states the rentention started this morning and states it was associated with abdominal pressure and pain. States that he is currently undergoing treatment for UTI. Patient had multidrug resistant UTI and is on meropenom by PICC line. States he receives weekly infusions. Last infusion was wednesday and he states his next one is this upcoming wednesday. Prior to my evaluation, patient states he attempted to use the restroom and was able to void his bladder. He states his symptoms have resolved and he is currently feeling well and requesting discharge. Denies chest pain, SOB, nausea, vomiting, fevers, back pain, light headedness or other issues.

## 2020-06-21 NOTE — ED ADULT NURSE NOTE - CHIEF COMPLAINT QUOTE
I cannot pee since I got up this morning. Urinated last night, blood in urine noted since Friday at 1530 per friend. Picc line in LUE for bacterial infection in urine inserted 5/22/2020 took meropenem until 6/13. Friend Priscilla [phone], [phone]. Negative corona virus swab in May.

## 2020-06-21 NOTE — ED PROVIDER NOTE - PMH
6/09 diagnosed with Prostate Ca  s/p radiation and brachytherapy w/ seed implantation 5 years ago s/p TURP  Anemia    approximately 2001  MI/Coronary Artery Disease    Bladder mass  s/p resection  BPH    CAD (coronary artery disease)  coronary stent x 1 - 2016- pt on Pletal and Plavix to continue up to sxm Surgeon Nicole alonso aware, pt aware, Anasthesia DR. Rivera aware  CKD (chronic kidney disease) stage 3, GFR 30-59 ml/min    COPD (chronic obstructive pulmonary disease)  on chest xray  Diabetes mellitus, type 2    Gout    Hearing Decreased left ear    Hypercholesteremia    Hypertension    Injury of head, initial encounter  12/2016 - s/p fall in hospital  Occlusion and stenosis of unspecified carotid artery    PAD--Left leg stent 2002  done at hospital in [place]  --[facility] in [place], s/p balloon on LLE in 2006  Pulmonary edema  May 2016-resolved  Rectal bleeding  November 2018  Urinary retention

## 2020-06-21 NOTE — ED PROVIDER NOTE - PATIENT PORTAL LINK FT
You can access the FollowMyHealth Patient Portal offered by [facility] by registering at the following website: [email or web address]. By joining [employer]’s FollowMyHealth portal, you will also be able to view your health information using other applications (apps) compatible with our system.

## 2020-06-21 NOTE — ED PROVIDER NOTE - CLINICAL SUMMARY MEDICAL DECISION MAKING FREE TEXT BOX
Patient with urinary retention now resolved. Bedside bladder scan shows no more than 9cc of urine in the bladder. Patient at baseline requesting dc home. Will DC. Return precautions given. Will dc home.

## 2020-06-24 ENCOUNTER — APPOINTMENT (OUTPATIENT)
Dept: UROLOGY | Facility: CLINIC | Age: 80
End: 2020-06-24
Payer: MEDICARE

## 2020-06-24 VITALS
WEIGHT: 174 LBS | DIASTOLIC BLOOD PRESSURE: 49 MMHG | TEMPERATURE: 97.2 F | HEART RATE: 76 BPM | SYSTOLIC BLOOD PRESSURE: 123 MMHG | HEIGHT: 67 IN | BODY MASS INDEX: 27.31 KG/M2

## 2020-06-24 PROCEDURE — 99214 OFFICE O/P EST MOD 30 MIN: CPT

## 2020-06-24 NOTE — PHYSICAL EXAM
[General Appearance - Well Nourished] : well nourished [General Appearance - Well Developed] : well developed [Normal Appearance] : normal appearance [Well Groomed] : well groomed [General Appearance - In No Acute Distress] : no acute distress [Abdomen Soft] : soft [Abdomen Tenderness] : non-tender [Costovertebral Angle Tenderness] : no ~M costovertebral angle tenderness [Urethral Meatus] : meatus normal [Urinary Bladder Findings] : the bladder was normal on palpation [Testes Mass (___cm)] : there were no testicular masses [Scrotum] : the scrotum was normal [] : no respiratory distress [Edema] : no peripheral edema [Exaggerated Use Of Accessory Muscles For Inspiration] : no accessory muscle use [Respiration, Rhythm And Depth] : normal respiratory rhythm and effort [Mood] : the mood was normal [Oriented To Time, Place, And Person] : oriented to person, place, and time [Affect] : the affect was normal [Not Anxious] : not anxious [Normal Station and Gait] : the gait and station were normal for the patient's age [No Palpable Adenopathy] : no palpable adenopathy [No Focal Deficits] : no focal deficits

## 2020-06-24 NOTE — HISTORY OF PRESENT ILLNESS
[FreeTextEntry1] : cc hematuria \par s/p turbt and turbn\par ct cysto for hematutria in 11/19 \par ucx was pos\par has had intermittnet hematuria - on [id number]\par no dysuria \par on tamsulosin . unsure taking trospium - has urgency uui\par \par completed course of meropenem last wed \par friday started hematuria again went to ed sunday with clots now voiding well but still has hematuria

## 2020-06-30 LAB
BACTERIA UR CULT: ABNORMAL
URINE CYTOLOGY: NORMAL

## 2020-07-02 ENCOUNTER — APPOINTMENT (OUTPATIENT)
Dept: UROLOGY | Facility: CLINIC | Age: 80
End: 2020-07-02
Payer: MEDICARE

## 2020-07-02 VITALS — TEMPERATURE: 97.6 F | DIASTOLIC BLOOD PRESSURE: 51 MMHG | HEART RATE: 65 BPM | SYSTOLIC BLOOD PRESSURE: 126 MMHG

## 2020-07-02 PROCEDURE — 52234 CYSTOSCOPY AND TREATMENT: CPT

## 2020-07-02 PROCEDURE — 52001 CYSTO W/IRRG&EVAC MLT CLOTS: CPT | Mod: 59

## 2020-07-28 DIAGNOSIS — Z01.818 ENCOUNTER FOR OTHER PREPROCEDURAL EXAMINATION: ICD-10-CM

## 2020-07-31 ENCOUNTER — APPOINTMENT (OUTPATIENT)
Dept: DISASTER EMERGENCY | Facility: CLINIC | Age: 80
End: 2020-07-31

## 2020-07-31 ENCOUNTER — OUTPATIENT (OUTPATIENT)
Dept: OUTPATIENT SERVICES | Facility: HOSPITAL | Age: 80
LOS: 1 days | End: 2020-07-31

## 2020-07-31 VITALS
DIASTOLIC BLOOD PRESSURE: 36 MMHG | HEART RATE: 67 BPM | TEMPERATURE: 98 F | RESPIRATION RATE: 16 BRPM | WEIGHT: 169.98 LBS | HEIGHT: 67 IN | SYSTOLIC BLOOD PRESSURE: 120 MMHG | OXYGEN SATURATION: 98 %

## 2020-07-31 DIAGNOSIS — Z90.79 ACQUIRED ABSENCE OF OTHER GENITAL ORGAN(S): Chronic | ICD-10-CM

## 2020-07-31 DIAGNOSIS — Z77.123 CONTACT WITH AND (SUSPECTED) EXPOSURE TO RADON AND OTHER NATURALLY OCCURRING RADIATION: Chronic | ICD-10-CM

## 2020-07-31 DIAGNOSIS — C67.9 MALIGNANT NEOPLASM OF BLADDER, UNSPECIFIED: ICD-10-CM

## 2020-07-31 DIAGNOSIS — Z82.49 FAMILY HISTORY OF ISCHEMIC HEART DISEASE AND OTHER DISEASES OF THE CIRCULATORY SYSTEM: Chronic | ICD-10-CM

## 2020-07-31 DIAGNOSIS — N32.89 OTHER SPECIFIED DISORDERS OF BLADDER: Chronic | ICD-10-CM

## 2020-07-31 DIAGNOSIS — N39.0 URINARY TRACT INFECTION, SITE NOT SPECIFIED: ICD-10-CM

## 2020-07-31 DIAGNOSIS — Z98.890 OTHER SPECIFIED POSTPROCEDURAL STATES: Chronic | ICD-10-CM

## 2020-07-31 DIAGNOSIS — I10 ESSENTIAL (PRIMARY) HYPERTENSION: ICD-10-CM

## 2020-07-31 DIAGNOSIS — I21.3 ST ELEVATION (STEMI) MYOCARDIAL INFARCTION OF UNSPECIFIED SITE: Chronic | ICD-10-CM

## 2020-07-31 DIAGNOSIS — Z01.818 ENCOUNTER FOR OTHER PREPROCEDURAL EXAMINATION: ICD-10-CM

## 2020-07-31 DIAGNOSIS — Z95.5 PRESENCE OF CORONARY ANGIOPLASTY IMPLANT AND GRAFT: ICD-10-CM

## 2020-07-31 RX ORDER — CHLORHEXIDINE GLUCONATE 213 G/1000ML
1 SOLUTION TOPICAL
Qty: 1 | Refills: 0
Start: 2020-07-31 | End: 2020-07-31

## 2020-07-31 RX ORDER — SODIUM CHLORIDE 9 MG/ML
3 INJECTION INTRAMUSCULAR; INTRAVENOUS; SUBCUTANEOUS EVERY 8 HOURS
Refills: 0 | Status: DISCONTINUED | OUTPATIENT
Start: 2020-08-03 | End: 2020-08-11

## 2020-07-31 RX ORDER — CEFUROXIME AXETIL 250 MG
1 TABLET ORAL
Qty: 20 | Refills: 0
Start: 2020-07-31 | End: 2020-08-06

## 2020-07-31 RX ORDER — ROSUVASTATIN CALCIUM 5 MG/1
1 TABLET ORAL
Qty: 0 | Refills: 0 | DISCHARGE

## 2020-07-31 RX ORDER — ASPIRIN/CALCIUM CARB/MAGNESIUM 324 MG
1 TABLET ORAL
Qty: 0 | Refills: 0 | DISCHARGE
Start: 2020-07-31

## 2020-07-31 RX ORDER — CLOPIDOGREL BISULFATE 75 MG/1
1 TABLET, FILM COATED ORAL
Qty: 0 | Refills: 0 | DISCHARGE

## 2020-07-31 NOTE — H&P PST ADULT - NEGATIVE GENERAL GENITOURINARY SYMPTOMS
CKD (chronic kidney disease) stage 3, GFR 30-59 ml/min   BPH/no gas in urine/no dysuria/no renal colic/no flank pain L/no bladder infections/no urinary hesitancy/no flank pain R/no urine discoloration/no incontinence/no nocturia/normal urinary frequency

## 2020-07-31 NOTE — H&P PST ADULT - ABILITY TO HEAR (WITH HEARING AID OR HEARING APPLIANCE IF NORMALLY USED):
Mildly to Moderately Impaired: difficulty hearing in some environments or speaker may need to increase volume or speak distinctly/left ear and right ear

## 2020-07-31 NOTE — H&P PST ADULT - RS GEN PE MLT RESP DETAILS PC
no rhonchi/good air movement/no wheezes/clear to auscultation bilaterally/breath sounds equal/no rales

## 2020-07-31 NOTE — H&P PST ADULT - NSICDXPASTSURGICALHX_GEN_ALL_CORE_FT
PAST SURGICAL HISTORY:  2002 left leg stent     Acute myocardial infarction as per pt in 2001, no coronary stent    Bladder mass cystoscopy, TURBT, transurethral incision of bladder neck- 06/2018    Cataract surgery 30 years ago     Exposure to radiation for prostate cancer (seed implant)    FH: carotid endarterectomy     H/O vascular surgery left suprficial femoral artery stent- 2019 November    History of bladder surgery     History of colon surgery 2007    History of vascular access device 05/2020  insertion in left upper arm ( PICC) , removal after 2 weeks of abx    right wrist surgery with hardware 27 years ago     S/P cardiac catheterization 2016 with 1 FEMI stent    S/P carotid endarterectomy right 01/2019    S/P TURP (status post transurethral resection of prostate)

## 2020-07-31 NOTE — H&P PST ADULT - NSICDXFAMILYHX_GEN_ALL_CORE_FT
FAMILY HISTORY:  Family history of heart disease    Mother  Still living? Unknown  Family history of diabetes mellitus, Age at diagnosis: Age Unknown

## 2020-07-31 NOTE — H&P PST ADULT - NEGATIVE GASTROINTESTINAL SYMPTOMS
no constipation/no change in bowel habits/no abdominal pain/no nausea/no vomiting/no flatulence/no diarrhea

## 2020-07-31 NOTE — H&P PST ADULT - TOBACCO, LAST USE DATE, PROFILE
Initiate Treatment: Doxycycline 100 mg - take one tablet po BID w/ food (pt has at home) - pt may call for new Rx if needed Detail Level: Zone 28-Dec-2003

## 2020-07-31 NOTE — H&P PST ADULT - NEGATIVE NEUROLOGICAL SYMPTOMS
no paresthesias/no syncope/no vertigo/no loss of sensation/no headache/no loss of consciousness/no hemiparesis/no focal seizures/no facial palsy/no tremors/no confusion/no transient paralysis/no weakness/no generalized seizures

## 2020-07-31 NOTE — H&P PST ADULT - NEGATIVE CARDIOVASCULAR SYMPTOMS
HLD, HTN/no chest pain/no orthopnea/no paroxysmal nocturnal dyspnea/no peripheral edema/no dyspnea on exertion/no claudication/no palpitations

## 2020-07-31 NOTE — H&P PST ADULT - ASSESSMENT
80  years old male called to obtain history before surgery , spoke to partner who is reliable historian and health proxy , Priscilla Duran , pt was diagnosed with malignant neoplasm of bladder, unspecified  and is scheduled for cytoscopy, transurethral resection of bladder tumor on 8/3/2020.

## 2020-07-31 NOTE — H&P PST ADULT - NSICDXPASTMEDICALHX_GEN_ALL_CORE_FT
PAST MEDICAL HISTORY:  6/09 diagnosed with Prostate Ca s/p radiation and brachytherapy w/ seed implantation 5 years ago s/p TURP    Anemia     approximately 2001  MI/Coronary Artery Disease     Bladder mass s/p resection    BPH     CAD (coronary artery disease) coronary stent x 1 - 2016- pt stopped Pletal and Aspirin 325mg , called Dr. mack Garvey , to start ASA 81 mg today including am of sx, office of Tali alonso notified    CKD (chronic kidney disease) stage 3, GFR 30-59 ml/min     COPD (chronic obstructive pulmonary disease) on chest xray    Diabetes mellitus, type 2 controlled with diet    Femoral artery occlusion, left superficial- 2019- surgically resolved    Frequent UTI     Gout     Hearing Decreased left ear     Hematuria     [place] (hard of hearing) right    Hypercholesteremia     Hypertension     Injury of head, initial encounter 12/2016 - s/p fall in hospital    Malignant neoplasm of bladder, unspecified     Occlusion and stenosis of unspecified carotid artery right carotid artery    PAD--Left leg stent 2002 done at hospital in [place]  --[facility] in [place], s/p balloon on LLE in 2006    Pulmonary edema May 2016-resolved    Rectal bleeding November 2018    Urinary retention

## 2020-07-31 NOTE — H&P PST ADULT - NEGATIVE OPHTHALMOLOGIC SYMPTOMS
no diplopia/no lacrimation R/no lacrimation L/no blurred vision L/no photophobia/no blurred vision R

## 2020-07-31 NOTE — H&P PST ADULT - NEUROLOGICAL SYMPTOMS
difficulty walking/Injury of head, initial encounter  12/2016 - s/p fall in hospital, limited ROM of neckl

## 2020-07-31 NOTE — H&P PST ADULT - NSICDXPROBLEM_GEN_ALL_CORE_FT
PROBLEM DIAGNOSES  Problem: Malignant neoplasm of bladder, unspecified  Assessment and Plan: Patient  is scheduled for cytoscopy, transurethral resection of bladder tumor on 8/3/2020.    Problem: UTI (urinary tract infection), bacterial  Assessment and Plan: Continue abx orally as prescribed    Problem: Hypertension  Assessment and Plan: Continue antihypertensive meds and take with sips of water on day of surgery.  Cleared by PCP. Follow-up with PCP postop for management.    Problem: Coronary stent patent  Assessment and Plan: As per Dr. Rivera, Anesthesia , pt to start ASA 81 including morning of sx

## 2020-07-31 NOTE — H&P PST ADULT - CARDIOVASCULAR SYMPTOMS
Occlusion and stenosis of unspecified carotid artery  , PAD--Left leg stent 2002  done at [facility] in [place]  --[facility] in [place], s/p balloon on LLE in 2006

## 2020-08-01 LAB — SARS-COV-2 N GENE NPH QL NAA+PROBE: NOT DETECTED

## 2020-08-02 ENCOUNTER — TRANSCRIPTION ENCOUNTER (OUTPATIENT)
Age: 80
End: 2020-08-02

## 2020-08-03 ENCOUNTER — APPOINTMENT (OUTPATIENT)
Dept: UROLOGY | Facility: HOSPITAL | Age: 80
End: 2020-08-03

## 2020-08-03 ENCOUNTER — OUTPATIENT (OUTPATIENT)
Dept: OUTPATIENT SERVICES | Facility: HOSPITAL | Age: 80
LOS: 1 days | End: 2020-08-03
Payer: MEDICARE

## 2020-08-03 ENCOUNTER — RESULT REVIEW (OUTPATIENT)
Age: 80
End: 2020-08-03

## 2020-08-03 VITALS
TEMPERATURE: 98 F | HEART RATE: 61 BPM | RESPIRATION RATE: 16 BRPM | DIASTOLIC BLOOD PRESSURE: 50 MMHG | OXYGEN SATURATION: 96 % | SYSTOLIC BLOOD PRESSURE: 111 MMHG

## 2020-08-03 VITALS
HEART RATE: 67 BPM | TEMPERATURE: 98 F | RESPIRATION RATE: 16 BRPM | DIASTOLIC BLOOD PRESSURE: 36 MMHG | HEIGHT: 67 IN | WEIGHT: 169.98 LBS | SYSTOLIC BLOOD PRESSURE: 120 MMHG | OXYGEN SATURATION: 98 %

## 2020-08-03 DIAGNOSIS — Z98.890 OTHER SPECIFIED POSTPROCEDURAL STATES: Chronic | ICD-10-CM

## 2020-08-03 DIAGNOSIS — Z90.79 ACQUIRED ABSENCE OF OTHER GENITAL ORGAN(S): Chronic | ICD-10-CM

## 2020-08-03 DIAGNOSIS — Z77.123 CONTACT WITH AND (SUSPECTED) EXPOSURE TO RADON AND OTHER NATURALLY OCCURRING RADIATION: Chronic | ICD-10-CM

## 2020-08-03 DIAGNOSIS — Z82.49 FAMILY HISTORY OF ISCHEMIC HEART DISEASE AND OTHER DISEASES OF THE CIRCULATORY SYSTEM: Chronic | ICD-10-CM

## 2020-08-03 DIAGNOSIS — N32.89 OTHER SPECIFIED DISORDERS OF BLADDER: Chronic | ICD-10-CM

## 2020-08-03 DIAGNOSIS — I21.3 ST ELEVATION (STEMI) MYOCARDIAL INFARCTION OF UNSPECIFIED SITE: Chronic | ICD-10-CM

## 2020-08-03 DIAGNOSIS — C67.9 MALIGNANT NEOPLASM OF BLADDER, UNSPECIFIED: ICD-10-CM

## 2020-08-03 LAB
ALLERGY+IMMUNOLOGY DIAG STUDY NOTE: SIGNIFICANT CHANGE UP
BLD GP AB SCN SERPL QL: SIGNIFICANT CHANGE UP

## 2020-08-03 PROCEDURE — 71046 X-RAY EXAM CHEST 2 VIEWS: CPT

## 2020-08-03 PROCEDURE — 71046 X-RAY EXAM CHEST 2 VIEWS: CPT | Mod: 26

## 2020-08-03 PROCEDURE — 86901 BLOOD TYPING SEROLOGIC RH(D): CPT

## 2020-08-03 PROCEDURE — 36415 COLL VENOUS BLD VENIPUNCTURE: CPT

## 2020-08-03 PROCEDURE — 88307 TISSUE EXAM BY PATHOLOGIST: CPT

## 2020-08-03 PROCEDURE — 52235 CYSTOSCOPY AND TREATMENT: CPT

## 2020-08-03 PROCEDURE — 86905 BLOOD TYPING RBC ANTIGENS: CPT

## 2020-08-03 PROCEDURE — 86900 BLOOD TYPING SEROLOGIC ABO: CPT

## 2020-08-03 PROCEDURE — 88307 TISSUE EXAM BY PATHOLOGIST: CPT | Mod: 26

## 2020-08-03 PROCEDURE — 86850 RBC ANTIBODY SCREEN: CPT

## 2020-08-03 PROCEDURE — C1769: CPT

## 2020-08-03 PROCEDURE — 86870 RBC ANTIBODY IDENTIFICATION: CPT

## 2020-08-03 RX ORDER — FENTANYL CITRATE 50 UG/ML
25 INJECTION INTRAVENOUS
Refills: 0 | Status: DISCONTINUED | OUTPATIENT
Start: 2020-08-03 | End: 2020-08-03

## 2020-08-03 RX ORDER — ACETAMINOPHEN 500 MG
1000 TABLET ORAL ONCE
Refills: 0 | Status: DISCONTINUED | OUTPATIENT
Start: 2020-08-03 | End: 2020-08-03

## 2020-08-03 RX ORDER — FENTANYL CITRATE 50 UG/ML
50 INJECTION INTRAVENOUS
Refills: 0 | Status: DISCONTINUED | OUTPATIENT
Start: 2020-08-03 | End: 2020-08-03

## 2020-08-03 RX ORDER — SODIUM CHLORIDE 9 MG/ML
1000 INJECTION, SOLUTION INTRAVENOUS
Refills: 0 | Status: DISCONTINUED | OUTPATIENT
Start: 2020-08-03 | End: 2020-08-03

## 2020-08-03 NOTE — ASU DISCHARGE PLAN (ADULT/PEDIATRIC) - ASU DC SPECIAL INSTRUCTIONSFT
Please follow up with Dr. Cesar in 1 week for office lab testing and swenson catheter removal, call the office to confirm your appointment    You may take Tylenol as needed for any pain.      You may experience some blood in your urine which is normal after the procedure, but please call the office if the swenson catheter is not draining or you are passing a large amount of blood clots.    Very important -> Please continue and finish your prescribed antibiotics for UTI prophylaxis

## 2020-08-03 NOTE — ASU PATIENT PROFILE, ADULT - PMH
6/09 diagnosed with Prostate Ca  s/p radiation and brachytherapy w/ seed implantation 5 years ago s/p TURP  Anemia    approximately 2001  MI/Coronary Artery Disease    Bladder mass  s/p resection  BPH    CAD (coronary artery disease)  coronary stent x 1 - 2016- pt stopped Pletal and Aspirin 325mg , called Dr. mack Garvey , to start ASA 81 mg today including am of sx, office of Tali alonso notified  CKD (chronic kidney disease) stage 3, GFR 30-59 ml/min    COPD (chronic obstructive pulmonary disease)  on chest xray  Diabetes mellitus, type 2  controlled with diet  Femoral artery occlusion, left  superficial- 2019- surgically resolved  Frequent UTI    Gout    Hearing Decreased left ear    Hematuria    [place] (hard of hearing)  right  Hypercholesteremia    Hypertension    Injury of head, initial encounter  12/2016 - s/p fall in hospital  Malignant neoplasm of bladder, unspecified    Occlusion and stenosis of unspecified carotid artery  right carotid artery  PAD--Left leg stent 2002  done at hospital in [place]  --[facility] in [place], s/p balloon on LLE in 2006  Pulmonary edema  May 2016-resolved  Rectal bleeding  November 2018  Urinary retention

## 2020-08-03 NOTE — ASU PATIENT PROFILE, ADULT - PRO ARRIVE FROM
Nursing Note by Valencia Peres RN at 09/18/18 04:30 PM     Author:  Valencia Peres RN Service:  (none) Author Type:  Registered Nurse     Filed:  09/18/18 04:48 PM Encounter Date:  9/18/2018 Status:  Signed     :  Valencia Peres RN (Registered Nurse)            Received fax from [facility] [address] in [place], [place] that a PA is required for Otezla; called [employer] and spoke with David; Otezla 30mg tabs approved until 9/18/2019; [id number]; [facility] but states [facility] are not contracted with the patient's insurance and the pharmacy that needs to be contacted is [facility] at [phone]. I called [facility] and spoke with Sathya who states that the patient must have an account with [facility] and if not, the patient must call [facility].[NW1.1M]     Message Left on Machine[NW1.1T] for Mr. Cuevas to call the office.[NW1.1M] Electronically Signed by:    Valencia Peres RN , 9/18/2018[NW1.2T]          Revision History        User Key Date/Time User Provider Type Action    > NW1.2 09/18/18 04:48 PM Valencia Peres RN Registered Nurse Sign     NW1.1 09/18/18 04:30 PM Valencia Peres RN Registered Nurse     M - Manual, T - Template             home

## 2020-08-03 NOTE — ASU DISCHARGE PLAN (ADULT/PEDIATRIC) - CARE PROVIDER_API CALL
Roman Cesar  UROLOGY  [address] [place] [address]  Phone: [phone]  Fax: [phone]  Follow Up Time: 1 week

## 2020-08-05 PROBLEM — I65.29 OCCLUSION AND STENOSIS OF UNSPECIFIED CAROTID ARTERY: Chronic | Status: ACTIVE | Noted: 2020-07-31

## 2020-08-05 PROBLEM — H91.90 UNSPECIFIED HEARING LOSS, UNSPECIFIED EAR: Chronic | Status: ACTIVE | Noted: 2020-07-31

## 2020-08-05 PROBLEM — I70.202 UNSPECIFIED ATHEROSCLEROSIS OF NATIVE ARTERIES OF EXTREMITIES, LEFT LEG: Chronic | Status: ACTIVE | Noted: 2020-07-31

## 2020-08-05 PROBLEM — C67.9 MALIGNANT NEOPLASM OF BLADDER, UNSPECIFIED: Chronic | Status: ACTIVE | Noted: 2020-07-31

## 2020-08-05 PROBLEM — N39.0 URINARY TRACT INFECTION, SITE NOT SPECIFIED: Chronic | Status: ACTIVE | Noted: 2020-07-31

## 2020-08-05 PROBLEM — R31.9 HEMATURIA, UNSPECIFIED: Chronic | Status: ACTIVE | Noted: 2020-07-31

## 2020-08-05 PROBLEM — I25.10 ATHEROSCLEROTIC HEART DISEASE OF NATIVE CORONARY ARTERY WITHOUT ANGINA PECTORIS: Chronic | Status: ACTIVE | Noted: 2018-12-28

## 2020-08-07 ENCOUNTER — APPOINTMENT (OUTPATIENT)
Dept: UROLOGY | Facility: CLINIC | Age: 80
End: 2020-08-07
Payer: MEDICARE

## 2020-08-07 PROCEDURE — 99213 OFFICE O/P EST LOW 20 MIN: CPT

## 2020-08-07 NOTE — PHYSICAL EXAM
[General Appearance - Well Developed] : well developed [Normal Appearance] : normal appearance [General Appearance - Well Nourished] : well nourished [General Appearance - In No Acute Distress] : no acute distress [Well Groomed] : well groomed [Abdomen Soft] : soft [Urethral Meatus] : meatus normal [Abdomen Tenderness] : non-tender [Costovertebral Angle Tenderness] : no ~M costovertebral angle tenderness [Scrotum] : the scrotum was normal [Testes Mass (___cm)] : there were no testicular masses [Urinary Bladder Findings] : the bladder was normal on palpation [Edema] : no peripheral edema [FreeTextEntry1] : swenson clear urine [] : no respiratory distress [Oriented To Time, Place, And Person] : oriented to person, place, and time [Respiration, Rhythm And Depth] : normal respiratory rhythm and effort [Exaggerated Use Of Accessory Muscles For Inspiration] : no accessory muscle use [Affect] : the affect was normal [Not Anxious] : not anxious [Mood] : the mood was normal [No Focal Deficits] : no focal deficits [Normal Station and Gait] : the gait and station were normal for the patient's age [No Palpable Adenopathy] : no palpable adenopathy

## 2020-08-07 NOTE — ASSESSMENT
[FreeTextEntry1] : swenson removed and pt voided well\par path reviewed\par mix high/low grade ucc\par reviewed intravesical therapy bcg or mitomycin \par risks and benefits reviewed\par concerned given baseline voiding issues\par will watch carefully - cysto 3 months \par if recurrence will try bcg

## 2020-09-21 ENCOUNTER — APPOINTMENT (OUTPATIENT)
Dept: VASCULAR SURGERY | Facility: CLINIC | Age: 80
End: 2020-09-21
Payer: MEDICARE

## 2020-09-21 PROCEDURE — 99214 OFFICE O/P EST MOD 30 MIN: CPT

## 2020-09-21 PROCEDURE — 93926 LOWER EXTREMITY STUDY: CPT

## 2020-09-21 NOTE — ASSESSMENT
[FreeTextEntry1] : Patient with peripheral vascular disease, status post SFA stent placement.  Patient with significant stenosis of the mid left stent.  Plan for left leg angiogram and intervention.  Patient needs to restart Plavix and lower the dose of aspirin to a baby aspirin.  This was discussed with the patient.

## 2020-09-21 NOTE — PHYSICAL EXAM
[JVD] : no jugular venous distention  [Normal Breath Sounds] : Normal breath sounds [Normal Heart Sounds] : normal heart sounds [2+] : left 2+ [Ankle Swelling (On Exam)] : not present [Varicose Veins Of Lower Extremities] : not present [] : not present [Abdomen Masses] : No abdominal masses [Skin Ulcer] : no ulcer [Oriented to Person] : oriented to person [Oriented to Place] : oriented to place

## 2020-10-03 ENCOUNTER — APPOINTMENT (OUTPATIENT)
Dept: DISASTER EMERGENCY | Facility: CLINIC | Age: 80
End: 2020-10-03

## 2020-10-03 DIAGNOSIS — Z01.818 ENCOUNTER FOR OTHER PREPROCEDURAL EXAMINATION: ICD-10-CM

## 2020-10-04 LAB — SARS-COV-2 N GENE NPH QL NAA+PROBE: NOT DETECTED

## 2020-10-06 ENCOUNTER — FORM ENCOUNTER (OUTPATIENT)
Age: 80
End: 2020-10-06

## 2020-10-06 NOTE — PATIENT PROFILE ADULT. - NS PRO PT REFERRAL QUES 2 YN
Spoke with Alyssa Flores and notified him that he can get his vaccines at his appointment with . Pt verbalized understanding.  No further action needed no

## 2020-10-07 ENCOUNTER — APPOINTMENT (OUTPATIENT)
Dept: ENDOVASCULAR SURGERY | Facility: CLINIC | Age: 80
End: 2020-10-07
Payer: MEDICARE

## 2020-10-07 ENCOUNTER — LABORATORY RESULT (OUTPATIENT)
Age: 80
End: 2020-10-07

## 2020-10-07 VITALS
DIASTOLIC BLOOD PRESSURE: 56 MMHG | SYSTOLIC BLOOD PRESSURE: 152 MMHG | TEMPERATURE: 97.4 F | OXYGEN SATURATION: 96 % | WEIGHT: 174 LBS | BODY MASS INDEX: 27.31 KG/M2 | HEIGHT: 67 IN | RESPIRATION RATE: 16 BRPM | HEART RATE: 70 BPM

## 2020-10-07 PROCEDURE — 37221Z: CUSTOM | Mod: 50,59

## 2020-10-07 PROCEDURE — 75625 CONTRAST EXAM ABDOMINL AORTA: CPT

## 2020-10-07 PROCEDURE — 37227Z: CUSTOM | Mod: LT

## 2020-10-07 RX ORDER — FUROSEMIDE 20 MG/1
20 TABLET ORAL
Refills: 0 | Status: ACTIVE | COMMUNITY

## 2020-10-07 RX ORDER — TAMSULOSIN HYDROCHLORIDE 0.4 MG/1
0.4 CAPSULE ORAL
Qty: 90 | Refills: 0 | Status: DISCONTINUED | COMMUNITY
Start: 2018-11-19 | End: 2020-10-07

## 2020-10-23 ENCOUNTER — APPOINTMENT (OUTPATIENT)
Dept: VASCULAR SURGERY | Facility: CLINIC | Age: 80
End: 2020-10-23
Payer: MEDICARE

## 2020-10-23 VITALS — TEMPERATURE: 97.7 F

## 2020-10-23 PROCEDURE — 93926 LOWER EXTREMITY STUDY: CPT

## 2020-10-23 PROCEDURE — 99213 OFFICE O/P EST LOW 20 MIN: CPT

## 2020-10-23 PROCEDURE — 93979 VASCULAR STUDY: CPT

## 2020-10-23 NOTE — HISTORY OF PRESENT ILLNESS
[FreeTextEntry1] : Patient with peripheral vascular disease with new left lower extremity claudication symptoms patient underwent bilateral iliac stent placement with atherectomy of the left SFA IntraStent stenosis with good results.  Symptoms have improved.

## 2020-10-23 NOTE — HISTORY OF PRESENT ILLNESS
[FreeTextEntry1] : Covid testing - not detected 10-3-20\par Cr: 2.43 9/21/20\par accompanied by partner Priscilla Duran [phone]\par alert and oriented\par no reported falls\par took aspirin, isosorbide, plavix and amlodipine meds this Am [FreeTextEntry4] : N/A [FreeTextEntry6] : Dr Reveles

## 2020-10-23 NOTE — PHYSICAL EXAM
[JVD] : no jugular venous distention  [Ankle Swelling (On Exam)] : not present [Varicose Veins Of Lower Extremities] : not present [] : not present [Abdomen Masses] : No abdominal masses [Skin Ulcer] : no ulcer

## 2020-10-23 NOTE — ASSESSMENT
[FreeTextEntry1] : Patient with peripheral vascular disease status post endovascular intervention with bilateral iliac stents and left SFA atherectomy.  Patient doing well.  Continue antiplatelet therapy.  Follow-up in 3 months.

## 2020-10-23 NOTE — PAST MEDICAL HISTORY
[FreeTextEntry1] : Malignant Hyperthermia Screening Tool and Risk of Bleeding Assessment\par Mr. HEENA KEYES denies family history of unexpected death following Anesthesia or Exercise.\par Denies Family history of Malignant Hyperthermia, Muscle or Neuromuscular disorder and High Temperature following exercise.\par \par Mr. HEENA KEYES denies history of Muscle Spasm, Dark or Chocolate - Colored urine and Unanticipated fever immediately following anesthesia or serious exercise. \par Mr. KEYES also denies bleeding tendencies/ Risks of Bleeding.\par

## 2020-11-11 ENCOUNTER — APPOINTMENT (OUTPATIENT)
Dept: UROLOGY | Facility: CLINIC | Age: 80
End: 2020-11-11
Payer: MEDICARE

## 2020-11-11 VITALS
HEART RATE: 82 BPM | TEMPERATURE: 97.5 F | WEIGHT: 174 LBS | HEIGHT: 67 IN | SYSTOLIC BLOOD PRESSURE: 161 MMHG | BODY MASS INDEX: 27.31 KG/M2 | DIASTOLIC BLOOD PRESSURE: 66 MMHG

## 2020-11-11 PROCEDURE — 52000 CYSTOURETHROSCOPY: CPT

## 2020-11-16 ENCOUNTER — APPOINTMENT (OUTPATIENT)
Dept: VASCULAR SURGERY | Facility: CLINIC | Age: 80
End: 2020-11-16
Payer: MEDICARE

## 2020-11-16 VITALS — TEMPERATURE: 97.7 F

## 2020-11-16 PROCEDURE — 99213 OFFICE O/P EST LOW 20 MIN: CPT

## 2020-11-16 PROCEDURE — 93880 EXTRACRANIAL BILAT STUDY: CPT

## 2020-11-16 NOTE — ASSESSMENT
[FreeTextEntry1] : 81 yo male with history of cad, htn, pad s/p iliac stents and left sfa stent, carotid stenosis s/p cea presents for follow up\par \par duplex shows patent right cea, and stable left ica stenosis of 50-60% \par \par pt to continue with plavix and will follow up in 3 months with stent duplex and will repeat carotid duplex in 6 months

## 2020-11-16 NOTE — HISTORY OF PRESENT ILLNESS
[FreeTextEntry1] : 79 yo male with history of cad, htn, pad s/p iliac stents and left sfa stent, carotid stenosis s/p cea presents for follow up.  pt states that he is walking better with less pain.  pt states that he is walking about 1/2 block prior to resting.  pt denies any lower extremity wounds or ulcers.  pt denies any stroke like symptoms\par pt is currently taking his plavix

## 2020-11-16 NOTE — PHYSICAL EXAM
[No Rash or Lesion] : No rash or lesion [Alert] : alert [Calm] : calm [JVD] : no jugular venous distention  [Normal Breath Sounds] : Normal breath sounds [Normal Heart Sounds] : normal heart sounds [Ankle Swelling (On Exam)] : not present [Varicose Veins Of Lower Extremities] : not present [] : not present [Abdomen Masses] : No abdominal masses [Skin Ulcer] : no ulcer [Oriented to Person] : oriented to person [Oriented to Place] : oriented to place [de-identified] : appears well  [de-identified] :  strength 5/5 b/l upper extremities\par  [de-identified] : cn 2-12 intact

## 2020-11-20 ENCOUNTER — OUTPATIENT (OUTPATIENT)
Dept: OUTPATIENT SERVICES | Facility: HOSPITAL | Age: 80
LOS: 1 days | End: 2020-11-20
Payer: MEDICARE

## 2020-11-20 VITALS
RESPIRATION RATE: 18 BRPM | WEIGHT: 177.91 LBS | HEART RATE: 76 BPM | HEIGHT: 64.5 IN | DIASTOLIC BLOOD PRESSURE: 64 MMHG | TEMPERATURE: 98 F | SYSTOLIC BLOOD PRESSURE: 170 MMHG | OXYGEN SATURATION: 97 %

## 2020-11-20 DIAGNOSIS — N32.89 OTHER SPECIFIED DISORDERS OF BLADDER: Chronic | ICD-10-CM

## 2020-11-20 DIAGNOSIS — Z98.890 OTHER SPECIFIED POSTPROCEDURAL STATES: Chronic | ICD-10-CM

## 2020-11-20 DIAGNOSIS — C67.9 MALIGNANT NEOPLASM OF BLADDER, UNSPECIFIED: ICD-10-CM

## 2020-11-20 DIAGNOSIS — Z90.79 ACQUIRED ABSENCE OF OTHER GENITAL ORGAN(S): Chronic | ICD-10-CM

## 2020-11-20 DIAGNOSIS — I10 ESSENTIAL (PRIMARY) HYPERTENSION: ICD-10-CM

## 2020-11-20 DIAGNOSIS — Z77.123 CONTACT WITH AND (SUSPECTED) EXPOSURE TO RADON AND OTHER NATURALLY OCCURRING RADIATION: Chronic | ICD-10-CM

## 2020-11-20 DIAGNOSIS — Z01.818 ENCOUNTER FOR OTHER PREPROCEDURAL EXAMINATION: ICD-10-CM

## 2020-11-20 DIAGNOSIS — Z95.5 PRESENCE OF CORONARY ANGIOPLASTY IMPLANT AND GRAFT: ICD-10-CM

## 2020-11-20 DIAGNOSIS — I21.3 ST ELEVATION (STEMI) MYOCARDIAL INFARCTION OF UNSPECIFIED SITE: Chronic | ICD-10-CM

## 2020-11-20 DIAGNOSIS — Z82.49 FAMILY HISTORY OF ISCHEMIC HEART DISEASE AND OTHER DISEASES OF THE CIRCULATORY SYSTEM: Chronic | ICD-10-CM

## 2020-11-20 LAB
ALLERGY+IMMUNOLOGY DIAG STUDY NOTE: SIGNIFICANT CHANGE UP
APPEARANCE UR: CLEAR — SIGNIFICANT CHANGE UP
BACTERIA # UR AUTO: ABNORMAL /HPF
BILIRUB UR-MCNC: NEGATIVE — SIGNIFICANT CHANGE UP
BLD GP AB SCN SERPL QL: SIGNIFICANT CHANGE UP
COLOR SPEC: YELLOW — SIGNIFICANT CHANGE UP
DIFF PNL FLD: ABNORMAL
EPI CELLS # UR: SIGNIFICANT CHANGE UP /HPF
GLUCOSE UR QL: NEGATIVE — SIGNIFICANT CHANGE UP
KETONES UR-MCNC: NEGATIVE — SIGNIFICANT CHANGE UP
LEUKOCYTE ESTERASE UR-ACNC: ABNORMAL
NITRITE UR-MCNC: NEGATIVE — SIGNIFICANT CHANGE UP
PH UR: 5 — SIGNIFICANT CHANGE UP (ref 5–8)
PROT UR-MCNC: 30 MG/DL
RBC CASTS # UR COMP ASSIST: SIGNIFICANT CHANGE UP /HPF (ref 0–2)
SP GR SPEC: 1.01 — SIGNIFICANT CHANGE UP (ref 1.01–1.02)
UROBILINOGEN FLD QL: NEGATIVE — SIGNIFICANT CHANGE UP
WBC UR QL: ABNORMAL /HPF (ref 0–5)

## 2020-11-20 PROCEDURE — 87186 SC STD MICRODIL/AGAR DIL: CPT

## 2020-11-20 PROCEDURE — G0463: CPT

## 2020-11-20 RX ORDER — AZELASTINE HCL 0.05 %
2 DROPS OPHTHALMIC (EYE)
Qty: 0 | Refills: 0 | DISCHARGE

## 2020-11-20 RX ORDER — ASPIRIN/CALCIUM CARB/MAGNESIUM 324 MG
1 TABLET ORAL
Qty: 0 | Refills: 0 | DISCHARGE
End: 2020-07-27

## 2020-11-20 RX ORDER — TAMSULOSIN HYDROCHLORIDE 0.4 MG/1
1 CAPSULE ORAL
Qty: 0 | Refills: 0 | DISCHARGE

## 2020-11-20 NOTE — H&P PST ADULT - NSICDXPROBLEM_GEN_ALL_CORE_FT
PROBLEM DIAGNOSES  Problem: Malignant neoplasm of bladder, unspecified  Assessment and Plan:  Cystoscopy  Transurethral Resection of Bladder Tumor    Problem: Stented coronary artery  Assessment and Plan: Continue ASA as prescribed.  Follow your doctor's instruction regarding plavix    Problem: Hypertension  Assessment and Plan: Continue medication as prescribed

## 2020-11-20 NOTE — H&P PST ADULT - HISTORY OF PRESENT ILLNESS
79 yo male with history of  reports the above.  He is scheduled for : Cystoscopy  Transurethral Resection of Bladder Tumor, on 11/30/20 81 yo male with history of  HTN, CAD with Stents, HLD, Vitamin D Deficiency reports the above.  He is scheduled for : Cystoscopy  Transurethral Resection of Bladder Tumor, on 11/30/20

## 2020-11-20 NOTE — H&P PST ADULT - ABILITY TO HEAR (WITH HEARING AID OR HEARING APPLIANCE IF NORMALLY USED):
Does not want to use hearing aid to left ear/Mildly to Moderately Impaired: difficulty hearing in some environments or speaker may need to increase volume or speak distinctly

## 2020-11-20 NOTE — H&P PST ADULT - NSICDXPASTMEDICALHX_GEN_ALL_CORE_FT
PAST MEDICAL HISTORY:  6/09 diagnosed with Prostate Ca s/p radiation and brachytherapy w/ seed implantation 5 years ago s/p TURP    Anemia     approximately 2001  MI/Coronary Artery Disease     Bladder mass s/p resection    BPH     CAD (coronary artery disease) coronary stent x 1 - 2016- pt stopped Pletal and Aspirin 325mg , called Dr. mack Garvey , to start ASA 81 mg today including am of sx, office of Tali alonso notified    CKD (chronic kidney disease) stage 3, GFR 30-59 ml/min     COPD (chronic obstructive pulmonary disease) on chest xray    Diabetes mellitus, type 2 controlled with diet    Femoral artery occlusion, left superficial- 2019- surgically resolved    Frequent UTI     Gout     Hearing Decreased left ear     Hematuria     [place] (hard of hearing) right    Hypercholesteremia     Hypertension     Injury of head, initial encounter 12/2016 - s/p fall in hospital    Malignant neoplasm of bladder, unspecified     Occlusion and stenosis of unspecified carotid artery right carotid artery    PAD--Left leg stent 2002 done at hospital in [place]  --[facility] in [place], s/p balloon on LLE in 2006    Pulmonary edema May 2016-resolved    Rectal bleeding November 2018    Urinary retention

## 2020-11-23 LAB
-  AMIKACIN: SIGNIFICANT CHANGE UP
-  AMIKACIN: SIGNIFICANT CHANGE UP
-  AMOXICILLIN/CLAVULANIC ACID: SIGNIFICANT CHANGE UP
-  AMOXICILLIN/CLAVULANIC ACID: SIGNIFICANT CHANGE UP
-  AMPICILLIN/SULBACTAM: SIGNIFICANT CHANGE UP
-  AMPICILLIN/SULBACTAM: SIGNIFICANT CHANGE UP
-  AMPICILLIN: SIGNIFICANT CHANGE UP
-  AMPICILLIN: SIGNIFICANT CHANGE UP
-  AZTREONAM: SIGNIFICANT CHANGE UP
-  AZTREONAM: SIGNIFICANT CHANGE UP
-  CEFAZOLIN: SIGNIFICANT CHANGE UP
-  CEFAZOLIN: SIGNIFICANT CHANGE UP
-  CEFEPIME: SIGNIFICANT CHANGE UP
-  CEFEPIME: SIGNIFICANT CHANGE UP
-  CEFOXITIN: SIGNIFICANT CHANGE UP
-  CEFOXITIN: SIGNIFICANT CHANGE UP
-  CEFTRIAXONE: SIGNIFICANT CHANGE UP
-  CEFTRIAXONE: SIGNIFICANT CHANGE UP
-  CIPROFLOXACIN: SIGNIFICANT CHANGE UP
-  CIPROFLOXACIN: SIGNIFICANT CHANGE UP
-  ERTAPENEM: SIGNIFICANT CHANGE UP
-  ERTAPENEM: SIGNIFICANT CHANGE UP
-  GENTAMICIN: SIGNIFICANT CHANGE UP
-  GENTAMICIN: SIGNIFICANT CHANGE UP
-  IMIPENEM: SIGNIFICANT CHANGE UP
-  IMIPENEM: SIGNIFICANT CHANGE UP
-  LEVOFLOXACIN: SIGNIFICANT CHANGE UP
-  LEVOFLOXACIN: SIGNIFICANT CHANGE UP
-  MEROPENEM: SIGNIFICANT CHANGE UP
-  MEROPENEM: SIGNIFICANT CHANGE UP
-  NITROFURANTOIN: SIGNIFICANT CHANGE UP
-  NITROFURANTOIN: SIGNIFICANT CHANGE UP
-  PIPERACILLIN/TAZOBACTAM: SIGNIFICANT CHANGE UP
-  PIPERACILLIN/TAZOBACTAM: SIGNIFICANT CHANGE UP
-  TIGECYCLINE: SIGNIFICANT CHANGE UP
-  TIGECYCLINE: SIGNIFICANT CHANGE UP
-  TOBRAMYCIN: SIGNIFICANT CHANGE UP
-  TOBRAMYCIN: SIGNIFICANT CHANGE UP
-  TRIMETHOPRIM/SULFAMETHOXAZOLE: SIGNIFICANT CHANGE UP
-  TRIMETHOPRIM/SULFAMETHOXAZOLE: SIGNIFICANT CHANGE UP
BACTERIA UR CULT: ABNORMAL
CULTURE RESULTS: SIGNIFICANT CHANGE UP
METHOD TYPE: SIGNIFICANT CHANGE UP
METHOD TYPE: SIGNIFICANT CHANGE UP
ORGANISM # SPEC MICROSCOPIC CNT: SIGNIFICANT CHANGE UP
SPECIMEN SOURCE: SIGNIFICANT CHANGE UP

## 2020-11-25 ENCOUNTER — NON-APPOINTMENT (OUTPATIENT)
Age: 80
End: 2020-11-25

## 2020-11-27 ENCOUNTER — APPOINTMENT (OUTPATIENT)
Dept: DISASTER EMERGENCY | Facility: CLINIC | Age: 80
End: 2020-11-27

## 2020-11-28 ENCOUNTER — NON-APPOINTMENT (OUTPATIENT)
Age: 80
End: 2020-11-28

## 2020-11-28 LAB — SARS-COV-2 N GENE NPH QL NAA+PROBE: NOT DETECTED

## 2020-11-29 ENCOUNTER — TRANSCRIPTION ENCOUNTER (OUTPATIENT)
Age: 80
End: 2020-11-29

## 2020-11-30 ENCOUNTER — APPOINTMENT (OUTPATIENT)
Dept: UROLOGY | Facility: HOSPITAL | Age: 80
End: 2020-11-30

## 2020-11-30 ENCOUNTER — OUTPATIENT (OUTPATIENT)
Dept: OUTPATIENT SERVICES | Facility: HOSPITAL | Age: 80
LOS: 1 days | End: 2020-11-30
Payer: MEDICARE

## 2020-11-30 ENCOUNTER — RESULT REVIEW (OUTPATIENT)
Age: 80
End: 2020-11-30

## 2020-11-30 VITALS
HEART RATE: 76 BPM | TEMPERATURE: 98 F | WEIGHT: 177.91 LBS | DIASTOLIC BLOOD PRESSURE: 64 MMHG | OXYGEN SATURATION: 97 % | HEIGHT: 64.5 IN | SYSTOLIC BLOOD PRESSURE: 170 MMHG | RESPIRATION RATE: 18 BRPM

## 2020-11-30 VITALS
HEART RATE: 56 BPM | RESPIRATION RATE: 17 BRPM | TEMPERATURE: 97 F | SYSTOLIC BLOOD PRESSURE: 109 MMHG | DIASTOLIC BLOOD PRESSURE: 41 MMHG

## 2020-11-30 DIAGNOSIS — Z82.49 FAMILY HISTORY OF ISCHEMIC HEART DISEASE AND OTHER DISEASES OF THE CIRCULATORY SYSTEM: Chronic | ICD-10-CM

## 2020-11-30 DIAGNOSIS — Z77.123 CONTACT WITH AND (SUSPECTED) EXPOSURE TO RADON AND OTHER NATURALLY OCCURRING RADIATION: Chronic | ICD-10-CM

## 2020-11-30 DIAGNOSIS — Z98.890 OTHER SPECIFIED POSTPROCEDURAL STATES: Chronic | ICD-10-CM

## 2020-11-30 DIAGNOSIS — Z90.79 ACQUIRED ABSENCE OF OTHER GENITAL ORGAN(S): Chronic | ICD-10-CM

## 2020-11-30 DIAGNOSIS — C67.9 MALIGNANT NEOPLASM OF BLADDER, UNSPECIFIED: ICD-10-CM

## 2020-11-30 DIAGNOSIS — I21.3 ST ELEVATION (STEMI) MYOCARDIAL INFARCTION OF UNSPECIFIED SITE: Chronic | ICD-10-CM

## 2020-11-30 DIAGNOSIS — N32.89 OTHER SPECIFIED DISORDERS OF BLADDER: Chronic | ICD-10-CM

## 2020-11-30 PROCEDURE — 86870 RBC ANTIBODY IDENTIFICATION: CPT

## 2020-11-30 PROCEDURE — 86901 BLOOD TYPING SEROLOGIC RH(D): CPT

## 2020-11-30 PROCEDURE — 82962 GLUCOSE BLOOD TEST: CPT

## 2020-11-30 PROCEDURE — 52235 CYSTOSCOPY AND TREATMENT: CPT

## 2020-11-30 PROCEDURE — 86900 BLOOD TYPING SEROLOGIC ABO: CPT

## 2020-11-30 PROCEDURE — 88307 TISSUE EXAM BY PATHOLOGIST: CPT | Mod: 26

## 2020-11-30 PROCEDURE — 86850 RBC ANTIBODY SCREEN: CPT

## 2020-11-30 PROCEDURE — 88307 TISSUE EXAM BY PATHOLOGIST: CPT

## 2020-11-30 PROCEDURE — 86922 COMPATIBILITY TEST ANTIGLOB: CPT

## 2020-11-30 RX ORDER — SODIUM CHLORIDE 9 MG/ML
1000 INJECTION, SOLUTION INTRAVENOUS
Refills: 0 | Status: DISCONTINUED | OUTPATIENT
Start: 2020-11-30 | End: 2020-11-30

## 2020-11-30 RX ORDER — SODIUM CHLORIDE 9 MG/ML
3 INJECTION INTRAMUSCULAR; INTRAVENOUS; SUBCUTANEOUS EVERY 8 HOURS
Refills: 0 | Status: DISCONTINUED | OUTPATIENT
Start: 2020-11-30 | End: 2020-11-30

## 2020-11-30 RX ORDER — FENTANYL CITRATE 50 UG/ML
25 INJECTION INTRAVENOUS
Refills: 0 | Status: DISCONTINUED | OUTPATIENT
Start: 2020-11-30 | End: 2020-11-30

## 2020-11-30 RX ORDER — FENTANYL CITRATE 50 UG/ML
50 INJECTION INTRAVENOUS
Refills: 0 | Status: DISCONTINUED | OUTPATIENT
Start: 2020-11-30 | End: 2020-11-30

## 2020-11-30 RX ORDER — SODIUM CHLORIDE 9 MG/ML
1000 INJECTION, SOLUTION INTRAVENOUS
Refills: 0 | Status: DISCONTINUED | OUTPATIENT
Start: 2020-11-30 | End: 2020-12-07

## 2020-11-30 RX ORDER — MOXIFLOXACIN HYDROCHLORIDE TABLETS, 400 MG 400 MG/1
1 TABLET, FILM COATED ORAL
Qty: 10 | Refills: 0
Start: 2020-11-30 | End: 2020-12-04

## 2020-11-30 RX ORDER — CLOPIDOGREL BISULFATE 75 MG/1
1 TABLET, FILM COATED ORAL
Qty: 0 | Refills: 0 | DISCHARGE

## 2020-11-30 RX ORDER — ACETAMINOPHEN 500 MG
1000 TABLET ORAL ONCE
Refills: 0 | Status: DISCONTINUED | OUTPATIENT
Start: 2020-11-30 | End: 2020-11-30

## 2020-11-30 NOTE — ASU PATIENT PROFILE, ADULT - ABILITY TO HEAR (WITH HEARING AID OR HEARING APPLIANCE IF NORMALLY USED):
Mildly to Moderately Impaired: difficulty hearing in some environments or speaker may need to increase volume or speak distinctly/Does not want to use hearing aid to left ear

## 2020-11-30 NOTE — ASU DISCHARGE PLAN (ADULT/PEDIATRIC) - ASU DC SPECIAL INSTRUCTIONSFT
Drink plenty of fluids.  No heavy lifting or straining for 4 weeks, avoid constipation. You may have intermittent pink tinged urine and urinary dribbling.  This is normal.   If your urine becomes bright red or with clots, please call the office.    Please wait until Friday to restart Plavix Drink plenty of fluids.  No heavy lifting or straining for 4 weeks, avoid constipation. You may have intermittent pink tinged urine and urinary dribbling.  This is normal.   If your urine becomes bright red or with clots, please call the office.    restart Plavix in 2 days

## 2020-11-30 NOTE — ASU DISCHARGE PLAN (ADULT/PEDIATRIC) - CARE PROVIDER_API CALL
Roman Cesar  UROLOGY  [address], [address]  [place] [address]  Phone: [phone]  Fax: [phone]  Follow Up Time: 2 weeks

## 2020-12-03 NOTE — H&P ADULT - PSH
DISASTER CHARTING    12/1/20, 3:20 PM EST    DISCHARGE ONGOING EVALUATION:     [facility] day: 1  Location: -66/066-A Reason for admit: Intra-abdominal abscess [facility]) [K65.1]   Barriers to Discharge: client here with abdominal abscess (history CVA), drain placed 12/1; ID following cultures and IV AB; SGY following  PCP: Dr. Krysta Gomez with [address], [place]  Patient Goals/Plan/Treatment Preferences: plans home with spouse, has walker, WC; therapy following; monitor for AB plans, they are [employer] and hoping for PO AB; if IV AB needed would work with PCP
DISASTER CHARTING    12/2/20, 7:40 AM EST    DISCHARGE ONGOING EVALUATION:     [facility] day: 2  Location: 6E-66/066-A Reason for admit: Intra-abdominal abscess ([address] [K65.1]   Barriers to Discharge: Truclose drain, 260 mL out. IVF, IV Zosyn, pain control. ID following cx for plan. PCP: Richy Nicole MD  Patient Goals/Plan/Treatment Preferences: Home with wife. Has walker and wheelchair. Follow for atb plans.
DISASTER CHARTING    12/3/20, 11:11 AM EST    DISCHARGE ONGOING EVALUATION:     [facility] day: 3  Location: 6E-66/066-A Reason for admit: Intra-abdominal abscess ([address] [K65.1]   Barriers to Discharge: Truclose drain intact 130 ml out. ID following, IV antibiotics, IV fluids, PT/OT. CT planned for tomorrow. PCP: Caitlyn Jaeger MD  Patient Goals/Plan/Treatment Preferences: Home with wife. Will follow for potential needs.
2002 left leg stent    Acute myocardial infarction  as per pt in 2001, no coronary stent  Cataract surgery 30 years ago    Exposure to radiation  for prostate cancer (seed implant)  History of bladder surgery    History of colon surgery  2007  right wrist surgery with hardware 27 years ago    S/P cardiac catheterization  2016 with 1 FEMI stent  S/P TURP (status post transurethral resection of prostate)

## 2020-12-09 NOTE — ED ADULT TRIAGE NOTE - MEANS OF ARRIVAL
Try to do some things for yourself, exercise, stress reduction.  Come back if you have any further concerns.   ambulatory

## 2020-12-11 ENCOUNTER — APPOINTMENT (OUTPATIENT)
Dept: UROLOGY | Facility: CLINIC | Age: 80
End: 2020-12-11
Payer: MEDICARE

## 2020-12-11 PROCEDURE — 99213 OFFICE O/P EST LOW 20 MIN: CPT

## 2020-12-15 ENCOUNTER — RESULT REVIEW (OUTPATIENT)
Age: 80
End: 2020-12-15

## 2021-01-12 NOTE — ED PROVIDER NOTE - ENMT, MLM
Airway patent, Nasal mucosa clear. Mouth with normal mucosa. Throat has no vesicles, no oropharyngeal exudates and uvula is midline. no

## 2021-01-29 ENCOUNTER — APPOINTMENT (OUTPATIENT)
Dept: VASCULAR SURGERY | Facility: CLINIC | Age: 81
End: 2021-01-29
Payer: MEDICARE

## 2021-01-29 VITALS
WEIGHT: 174 LBS | BODY MASS INDEX: 27.31 KG/M2 | SYSTOLIC BLOOD PRESSURE: 188 MMHG | HEIGHT: 67 IN | DIASTOLIC BLOOD PRESSURE: 67 MMHG | HEART RATE: 80 BPM

## 2021-01-29 VITALS — TEMPERATURE: 97.2 F

## 2021-01-29 PROCEDURE — 99213 OFFICE O/P EST LOW 20 MIN: CPT

## 2021-01-29 PROCEDURE — 93926 LOWER EXTREMITY STUDY: CPT

## 2021-01-29 PROCEDURE — 93978 VASCULAR STUDY: CPT

## 2021-01-29 NOTE — HISTORY OF PRESENT ILLNESS
[FreeTextEntry1] : 81 yo male with history of cad, htn, pad s/p iliac stents and left sfa stent, carotid stenosis s/p cea presents for follow up.  pt states that he is walking better with less pain.  pt states that he is walking about 1/2 block prior to resting.  pt denies any lower extremity wounds or ulcers.  pt denies any stroke like symptoms\par pt is currently taking his plavix

## 2021-01-29 NOTE — PHYSICAL EXAM
[JVD] : no jugular venous distention  [Normal Breath Sounds] : Normal breath sounds [Normal Heart Sounds] : normal heart sounds [Ankle Swelling (On Exam)] : not present [Varicose Veins Of Lower Extremities] : not present [] : not present [Abdomen Masses] : No abdominal masses [No Rash or Lesion] : No rash or lesion [Skin Ulcer] : no ulcer [Alert] : alert [Oriented to Person] : oriented to person [Oriented to Place] : oriented to place [Calm] : calm [de-identified] : appears well  [de-identified] :  strength 5/5 b/l upper extremities\par  [de-identified] : cn 2-12 intact

## 2021-02-24 ENCOUNTER — INPATIENT (INPATIENT)
Facility: HOSPITAL | Age: 81
LOS: 6 days | Discharge: HOME | DRG: 280 | End: 2021-03-03
Attending: INTERNAL MEDICINE | Admitting: INTERNAL MEDICINE
Payer: MEDICARE

## 2021-02-24 VITALS
SYSTOLIC BLOOD PRESSURE: 132 MMHG | OXYGEN SATURATION: 94 % | HEART RATE: 68 BPM | HEIGHT: 63 IN | TEMPERATURE: 99 F | DIASTOLIC BLOOD PRESSURE: 61 MMHG | RESPIRATION RATE: 24 BRPM | WEIGHT: 169.98 LBS

## 2021-02-24 DIAGNOSIS — Z98.890 OTHER SPECIFIED POSTPROCEDURAL STATES: Chronic | ICD-10-CM

## 2021-02-24 DIAGNOSIS — I50.9 HEART FAILURE, UNSPECIFIED: ICD-10-CM

## 2021-02-24 DIAGNOSIS — N32.89 OTHER SPECIFIED DISORDERS OF BLADDER: Chronic | ICD-10-CM

## 2021-02-24 DIAGNOSIS — J44.9 CHRONIC OBSTRUCTIVE PULMONARY DISEASE, UNSPECIFIED: ICD-10-CM

## 2021-02-24 DIAGNOSIS — N17.9 ACUTE KIDNEY FAILURE, UNSPECIFIED: ICD-10-CM

## 2021-02-24 DIAGNOSIS — Z29.9 ENCOUNTER FOR PROPHYLACTIC MEASURES, UNSPECIFIED: ICD-10-CM

## 2021-02-24 DIAGNOSIS — I25.10 ATHEROSCLEROTIC HEART DISEASE OF NATIVE CORONARY ARTERY WITHOUT ANGINA PECTORIS: ICD-10-CM

## 2021-02-24 DIAGNOSIS — J18.1 LOBAR PNEUMONIA, UNSPECIFIED ORGANISM: ICD-10-CM

## 2021-02-24 DIAGNOSIS — I21.3 ST ELEVATION (STEMI) MYOCARDIAL INFARCTION OF UNSPECIFIED SITE: Chronic | ICD-10-CM

## 2021-02-24 DIAGNOSIS — D64.9 ANEMIA, UNSPECIFIED: ICD-10-CM

## 2021-02-24 DIAGNOSIS — Z82.49 FAMILY HISTORY OF ISCHEMIC HEART DISEASE AND OTHER DISEASES OF THE CIRCULATORY SYSTEM: Chronic | ICD-10-CM

## 2021-02-24 DIAGNOSIS — Z77.123 CONTACT WITH AND (SUSPECTED) EXPOSURE TO RADON AND OTHER NATURALLY OCCURRING RADIATION: Chronic | ICD-10-CM

## 2021-02-24 DIAGNOSIS — Z90.79 ACQUIRED ABSENCE OF OTHER GENITAL ORGAN(S): Chronic | ICD-10-CM

## 2021-02-24 LAB
ALBUMIN SERPL ELPH-MCNC: 3.6 G/DL — SIGNIFICANT CHANGE UP (ref 3.5–5)
ALP SERPL-CCNC: 248 U/L — HIGH (ref 40–120)
ALT FLD-CCNC: 27 U/L DA — SIGNIFICANT CHANGE UP (ref 10–60)
ANION GAP SERPL CALC-SCNC: 6 MMOL/L — SIGNIFICANT CHANGE UP (ref 5–17)
AST SERPL-CCNC: 16 U/L — SIGNIFICANT CHANGE UP (ref 10–40)
BASOPHILS # BLD AUTO: 0.05 K/UL — SIGNIFICANT CHANGE UP (ref 0–0.2)
BASOPHILS NFR BLD AUTO: 0.6 % — SIGNIFICANT CHANGE UP (ref 0–2)
BILIRUB SERPL-MCNC: 1.3 MG/DL — HIGH (ref 0.2–1.2)
BUN SERPL-MCNC: 33 MG/DL — HIGH (ref 7–18)
CALCIUM SERPL-MCNC: 8.7 MG/DL — SIGNIFICANT CHANGE UP (ref 8.4–10.5)
CHLORIDE SERPL-SCNC: 107 MMOL/L — SIGNIFICANT CHANGE UP (ref 96–108)
CK MB CFR SERPL CALC: 1.7 NG/ML — SIGNIFICANT CHANGE UP (ref 0–3.6)
CO2 SERPL-SCNC: 29 MMOL/L — SIGNIFICANT CHANGE UP (ref 22–31)
CREAT SERPL-MCNC: 2.73 MG/DL — HIGH (ref 0.5–1.3)
D DIMER BLD IA.RAPID-MCNC: 257 NG/ML DDU — HIGH
EOSINOPHIL # BLD AUTO: 0.09 K/UL — SIGNIFICANT CHANGE UP (ref 0–0.5)
EOSINOPHIL NFR BLD AUTO: 1.1 % — SIGNIFICANT CHANGE UP (ref 0–6)
GLUCOSE SERPL-MCNC: 79 MG/DL — SIGNIFICANT CHANGE UP (ref 70–99)
HCT VFR BLD CALC: 33.1 % — LOW (ref 39–50)
HGB BLD-MCNC: 9.8 G/DL — LOW (ref 13–17)
IMM GRANULOCYTES NFR BLD AUTO: 0.4 % — SIGNIFICANT CHANGE UP (ref 0–1.5)
LACTATE SERPL-SCNC: 1.8 MMOL/L — SIGNIFICANT CHANGE UP (ref 0.7–2)
LYMPHOCYTES # BLD AUTO: 1.04 K/UL — SIGNIFICANT CHANGE UP (ref 1–3.3)
LYMPHOCYTES # BLD AUTO: 13 % — SIGNIFICANT CHANGE UP (ref 13–44)
MCHC RBC-ENTMCNC: 26.4 PG — LOW (ref 27–34)
MCHC RBC-ENTMCNC: 29.6 GM/DL — LOW (ref 32–36)
MCV RBC AUTO: 89.2 FL — SIGNIFICANT CHANGE UP (ref 80–100)
MONOCYTES # BLD AUTO: 0.67 K/UL — SIGNIFICANT CHANGE UP (ref 0–0.9)
MONOCYTES NFR BLD AUTO: 8.4 % — SIGNIFICANT CHANGE UP (ref 2–14)
NEUTROPHILS # BLD AUTO: 6.1 K/UL — SIGNIFICANT CHANGE UP (ref 1.8–7.4)
NEUTROPHILS NFR BLD AUTO: 76.5 % — SIGNIFICANT CHANGE UP (ref 43–77)
NRBC # BLD: 1 /100 WBCS — HIGH (ref 0–0)
PLATELET # BLD AUTO: 384 K/UL — SIGNIFICANT CHANGE UP (ref 150–400)
POTASSIUM SERPL-MCNC: 4.2 MMOL/L — SIGNIFICANT CHANGE UP (ref 3.5–5.3)
POTASSIUM SERPL-SCNC: 4.2 MMOL/L — SIGNIFICANT CHANGE UP (ref 3.5–5.3)
PROT SERPL-MCNC: 8.3 G/DL — SIGNIFICANT CHANGE UP (ref 6–8.3)
RAPID RVP RESULT: SIGNIFICANT CHANGE UP
RBC # BLD: 3.71 M/UL — LOW (ref 4.2–5.8)
RBC # FLD: 18.4 % — HIGH (ref 10.3–14.5)
SARS-COV-2 RNA SPEC QL NAA+PROBE: SIGNIFICANT CHANGE UP
SODIUM SERPL-SCNC: 142 MMOL/L — SIGNIFICANT CHANGE UP (ref 135–145)
WBC # BLD: 7.98 K/UL — SIGNIFICANT CHANGE UP (ref 3.8–10.5)
WBC # FLD AUTO: 7.98 K/UL — SIGNIFICANT CHANGE UP (ref 3.8–10.5)

## 2021-02-24 PROCEDURE — 71045 X-RAY EXAM CHEST 1 VIEW: CPT | Mod: 26

## 2021-02-24 PROCEDURE — 99223 1ST HOSP IP/OBS HIGH 75: CPT | Mod: GC

## 2021-02-24 PROCEDURE — 99291 CRITICAL CARE FIRST HOUR: CPT

## 2021-02-24 PROCEDURE — 71250 CT THORAX DX C-: CPT | Mod: 26

## 2021-02-24 RX ORDER — BUDESONIDE AND FORMOTEROL FUMARATE DIHYDRATE 160; 4.5 UG/1; UG/1
2 AEROSOL RESPIRATORY (INHALATION)
Refills: 0 | Status: DISCONTINUED | OUTPATIENT
Start: 2021-02-24 | End: 2021-02-26

## 2021-02-24 RX ORDER — AMLODIPINE BESYLATE 2.5 MG/1
10 TABLET ORAL DAILY
Refills: 0 | Status: DISCONTINUED | OUTPATIENT
Start: 2021-02-24 | End: 2021-03-02

## 2021-02-24 RX ORDER — CLOPIDOGREL BISULFATE 75 MG/1
75 TABLET, FILM COATED ORAL DAILY
Refills: 0 | Status: DISCONTINUED | OUTPATIENT
Start: 2021-02-24 | End: 2021-03-03

## 2021-02-24 RX ORDER — FOLIC ACID 0.8 MG
1 TABLET ORAL DAILY
Refills: 0 | Status: DISCONTINUED | OUTPATIENT
Start: 2021-02-24 | End: 2021-03-03

## 2021-02-24 RX ORDER — AZITHROMYCIN 500 MG/1
500 TABLET, FILM COATED ORAL ONCE
Refills: 0 | Status: COMPLETED | OUTPATIENT
Start: 2021-02-24 | End: 2021-02-24

## 2021-02-24 RX ORDER — CILOSTAZOL 100 MG/1
50 TABLET ORAL
Refills: 0 | Status: DISCONTINUED | OUTPATIENT
Start: 2021-02-24 | End: 2021-02-25

## 2021-02-24 RX ORDER — HEPARIN SODIUM 5000 [USP'U]/ML
5000 INJECTION INTRAVENOUS; SUBCUTANEOUS EVERY 12 HOURS
Refills: 0 | Status: DISCONTINUED | OUTPATIENT
Start: 2021-02-24 | End: 2021-03-03

## 2021-02-24 RX ORDER — ALBUTEROL 90 UG/1
2 AEROSOL, METERED ORAL EVERY 6 HOURS
Refills: 0 | Status: DISCONTINUED | OUTPATIENT
Start: 2021-02-24 | End: 2021-03-03

## 2021-02-24 RX ORDER — IPRATROPIUM BROMIDE 0.2 MG/ML
1 SOLUTION, NON-ORAL INHALATION ONCE
Refills: 0 | Status: COMPLETED | OUTPATIENT
Start: 2021-02-24 | End: 2021-02-24

## 2021-02-24 RX ORDER — ATORVASTATIN CALCIUM 80 MG/1
40 TABLET, FILM COATED ORAL AT BEDTIME
Refills: 0 | Status: DISCONTINUED | OUTPATIENT
Start: 2021-02-24 | End: 2021-02-25

## 2021-02-24 RX ORDER — PANTOPRAZOLE SODIUM 20 MG/1
40 TABLET, DELAYED RELEASE ORAL
Refills: 0 | Status: DISCONTINUED | OUTPATIENT
Start: 2021-02-24 | End: 2021-03-03

## 2021-02-24 RX ORDER — CEFTRIAXONE 500 MG/1
1000 INJECTION, POWDER, FOR SOLUTION INTRAMUSCULAR; INTRAVENOUS EVERY 24 HOURS
Refills: 0 | Status: DISCONTINUED | OUTPATIENT
Start: 2021-02-24 | End: 2021-02-25

## 2021-02-24 RX ORDER — CILOSTAZOL 100 MG/1
1 TABLET ORAL
Qty: 0 | Refills: 0 | DISCHARGE

## 2021-02-24 RX ORDER — AZITHROMYCIN 500 MG/1
500 TABLET, FILM COATED ORAL EVERY 24 HOURS
Refills: 0 | Status: DISCONTINUED | OUTPATIENT
Start: 2021-02-24 | End: 2021-02-26

## 2021-02-24 RX ORDER — FUROSEMIDE 40 MG
40 TABLET ORAL EVERY 12 HOURS
Refills: 0 | Status: DISCONTINUED | OUTPATIENT
Start: 2021-02-24 | End: 2021-03-01

## 2021-02-24 RX ORDER — ALBUTEROL 90 UG/1
2 AEROSOL, METERED ORAL ONCE
Refills: 0 | Status: COMPLETED | OUTPATIENT
Start: 2021-02-24 | End: 2021-02-24

## 2021-02-24 RX ORDER — FUROSEMIDE 40 MG
40 TABLET ORAL ONCE
Refills: 0 | Status: COMPLETED | OUTPATIENT
Start: 2021-02-24 | End: 2021-02-24

## 2021-02-24 RX ORDER — ASPIRIN/CALCIUM CARB/MAGNESIUM 324 MG
81 TABLET ORAL DAILY
Refills: 0 | Status: DISCONTINUED | OUTPATIENT
Start: 2021-02-24 | End: 2021-03-03

## 2021-02-24 RX ORDER — CEFTRIAXONE 500 MG/1
1000 INJECTION, POWDER, FOR SOLUTION INTRAMUSCULAR; INTRAVENOUS ONCE
Refills: 0 | Status: COMPLETED | OUTPATIENT
Start: 2021-02-24 | End: 2021-02-24

## 2021-02-24 RX ORDER — ASPIRIN/CALCIUM CARB/MAGNESIUM 324 MG
325 TABLET ORAL ONCE
Refills: 0 | Status: COMPLETED | OUTPATIENT
Start: 2021-02-24 | End: 2021-02-24

## 2021-02-24 RX ADMIN — Medication 40 MILLIGRAM(S): at 18:11

## 2021-02-24 RX ADMIN — AZITHROMYCIN 255 MILLIGRAM(S): 500 TABLET, FILM COATED ORAL at 18:53

## 2021-02-24 RX ADMIN — ALBUTEROL 2 PUFF(S): 90 AEROSOL, METERED ORAL at 15:00

## 2021-02-24 RX ADMIN — Medication 125 MILLIGRAM(S): at 15:00

## 2021-02-24 RX ADMIN — CEFTRIAXONE 100 MILLIGRAM(S): 500 INJECTION, POWDER, FOR SOLUTION INTRAMUSCULAR; INTRAVENOUS at 18:11

## 2021-02-24 RX ADMIN — Medication 325 MILLIGRAM(S): at 18:12

## 2021-02-24 NOTE — ED PROVIDER NOTE - PSH
2002 left leg stent    Acute myocardial infarction  as per pt in 2001, no coronary stent  Bladder mass  cystoscopy, TURBT, transurethral incision of bladder neck- 06/2018  Cataract surgery 30 years ago    Exposure to radiation  for prostate cancer (seed implant)  FH: carotid endarterectomy    H/O vascular surgery  left suprficial femoral artery stent- 2019 November  History of bladder surgery    History of colon surgery  2007  History of vascular access device  05/2020  insertion in left upper arm ( PICC) , removal after 2 weeks of abx  right wrist surgery with hardware 27 years ago    S/P cardiac catheterization  2016 with 1 FEMI stent  S/P carotid endarterectomy  right 01/2019  S/P TURP (status post transurethral resection of prostate)

## 2021-02-24 NOTE — ED PROVIDER NOTE - CLINICAL SUMMARY MEDICAL DECISION MAKING FREE TEXT BOX
Given albuterol, solumedrol by prior provider prior to my assessment. No wheezing at my assessment. No e/o DVT and no CP and noted CXR findings with low suspicion for PE. ECG unchanged and character low suspicion for ACS. Noted pulm edema and mass on CXR Given albuterol, solumedrol by prior provider prior to my assessment. No wheezing at my assessment. No e/o DVT and no CP and noted CXR findings with low suspicion for PE. ECG unchanged and character low suspicion for ACS. Noted pulm edema and mass on CXR    anemia Given albuterol, solumedrol by prior provider prior to my assessment. No wheezing at my assessment. No e/o DVT and no CP and noted CXR findings with low suspicion for PE. ECG unchanged and character low suspicion for ACS. Noted pulm edema concerning for CHF exacerbation, given Lasix. Also given abx in light of concern for PNA. Also noted anemia from 1.5 yrs ago, no bleeding acutely. Patient well appearing, hemodynamically stable, no WOB, satting well on 5L NC. Admitted to internal medicine for further monitoring, w/u, and care.

## 2021-02-24 NOTE — H&P ADULT - ASSESSMENT
80 year old male with medical history of  HTN, HLD, CAD on ASA/Plavix, PAD, CKD, prostate ca s/p radiation, anemia, COPD not on home O2, PAD, presents with SOB, dry cough, and dizziness x 2 days. Patient admitted fir CHF exacerbation less liley COPD exacerbation

## 2021-02-24 NOTE — ED PROVIDER NOTE - PMH
6/09 diagnosed with Prostate Ca  s/p radiation and brachytherapy w/ seed implantation 5 years ago s/p TURP  Anemia    approximately 2001  MI/Coronary Artery Disease    Bladder mass  s/p resection  BPH    CAD (coronary artery disease)  coronary stent x 1 - 2016- pt stopped Pletal and Aspirin 325mg , called Dr. mack Garvey , to start ASA 81 mg today including am of sx, office of Tali alonso notified  CKD (chronic kidney disease) stage 3, GFR 30-59 ml/min    COPD (chronic obstructive pulmonary disease)  on chest xray  Diabetes mellitus, type 2  controlled with diet  Femoral artery occlusion, left  superficial- 2019- surgically resolved  Frequent UTI    Gout    Hearing Decreased left ear    Hematuria    [place] (hard of hearing)  right  Hypercholesteremia    Hypertension    Injury of head, initial encounter  12/2016 - s/p fall in hospital  Malignant neoplasm of bladder, unspecified    Occlusion and stenosis of unspecified carotid artery  right carotid artery  PAD--Left leg stent 2002  done at hospital in [place]  --[facility] in [place], s/p balloon on LLE in 2006  Pulmonary edema  May 2016-resolved  Rectal bleeding  November 2018  Urinary retention

## 2021-02-24 NOTE — ED PROVIDER NOTE - SECONDARY DIAGNOSIS.
Lung consolidation RUSTY (acute kidney injury) Anemia Demand ischemia of myocardium COPD (chronic obstructive pulmonary disease)

## 2021-02-24 NOTE — H&P ADULT - PROBLEM SELECTOR PLAN 1
Patient presented with shortness of breath. probnp elevated.   CXR showed b/l pulmonary edema. got 40 IV lasix in ED. will continue with 40 IV bud. taper as indicated. strict is and os. daily weights. follow ECHO.  patients troponin was elevated , likely demand ischemia. follow T2

## 2021-02-24 NOTE — H&P ADULT - PROBLEM SELECTOR PLAN 5
Patients hb is low. MCV 89  follow b12, folate, iron profile and retic count  Patient with increased Gamma gap, follow SPEP and UPEP

## 2021-02-24 NOTE — ED PROVIDER NOTE - OBJECTIVE STATEMENT
80yoM with h/o HTN, HLD, CAD on ASA/Plavix, PAD, CKD, prostate ca s/p radiation, anemia, COPD not on home O2, PAD, presents with SOB, dry cough, and dizziness x 2 days. Vomited NBNB x1 today as well. Went to Dr. Kumari cardio today and reportedly satting 44% on RA. Feels better now with NRB. Denies CP, fever, leg pain or swelling, abd pain, diarrhea, constipation, and all other symptoms. Other meds: Imdur, Lasix. 80yoM with h/o HTN, HLD, CAD on ASA/Plavix, PAD, CKD, prostate ca s/p radiation, anemia, COPD not on home O2, PAD, presents with SOB, dry cough, and dizziness x 2 days. Vomited NBNB x1 today as well. Went to Dr. Kumari cardio today and reportedly satting 44% on RA. Feels better now with NRB. Denies CP, fever, leg pain or swelling, abd pain, diarrhea, constipation, black or bloody stool, and all other symptoms. Other meds: Imdur, Lasix.

## 2021-02-24 NOTE — ED PROVIDER NOTE - CARE PLAN
Principal Discharge DX:	CHF exacerbation  Secondary Diagnosis:	COPD (chronic obstructive pulmonary disease)  Secondary Diagnosis:	Demand ischemia of myocardium  Secondary Diagnosis:	RUSTY (acute kidney injury)  Secondary Diagnosis:	Anemia  Secondary Diagnosis:	Lung consolidation

## 2021-02-24 NOTE — H&P ADULT - HISTORY OF PRESENT ILLNESS
80 year old male with medical history of  HTN, HLD, CAD on ASA/Plavix, PAD, CKD, prostate ca s/p radiation, anemia, COPD not on home O2, PAD, presents with SOB, dry cough, and dizziness x 2 days. Patient states he went to  Dr. Kumari cardio today and reportedly saturating. 44% on RA and patient was sent to ED. Patient states he feels little better. denies any fever or phlegm production. He added that he uses 2 pillows at night to sleep. patient didnot remember his other meds.   80 year old male with medical history of  HTN, HLD, CAD on ASA/Plavix, PAD, CKD, prostate ca s/p radiation, anemia, COPD not on home O2, PAD, presents with SOB, dry cough, and dizziness x 2 days. Patient states he went to  Dr. Kumari cardio today and reportedly saturating. 44% on RA and patient was sent to ED. Patient states he feels little better. denies any fever or phlegm production. He added that he uses 2 pillows at night to sleep.  Spoke to patients partner, she states patient was prescribed on water pill , but he never likes to take it.    GOC: full code

## 2021-02-24 NOTE — H&P ADULT - NSHPPHYSICALEXAM_GEN_ALL_CORE
Vital Signs Last 24 Hrs  T(C): 36.7 (24 Feb 2021 15:25), Max: 37.1 (24 Feb 2021 13:16)  T(F): 98 (24 Feb 2021 15:25), Max: 98.7 (24 Feb 2021 13:16)  HR: 68 (24 Feb 2021 13:16) (68 - 68)  BP: 143/67 (24 Feb 2021 15:25) (132/61 - 143/67)  BP(mean): --  RR: 22 (24 Feb 2021 15:25) (22 - 24)  SpO2: 95% (24 Feb 2021 15:25) (94% - 95%)    GENERAL: elderly male   HEAD:  Atraumatic, Normocephalic  EYES: EOMI, PERRLA, conjunctiva and sclera clear  NECK: Supple, No JVD  CHEST/LUNG: decrease air entry.  HEART: Regular rate and rhythm; No murmurs;   ABDOMEN: Soft, Nontender, Nondistended; Bowel sounds present; No guarding  EXTREMITIES:  2+ Peripheral Pulses, No cyanosis or edema  PSYCH:  Normal Affect  NEUROLOGY: non-focal, AAO X 3. Strength is 5/5. no sensory loss.  SKIN: No rashes or lesions

## 2021-02-24 NOTE — H&P ADULT - PROBLEM SELECTOR PLAN 4
Patients creatinine is elevated .  cr 2.72. baseline 1.93  follow urine lytes.  follow renal US. nephro consult DR Sevilla  follow SPEP, UPEP Patients creatinine is elevated .  cr 2.72. baseline 1.93  follow urine lytes.  follow renal US. nephro consult Dr Rosario  follow SPEP, UPEP

## 2021-02-24 NOTE — H&P ADULT - PROBLEM SELECTOR PLAN 7
RISK                                                          Points  [] Previous VTE                                           3  [] Thrombophilia                                        2  [] Lower limb paralysis                              2   [] Current Cancer                                       2   [x] Immobilization > 24 hrs                        1  [] ICU/CCU stay > 24 hours                       1  [x] Age > 60                                                   1    heparin for dvt ppx

## 2021-02-24 NOTE — H&P ADULT - PROBLEM SELECTOR PLAN 2
Patient with hx of COPD.   Patient was hypoxic on arrival.   got 125 solumedrol in ED.  will continue with 40 tid.   CXR showed hest Moderate pulmonary edema with moderate to marked cardiomegaly.    Peripheral right lung opacity may represent loculated effusion, infiltrate or mass. Recommend chest CT.    follow CT chest. will continue with albuterol MDI  Symbicort and ABx for possible pneumonia

## 2021-02-24 NOTE — H&P ADULT - NSICDXPASTMEDICALHX_GEN_ALL_CORE_FT
PAST MEDICAL HISTORY:  6/09 diagnosed with Prostate Ca s/p radiation and brachytherapy w/ seed implantation 5 years ago s/p TURP    Anemia     approximately 2001  MI/Coronary Artery Disease     Bladder mass s/p resection    BPH     CAD (coronary artery disease) coronary stent x 1 - 2016- pt stopped Pletal and Aspirin 325mg , called Dr. mack Garvey , to start ASA 81 mg today including am of sx, office of Tali alonso notified    CKD (chronic kidney disease) stage 3, GFR 30-59 ml/min     COPD (chronic obstructive pulmonary disease) on chest xray    Diabetes mellitus, type 2 controlled with diet    Femoral artery occlusion, left superficial- 2019- surgically resolved    Frequent UTI     Gout     Hearing Decreased left ear     Hematuria     [place] (hard of hearing) right    Hypercholesteremia     Hypertension     Injury of head, initial encounter 12/2016 - s/p fall in hospital    Malignant neoplasm of bladder, unspecified     Occlusion and stenosis of unspecified carotid artery right carotid artery    PAD--Left leg stent 2002 done at hospital in [place]  --[facility] in [place], s/p balloon on LLE in 2006    Pulmonary edema May 2016-resolved    Rectal bleeding November 2018    Urinary retention

## 2021-02-24 NOTE — ED PROVIDER NOTE - PHYSICAL EXAMINATION
Afebrile, hemodynamically stable, saturating 95% on 6L NC  NAD, nontoxic appearing, no WOB, speaking full sentences  Head NCAT  EOMI grossly, anicteric  MMM  No JVD  RRR, nml S1/S2, no m/r/g  Lungs bibasilar rales  Abd soft, NT, ND, nml BS, no rebound or guarding  AAO, CN's 3-12 grossly intact  ALDANA spontaneously, no leg cyanosis or edema  Skin warm, dry, no rashes or hives

## 2021-02-25 ENCOUNTER — TRANSCRIPTION ENCOUNTER (OUTPATIENT)
Age: 81
End: 2021-02-25

## 2021-02-25 DIAGNOSIS — I10 ESSENTIAL (PRIMARY) HYPERTENSION: ICD-10-CM

## 2021-02-25 DIAGNOSIS — E78.5 HYPERLIPIDEMIA, UNSPECIFIED: ICD-10-CM

## 2021-02-25 DIAGNOSIS — R74.8 ABNORMAL LEVELS OF OTHER SERUM ENZYMES: ICD-10-CM

## 2021-02-25 LAB
A1C WITH ESTIMATED AVERAGE GLUCOSE RESULT: 6.9 % — HIGH (ref 4–5.6)
ALBUMIN SERPL ELPH-MCNC: 3.6 G/DL — SIGNIFICANT CHANGE UP (ref 3.5–5)
ALP SERPL-CCNC: 237 U/L — HIGH (ref 40–120)
ALT FLD-CCNC: 31 U/L DA — SIGNIFICANT CHANGE UP (ref 10–60)
ANION GAP SERPL CALC-SCNC: 12 MMOL/L — SIGNIFICANT CHANGE UP (ref 5–17)
ANISOCYTOSIS BLD QL: SIGNIFICANT CHANGE UP
AST SERPL-CCNC: 16 U/L — SIGNIFICANT CHANGE UP (ref 10–40)
BASE EXCESS BLDA CALC-SCNC: -1.8 MMOL/L — SIGNIFICANT CHANGE UP (ref -2–2)
BASOPHILS # BLD AUTO: 0 K/UL — SIGNIFICANT CHANGE UP (ref 0–0.2)
BASOPHILS NFR BLD AUTO: 0 % — SIGNIFICANT CHANGE UP (ref 0–2)
BILIRUB SERPL-MCNC: 0.8 MG/DL — SIGNIFICANT CHANGE UP (ref 0.2–1.2)
BLOOD GAS COMMENTS ARTERIAL: SIGNIFICANT CHANGE UP
BUN SERPL-MCNC: 46 MG/DL — HIGH (ref 7–18)
CALCIUM SERPL-MCNC: 8.9 MG/DL — SIGNIFICANT CHANGE UP (ref 8.4–10.5)
CHLORIDE SERPL-SCNC: 103 MMOL/L — SIGNIFICANT CHANGE UP (ref 96–108)
CHOLEST SERPL-MCNC: 193 MG/DL — SIGNIFICANT CHANGE UP
CO2 SERPL-SCNC: 25 MMOL/L — SIGNIFICANT CHANGE UP (ref 22–31)
CREAT SERPL-MCNC: 3.22 MG/DL — HIGH (ref 0.5–1.3)
CRP SERPL-MCNC: 8.01 MG/DL — HIGH (ref 0–0.4)
DACRYOCYTES BLD QL SMEAR: SLIGHT — SIGNIFICANT CHANGE UP
ELLIPTOCYTES BLD QL SMEAR: SLIGHT — SIGNIFICANT CHANGE UP
EOSINOPHIL # BLD AUTO: 0 K/UL — SIGNIFICANT CHANGE UP (ref 0–0.5)
EOSINOPHIL NFR BLD AUTO: 0 % — SIGNIFICANT CHANGE UP (ref 0–6)
ESTIMATED AVERAGE GLUCOSE: 151 MG/DL — HIGH (ref 68–114)
FERRITIN SERPL-MCNC: 31 NG/ML — SIGNIFICANT CHANGE UP (ref 30–400)
FOLATE SERPL-MCNC: >20 NG/ML — SIGNIFICANT CHANGE UP
GLUCOSE BLDC GLUCOMTR-MCNC: 138 MG/DL — HIGH (ref 70–99)
GLUCOSE SERPL-MCNC: 148 MG/DL — HIGH (ref 70–99)
HCO3 BLDA-SCNC: 24 MMOL/L — SIGNIFICANT CHANGE UP (ref 23–27)
HCT VFR BLD CALC: 33.4 % — LOW (ref 39–50)
HDLC SERPL-MCNC: 43 MG/DL — SIGNIFICANT CHANGE UP
HGB BLD-MCNC: 9.5 G/DL — LOW (ref 13–17)
HOROWITZ INDEX BLDA+IHG-RTO: 6 — SIGNIFICANT CHANGE UP
HYPOCHROMIA BLD QL: SLIGHT — SIGNIFICANT CHANGE UP
IRON SATN MFR SERPL: 17 UG/DL — LOW (ref 65–170)
IRON SATN MFR SERPL: 4 % — LOW (ref 20–55)
LIPID PNL WITH DIRECT LDL SERPL: 131 MG/DL — HIGH
LYMPHOCYTES # BLD AUTO: 0.75 K/UL — LOW (ref 1–3.3)
LYMPHOCYTES # BLD AUTO: 9 % — LOW (ref 13–44)
MAGNESIUM SERPL-MCNC: 2.4 MG/DL — SIGNIFICANT CHANGE UP (ref 1.6–2.6)
MANUAL SMEAR VERIFICATION: SIGNIFICANT CHANGE UP
MCHC RBC-ENTMCNC: 26.2 PG — LOW (ref 27–34)
MCHC RBC-ENTMCNC: 28.4 GM/DL — LOW (ref 32–36)
MCV RBC AUTO: 92.3 FL — SIGNIFICANT CHANGE UP (ref 80–100)
MONOCYTES # BLD AUTO: 0.17 K/UL — SIGNIFICANT CHANGE UP (ref 0–0.9)
MONOCYTES NFR BLD AUTO: 2 % — SIGNIFICANT CHANGE UP (ref 2–14)
NEUTROPHILS # BLD AUTO: 7.42 K/UL — HIGH (ref 1.8–7.4)
NEUTROPHILS NFR BLD AUTO: 89 % — HIGH (ref 43–77)
NON HDL CHOLESTEROL: 150 MG/DL — HIGH
NRBC # BLD: 0 /100 — SIGNIFICANT CHANGE UP (ref 0–0)
OVALOCYTES BLD QL SMEAR: SLIGHT — SIGNIFICANT CHANGE UP
PCO2 BLDA: 48 MMHG — HIGH (ref 32–46)
PH BLDA: 7.31 — LOW (ref 7.35–7.45)
PHOSPHATE SERPL-MCNC: 4.6 MG/DL — HIGH (ref 2.5–4.5)
PLAT MORPH BLD: NORMAL — SIGNIFICANT CHANGE UP
PLATELET # BLD AUTO: 435 K/UL — HIGH (ref 150–400)
PLATELET COUNT - ESTIMATE: NORMAL — SIGNIFICANT CHANGE UP
PO2 BLDA: 66 MMHG — LOW (ref 74–108)
POIKILOCYTOSIS BLD QL AUTO: SIGNIFICANT CHANGE UP
POLYCHROMASIA BLD QL SMEAR: SLIGHT — SIGNIFICANT CHANGE UP
POTASSIUM SERPL-MCNC: 4.4 MMOL/L — SIGNIFICANT CHANGE UP (ref 3.5–5.3)
POTASSIUM SERPL-SCNC: 4.4 MMOL/L — SIGNIFICANT CHANGE UP (ref 3.5–5.3)
PROCALCITONIN SERPL-MCNC: 0.16 NG/ML — HIGH (ref 0.02–0.1)
PROT SERPL-MCNC: 7.6 G/DL — SIGNIFICANT CHANGE UP (ref 6–8.3)
PROT SERPL-MCNC: 7.6 G/DL — SIGNIFICANT CHANGE UP (ref 6–8.3)
PROT SERPL-MCNC: 8.3 G/DL — SIGNIFICANT CHANGE UP (ref 6–8.3)
RBC # BLD: 3.62 M/UL — LOW (ref 4.2–5.8)
RBC # BLD: 3.62 M/UL — LOW (ref 4.2–5.8)
RBC # FLD: 18.5 % — HIGH (ref 10.3–14.5)
RBC BLD AUTO: ABNORMAL
RETICS #: 122.7 K/UL — SIGNIFICANT CHANGE UP (ref 25–125)
RETICS/RBC NFR: 3.4 % — HIGH (ref 0.5–2.5)
SAO2 % BLDA: 89 % — LOW (ref 92–96)
SARS-COV-2 IGG SERPL QL IA: NEGATIVE — SIGNIFICANT CHANGE UP
SARS-COV-2 IGM SERPL IA-ACNC: 0.07 INDEX — SIGNIFICANT CHANGE UP
SODIUM SERPL-SCNC: 140 MMOL/L — SIGNIFICANT CHANGE UP (ref 135–145)
TARGETS BLD QL SMEAR: SLIGHT — SIGNIFICANT CHANGE UP
TIBC SERPL-MCNC: 390 UG/DL — SIGNIFICANT CHANGE UP (ref 250–450)
TRIGL SERPL-MCNC: 94 MG/DL — SIGNIFICANT CHANGE UP
TROPONIN I SERPL-MCNC: 0.09 NG/ML — HIGH (ref 0–0.04)
TROPONIN I SERPL-MCNC: 0.1 NG/ML — HIGH (ref 0–0.04)
TSH SERPL-MCNC: 1.04 UU/ML — SIGNIFICANT CHANGE UP (ref 0.34–4.82)
UIBC SERPL-MCNC: 373 UG/DL — HIGH (ref 110–370)
VIT B12 SERPL-MCNC: 325 PG/ML — SIGNIFICANT CHANGE UP (ref 232–1245)
WBC # BLD: 8.34 K/UL — SIGNIFICANT CHANGE UP (ref 3.8–10.5)
WBC # FLD AUTO: 8.34 K/UL — SIGNIFICANT CHANGE UP (ref 3.8–10.5)

## 2021-02-25 PROCEDURE — 99223 1ST HOSP IP/OBS HIGH 75: CPT

## 2021-02-25 PROCEDURE — 99233 SBSQ HOSP IP/OBS HIGH 50: CPT | Mod: GC

## 2021-02-25 RX ORDER — CILOSTAZOL 100 MG/1
1 TABLET ORAL
Qty: 0 | Refills: 0 | DISCHARGE

## 2021-02-25 RX ORDER — ALLOPURINOL 300 MG
1 TABLET ORAL
Qty: 0 | Refills: 0 | DISCHARGE

## 2021-02-25 RX ORDER — FERROUS SULFATE 325(65) MG
325 TABLET ORAL DAILY
Refills: 0 | Status: DISCONTINUED | OUTPATIENT
Start: 2021-02-25 | End: 2021-03-03

## 2021-02-25 RX ORDER — AMLODIPINE BESYLATE 2.5 MG/1
1 TABLET ORAL
Qty: 0 | Refills: 0 | DISCHARGE

## 2021-02-25 RX ORDER — OMEPRAZOLE 10 MG/1
1 CAPSULE, DELAYED RELEASE ORAL
Qty: 0 | Refills: 0 | DISCHARGE

## 2021-02-25 RX ORDER — ISOSORBIDE MONONITRATE 60 MG/1
1 TABLET, EXTENDED RELEASE ORAL
Qty: 0 | Refills: 0 | DISCHARGE

## 2021-02-25 RX ORDER — ATORVASTATIN CALCIUM 80 MG/1
80 TABLET, FILM COATED ORAL AT BEDTIME
Refills: 0 | Status: DISCONTINUED | OUTPATIENT
Start: 2021-02-25 | End: 2021-03-03

## 2021-02-25 RX ORDER — ERGOCALCIFEROL 1.25 MG/1
1 CAPSULE ORAL
Qty: 0 | Refills: 0 | DISCHARGE

## 2021-02-25 RX ORDER — FOLIC ACID 0.8 MG
1 TABLET ORAL
Qty: 0 | Refills: 0 | DISCHARGE

## 2021-02-25 RX ADMIN — ATORVASTATIN CALCIUM 80 MILLIGRAM(S): 80 TABLET, FILM COATED ORAL at 20:39

## 2021-02-25 RX ADMIN — Medication 325 MILLIGRAM(S): at 12:00

## 2021-02-25 RX ADMIN — BUDESONIDE AND FORMOTEROL FUMARATE DIHYDRATE 2 PUFF(S): 160; 4.5 AEROSOL RESPIRATORY (INHALATION) at 20:40

## 2021-02-25 RX ADMIN — HEPARIN SODIUM 5000 UNIT(S): 5000 INJECTION INTRAVENOUS; SUBCUTANEOUS at 06:12

## 2021-02-25 RX ADMIN — CLOPIDOGREL BISULFATE 75 MILLIGRAM(S): 75 TABLET, FILM COATED ORAL at 12:00

## 2021-02-25 RX ADMIN — AMLODIPINE BESYLATE 10 MILLIGRAM(S): 2.5 TABLET ORAL at 06:13

## 2021-02-25 RX ADMIN — Medication 1 MILLIGRAM(S): at 12:00

## 2021-02-25 RX ADMIN — BUDESONIDE AND FORMOTEROL FUMARATE DIHYDRATE 2 PUFF(S): 160; 4.5 AEROSOL RESPIRATORY (INHALATION) at 11:59

## 2021-02-25 RX ADMIN — PANTOPRAZOLE SODIUM 40 MILLIGRAM(S): 20 TABLET, DELAYED RELEASE ORAL at 06:13

## 2021-02-25 RX ADMIN — Medication 81 MILLIGRAM(S): at 11:59

## 2021-02-25 RX ADMIN — Medication 40 MILLIGRAM(S): at 18:04

## 2021-02-25 RX ADMIN — CILOSTAZOL 50 MILLIGRAM(S): 100 TABLET ORAL at 06:13

## 2021-02-25 RX ADMIN — Medication 40 MILLIGRAM(S): at 06:12

## 2021-02-25 RX ADMIN — Medication 40 MILLIGRAM(S): at 20:40

## 2021-02-25 RX ADMIN — AZITHROMYCIN 255 MILLIGRAM(S): 500 TABLET, FILM COATED ORAL at 18:04

## 2021-02-25 RX ADMIN — HEPARIN SODIUM 5000 UNIT(S): 5000 INJECTION INTRAVENOUS; SUBCUTANEOUS at 18:04

## 2021-02-25 RX ADMIN — Medication 40 MILLIGRAM(S): at 14:01

## 2021-02-25 NOTE — CONSULT NOTE ADULT - SUBJECTIVE AND OBJECTIVE BOX
CHIEF COMPLAINT: Shortness of breath    HPI: 79 yo M with HTN, HLD, T2DM, CAD s/p MI and PCI, former smoker with reported COPD not on home O2, CKD III, and PAD s/p stenting who presented with dyspnea to his cardiologist's office. Reportedly his pulse ox was in 40-50 range while pt displayed little symptoms. He had an emesis episode and some increased SOB from baseline with dry cough over past couple days. He does not take any inhalers and denies any wheezing or chronic cough. He has a limited ET at baseline which has worsened over last week. He does report 2 pillow orthopnea and no leg swelling.   Cardiologist: Dr. Kumari    PAST MEDICAL & SURGICAL HISTORY:  As above, also [place] (hard of hearing), Malignant neoplasm of bladder s/p resection and XRT, Femoral artery occlusion, left superficial- 2019- surgically resolved; Occlusion and stenosis of right carotid artery, Anemia, Gout, Rectal bleeding (2018), BPH, S/P carotid endarterectomy  right 01/2019,  History of colon surgery 2007, right wrist surgery with hardware 27 years ago, Cataract surgery 30 years ago    Allergies    Novocain (Faint)    MEDICATIONS  (STANDING):  amLODIPine   Tablet 10 milliGRAM(s) Oral daily  aspirin enteric coated 81 milliGRAM(s) Oral daily  atorvastatin 40 milliGRAM(s) Oral at bedtime  azithromycin  IVPB 500 milliGRAM(s) IV Intermittent every 24 hours  budesonide  80 MICROgram(s)/formoterol 4.5 MICROgram(s) Inhaler 2 Puff(s) Inhalation two times a day  cefTRIAXone   IVPB 1000 milliGRAM(s) IV Intermittent every 24 hours  cilostazol 50 milliGRAM(s) Oral two times a day  clopidogrel Tablet 75 milliGRAM(s) Oral daily  ferrous    sulfate 325 milliGRAM(s) Oral daily  folic acid 1 milliGRAM(s) Oral daily  furosemide   Injectable 40 milliGRAM(s) IV Push every 12 hours  heparin   Injectable 5000 Unit(s) SubCutaneous every 12 hours  methylPREDNISolone sodium succinate Injectable 40 milliGRAM(s) IV Push every 8 hours  pantoprazole    Tablet 40 milliGRAM(s) Oral before breakfast    MEDICATIONS  (PRN):  ALBUTerol    90 MICROgram(s) HFA Inhaler 2 Puff(s) Inhalation every 6 hours PRN Shortness of Breath and/or Wheezing      FAMILY HISTORY:  Family history of heart disease    Family history of diabetes mellitus (Mother)      No family history of premature coronary artery disease or sudden cardiac death    SOCIAL HISTORY:  Smoking-Quit 15 years ago  Alcohol-Denies  Illicit Drug use-Denies    REVIEW OF SYSTEMS:  Constitutional: [ ] fever, [ ]weight loss,  [ ]fatigue  Eyes: [ ] visual changes  Respiratory: [ ]shortness of breath;  [ ] cough, [ ]wheezing, [ ]chills, [ ]hemoptysis  Cardiovascular: [ ] chest pain, [ ]palpitations, [ ]dizziness,  [ ]leg swelling [ ]syncope  Gastrointestinal: [ ] abdominal pain, [ ]nausea, [ ]vomiting,  [ ]diarrhea   Genitourinary: [ ] dysuria, [ ] hematuria  Neurologic: [ ] headaches [ ] tremors  [ ] weakness [ ] lightheadedness  Skin: [ ] itching, [ ]burning, [ ] rashes  Endocrine: [ ] heat or cold intolerance  Musculoskeletal: [ ] joint pain or swelling; [ ] muscle, back, or extremity pain  Psychiatric: [ ] depression, [ ]anxiety, [ ]mood swings, or [ ]difficulty sleeping  Hematologic: [ ] easy bruising, [ ] bleeding gums       [ x] All others negative	  [ ] Unable to obtain    Vital Signs Last 24 Hrs  T(C): 36.3 (25 Feb 2021 15:30), Max: 36.7 (24 Feb 2021 20:36)  T(F): 97.3 (25 Feb 2021 15:30), Max: 98 (24 Feb 2021 20:36)  HR: 72 (25 Feb 2021 15:30) (66 - 78)  BP: 124/48 (25 Feb 2021 15:30) (124/48 - 157/54)  BP(mean): --  RR: 18 (25 Feb 2021 15:30) (17 - 22)  SpO2: 99% (25 Feb 2021 15:30) (93% - 100%)      PHYSICAL EXAM:  General: No acute distress  HEENT: EOMI, PERRL  Neck: Supple, No JVD  Lungs: Decreased BS at bases B/L with rales, no wheezing  Heart: Regular rate and rhythm; No murmurs, rubs, or gallops  Abdomen: Nontender, bowel sounds present  Extremities: No clubbing, cyanosis, or edema  Nervous system:  Alert & Oriented X3, no focal deficits  Psychiatric: Normal affect  Skin: No rashes or lesions      LABS:  02-25    140  |  103  |  46<H>  ----------------------------<  148<H>  4.4   |  25  |  3.22<H>                          9.5    8.34  )-----------( 435      ( 25 Feb 2021 09:16 )             33.4   Ca    8.9      25 Feb 2021 09:16  Phos  4.6     02-25  Mg     2.4     02-25    TPro  8.3  /  Alb  3.6  /  TBili  0.8  /  DBili  x   /  AST  16  /  ALT  31  /  AlkPhos  237<H>  02-25        Serum Pro-Brain Natriuretic Peptide: 3706 pg/mL (02-24-21 @ 15:54)    RADIOLOGY:   < from: Xray Chest 1 View AP/PA (02.24.21 @ 14:25) >  IMPRESSION:    Moderate pulmonary edema with moderate to marked cardiomegaly.    Peripheral right lung opacity may represent loculated effusion, infiltrate or mass. Recommend chest CT.    < end of copied text >    < from: CT Chest No Cont (02.24.21 @ 19:54) >  IMPRESSION: Probable pulmonary vascular congestion with small to moderate bilateral pleural effusions and compressive atelectasis at the lung bases are loculated fluid on the right corresponds to chest x-ray abnormality    < end of copied text >

## 2021-02-25 NOTE — CONSULT NOTE ADULT - ASSESSMENT
79 yo M former smoker and vasculopath with HTN, HLD, T2DM, CAD s/p MI and PCI, reported COPD, CKD III, and PAD s/p stenting, admitted with acute hypoxic respiratory failure and hypoxemia out of proportion to clinical findings.    CT chest personally reviewed by me: bilateral ground glass and interlobular septal thickening with bilateral moderate pleural effusions loculated in right minor fissure.   This is consistent with pulmonary vascular congestion and edema due to CHF, likely exacerbated by RUSTY on CKD and medication noncompliance.  Pulse ox can be unreliable in pt with vasculopathy and PAD and hypoxemia can be somewhat exaggerated. ABG on 6L n/c noted with O2 sat 89% and paO2 66.    Clinically pt is not wheezing, cough is dry and nonproductive. Low suspicion for copd exacerbation.    Recommend:  - management of acute CHF exacerbation and diuresis as renal function tolerates  - would not continue with systemic steroids  - ok to cont inhalers, would d/c symbicort and start on spiriva instead with prn albuterol mdi  - therapeutic thoracentesis possible if pt fails to respond to diuresis    Thank you for this consult, will follow with you.

## 2021-02-25 NOTE — PROGRESS NOTE ADULT - PROBLEM SELECTOR PLAN 6
continue with home meds Alk phos 248, t bili 1.3  f/u GGT and US abdomen Only on Plavix at home  c/w aspirin, clopidogrel, atorvastatin  No beta blocker due to COPD

## 2021-02-25 NOTE — PROGRESS NOTE ADULT - PROBLEM SELECTOR PLAN 5
Patients hb is low. MCV 89  follow b12, folate, iron profile and retic count  Patient with increased Gamma gap, follow SPEP and UPEP Hb 9.5  Iron studies likely ANNA  Elevated retic count and gamma gap  f/u b12, folate, SPEP and UPEP  Monitor for s/s anemia Stable 230s  f/u GGT and US abdomen

## 2021-02-25 NOTE — PROGRESS NOTE ADULT - PROBLEM SELECTOR PLAN 4
Patients creatinine is elevated .  cr 2.72. baseline 1.93  follow urine lytes.  follow renal US. nephro consult Dr Rosario  follow SPEP, UPEP BUN 46, Cr 3.22 (2/25), baseline Cr 1.93  Urine lytes wnl  Measure post void residual  f/u renal US, SPEP, UPEP  Nephro Dr Rosario BUN 46, Cr 3.22 (2/25), baseline Cr 1.93  f/u urine lytes  Measure post void residual  f/u renal US, SPEP, UPEP  Nephro Dr Rosario Hb 9.5  Iron studies likely ANNA  C/w oral iron supplement  f/u b12, folate, SPEP and UPEP  Monitor for s/s bleeding

## 2021-02-25 NOTE — PROGRESS NOTE ADULT - PROBLEM SELECTOR PLAN 2
Patient with hx of COPD.   Patient was hypoxic on arrival.   got 125 solumedrol in ED.  will continue with 40 tid.   CXR showed hest Moderate pulmonary edema with moderate to marked cardiomegaly.    Peripheral right lung opacity may represent loculated effusion, infiltrate or mass. Recommend chest CT.    follow CT chest. will continue with albuterol MDI  Symbicort and ABx for possible pneumonia Saturating 53% on RA, placed on 6L NC  ABG - 7.31/48/66/24  Taper down solumedrol as tolerated  CXR - pulmonary edema, cardiomegaly  CT chest - pulm vascular congestion, b/l pleural effusion, compressive atelectasis, loculated fluid on R  c/w Azithromycin and ceftriaxone for possible infection  c/w albuterol and Symbicort  Monitor O2 saturation, titrate O2 as needed  Pulm Dr. Urias Saturating 53% on RA, placed on 6L NC at 96%  ABG - 7.31/48/66/24  C/w 40mg IV solumedrol q8h  Imaging c/w congestion w/ R loculated fluid collection in fissure  c/w Azithromycin   c/w albuterol and Symbicort  Monitor O2 saturation, titrate O2 as needed  Pulmelyssa Urias

## 2021-02-25 NOTE — PROGRESS NOTE ADULT - PROBLEM SELECTOR PLAN 7
RISK                                                          Points  [] Previous VTE                                           3  [] Thrombophilia                                        2  [] Lower limb paralysis                              2   [] Current Cancer                                       2   [x] Immobilization > 24 hrs                        1  [] ICU/CCU stay > 24 hours                       1  [x] Age > 60                                                   1    heparin for dvt ppx c/w aspirin, clopidogrel, atorvastatin, and Imdur  No beta blocker due to COPD BP remains stable in 130s-140s/50s  c/w amlodipine  No ACEi/ARB due to RUSTY

## 2021-02-25 NOTE — CONSULT NOTE ADULT - ASSESSMENT
# RUSTY on CKD. patient with CHF excerbation.  cardio-renal syndrome is a differential  continue lasix for now  no further work-up warranted at this time.  RPT BMP in AM  avoid NSAIDS and iv contrast agents.

## 2021-02-25 NOTE — PROGRESS NOTE ADULT - PROBLEM SELECTOR PLAN 9
Cholesterol 193, HDL 43, , non , TG 94  Increased atorvastatin to 80 mg PO QHS RISK                                                          Points  [] Previous VTE                                           3  [] Thrombophilia                                        2  [] Lower limb paralysis                              2   [] Current Cancer                                       2   [x] Immobilization > 24 hrs                        1  [] ICU/CCU stay > 24 hours                       1  [x] Age > 60                                                   1  DVT ppx: Subq Heparin  GI ppx: Protonix  Diet: Regular, Diabetic  Electrolytes replaced PRN  Dispo: Pending clinical course  FULL CODE RISK                                                          Points  [] Previous VTE                                           3  [] Thrombophilia                                        2  [] Lower limb paralysis                              2   [] Current Cancer                                       2   [x] Immobilization > 24 hrs                        1  [] ICU/CCU stay > 24 hours                       1  [x] Age > 60                                                   1  DVT ppx: Subq Heparin  GI ppx: Protonix  Diet: Regular DASH  Electrolytes replaced PRN  Dispo: Pending clinical course  FULL CODE

## 2021-02-25 NOTE — PROGRESS NOTE ADULT - PROBLEM SELECTOR PLAN 1
CXR - b/l pulmonary edema  c/w IV Lasix 40 BID taper as indicated  strict is and os. daily weights. follow ECHO.  patients troponin was elevated , likely demand ischemia. follow T2 CXR - b/l pulmonary edema  c/w IV Lasix 40 BID  Strict Is&Os and daily weights  Trops trending down, elevation likely 2/2 demand ischemia  BNP 3706  F/u cardiac echo  Cardio Dr. Boles

## 2021-02-25 NOTE — PROGRESS NOTE ADULT - PROBLEM SELECTOR PLAN 3
As above c/w Azithromycin and ceftriaxone for possible infection  On 6L NC, titrate as needed  CRP, procal and lactate elevated  Monitor for s/s infection BUN 46, Cr 3.22 (2/25), baseline Cr 1.93  f/u urine lytes  Voiding freely, no post void residual  f/u renal US, SPEP, UPEP  Nephro Dr Rosario

## 2021-02-25 NOTE — CONSULT NOTE ADULT - ASSESSMENT
79 yo M with HTN, HLD, T2DM, CAD s/p MI and PCI (FEMI to mLCx 2016, RCA with  and good collaterals), COPD not on home O2, CKD III, and PAD s/p stenting (L SFA 2019) who presented with dyspnea.    1. Dyspnea: Likely due to combination of COPD and CHF exacerbation  -Strict Is and Os, please check daily weights  -Currently on IV furosemide  -Mildly elevated troponin levels likely represent type II MI (demand ischemia) in setting of COPD/CHF exacerbation, as well as decreased clearance in setting of RUSTY on CKD.   -Extent of hypoxia seems somewhat out of proportion to clinical picture.     2. CAD s/p PCI:  On aspirin, clopidogrel, atorvastatin, and Imdur  -Likely no beta blocker in setting of COPD    3. HTN: On amlodipine  -Holding off ACEi/ARB due to RUSTY on CKD    4. HLD: On atorvastatin, suboptimally controlled  -Would increase atorvastatin to 80mg PO QHS    ***Note that this is a preliminary note and any recommendations should NOT be carried out until this note is finalized. *** 81 yo M with HTN, HLD, T2DM, CAD s/p MI and PCI (FEMI to mLCx 2016, RCA with  and good collaterals), COPD not on home O2, CKD III, and PAD s/p stenting (L SFA 2019) who presented with mild dyspnea and decreased O2 saturation.    1. 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   -Extent of hypoxia seems somewhat out of proportion to clinical picture. If patient's hypoxia does not improve with diuresis, consider alternate etiologies of dyspnea. PE is less likely given borderline d-dimer level, however may be reasonable to perform VQ scan if symptoms persist.   -Check echocardiogram to reassess LVEF    2. CAD s/p PCI:  On aspirin, clopidogrel, atorvastatin, and Imdur  -Likely not on beta blocker in setting of COPD    3. HTN: On amlodipine  -Holding off ACEi/ARB due to RUSTY on CKD    4. HLD: On atorvastatin, suboptimally controlled  -Would increase atorvastatin to 80mg PO QHS

## 2021-02-25 NOTE — CONSULT NOTE ADULT - SUBJECTIVE AND OBJECTIVE BOX
[facility] : Progress Note :: [phone], (office [phone]),   Dr Rosario / Dr Pagan / Dr Lang / Dr Serna / Dr Jose JIMÉNEZ / Dr Charles / Dr Yeung / Dr Sancho rivera  _____________________________________________________________________________________________    80 yr old male with H/O HTN HLD, CAD PAD, PROSTATE CA S/P RADIATION.  Follows in the renal clinic for CKD, last seen in November, baseline SCR 2-2.2  Presented to ED with Shortness of breath  takes lasix as outpatient.  In ED and CXR demonstrated pulmonary edema and pleural effusions  getting lasix IV for CHF excercebation. Also on steroids and nebs for COPD exercebation.  SCR 2.7  feels better    Novocain (Faint)    Hospital Medications:   MEDICATIONS  (STANDING):  amLODIPine   Tablet 10 milliGRAM(s) Oral daily  aspirin enteric coated 81 milliGRAM(s) Oral daily  atorvastatin 80 milliGRAM(s) Oral at bedtime  azithromycin  IVPB 500 milliGRAM(s) IV Intermittent every 24 hours  budesonide  80 MICROgram(s)/formoterol 4.5 MICROgram(s) Inhaler 2 Puff(s) Inhalation two times a day  clopidogrel Tablet 75 milliGRAM(s) Oral daily  ferrous    sulfate 325 milliGRAM(s) Oral daily  folic acid 1 milliGRAM(s) Oral daily  furosemide   Injectable 40 milliGRAM(s) IV Push every 12 hours  heparin   Injectable 5000 Unit(s) SubCutaneous every 12 hours  methylPREDNISolone sodium succinate Injectable 40 milliGRAM(s) IV Push every 8 hours  pantoprazole    Tablet 40 milliGRAM(s) Oral before breakfast        VITALS:  T(F): 97.4 (02-25-21 @ 19:39), Max: 97.7 (02-25-21 @ 11:15)  HR: 69 (02-25-21 @ 19:39)  BP: 121/54 (02-25-21 @ 19:39)  RR: 18 (02-25-21 @ 19:39)  SpO2: 93% (02-25-21 @ 19:39)  Wt(kg): --    02-25 @ 07:01  -  02-25 @ 21:00  --------------------------------------------------------  IN: 0 mL / OUT: 250 mL / NET: -250 mL        PHYSICAL EXAM:  Constitutional: NAD  HEENT: anicteric sclera, oropharynx clear.  Neck: No JVD  Respiratory: CTAB, no wheezes, rales or rhonchi  Cardiovascular: S1, S2, RRR  Gastrointestinal: BS+, soft, NT/ND  Extremities:  No peripheral edema+  Neurological: A/O x 3, no focal deficits  : No CVA tenderness. No swenson.       LABS:  02-25    140  |  103  |  46<H>  ----------------------------<  148<H>  4.4   |  25  |  3.22<H>    Ca    8.9      25 Feb 2021 09:16  Phos  4.6     02-25  Mg     2.4     02-25    TPro  7.6  /  Alb      /  TBili      /  DBili      /  AST      /  ALT      /  AlkPhos      02-25    Creatinine Trend: 3.22 <--, 2.73 <--                        9.5    8.34  )-----------( 435      ( 25 Feb 2021 09:16 )             33.4     Urine Studies:      RADIOLOGY & ADDITIONAL STUDIES:

## 2021-02-25 NOTE — PROGRESS NOTE ADULT - ASSESSMENT
80 year old male with medical history of  HTN, HLD, CAD on ASA/Plavix, PAD, CKD, prostate ca s/p radiation, anemia, COPD not on home O2, PAD, presents with SOB, dry cough, and dizziness x 2 days. Patient admitted for COPD v CHF exacerbation.

## 2021-02-25 NOTE — DISCHARGE NOTE NURSING/CASE MANAGEMENT/SOCIAL WORK - PATIENT PORTAL LINK FT
You can access the FollowMyHealth Patient Portal offered by [facility] by registering at the following website: [email or web address]. By joining [facility]’s FollowMyHealth portal, you will also be able to view your health information using other applications (apps) compatible with our system.

## 2021-02-25 NOTE — CONSULT NOTE ADULT - SUBJECTIVE AND OBJECTIVE BOX
CHIEF COMPLAINT: Shortness of breath    HPI: 79 yo M with HTN, HLD, T2DM, CAD s/p MI and PCI (FEMI to mLCx 2016, RCA with  and good collaterals), COPD not on home O2, CKD III, and PAD s/p stenting (L SFA 2019) who presented with dyspnea to his cardiologist's office. His pulse Ox showed SpO2 44% and patient was sent to the ER. Patient reports    PAST MEDICAL & SURGICAL HISTORY:  As above, also [place] (hard of hearing), Malignant neoplasm of bladder s/p resection and XRT, Femoral artery occlusion, left superficial- 2019- surgically resolved; Occlusion and stenosis of right carotid artery, Anemia, Gout, Rectal bleeding (2018), BPH, S/P carotid endarterectomy  right 01/2019,  History of colon surgery 2007, right wrist surgery with hardware 27 years ago, Cataract surgery 30 years ago    Allergies    Novocain (Faint)    MEDICATIONS  (STANDING):  amLODIPine   Tablet 10 milliGRAM(s) Oral daily  aspirin enteric coated 81 milliGRAM(s) Oral daily  atorvastatin 40 milliGRAM(s) Oral at bedtime  azithromycin  IVPB 500 milliGRAM(s) IV Intermittent every 24 hours  budesonide  80 MICROgram(s)/formoterol 4.5 MICROgram(s) Inhaler 2 Puff(s) Inhalation two times a day  cefTRIAXone   IVPB 1000 milliGRAM(s) IV Intermittent every 24 hours  cilostazol 50 milliGRAM(s) Oral two times a day  clopidogrel Tablet 75 milliGRAM(s) Oral daily  ferrous    sulfate 325 milliGRAM(s) Oral daily  folic acid 1 milliGRAM(s) Oral daily  furosemide   Injectable 40 milliGRAM(s) IV Push every 12 hours  heparin   Injectable 5000 Unit(s) SubCutaneous every 12 hours  methylPREDNISolone sodium succinate Injectable 40 milliGRAM(s) IV Push every 8 hours  pantoprazole    Tablet 40 milliGRAM(s) Oral before breakfast    MEDICATIONS  (PRN):  ALBUTerol    90 MICROgram(s) HFA Inhaler 2 Puff(s) Inhalation every 6 hours PRN Shortness of Breath and/or Wheezing      FAMILY HISTORY:  Family history of heart disease    Family history of diabetes mellitus (Mother)      No family history of premature coronary artery disease or sudden cardiac death    SOCIAL HISTORY:  Smoking-  Alcohol-  Illicit Drug use-    REVIEW OF SYSTEMS:  Constitutional: [ ] fever, [ ]weight loss,  [ ]fatigue  Eyes: [ ] visual changes  Respiratory: [ ]shortness of breath;  [ ] cough, [ ]wheezing, [ ]chills, [ ]hemoptysis  Cardiovascular: [ ] chest pain, [ ]palpitations, [ ]dizziness,  [ ]leg swelling [ ]syncope  Gastrointestinal: [ ] abdominal pain, [ ]nausea, [ ]vomiting,  [ ]diarrhea   Genitourinary: [ ] dysuria, [ ] hematuria  Neurologic: [ ] headaches [ ] tremors  [ ] weakness [ ] lightheadedness  Skin: [ ] itching, [ ]burning, [ ] rashes  Endocrine: [ ] heat or cold intolerance  Musculoskeletal: [ ] joint pain or swelling; [ ] muscle, back, or extremity pain  Psychiatric: [ ] depression, [ ]anxiety, [ ]mood swings, or [ ]difficulty sleeping  Hematologic: [ ] easy bruising, [ ] bleeding gums       [ x] All others negative	  [ ] Unable to obtain    Vital Signs Last 24 Hrs  T(C): 36.3 (25 Feb 2021 07:37), Max: 37.1 (24 Feb 2021 13:16)  T(F): 97.4 (25 Feb 2021 07:37), Max: 98.7 (24 Feb 2021 13:16)  HR: 67 (25 Feb 2021 07:37) (66 - 73)  BP: 135/59 (25 Feb 2021 07:37) (127/54 - 145/65)  BP(mean): --  RR: 18 (25 Feb 2021 07:37) (17 - 24)  SpO2: 100% (25 Feb 2021 07:37) (93% - 100%)  I&O's Summary      PHYSICAL EXAM:  General: No acute distress  HEENT: EOMI, PERRL  Neck: Supple, No JVD  Lungs: Clear to auscultation bilaterally; No rales or wheezing  Heart: Regular rate and rhythm; No murmurs, rubs, or gallops  Abdomen: Nontender, bowel sounds present  Extremities: No clubbing, cyanosis, or edema  Nervous system:  Alert & Oriented X3, no focal deficits  Psychiatric: Normal affect  Skin: No rashes or lesions      LABS:  02-25    140  |  103  |  46<H>  ----------------------------<  148<H>  4.4   |  25  |  3.22<H>    Ca    8.9      25 Feb 2021 09:16  Phos  4.6     02-25  Mg     2.4     02-25    TPro  8.3  /  Alb  3.6  /  TBili  0.8  /  DBili  x   /  AST  16  /  ALT  31  /  AlkPhos  237<H>  02-25    Creatinine Trend: 3.22<--, 2.73<--                        9.5    x     )-----------( 435      ( 25 Feb 2021 09:16 )             33.4     Lipid Panel: Cholesterol, Serum 193  Direct LDL --131  HDL Cholesterol, Serum 43  Triglycerides, Serum 94    Cardiac Enzymes: CARDIAC MARKERS ( 25 Feb 2021 09:16 )  0.089 ng/mL / x     / x     / x     / x      CARDIAC MARKERS ( 24 Feb 2021 22:41 )  0.101 ng/mL / x     / x     / x     / 1.7 ng/mL  CARDIAC MARKERS ( 24 Feb 2021 15:06 )  0.165 ng/mL / x     / x     / x     / x        Serum Pro-Brain Natriuretic Peptide: 3706 pg/mL (02-24-21 @ 15:54)    RADIOLOGY:   < from: Xray Chest 1 View AP/PA (02.24.21 @ 14:25) >  IMPRESSION:    Moderate pulmonary edema with moderate to marked cardiomegaly.    Peripheral right lung opacity may represent loculated effusion, infiltrate or mass. Recommend chest CT.    < end of copied text >    < from: CT Chest No Cont (02.24.21 @ 19:54) >  IMPRESSION: Probable pulmonary vascular congestion with small to moderate bilateral pleural effusions and compressive atelectasis at the lung bases are loculated fluid on the right corresponds to chest x-ray abnormality    < end of copied text >      ECG [my interpretation]:    TELEMETRY:    ECHO:    < from: Transthoracic Echocardiogram (12.01.16 @ 12:50) >  CONCLUSIONS:  1. Mitral annular calcification, otherwise normal mitral  valve. Mild mitral regurgitation.  2. Aortic valve leaflet morphology not well visualized.  Mild aortic regurgitation.  3. Endocardium not well visualized; grossly mild segmental  left ventricular systolic dysfunction.  Hypokinesis of the  basal inferior wall and basal inferoseptum.  4. The right ventricle is not well visualized; grossly  normal right ventricular systolic function.    < end of copied text >    STRESS TEST:      < from: Nuclear Stress Test-Pharmacologic (01.02.19 @ 07:45) >  IMPRESSIONS:  * There is a small, fixed, mild intensity defect in the  mid inferior wall that thickens, consistent with  attenuation artifact.  * Post-stress resting myocardial perfusion gated SPECT  imaging was performed(LVEF = 57 %; LVEDV = 161 ml.)  * Negative study for reversible ischemia    < end of copied text >    CATHETERIZATION:    < from: Cardiac Cath Lab - Adult (10.26.16 @ 13:09) >  CORONARY VESSELS: The coronary circulation is co-dominant.  LM:   --  LM: Angiography showed mild atherosclerosis with no flow limiting  lesions.  LAD:   --  LAD: Angiography showed mild atherosclerosis with no flow  limiting lesions.  --  Mid LAD: There was a diffuse 20 % stenosis at a site with no prior  intervention. The lesion was eccentric. There was JORDIN grade 3 flow  through the vessel (brisk flow).  --  D1: Angiography showed mild atherosclerosis with no flow limiting  lesions.  --  D2: Angiography showed mild atherosclerosis with no flow limiting  lesions.  --  D3: Angiography showed mild atherosclerosis with no flow limiting  lesions.  CX:   --  Proximal circumflex: There was a discrete 30 % stenosis at a site  with no prior intervention. The lesion was eccentric. There was JORDIN grade  3 flow through the vessel (brisk flow).  --  Mid circumflex: There was a tubular 80 % stenosis at a site with no  prior intervention. The lesion was eccentric. There was JORDIN grade 3 flow  through the vessel (brisk flow) and a moderate-sized vascular territory  distal to the lesion. This lesion is a likely culprit for the patient's  recent myocardial infarction. An intervention was performed.  --  OM1: There was a discrete 30 % stenosis at a site with no prior  intervention. The lesion was eccentric. There was JORDIN grade 3 flow  through the vessel(brisk flow).  --  OM2: Angiography showed mild atherosclerosis with no flow limiting  lesions.  RCA:   --  Mid RCA: There was a 100 % stenosis. There was JORDIN grade 0 flow  through the vessel (no flow), a moderate-sized vascular territory distal  tothe lesion, and good collateral blood supply to the distal myocardium.  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    < end of copied text >   CHIEF COMPLAINT: Shortness of breath    HPI: 81 yo M with HTN, HLD, T2DM, CAD s/p MI and PCI (FEMI to mLCx 2016, RCA with  and good collaterals), COPD not on home O2, CKD III, and PAD s/p stenting (L SFA 2019) who presented with dyspnea to his cardiologist's office. His pulse Ox showed SpO2 44% and patient was sent to the ER. Patient reports that despite the numbers, he did not particularly feel short of breath and denied any chest pain, palpitations, lightheadedness, or syncope. He did note 2-pillow orthopnea but no LE edema. Reports compliance with medications. Currently has no complaints, comfortably lying in bed.     Cardiologist: Dr. Kumari    PAST MEDICAL & SURGICAL HISTORY:  As above, also [place] (hard of hearing), Malignant neoplasm of bladder s/p resection and XRT, Femoral artery occlusion, left superficial- 2019- surgically resolved; Occlusion and stenosis of right carotid artery, Anemia, Gout, Rectal bleeding (2018), BPH, S/P carotid endarterectomy  right 01/2019,  History of colon surgery 2007, right wrist surgery with hardware 27 years ago, Cataract surgery 30 years ago    Allergies    Novocain (Faint)    MEDICATIONS  (STANDING):  amLODIPine   Tablet 10 milliGRAM(s) Oral daily  aspirin enteric coated 81 milliGRAM(s) Oral daily  atorvastatin 40 milliGRAM(s) Oral at bedtime  azithromycin  IVPB 500 milliGRAM(s) IV Intermittent every 24 hours  budesonide  80 MICROgram(s)/formoterol 4.5 MICROgram(s) Inhaler 2 Puff(s) Inhalation two times a day  cefTRIAXone   IVPB 1000 milliGRAM(s) IV Intermittent every 24 hours  cilostazol 50 milliGRAM(s) Oral two times a day  clopidogrel Tablet 75 milliGRAM(s) Oral daily  ferrous    sulfate 325 milliGRAM(s) Oral daily  folic acid 1 milliGRAM(s) Oral daily  furosemide   Injectable 40 milliGRAM(s) IV Push every 12 hours  heparin   Injectable 5000 Unit(s) SubCutaneous every 12 hours  methylPREDNISolone sodium succinate Injectable 40 milliGRAM(s) IV Push every 8 hours  pantoprazole    Tablet 40 milliGRAM(s) Oral before breakfast    MEDICATIONS  (PRN):  ALBUTerol    90 MICROgram(s) HFA Inhaler 2 Puff(s) Inhalation every 6 hours PRN Shortness of Breath and/or Wheezing      FAMILY HISTORY:  Family history of heart disease    Family history of diabetes mellitus (Mother)      No family history of premature coronary artery disease or sudden cardiac death    SOCIAL HISTORY:  Smoking-Quit 15 years ago  Alcohol-Denies  Illicit Drug use-Denies    REVIEW OF SYSTEMS:  Constitutional: [ ] fever, [ ]weight loss,  [ ]fatigue  Eyes: [ ] visual changes  Respiratory: [ ]shortness of breath;  [ ] cough, [ ]wheezing, [ ]chills, [ ]hemoptysis  Cardiovascular: [ ] chest pain, [ ]palpitations, [ ]dizziness,  [ ]leg swelling [ ]syncope  Gastrointestinal: [ ] abdominal pain, [ ]nausea, [ ]vomiting,  [ ]diarrhea   Genitourinary: [ ] dysuria, [ ] hematuria  Neurologic: [ ] headaches [ ] tremors  [ ] weakness [ ] lightheadedness  Skin: [ ] itching, [ ]burning, [ ] rashes  Endocrine: [ ] heat or cold intolerance  Musculoskeletal: [ ] joint pain or swelling; [ ] muscle, back, or extremity pain  Psychiatric: [ ] depression, [ ]anxiety, [ ]mood swings, or [ ]difficulty sleeping  Hematologic: [ ] easy bruising, [ ] bleeding gums       [ x] All others negative	  [ ] Unable to obtain    Vital Signs Last 24 Hrs  T(C): 36.3 (25 Feb 2021 07:37), Max: 37.1 (24 Feb 2021 13:16)  T(F): 97.4 (25 Feb 2021 07:37), Max: 98.7 (24 Feb 2021 13:16)  HR: 67 (25 Feb 2021 07:37) (66 - 73)  BP: 135/59 (25 Feb 2021 07:37) (127/54 - 145/65)  BP(mean): --  RR: 18 (25 Feb 2021 07:37) (17 - 24)  SpO2: 100% (25 Feb 2021 07:37) (93% - 100%)  I&O's Summary      PHYSICAL EXAM:  General: No acute distress  HEENT: EOMI, PERRL  Neck: Supple, No JVD  Lungs: Decreased BS at bases B/L  Heart: Regular rate and rhythm; No murmurs, rubs, or gallops  Abdomen: Nontender, bowel sounds present  Extremities: No clubbing, cyanosis, or edema  Nervous system:  Alert & Oriented X3, no focal deficits  Psychiatric: Normal affect  Skin: No rashes or lesions      LABS:  02-25    140  |  103  |  46<H>  ----------------------------<  148<H>  4.4   |  25  |  3.22<H>    Ca    8.9      25 Feb 2021 09:16  Phos  4.6     02-25  Mg     2.4     02-25    TPro  8.3  /  Alb  3.6  /  TBili  0.8  /  DBili  x   /  AST  16  /  ALT  31  /  AlkPhos  237<H>  02-25    Creatinine Trend: 3.22<--, 2.73<--                        9.5    x     )-----------( 435      ( 25 Feb 2021 09:16 )             33.4     Lipid Panel: Cholesterol, Serum 193  Direct LDL --131  HDL Cholesterol, Serum 43  Triglycerides, Serum 94    Cardiac Enzymes: CARDIAC MARKERS ( 25 Feb 2021 09:16 )  0.089 ng/mL / x     / x     / x     / x      CARDIAC MARKERS ( 24 Feb 2021 22:41 )  0.101 ng/mL / x     / x     / x     / 1.7 ng/mL  CARDIAC MARKERS ( 24 Feb 2021 15:06 )  0.165 ng/mL / x     / x     / x     / x        Serum Pro-Brain Natriuretic Peptide: 3706 pg/mL (02-24-21 @ 15:54)    RADIOLOGY:   < from: Xray Chest 1 View AP/PA (02.24.21 @ 14:25) >  IMPRESSION:    Moderate pulmonary edema with moderate to marked cardiomegaly.    Peripheral right lung opacity may represent loculated effusion, infiltrate or mass. Recommend chest CT.    < end of copied text >    < from: CT Chest No Cont (02.24.21 @ 19:54) >  IMPRESSION: Probable pulmonary vascular congestion with small to moderate bilateral pleural effusions and compressive atelectasis at the lung bases are loculated fluid on the right corresponds to chest x-ray abnormality    < end of copied text >      ECG [my interpretation]: n/a    TELEMETRY: Sinus rhythm with RBBB morphology, occasional PVCs    ECHO:    < from: Transthoracic Echocardiogram (12.01.16 @ 12:50) >  CONCLUSIONS:  1. Mitral annular calcification, otherwise normal mitral  valve. Mild mitral regurgitation.  2. Aortic valve leaflet morphology not well visualized.  Mild aortic regurgitation.  3. Endocardium not well visualized; grossly mild segmental  left ventricular systolic dysfunction.  Hypokinesis of the  basal inferior wall and basal inferoseptum.  4. The right ventricle is not well visualized; grossly  normal right ventricular systolic function.    < end of copied text >    STRESS TEST:      < from: Nuclear Stress Test-Pharmacologic (01.02.19 @ 07:45) >  IMPRESSIONS:  * There is a small, fixed, mild intensity defect in the  mid inferior wall that thickens, consistent with  attenuation artifact.  * Post-stress resting myocardial perfusion gated SPECT  imaging was performed(LVEF = 57 %; LVEDV = 161 ml.)  * Negative study for reversible ischemia    < end of copied text >    CATHETERIZATION:    < from: Cardiac Cath Lab - Adult (10.26.16 @ 13:09) >  CORONARY VESSELS: The coronary circulation is co-dominant.  LM:   --  LM: Angiography showed mild atherosclerosis with no flow limiting  lesions.  LAD:   --  LAD: Angiography showed mild atherosclerosis with no flow  limiting lesions.  --  Mid LAD: There was a diffuse 20 % stenosis at a site with no prior  intervention. The lesion was eccentric. There was JORDIN grade 3 flow  through the vessel (brisk flow).  --  D1: Angiography showed mild atherosclerosis with no flow limiting  lesions.  --  D2: Angiography showed mild atherosclerosis with no flow limiting  lesions.  --  D3: Angiography showed mild atherosclerosis with no flow limiting  lesions.  CX:   --  Proximal circumflex: There was a discrete 30 % stenosis at a site  with no prior intervention. The lesion was eccentric. There was JORDIN grade  3 flow through the vessel (brisk flow).  --  Mid circumflex: There was a tubular 80 % stenosis at a site with no  prior intervention. The lesion was eccentric. There was JORDIN grade 3 flow  through the vessel (brisk flow) and a moderate-sized vascular territory  distal to the lesion. This lesion is a likely culprit for the patient's  recent myocardial infarction. An intervention was performed.  --  OM1: There was a discrete 30 % stenosis at a site with no prior  intervention. The lesion was eccentric. There was JORDIN grade 3 flow  through the vessel(brisk flow).  --  OM2: Angiography showed mild atherosclerosis with no flow limiting  lesions.  RCA:   --  Mid RCA: There was a 100 % stenosis. There was JORDIN grade 0 flow  through the vessel (no flow), a moderate-sized vascular territory distal  tothe lesion, and good collateral blood supply to the distal myocardium.  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    < end of copied text >

## 2021-02-25 NOTE — PROGRESS NOTE ADULT - PROBLEM SELECTOR PLAN 8
BP remains stable in 130s-140s/50s  c/w amlodipine  No ACEi/ARB due to RUSTY Cholesterol 193, HDL 43, , non , TG 94  Increased atorvastatin to 80 mg PO QHS

## 2021-02-25 NOTE — PROGRESS NOTE ADULT - SUBJECTIVE AND OBJECTIVE BOX
OM[facility]-3 Progress Note discussed with attending    PAGER #: [[phone]] TILL 5:00 PM  PLEASE CONTACT ON CALL TEAM:   - On Call Team (Please refer to Janneth) FROM 5:00 PM - 8:30PM  - Nightfloat Team FROM 8:30 -7:30 AM    CHIEF COMPLAINT & BRIEF HOSPITAL COURSE:       INTERVAL HPI/OVERNIGHT EVENTS:       REVIEW OF SYSTEMS:  CONSTITUTIONAL: No fever, weight loss, or fatigue  RESPIRATORY: No cough, wheezing, chills or hemoptysis; No shortness of breath  CARDIOVASCULAR: No chest pain, palpitations, dizziness, or leg swelling  GASTROINTESTINAL: No abdominal pain. No nausea, vomiting, or hematemesis; No diarrhea or constipation. No melena or hematochezia.  GENITOURINARY: No dysuria or hematuria, urinary frequency  NEUROLOGICAL: No headaches, memory loss, loss of strength, numbness, or tremors  SKIN: No itching, burning, rashes, or lesions     MEDICATIONS  (STANDING):  amLODIPine   Tablet 10 milliGRAM(s) Oral daily  aspirin enteric coated 81 milliGRAM(s) Oral daily  atorvastatin 40 milliGRAM(s) Oral at bedtime  azithromycin  IVPB 500 milliGRAM(s) IV Intermittent every 24 hours  budesonide  80 MICROgram(s)/formoterol 4.5 MICROgram(s) Inhaler 2 Puff(s) Inhalation two times a day  cefTRIAXone   IVPB 1000 milliGRAM(s) IV Intermittent every 24 hours  cilostazol 50 milliGRAM(s) Oral two times a day  clopidogrel Tablet 75 milliGRAM(s) Oral daily  folic acid 1 milliGRAM(s) Oral daily  furosemide   Injectable 40 milliGRAM(s) IV Push every 12 hours  heparin   Injectable 5000 Unit(s) SubCutaneous every 12 hours  methylPREDNISolone sodium succinate Injectable 40 milliGRAM(s) IV Push every 8 hours  pantoprazole    Tablet 40 milliGRAM(s) Oral before breakfast    MEDICATIONS  (PRN):  ALBUTerol    90 MICROgram(s) HFA Inhaler 2 Puff(s) Inhalation every 6 hours PRN Shortness of Breath and/or Wheezing      Vital Signs Last 24 Hrs  T(C): 36.3 (25 Feb 2021 07:37), Max: 37.1 (24 Feb 2021 13:16)  T(F): 97.4 (25 Feb 2021 07:37), Max: 98.7 (24 Feb 2021 13:16)  HR: 67 (25 Feb 2021 07:37) (66 - 73)  BP: 135/59 (25 Feb 2021 07:37) (127/54 - 145/65)  BP(mean): --  RR: 18 (25 Feb 2021 07:37) (17 - 24)  SpO2: 100% (25 Feb 2021 07:37) (93% - 100%)    PHYSICAL EXAMINATION:  GENERAL: NAD, well built  HEAD:  Atraumatic, Normocephalic  EYES:  conjunctiva and sclera clear  NECK: Supple, No JVD, Normal thyroid  CHEST/LUNG: Clear to auscultation. Clear to percussion bilaterally; No rales, rhonchi, wheezing, or rubs  HEART: Regular rate and rhythm; No murmurs, rubs, or gallops  ABDOMEN: Soft, Nontender, Nondistended; Bowel sounds present  NERVOUS SYSTEM:  Alert & Oriented X3,    EXTREMITIES:  2+ Peripheral Pulses, No clubbing, cyanosis, or edema  SKIN: warm dry                          9.8    7.98  )-----------( 384      ( 24 Feb 2021 15:06 )             33.1     02-24    142  |  107  |  33<H>  ----------------------------<  79  4.2   |  29  |  2.73<H>    Ca    8.7      24 Feb 2021 15:06    TPro  8.3  /  Alb  3.6  /  TBili  1.3<H>  /  DBili  x   /  AST  16  /  ALT  27  /  AlkPhos  248<H>  02-24    LIVER FUNCTIONS - ( 24 Feb 2021 15:06 )  Alb: 3.6 g/dL / Pro: 8.3 g/dL / ALK PHOS: 248 U/L / ALT: 27 U/L DA / AST: 16 U/L / GGT: x           CARDIAC MARKERS ( 24 Feb 2021 22:41 )  0.101 ng/mL / x     / x     / x     / 1.7 ng/mL  CARDIAC MARKERS ( 24 Feb 2021 15:06 )  0.165 ng/mL / x     / x     / x     / x                  I&O's Summary      CAPILLARY BLOOD GLUCOSE      POCT Blood Glucose.: 138 mg/dL (25 Feb 2021 06:40)    CAPILLARY BLOOD GLUCOSE      POCT Blood Glucose.: 138 mg/dL (25 Feb 2021 06:40)      RADIOLOGY & ADDITIONAL TESTS:                   [facility]-3 Progress Note discussed with attending    PAGER #: [[phone]] TILL 5:00 PM  PLEASE CONTACT ON CALL TEAM:   - On Call Team (Please refer to Janneth) FROM 5:00 PM - 8:30PM  - Nightfloat Team FROM 8:30 -7:30 AM    CHIEF COMPLAINT & BRIEF HOSPITAL COURSE: 80 year old male with medical history of  HTN, HLD, CAD on ASA/Plavix, PAD, CKD, prostate ca s/p radiation, anemia, COPD not on home O2, PAD, presents with SOB, dry cough, and dizziness x 2 days. Patient states he went to  Dr. Kumari cardio today and reportedly saturating. 44% on RA and patient was sent to ED. Patient states he feels little better. Denied any fever or phlegm production in the ED. He added that he uses 2 pillows at night to sleep. Spoke to patients partner, she states patient was prescribed on water pill , but he never likes to take it.    GOC: full code      INTERVAL HPI/OVERNIGHT EVENTS: No acute events, patient states that he feels well with no complaints. Found to be saturating 53% on RA, placed on 6L NC. Does not complain of any SOB or CP. Called patient's cardiologist, Dr. Amanda Kumari, who stated that the patient saw her yesterday 2/24 for regular follow up visit and was saturating at 44%, otherwise vitals were normal. Placed on O2 in the office but is not on O2 at home. She stated that he felt dizzy, weak, tired and vomited when he walked into the office. Other than his O2 sat of 44%, his vitals normal. Jennifer Boles, check for PE, PAD, cardiac stent in 2016, endart 2019, stents in RLE, stated he had a LLE stent placement, was on home O2 but taken away from him abotui a couple years ago, not sure about details, VQ scan      REVIEW OF SYSTEMS:  CONSTITUTIONAL: No fever, weight loss, or fatigue  RESPIRATORY: No cough, wheezing, chills or hemoptysis; No shortness of breath  CARDIOVASCULAR: No chest pain, palpitations, dizziness, or leg swelling  GASTROINTESTINAL: No abdominal pain. No nausea, vomiting, or hematemesis; No diarrhea or constipation. No melena or hematochezia.  GENITOURINARY: + incontinence, No dysuria or hematuria, urinary frequency  NEUROLOGICAL: No headaches, memory loss, loss of strength, numbness, or tremors  SKIN: No itching, burning, rashes, or lesions     MEDICATIONS  (STANDING):  amLODIPine   Tablet 10 milliGRAM(s) Oral daily  aspirin enteric coated 81 milliGRAM(s) Oral daily  atorvastatin 40 milliGRAM(s) Oral at bedtime  azithromycin  IVPB 500 milliGRAM(s) IV Intermittent every 24 hours  budesonide  80 MICROgram(s)/formoterol 4.5 MICROgram(s) Inhaler 2 Puff(s) Inhalation two times a day  cefTRIAXone   IVPB 1000 milliGRAM(s) IV Intermittent every 24 hours  cilostazol 50 milliGRAM(s) Oral two times a day  clopidogrel Tablet 75 milliGRAM(s) Oral daily  folic acid 1 milliGRAM(s) Oral daily  furosemide   Injectable 40 milliGRAM(s) IV Push every 12 hours  heparin   Injectable 5000 Unit(s) SubCutaneous every 12 hours  methylPREDNISolone sodium succinate Injectable 40 milliGRAM(s) IV Push every 8 hours  pantoprazole    Tablet 40 milliGRAM(s) Oral before breakfast    MEDICATIONS  (PRN):  ALBUTerol    90 MICROgram(s) HFA Inhaler 2 Puff(s) Inhalation every 6 hours PRN Shortness of Breath and/or Wheezing      Vital Signs Last 24 Hrs  T(C): 36.3 (25 Feb 2021 07:37), Max: 37.1 (24 Feb 2021 13:16)  T(F): 97.4 (25 Feb 2021 07:37), Max: 98.7 (24 Feb 2021 13:16)  HR: 67 (25 Feb 2021 07:37) (66 - 73)  BP: 135/59 (25 Feb 2021 07:37) (127/54 - 145/65)  BP(mean): --  RR: 18 (25 Feb 2021 07:37) (17 - 24)  SpO2: 100% (25 Feb 2021 07:37) (93% - 100%)    PHYSICAL EXAMINATION:  GENERAL: NAD, well built  HEAD:  Atraumatic, Normocephalic  EYES:  conjunctiva and sclera clear  NECK: Supple, No JVD, Normal thyroid  CHEST/LUNG: Clear to auscultation. Clear to percussion bilaterally; No rales, rhonchi, wheezing, or rubs  HEART: Regular rate and rhythm; No murmurs, rubs, or gallops  ABDOMEN: Soft, Nontender, Nondistended; Bowel sounds present  NERVOUS SYSTEM:  Alert & Oriented X3,    EXTREMITIES:  2+ Peripheral Pulses, No clubbing, cyanosis, or edema  SKIN: warm dry                          9.8    7.98  )-----------( 384      ( 24 Feb 2021 15:06 )             33.1     02-24    142  |  107  |  33<H>  ----------------------------<  79  4.2   |  29  |  2.73<H>    Ca    8.7      24 Feb 2021 15:06    TPro  8.3  /  Alb  3.6  /  TBili  1.3<H>  /  DBili  x   /  AST  16  /  ALT  27  /  AlkPhos  248<H>  02-24    LIVER FUNCTIONS - ( 24 Feb 2021 15:06 )  Alb: 3.6 g/dL / Pro: 8.3 g/dL / ALK PHOS: 248 U/L / ALT: 27 U/L DA / AST: 16 U/L / GGT: x           CARDIAC MARKERS ( 24 Feb 2021 22:41 )  0.101 ng/mL / x     / x     / x     / 1.7 ng/mL  CARDIAC MARKERS ( 24 Feb 2021 15:06 )  0.165 ng/mL / x     / x     / x     / x                  I&O's Summary      CAPILLARY BLOOD GLUCOSE      POCT Blood Glucose.: 138 mg/dL (25 Feb 2021 06:40)    CAPILLARY BLOOD GLUCOSE      POCT Blood Glucose.: 138 mg/dL (25 Feb 2021 06:40)      RADIOLOGY & ADDITIONAL TESTS:                   [facility]-3 Progress Note discussed with attending    PAGER #: [[phone]] TILL 5:00 PM  PLEASE CONTACT ON CALL TEAM:   - On Call Team (Please refer to Janneth) FROM 5:00 PM - 8:30PM  - Nightfloat Team FROM 8:30 -7:30 AM    CHIEF COMPLAINT & BRIEF HOSPITAL COURSE: 80 year old male with medical history of  HTN, HLD, CAD on ASA/Plavix, PAD, CKD, prostate ca s/p radiation, anemia, COPD not on home O2, PAD, presents with SOB, dry cough, and dizziness x 2 days. Patient states he went to  Dr. Kumari cardio today and reportedly saturating. 44% on RA and patient was sent to ED. Patient states he feels little better. Denied any fever or phlegm production in the ED. He added that he uses 2 pillows at night to sleep. Spoke to patients partner, she states patient was prescribed on water pill , but he never likes to take it.    GOC: full code      INTERVAL HPI/OVERNIGHT EVENTS: No acute events, patient states that he feels well with no complaints. Found to be saturating 53% on RA, placed on 6L NC. Does not complain of any SOB or CP. Called patient's cardiologist, Dr. Amanda Kumari, who stated that the patient saw her yesterday 2/24 for regular follow up visit and was saturating at 44%, otherwise vitals were normal. Placed on O2 in the office but is not currently on O2 at home as it was taken away from him many years ago. Dr. Kumari stated that the patient felt dizzy, weak, tired and vomited when he walked into the office. Stated patient has a hx of PAD, latest cardiac stent in 2016, carotid endarterectomy in 2019, stents in RLE and a recent LLE stent placement. Dr. Boles consulted, pending recs.      REVIEW OF SYSTEMS:  CONSTITUTIONAL: No fever, weight loss, or fatigue  RESPIRATORY: No cough, wheezing, chills or hemoptysis; No shortness of breath  CARDIOVASCULAR: No chest pain, palpitations, dizziness, or leg swelling  GASTROINTESTINAL: No abdominal pain. No nausea, vomiting, or hematemesis; No diarrhea or constipation. No melena or hematochezia.  GENITOURINARY: + incontinence, No dysuria or hematuria, urinary frequency  NEUROLOGICAL: No headaches, memory loss, loss of strength, numbness, or tremors  SKIN: No itching, burning, rashes, or lesions     MEDICATIONS  (STANDING):  amLODIPine   Tablet 10 milliGRAM(s) Oral daily  aspirin enteric coated 81 milliGRAM(s) Oral daily  atorvastatin 40 milliGRAM(s) Oral at bedtime  azithromycin  IVPB 500 milliGRAM(s) IV Intermittent every 24 hours  budesonide  80 MICROgram(s)/formoterol 4.5 MICROgram(s) Inhaler 2 Puff(s) Inhalation two times a day  cefTRIAXone   IVPB 1000 milliGRAM(s) IV Intermittent every 24 hours  cilostazol 50 milliGRAM(s) Oral two times a day  clopidogrel Tablet 75 milliGRAM(s) Oral daily  folic acid 1 milliGRAM(s) Oral daily  furosemide   Injectable 40 milliGRAM(s) IV Push every 12 hours  heparin   Injectable 5000 Unit(s) SubCutaneous every 12 hours  methylPREDNISolone sodium succinate Injectable 40 milliGRAM(s) IV Push every 8 hours  pantoprazole    Tablet 40 milliGRAM(s) Oral before breakfast    MEDICATIONS  (PRN):  ALBUTerol    90 MICROgram(s) HFA Inhaler 2 Puff(s) Inhalation every 6 hours PRN Shortness of Breath and/or Wheezing      Vital Signs Last 24 Hrs  T(C): 36.3 (25 Feb 2021 07:37), Max: 37.1 (24 Feb 2021 13:16)  T(F): 97.4 (25 Feb 2021 07:37), Max: 98.7 (24 Feb 2021 13:16)  HR: 67 (25 Feb 2021 07:37) (66 - 73)  BP: 135/59 (25 Feb 2021 07:37) (127/54 - 145/65)  BP(mean): --  RR: 18 (25 Feb 2021 07:37) (17 - 24)  SpO2: 100% (25 Feb 2021 07:37) (93% - 100%)    PHYSICAL EXAMINATION:  GENERAL: NAD, well built, laying in bed comfortably  HEAD:  Atraumatic, Normocephalic  EYES:  conjunctiva and sclera clear  NECK: Supple, No JVD  CHEST/LUNG: + crackles in RLL, + decreased posterior air entry b/l, No rales, rhonchi, wheezing, or rubs  HEART: + AS murmur, Regular rate and rhythm; No rubs, or gallops  ABDOMEN: Soft, Nontender, Nondistended; Bowel sounds present  NERVOUS SYSTEM:  AAOx3, moving all 4 extremities equally  EXTREMITIES:  2+ Peripheral Pulses, No clubbing, cyanosis, or edema  SKIN: warm dry                          9.8    7.98  )-----------( 384      ( 24 Feb 2021 15:06 )             33.1     02-24    142  |  107  |  33<H>  ----------------------------<  79  4.2   |  29  |  2.73<H>    Ca    8.7      24 Feb 2021 15:06    TPro  8.3  /  Alb  3.6  /  TBili  1.3<H>  /  DBili  x   /  AST  16  /  ALT  27  /  AlkPhos  248<H>  02-24    LIVER FUNCTIONS - ( 24 Feb 2021 15:06 )  Alb: 3.6 g/dL / Pro: 8.3 g/dL / ALK PHOS: 248 U/L / ALT: 27 U/L DA / AST: 16 U/L / GGT: x           CARDIAC MARKERS ( 24 Feb 2021 22:41 )  0.101 ng/mL / x     / x     / x     / 1.7 ng/mL  CARDIAC MARKERS ( 24 Feb 2021 15:06 )  0.165 ng/mL / x     / x     / x     / x                  I&O's Summary      CAPILLARY BLOOD GLUCOSE      POCT Blood Glucose.: 138 mg/dL (25 Feb 2021 06:40)    CAPILLARY BLOOD GLUCOSE      POCT Blood Glucose.: 138 mg/dL (25 Feb 2021 06:40)      RADIOLOGY & ADDITIONAL TESTS:                   OMS-3 Progress Note discussed with attending    PAGER #: [[phone]] TILL 5:00 PM  PLEASE CONTACT ON CALL TEAM:   - On Call Team (Please refer to Janneth) FROM 5:00 PM - 8:30PM  - Nightfloat Team FROM 8:30 -7:30 AM    CHIEF COMPLAINT & BRIEF HOSPITAL COURSE: 80 year old male with medical history of  HTN, HLD, CAD on ASA/Plavix, PAD, CKD, prostate ca s/p radiation, anemia, COPD not on home O2, PAD, presents with SOB, dry cough, and dizziness x 2 days. Patient states he went to  Dr. Kumari cardio today and reportedly saturating. 44% on RA and patient was sent to ED. Patient states he feels little better. Denied any fever or phlegm production in the ED. He added that he uses 2 pillows at night to sleep. Spoke to patients partner, she states patient was prescribed on water pill , but he never likes to take it.    GOC: full code      INTERVAL HPI/OVERNIGHT EVENTS: No acute events, patient states that he feels well with no complaints. Found to be saturating 53% on RA, placed on 6L NC. Does not complain of any SOB or CP. Called patient's cardiologist, Dr. Amanda Kumari, who stated that the patient saw her yesterday 2/24 for regular follow up visit and was saturating at 44%, otherwise vitals were normal. Placed on O2 in the office but is not currently on O2 at home as it was taken away from him many years ago. Dr. Kumari stated that the patient felt dizzy, weak, tired and vomited when he walked into the office. Stated patient has a hx of PAD, latest cardiac stent in 2016, carotid endarterectomy in 2019, stents in RLE and a recent LLE stent placement. Cardio Dr. Boles consulted. Pending cardiac echo.      REVIEW OF SYSTEMS:  CONSTITUTIONAL: No fever, weight loss, or fatigue  RESPIRATORY: No cough, wheezing, chills or hemoptysis; No shortness of breath  CARDIOVASCULAR: No chest pain, palpitations, dizziness, or leg swelling  GASTROINTESTINAL: No abdominal pain. No nausea, vomiting, or hematemesis; No diarrhea or constipation. No melena or hematochezia.  GENITOURINARY: + slight incontinence, No dysuria or hematuria, urinary frequency  NEUROLOGICAL: No headaches, memory loss, loss of strength, numbness, or tremors  SKIN: No itching, burning, rashes, or lesions     MEDICATIONS  (STANDING):  amLODIPine   Tablet 10 milliGRAM(s) Oral daily  aspirin enteric coated 81 milliGRAM(s) Oral daily  atorvastatin 40 milliGRAM(s) Oral at bedtime  azithromycin  IVPB 500 milliGRAM(s) IV Intermittent every 24 hours  budesonide  80 MICROgram(s)/formoterol 4.5 MICROgram(s) Inhaler 2 Puff(s) Inhalation two times a day  cefTRIAXone   IVPB 1000 milliGRAM(s) IV Intermittent every 24 hours  cilostazol 50 milliGRAM(s) Oral two times a day  clopidogrel Tablet 75 milliGRAM(s) Oral daily  folic acid 1 milliGRAM(s) Oral daily  furosemide   Injectable 40 milliGRAM(s) IV Push every 12 hours  heparin   Injectable 5000 Unit(s) SubCutaneous every 12 hours  methylPREDNISolone sodium succinate Injectable 40 milliGRAM(s) IV Push every 8 hours  pantoprazole    Tablet 40 milliGRAM(s) Oral before breakfast    MEDICATIONS  (PRN):  ALBUTerol    90 MICROgram(s) HFA Inhaler 2 Puff(s) Inhalation every 6 hours PRN Shortness of Breath and/or Wheezing      Vital Signs Last 24 Hrs  T(C): 36.3 (25 Feb 2021 07:37), Max: 37.1 (24 Feb 2021 13:16)  T(F): 97.4 (25 Feb 2021 07:37), Max: 98.7 (24 Feb 2021 13:16)  HR: 67 (25 Feb 2021 07:37) (66 - 73)  BP: 135/59 (25 Feb 2021 07:37) (127/54 - 145/65)  BP(mean): --  RR: 18 (25 Feb 2021 07:37) (17 - 24)  SpO2: 100% (25 Feb 2021 07:37) (93% - 100%)    PHYSICAL EXAMINATION:  GENERAL: NAD, well built, laying in bed comfortably  HEAD:  Atraumatic, Normocephalic  EYES:  conjunctiva and sclera clear  NECK: Supple, No JVD  CHEST/LUNG: + crackles in RLL, + decreased posterior air entry b/l, No rales, rhonchi, wheezing, or rubs  HEART: + AS murmur, Regular rate and rhythm; No rubs, or gallops  ABDOMEN: Soft, Nontender, Nondistended; Bowel sounds present  NERVOUS SYSTEM:  AAOx3, moving all 4 extremities equally  EXTREMITIES:  2+ Peripheral Pulses, No clubbing, cyanosis, or edema  SKIN: warm dry                          9.8    7.98  )-----------( 384      ( 24 Feb 2021 15:06 )             33.1     02-24    142  |  107  |  33<H>  ----------------------------<  79  4.2   |  29  |  2.73<H>    Ca    8.7      24 Feb 2021 15:06    TPro  8.3  /  Alb  3.6  /  TBili  1.3<H>  /  DBili  x   /  AST  16  /  ALT  27  /  AlkPhos  248<H>  02-24    LIVER FUNCTIONS - ( 24 Feb 2021 15:06 )  Alb: 3.6 g/dL / Pro: 8.3 g/dL / ALK PHOS: 248 U/L / ALT: 27 U/L DA / AST: 16 U/L / GGT: x           CARDIAC MARKERS ( 24 Feb 2021 22:41 )  0.101 ng/mL / x     / x     / x     / 1.7 ng/mL  CARDIAC MARKERS ( 24 Feb 2021 15:06 )  0.165 ng/mL / x     / x     / x     / x                  I&O's Summary      CAPILLARY BLOOD GLUCOSE      POCT Blood Glucose.: 138 mg/dL (25 Feb 2021 06:40)    CAPILLARY BLOOD GLUCOSE      POCT Blood Glucose.: 138 mg/dL (25 Feb 2021 06:40)      RADIOLOGY & ADDITIONAL TESTS:                   OMS-3 Progress Note discussed with attending    PAGER #: [[phone]] TILL 5:00 PM  PLEASE CONTACT ON CALL TEAM:   - On Call Team (Please refer to Janneth) FROM 5:00 PM - 8:30PM  - Nightfloat Team FROM 8:30 -7:30 AM    CHIEF COMPLAINT & BRIEF HOSPITAL COURSE: 80 year old male with medical history of  HTN, HLD, CAD on ASA/Plavix, PAD, CKD, prostate ca s/p radiation, anemia, COPD not on home O2, PAD, presents with SOB, dry cough, and dizziness x 2 days. Patient states he went to  Dr. Kumari cardio today and reportedly saturating. 44% on RA and patient was sent to ED. Patient states he feels little better. Denied any fever or phlegm production in the ED. He added that he uses 2 pillows at night to sleep. Spoke to patients partner, she states patient was prescribed on water pill , but he never likes to take it.    GOC: full code      INTERVAL HPI/OVERNIGHT EVENTS: No acute events, patient states that he feels well with no complaints. Found to be saturating 53% on RA, placed on 6L NC. Does not complain of any SOB or CP. Called patient's cardiologist, Dr. Amanda Kumari, who stated that the patient saw her yesterday 2/24 for regular follow up visit and was saturating at 44%, otherwise vitals were normal. Placed on O2 in the office but is not currently on O2 at home as it was taken away from him many years ago. Dr. Kumari stated that the patient felt dizzy, weak, tired and vomited when he walked into the office. Stated patient has a hx of PAD, latest cardiac stent in 2016, carotid endarterectomy in 2019, stents in RLE and a recent LLE stent placement. Cardio Dr. Boles and Pulm Dr. Urias consulted. Pending cardiac echo. Cr uptrending - not retaining on bladder scan - voiding freely.       REVIEW OF SYSTEMS:  CONSTITUTIONAL: No fever, weight loss, or fatigue  RESPIRATORY: No cough, wheezing, chills or hemoptysis; No shortness of breath  CARDIOVASCULAR: No chest pain, palpitations, dizziness, or leg swelling  GASTROINTESTINAL: No abdominal pain. No nausea, vomiting, or hematemesis; No diarrhea or constipation. No melena or hematochezia.  GENITOURINARY: + slight incontinence, No dysuria or hematuria, urinary frequency  NEUROLOGICAL: No headaches, memory loss, loss of strength, numbness, or tremors  SKIN: No itching, burning, rashes, or lesions     MEDICATIONS  (STANDING):  amLODIPine   Tablet 10 milliGRAM(s) Oral daily  aspirin enteric coated 81 milliGRAM(s) Oral daily  atorvastatin 40 milliGRAM(s) Oral at bedtime  azithromycin  IVPB 500 milliGRAM(s) IV Intermittent every 24 hours  budesonide  80 MICROgram(s)/formoterol 4.5 MICROgram(s) Inhaler 2 Puff(s) Inhalation two times a day  cefTRIAXone   IVPB 1000 milliGRAM(s) IV Intermittent every 24 hours  cilostazol 50 milliGRAM(s) Oral two times a day  clopidogrel Tablet 75 milliGRAM(s) Oral daily  folic acid 1 milliGRAM(s) Oral daily  furosemide   Injectable 40 milliGRAM(s) IV Push every 12 hours  heparin   Injectable 5000 Unit(s) SubCutaneous every 12 hours  methylPREDNISolone sodium succinate Injectable 40 milliGRAM(s) IV Push every 8 hours  pantoprazole    Tablet 40 milliGRAM(s) Oral before breakfast    MEDICATIONS  (PRN):  ALBUTerol    90 MICROgram(s) HFA Inhaler 2 Puff(s) Inhalation every 6 hours PRN Shortness of Breath and/or Wheezing      Vital Signs Last 24 Hrs  T(C): 36.3 (25 Feb 2021 07:37), Max: 37.1 (24 Feb 2021 13:16)  T(F): 97.4 (25 Feb 2021 07:37), Max: 98.7 (24 Feb 2021 13:16)  HR: 67 (25 Feb 2021 07:37) (66 - 73)  BP: 135/59 (25 Feb 2021 07:37) (127/54 - 145/65)  BP(mean): --  RR: 18 (25 Feb 2021 07:37) (17 - 24)  SpO2: 100% (25 Feb 2021 07:37) (93% - 100%)    PHYSICAL EXAMINATION:  GENERAL: NAD, well built, laying in bed comfortably  HEAD:  Atraumatic, Normocephalic  EYES:  conjunctiva and sclera clear  NECK: Supple, No JVD  CHEST/LUNG: + crackles in RLL, + decreased posterior air entry b/l, No rales, rhonchi, wheezing, or rubs  HEART: + AS murmur, Regular rate and rhythm; No rubs, or gallops  ABDOMEN: Soft, Nontender, Nondistended; Bowel sounds present  NERVOUS SYSTEM:  AAOx3, moving all 4 extremities equally  EXTREMITIES:  2+ Peripheral Pulses, No clubbing, cyanosis, or edema  SKIN: warm dry                          9.8    7.98  )-----------( 384      ( 24 Feb 2021 15:06 )             33.1     02-24    142  |  107  |  33<H>  ----------------------------<  79  4.2   |  29  |  2.73<H>    Ca    8.7      24 Feb 2021 15:06    TPro  8.3  /  Alb  3.6  /  TBili  1.3<H>  /  DBili  x   /  AST  16  /  ALT  27  /  AlkPhos  248<H>  02-24    LIVER FUNCTIONS - ( 24 Feb 2021 15:06 )  Alb: 3.6 g/dL / Pro: 8.3 g/dL / ALK PHOS: 248 U/L / ALT: 27 U/L DA / AST: 16 U/L / GGT: x           CARDIAC MARKERS ( 24 Feb 2021 22:41 )  0.101 ng/mL / x     / x     / x     / 1.7 ng/mL  CARDIAC MARKERS ( 24 Feb 2021 15:06 )  0.165 ng/mL / x     / x     / x     / x                  I&O's Summary      CAPILLARY BLOOD GLUCOSE      POCT Blood Glucose.: 138 mg/dL (25 Feb 2021 06:40)    CAPILLARY BLOOD GLUCOSE      POCT Blood Glucose.: 138 mg/dL (25 Feb 2021 06:40)      RADIOLOGY & ADDITIONAL TESTS:                   OM[facility]-3 Progress Note discussed with attending    PAGER #: [[phone]] TILL 5:00 PM  PLEASE CONTACT ON CALL TEAM:   - On Call Team (Please refer to Janneth) FROM 5:00 PM - 8:30PM  - Nightfloat Team FROM 8:30 -7:30 AM    CHIEF COMPLAINT & BRIEF HOSPITAL COURSE: 80 year old male with medical history of  HTN, HLD, CAD on ASA/Plavix, PAD, CKD, prostate ca s/p radiation, anemia, COPD not on home O2, PAD, presents with SOB, dry cough, and dizziness x 2 days. Patient states he went to  Dr. Kumari cardio today and reportedly saturating. 44% on RA and patient was sent to ED. Patient states he feels little better. Denied any fever or phlegm production in the ED. He added that he uses 2 pillows at night to sleep. Spoke to patients partner, she states patient was prescribed on water pill , but he never likes to take it.    GOC: full code      INTERVAL HPI/OVERNIGHT EVENTS: No acute events, patient states that he feels well with no complaints. Found to be saturating 53% on RA, placed on 6L NC. Does not complain of any SOB or CP. Called patient's cardiologist, Dr. Amanda Kumari, who stated that the patient saw her yesterday 2/24 for regular follow up visit and was saturating at 44%, otherwise vitals were normal. Placed on O2 in the office but is not currently on O2 at home as it was taken away from him many years ago. Dr. Kumari stated that the patient felt dizzy, weak, tired and vomited when he walked into the office. Stated patient has a hx of PAD, latest cardiac stent in 2016, carotid endarterectomy in 2019, stents in RLE and a recent LLE stent placement. Cardio Dr. Boles and Pulm Dr. Urias consulted. Pending cardiac echo. Cr uptrending - not retaining on bladder scan - voiding freely.       REVIEW OF SYSTEMS:  CONSTITUTIONAL: No fever, weight loss, or fatigue  RESPIRATORY: No cough, wheezing, chills or hemoptysis; No shortness of breath  CARDIOVASCULAR: No chest pain, palpitations, dizziness, or leg swelling  GASTROINTESTINAL: No abdominal pain. No nausea, vomiting, or hematemesis; No diarrhea or constipation. No melena or hematochezia.  GENITOURINARY: + slight incontinence, No dysuria or hematuria, urinary frequency  NEUROLOGICAL: No headaches, memory loss, loss of strength, numbness, or tremors  SKIN: No itching, burning, rashes, or lesions     MEDICATIONS  (STANDING):  amLODIPine   Tablet 10 milliGRAM(s) Oral daily  aspirin enteric coated 81 milliGRAM(s) Oral daily  atorvastatin 40 milliGRAM(s) Oral at bedtime  azithromycin  IVPB 500 milliGRAM(s) IV Intermittent every 24 hours  budesonide  80 MICROgram(s)/formoterol 4.5 MICROgram(s) Inhaler 2 Puff(s) Inhalation two times a day  cefTRIAXone   IVPB 1000 milliGRAM(s) IV Intermittent every 24 hours  cilostazol 50 milliGRAM(s) Oral two times a day  clopidogrel Tablet 75 milliGRAM(s) Oral daily  folic acid 1 milliGRAM(s) Oral daily  furosemide   Injectable 40 milliGRAM(s) IV Push every 12 hours  heparin   Injectable 5000 Unit(s) SubCutaneous every 12 hours  methylPREDNISolone sodium succinate Injectable 40 milliGRAM(s) IV Push every 8 hours  pantoprazole    Tablet 40 milliGRAM(s) Oral before breakfast    MEDICATIONS  (PRN):  ALBUTerol    90 MICROgram(s) HFA Inhaler 2 Puff(s) Inhalation every 6 hours PRN Shortness of Breath and/or Wheezing      Vital Signs Last 24 Hrs  T(C): 36.3 (25 Feb 2021 07:37), Max: 37.1 (24 Feb 2021 13:16)  T(F): 97.4 (25 Feb 2021 07:37), Max: 98.7 (24 Feb 2021 13:16)  HR: 67 (25 Feb 2021 07:37) (66 - 73)  BP: 135/59 (25 Feb 2021 07:37) (127/54 - 145/65)  BP(mean): --  RR: 18 (25 Feb 2021 07:37) (17 - 24)  SpO2: 100% (25 Feb 2021 07:37) (93% - 100%)    PHYSICAL EXAMINATION:  GENERAL: NAD, well built, laying in bed comfortably  HEAD:  Atraumatic, Normocephalic  EYES:  conjunctiva and sclera clear  NECK: Supple, No JVD  CHEST/LUNG: + crackles in RLL, + decreased posterior air entry b/l, No rales, rhonchi, wheezing, or rubs  HEART: + AS murmur, Regular rate and rhythm; No rubs, or gallops  ABDOMEN: Soft, Nontender, Nondistended; Bowel sounds present  NERVOUS SYSTEM:  AAOx3, moving all 4 extremities equally  EXTREMITIES:  2+ Peripheral Pulses, No clubbing, cyanosis, or edema  SKIN: warm dry                          9.8    7.98  )-----------( 384      ( 24 Feb 2021 15:06 )             33.1     02-24    142  |  107  |  33<H>  ----------------------------<  79  4.2   |  29  |  2.73<H>    Ca    8.7      24 Feb 2021 15:06    TPro  8.3  /  Alb  3.6  /  TBili  1.3<H>  /  DBili  x   /  AST  16  /  ALT  27  /  AlkPhos  248<H>  02-24    LIVER FUNCTIONS - ( 24 Feb 2021 15:06 )  Alb: 3.6 g/dL / Pro: 8.3 g/dL / ALK PHOS: 248 U/L / ALT: 27 U/L DA / AST: 16 U/L / GGT: x           CARDIAC MARKERS ( 24 Feb 2021 22:41 )  0.101 ng/mL / x     / x     / x     / 1.7 ng/mL  CARDIAC MARKERS ( 24 Feb 2021 15:06 )  0.165 ng/mL / x     / x     / x     / x          CAPILLARY BLOOD GLUCOSE      POCT Blood Glucose.: 138 mg/dL (25 Feb 2021 06:40)    CAPILLARY BLOOD GLUCOSE      POCT Blood Glucose.: 138 mg/dL (25 Feb 2021 06:40)

## 2021-02-26 ENCOUNTER — TRANSCRIPTION ENCOUNTER (OUTPATIENT)
Age: 81
End: 2021-02-26

## 2021-02-26 DIAGNOSIS — E11.9 TYPE 2 DIABETES MELLITUS WITHOUT COMPLICATIONS: ICD-10-CM

## 2021-02-26 LAB
% ALBUMIN: 53.3 % — SIGNIFICANT CHANGE UP
% ALPHA 1: 6.1 % — SIGNIFICANT CHANGE UP
% ALPHA 2: 11.8 % — SIGNIFICANT CHANGE UP
% BETA: 13.4 % — SIGNIFICANT CHANGE UP
% GAMMA: 15.4 % — SIGNIFICANT CHANGE UP
ALBUMIN SERPL ELPH-MCNC: 3.1 G/DL — LOW (ref 3.5–5)
ALBUMIN SERPL ELPH-MCNC: 4.1 G/DL — SIGNIFICANT CHANGE UP (ref 3.6–5.5)
ALBUMIN/GLOB SERPL ELPH: 1.2 RATIO — SIGNIFICANT CHANGE UP
ALP SERPL-CCNC: 201 U/L — HIGH (ref 40–120)
ALPHA1 GLOB SERPL ELPH-MCNC: 0.5 G/DL — HIGH (ref 0.1–0.4)
ALPHA2 GLOB SERPL ELPH-MCNC: 0.9 G/DL — SIGNIFICANT CHANGE UP (ref 0.5–1)
ALT FLD-CCNC: 30 U/L DA — SIGNIFICANT CHANGE UP (ref 10–60)
ANION GAP SERPL CALC-SCNC: 10 MMOL/L — SIGNIFICANT CHANGE UP (ref 5–17)
APPEARANCE UR: CLEAR — SIGNIFICANT CHANGE UP
AST SERPL-CCNC: 17 U/L — SIGNIFICANT CHANGE UP (ref 10–40)
B-GLOBULIN SERPL ELPH-MCNC: 1 G/DL — SIGNIFICANT CHANGE UP (ref 0.5–1)
BACTERIA # UR AUTO: ABNORMAL /HPF
BILIRUB SERPL-MCNC: 0.3 MG/DL — SIGNIFICANT CHANGE UP (ref 0.2–1.2)
BILIRUB UR-MCNC: NEGATIVE — SIGNIFICANT CHANGE UP
BUN SERPL-MCNC: 69 MG/DL — HIGH (ref 7–18)
CALCIUM SERPL-MCNC: 8.4 MG/DL — SIGNIFICANT CHANGE UP (ref 8.4–10.5)
CHLORIDE SERPL-SCNC: 102 MMOL/L — SIGNIFICANT CHANGE UP (ref 96–108)
CO2 SERPL-SCNC: 27 MMOL/L — SIGNIFICANT CHANGE UP (ref 22–31)
COLOR SPEC: YELLOW — SIGNIFICANT CHANGE UP
CREAT ?TM UR-MCNC: 112 MG/DL — SIGNIFICANT CHANGE UP
CREAT SERPL-MCNC: 3.66 MG/DL — HIGH (ref 0.5–1.3)
DIFF PNL FLD: ABNORMAL
EPI CELLS # UR: SIGNIFICANT CHANGE UP /HPF
GAMMA GLOBULIN: 1.2 G/DL — SIGNIFICANT CHANGE UP (ref 0.6–1.6)
GGT SERPL-CCNC: 52 U/L — HIGH (ref 9–50)
GLUCOSE BLDC GLUCOMTR-MCNC: 156 MG/DL — HIGH (ref 70–99)
GLUCOSE BLDC GLUCOMTR-MCNC: 159 MG/DL — HIGH (ref 70–99)
GLUCOSE BLDC GLUCOMTR-MCNC: 233 MG/DL — HIGH (ref 70–99)
GLUCOSE SERPL-MCNC: 151 MG/DL — HIGH (ref 70–99)
GLUCOSE UR QL: NEGATIVE — SIGNIFICANT CHANGE UP
HCT VFR BLD CALC: 32.4 % — LOW (ref 39–50)
HGB BLD-MCNC: 9.1 G/DL — LOW (ref 13–17)
KETONES UR-MCNC: NEGATIVE — SIGNIFICANT CHANGE UP
LEUKOCYTE ESTERASE UR-ACNC: ABNORMAL
MAGNESIUM SERPL-MCNC: 2.4 MG/DL — SIGNIFICANT CHANGE UP (ref 1.6–2.6)
MCHC RBC-ENTMCNC: 25.9 PG — LOW (ref 27–34)
MCHC RBC-ENTMCNC: 28.1 GM/DL — LOW (ref 32–36)
MCV RBC AUTO: 92 FL — SIGNIFICANT CHANGE UP (ref 80–100)
NITRITE UR-MCNC: NEGATIVE — SIGNIFICANT CHANGE UP
NRBC # BLD: 1 /100 WBCS — HIGH (ref 0–0)
OSMOLALITY UR: 341 MOS/KG — SIGNIFICANT CHANGE UP (ref 50–1200)
PH UR: 5 — SIGNIFICANT CHANGE UP (ref 5–8)
PHOSPHATE SERPL-MCNC: 6.1 MG/DL — HIGH (ref 2.5–4.5)
PLATELET # BLD AUTO: 427 K/UL — HIGH (ref 150–400)
POTASSIUM SERPL-MCNC: 4.2 MMOL/L — SIGNIFICANT CHANGE UP (ref 3.5–5.3)
POTASSIUM SERPL-SCNC: 4.2 MMOL/L — SIGNIFICANT CHANGE UP (ref 3.5–5.3)
PROT ?TM UR-MCNC: 14 MG/DL — HIGH (ref 0–12)
PROT PATTERN SERPL ELPH-IMP: SIGNIFICANT CHANGE UP
PROT SERPL-MCNC: 7.3 G/DL — SIGNIFICANT CHANGE UP (ref 6–8.3)
PROT UR-MCNC: 15
RBC # BLD: 3.52 M/UL — LOW (ref 4.2–5.8)
RBC # FLD: 18.5 % — HIGH (ref 10.3–14.5)
RBC CASTS # UR COMP ASSIST: ABNORMAL /HPF (ref 0–2)
SODIUM SERPL-SCNC: 139 MMOL/L — SIGNIFICANT CHANGE UP (ref 135–145)
SODIUM UR-SCNC: 15 MMOL/L — SIGNIFICANT CHANGE UP
SP GR SPEC: 1.01 — SIGNIFICANT CHANGE UP (ref 1.01–1.02)
UROBILINOGEN FLD QL: NEGATIVE — SIGNIFICANT CHANGE UP
WBC # BLD: 11.9 K/UL — HIGH (ref 3.8–10.5)
WBC # FLD AUTO: 11.9 K/UL — HIGH (ref 3.8–10.5)
WBC UR QL: ABNORMAL /HPF (ref 0–5)

## 2021-02-26 PROCEDURE — 99233 SBSQ HOSP IP/OBS HIGH 50: CPT | Mod: GC

## 2021-02-26 PROCEDURE — 93306 TTE W/DOPPLER COMPLETE: CPT | Mod: 26

## 2021-02-26 PROCEDURE — 71045 X-RAY EXAM CHEST 1 VIEW: CPT | Mod: 26

## 2021-02-26 PROCEDURE — 99232 SBSQ HOSP IP/OBS MODERATE 35: CPT

## 2021-02-26 RX ORDER — INSULIN LISPRO 100/ML
VIAL (ML) SUBCUTANEOUS
Refills: 0 | Status: DISCONTINUED | OUTPATIENT
Start: 2021-02-26 | End: 2021-03-03

## 2021-02-26 RX ORDER — TIOTROPIUM BROMIDE 18 UG/1
1 CAPSULE ORAL; RESPIRATORY (INHALATION) DAILY
Refills: 0 | Status: DISCONTINUED | OUTPATIENT
Start: 2021-02-26 | End: 2021-03-03

## 2021-02-26 RX ORDER — CARVEDILOL PHOSPHATE 80 MG/1
3.12 CAPSULE, EXTENDED RELEASE ORAL EVERY 12 HOURS
Refills: 0 | Status: DISCONTINUED | OUTPATIENT
Start: 2021-02-26 | End: 2021-03-03

## 2021-02-26 RX ADMIN — Medication 1: at 21:59

## 2021-02-26 RX ADMIN — ATORVASTATIN CALCIUM 80 MILLIGRAM(S): 80 TABLET, FILM COATED ORAL at 21:29

## 2021-02-26 RX ADMIN — Medication 1: at 16:45

## 2021-02-26 RX ADMIN — Medication 2: at 11:32

## 2021-02-26 RX ADMIN — Medication 1 MILLIGRAM(S): at 11:31

## 2021-02-26 RX ADMIN — HEPARIN SODIUM 5000 UNIT(S): 5000 INJECTION INTRAVENOUS; SUBCUTANEOUS at 17:32

## 2021-02-26 RX ADMIN — Medication 40 MILLIGRAM(S): at 06:00

## 2021-02-26 RX ADMIN — Medication 81 MILLIGRAM(S): at 11:30

## 2021-02-26 RX ADMIN — Medication 40 MILLIGRAM(S): at 05:58

## 2021-02-26 RX ADMIN — HEPARIN SODIUM 5000 UNIT(S): 5000 INJECTION INTRAVENOUS; SUBCUTANEOUS at 05:58

## 2021-02-26 RX ADMIN — Medication 40 MILLIGRAM(S): at 17:32

## 2021-02-26 RX ADMIN — TIOTROPIUM BROMIDE 1 CAPSULE(S): 18 CAPSULE ORAL; RESPIRATORY (INHALATION) at 11:30

## 2021-02-26 RX ADMIN — CARVEDILOL PHOSPHATE 3.12 MILLIGRAM(S): 80 CAPSULE, EXTENDED RELEASE ORAL at 17:32

## 2021-02-26 RX ADMIN — AMLODIPINE BESYLATE 10 MILLIGRAM(S): 2.5 TABLET ORAL at 05:59

## 2021-02-26 RX ADMIN — PANTOPRAZOLE SODIUM 40 MILLIGRAM(S): 20 TABLET, DELAYED RELEASE ORAL at 06:00

## 2021-02-26 RX ADMIN — CLOPIDOGREL BISULFATE 75 MILLIGRAM(S): 75 TABLET, FILM COATED ORAL at 11:31

## 2021-02-26 RX ADMIN — Medication 325 MILLIGRAM(S): at 11:31

## 2021-02-26 NOTE — PROGRESS NOTE ADULT - PROBLEM SELECTOR PLAN 1
CXR - b/l pulmonary edema  c/w IV Lasix 40 BID  Strict Is&Os and daily weights  Trops trending down, elevation likely 2/2 demand ischemia  BNP 3706  F/u cardiac echo  Cardio Dr. Boles CXR - b/l pulmonary edema - now improved  c/w IV Lasix 40 BID  Strict Is&Os and daily weights  Trops trending down, elevation likely 2/2 demand ischemia  BNP 3706  F/u cardiac echo  Cardio Dr. Boles

## 2021-02-26 NOTE — PROGRESS NOTE ADULT - ASSESSMENT
# RUSTY on CKD. patient with CHF excerbation.  cardio-renal syndrome is a differential, vs ATN vs obstruction vs AIN  Rise in Cr noted, hemodynamically stable.   No identifiable nephrotoxins.   No recent NSAIDS and iv contrast agent use.   UA bland.   Await renal US. Consider lowering lasix dose.

## 2021-02-26 NOTE — DISCHARGE NOTE PROVIDER - CARE PROVIDER_API CALL
Amanda Kumari)  Cardiovascular Disease  [address] [place] [address]  Phone: [phone]  Fax: [phone]  Established Patient  Scheduled Appointment: 03/11/2021 12:00 PM

## 2021-02-26 NOTE — PROGRESS NOTE ADULT - SUBJECTIVE AND OBJECTIVE BOX
[facility]-3 Progress Note discussed with PGY-1 and attending    PAGER #: [[phone]] TILL 5:00 PM  PLEASE CONTACT ON CALL TEAM:   - On Call Team (Please refer to Janneth) FROM 5:00 PM - 8:30PM  - Nightfloat Team FROM 8:30 -7:30 AM    CHIEF COMPLAINT & BRIEF HOSPITAL COURSE: 80 year old male with medical history of  HTN, HLD, CAD on ASA/Plavix, PAD, CKD, prostate ca s/p radiation, anemia, COPD not on home O2, PAD, presents with SOB, dry cough, and dizziness x 2 days. Patient states he went to  Dr. Kenyetta lee today and reportedly saturating. 44% on RA and patient was sent to ED. Patient states he feels little better. Denied any fever or phlegm production in the ED. He added that he uses 2 pillows at night to sleep. Spoke to patients partner, she states patient was prescribed on water pill , but he never likes to take it. Found to be saturating 53% on RA, placed on 6L NC. Called patient's cardiologist, Dr. Amanda Kumari, who stated that the patient saw her yesterday 2/24 for regular follow up visit and was saturating at 44%, otherwise vitals were normal. Placed on O2 in the office but is not currently on O2 at home as it was taken away from him many years ago. Dr. Kumari stated that the patient felt dizzy, weak, tired and vomited when he walked into the office. Stated patient has a hx of PAD, latest cardiac stent in 2016, carotid endarterectomy in 2019, stents in RLE and a recent LLE stent placement. Cardio Dr. Boles and Pulm Dr. Urias.    GOC: full code      INTERVAL HPI/OVERNIGHT EVENTS: No acute events overnight. Repeat CXR this AM is improved. Patient feels well, only c/o SOB at night. Denies CP, LE edema, abdominal pain. Saturating 98% on 6L NC, titrated down to 4L and is saturating 96%. HbA1c elevated at 6.9%, started on insulin sliding scale. Nephro Dr. Rosario consulted, rec c/w Lasix and no further nephro w/u at this time. Pending cardiac echo, renal US and abdominal US.      REVIEW OF SYSTEMS:  CONSTITUTIONAL: No fever, weight loss, or fatigue  RESPIRATORY: + SOB at night, No cough, wheezing, chills or hemoptysis; No shortness of breath  CARDIOVASCULAR:  No chest pain, palpitations, dizziness, or leg swelling  GASTROINTESTINAL: No abdominal pain. No nausea, vomiting, or hematemesis; No diarrhea or constipation. No melena or hematochezia.  GENITOURINARY: No dysuria or hematuria, urinary frequency  NEUROLOGICAL: No headaches, memory loss, loss of strength, numbness, or tremors  SKIN: No itching, burning, rashes, or lesions     MEDICATIONS  (STANDING):  amLODIPine   Tablet 10 milliGRAM(s) Oral daily  aspirin enteric coated 81 milliGRAM(s) Oral daily  atorvastatin 80 milliGRAM(s) Oral at bedtime  clopidogrel Tablet 75 milliGRAM(s) Oral daily  ferrous    sulfate 325 milliGRAM(s) Oral daily  folic acid 1 milliGRAM(s) Oral daily  furosemide   Injectable 40 milliGRAM(s) IV Push every 12 hours  heparin   Injectable 5000 Unit(s) SubCutaneous every 12 hours  insulin lispro (ADMELOG) corrective regimen sliding scale   SubCutaneous Before meals and at bedtime  pantoprazole    Tablet 40 milliGRAM(s) Oral before breakfast  tiotropium 18 MICROgram(s) Capsule 1 Capsule(s) Inhalation daily    MEDICATIONS  (PRN):  ALBUTerol    90 MICROgram(s) HFA Inhaler 2 Puff(s) Inhalation every 6 hours PRN Shortness of Breath and/or Wheezing      Vital Signs Last 24 Hrs  T(C): 36.3 (26 Feb 2021 07:48), Max: 36.7 (25 Feb 2021 23:25)  T(F): 97.4 (26 Feb 2021 07:48), Max: 98 (25 Feb 2021 23:25)  HR: 60 (26 Feb 2021 07:48) (60 - 78)  BP: 113/39 (26 Feb 2021 07:48) (104/52 - 157/54)  BP(mean): --  RR: 18 (26 Feb 2021 07:48) (17 - 18)  SpO2: 98% (26 Feb 2021 07:48) (93% - 99%)    PHYSICAL EXAMINATION:  GENERAL: NAD, well built  HEAD:  Atraumatic, Normocephalic  EYES:  conjunctiva and sclera clear  NECK: Supple, No JVD  CHEST/LUNG: + slight crackles in RLL, + slight decreased air entry b/l - improved from yesterday, Clear to auscultation. Clear to percussion bilaterally; No rales, rhonchi, wheezing, or rubs  HEART: + AS murmur, Regular rate and rhythm; No rubs, or gallops  ABDOMEN: + distended, Soft, Nontender; Bowel sounds present  NERVOUS SYSTEM:  Alert & Oriented X3,    EXTREMITIES:  2+ Peripheral Pulses, No clubbing, cyanosis, or edema  SKIN: warm dry                          9.1    11.90 )-----------( 427      ( 26 Feb 2021 08:52 )             32.4     02-26    139  |  102  |  69<H>  ----------------------------<  151<H>  4.2   |  27  |  3.66<H>    Ca    8.4      26 Feb 2021 08:52  Phos  6.1     02-26  Mg     2.4     02-26    TPro  7.3  /  Alb  3.1<L>  /  TBili  0.3  /  DBili  x   /  AST  17  /  ALT  30  /  AlkPhos  201<H>  02-26    LIVER FUNCTIONS - ( 26 Feb 2021 08:52 )  Alb: 3.1 g/dL / Pro: 7.3 g/dL / ALK PHOS: 201 U/L / ALT: 30 U/L DA / AST: 17 U/L / GGT: x           CARDIAC MARKERS ( 25 Feb 2021 09:16 )  0.089 ng/mL / x     / x     / x     / x      CARDIAC MARKERS ( 24 Feb 2021 22:41 )  0.101 ng/mL / x     / x     / x     / 1.7 ng/mL  CARDIAC MARKERS ( 24 Feb 2021 15:06 )  0.165 ng/mL / x     / x     / x     / x                Culture - Blood (collected 24 Feb 2021 22:12)  Source: .Blood Blood  Preliminary Report (25 Feb 2021 23:02):    No growth to date.    Culture - Blood (collected 24 Feb 2021 22:12)  Source: .Blood Blood  Preliminary Report (25 Feb 2021 23:02):    No growth to date.        I&O's Summary    25 Feb 2021 07:01  -  26 Feb 2021 07:00  --------------------------------------------------------  IN: 120 mL / OUT: 350 mL / NET: -230 mL        CAPILLARY BLOOD GLUCOSE      POCT Blood Glucose.: 138 mg/dL (25 Feb 2021 06:40)    CAPILLARY BLOOD GLUCOSE          RADIOLOGY & ADDITIONAL TESTS:                   OMS-3 Progress Note discussed with PGY-1 and attending    PAGER #: [[phone]] TILL 5:00 PM  PLEASE CONTACT ON CALL TEAM:   - On Call Team (Please refer to Janneth) FROM 5:00 PM - 8:30PM  - Nightfloat Team FROM 8:30 -7:30 AM    CHIEF COMPLAINT & BRIEF HOSPITAL COURSE: 80 year old male with medical history of  HTN, HLD, DM, CAD on ASA/Plavix, PAD, CKD, prostate ca s/p radiation, anemia, COPD not on home O2, PAD, presents with SOB, dry cough, and dizziness x 2 days. Patient states he went to  Dr. Kenyetta lee today and reportedly saturating. 44% on RA and patient was sent to ED. Patient states he feels little better. Denied any fever or phlegm production in the ED. He added that he uses 2 pillows at night to sleep. Spoke to patients partner, she states patient was prescribed on water pill , but he never likes to take it. Found to be saturating 53% on RA, placed on 6L NC. Called patient's cardiologist, Dr. Amanda Kumari, who stated that the patient saw her yesterday 2/24 for regular follow up visit and was saturating at 44%, otherwise vitals were normal. Placed on O2 in the office but is not currently on O2 at home as it was taken away from him many years ago. Dr. Kumari stated that the patient felt dizzy, weak, tired and vomited when he walked into the office. Stated patient has a hx of PAD, latest cardiac stent in 2016, carotid endarterectomy in 2019, stents in RLE and a recent LLE stent placement. Cardio Dr. Boles and Pulm Dr. Urias.    GOC: full code      INTERVAL HPI/OVERNIGHT EVENTS: No acute events overnight. Repeat CXR this AM is improved. Patient feels well, only c/o SOB at night. Denies CP, LE edema, abdominal pain. Saturating 98% on 6L NC, titrated down to 4L and is saturating 96%. HbA1c elevated at 6.9%, started on insulin sliding scale. Nephro Dr. Rosario consulted, rec c/w Lasix and no further nephro w/u at this time. Pending cardiac echo, renal US and abdominal US. Steroids D/steven as per Pulm. Episodes of NSVT on tele.       REVIEW OF SYSTEMS:  CONSTITUTIONAL: No fever, weight loss, or fatigue  RESPIRATORY: + SOB at night, No cough, wheezing, chills or hemoptysis; No shortness of breath  CARDIOVASCULAR:  No chest pain, palpitations, dizziness, or leg swelling  GASTROINTESTINAL: No abdominal pain. No nausea, vomiting, or hematemesis; No diarrhea or constipation. No melena or hematochezia.  GENITOURINARY: No dysuria or hematuria, urinary frequency  NEUROLOGICAL: No headaches, memory loss, loss of strength, numbness, or tremors  SKIN: No itching, burning, rashes, or lesions     MEDICATIONS  (STANDING):  amLODIPine   Tablet 10 milliGRAM(s) Oral daily  aspirin enteric coated 81 milliGRAM(s) Oral daily  atorvastatin 80 milliGRAM(s) Oral at bedtime  clopidogrel Tablet 75 milliGRAM(s) Oral daily  ferrous    sulfate 325 milliGRAM(s) Oral daily  folic acid 1 milliGRAM(s) Oral daily  furosemide   Injectable 40 milliGRAM(s) IV Push every 12 hours  heparin   Injectable 5000 Unit(s) SubCutaneous every 12 hours  insulin lispro (ADMELOG) corrective regimen sliding scale   SubCutaneous Before meals and at bedtime  pantoprazole    Tablet 40 milliGRAM(s) Oral before breakfast  tiotropium 18 MICROgram(s) Capsule 1 Capsule(s) Inhalation daily    MEDICATIONS  (PRN):  ALBUTerol    90 MICROgram(s) HFA Inhaler 2 Puff(s) Inhalation every 6 hours PRN Shortness of Breath and/or Wheezing      Vital Signs Last 24 Hrs  T(C): 36.3 (26 Feb 2021 07:48), Max: 36.7 (25 Feb 2021 23:25)  T(F): 97.4 (26 Feb 2021 07:48), Max: 98 (25 Feb 2021 23:25)  HR: 60 (26 Feb 2021 07:48) (60 - 78)  BP: 113/39 (26 Feb 2021 07:48) (104/52 - 157/54)  BP(mean): --  RR: 18 (26 Feb 2021 07:48) (17 - 18)  SpO2: 98% (26 Feb 2021 07:48) (93% - 99%)    PHYSICAL EXAMINATION:  GENERAL: NAD, well built  HEAD:  Atraumatic, Normocephalic  EYES:  conjunctiva and sclera clear  NECK: Supple, No JVD  CHEST/LUNG: + slight crackles in RLL, + slight decreased air entry b/l - improved from yesterday, Clear to auscultation. Clear to percussion bilaterally; No rales, rhonchi, wheezing, or rubs  HEART: + AS murmur, Regular rate and rhythm; No rubs, or gallops  ABDOMEN: + distended, Soft, Nontender; Bowel sounds present  NERVOUS SYSTEM:  Alert & Oriented X3,    EXTREMITIES:  2+ Peripheral Pulses, No clubbing, cyanosis, or edema  SKIN: warm dry                          9.1    11.90 )-----------( 427      ( 26 Feb 2021 08:52 )             32.4     02-26    139  |  102  |  69<H>  ----------------------------<  151<H>  4.2   |  27  |  3.66<H>    Ca    8.4      26 Feb 2021 08:52  Phos  6.1     02-26  Mg     2.4     02-26    TPro  7.3  /  Alb  3.1<L>  /  TBili  0.3  /  DBili  x   /  AST  17  /  ALT  30  /  AlkPhos  201<H>  02-26    LIVER FUNCTIONS - ( 26 Feb 2021 08:52 )  Alb: 3.1 g/dL / Pro: 7.3 g/dL / ALK PHOS: 201 U/L / ALT: 30 U/L DA / AST: 17 U/L / GGT: x           CARDIAC MARKERS ( 25 Feb 2021 09:16 )  0.089 ng/mL / x     / x     / x     / x      CARDIAC MARKERS ( 24 Feb 2021 22:41 )  0.101 ng/mL / x     / x     / x     / 1.7 ng/mL  CARDIAC MARKERS ( 24 Feb 2021 15:06 )  0.165 ng/mL / x     / x     / x     / x                Culture - Blood (collected 24 Feb 2021 22:12)  Source: .Blood Blood  Preliminary Report (25 Feb 2021 23:02):    No growth to date.    Culture - Blood (collected 24 Feb 2021 22:12)  Source: .Blood Blood  Preliminary Report (25 Feb 2021 23:02):    No growth to date.        I&O's Summary    25 Feb 2021 07:01  -  26 Feb 2021 07:00  --------------------------------------------------------  IN: 120 mL / OUT: 350 mL / NET: -230 mL        CAPILLARY BLOOD GLUCOSE      POCT Blood Glucose.: 138 mg/dL (25 Feb 2021 06:40)    CAPILLARY BLOOD GLUCOSE          RADIOLOGY & ADDITIONAL TESTS:      < from: Xray Chest 1 View- PORTABLE-Urgent (Xray Chest 1 View- PORTABLE-Urgent .) (02.26.21 @ 09:21) >  IMPRESSION:    Improving right pleural effusion.    Stable bilateral interstitial infiltrates.    < end of copied text >

## 2021-02-26 NOTE — PROGRESS NOTE ADULT - PROBLEM SELECTOR PLAN 10
RISK                                                          Points  [] Previous VTE                                           3  [] Thrombophilia                                        2  [] Lower limb paralysis                              2   [] Current Cancer                                       2   [x] Immobilization > 24 hrs                        1  [] ICU/CCU stay > 24 hours                       1  [x] Age > 60                                                   1  DVT ppx: Subq Heparin  GI ppx: Protonix  Diet: Regular DASH  Electrolytes replaced PRN  Dispo: Pending clinical course  FULL CODE

## 2021-02-26 NOTE — DISCHARGE NOTE PROVIDER - HOSPITAL COURSE
80 year old male with medical history of  HTN, HLD, CAD on ASA/Plavix, PAD, CKD, prostate ca s/p radiation, anemia, COPD not on home O2, PAD, presents with SOB, dry cough, and dizziness x 2 days. Patient states he went to  Dr. Kumari cardio today and reportedly saturating. 44% on RA and patient was sent to ED. Patient states he feels little better. Denied any fever or phlegm production in the ED. He added that he uses 2 pillows at night to sleep. Spoke to patients partner, she states patient was prescribed on water pill , but he never likes to take it. Patient was first placed on 3L NC but was saturating 93% so was placed on 11L NRB and was saturating 100%. CXR showed moderate pulmonary edema and moderate to marked cardiomegaly, peripheral R lung opacity that may represent a loculated pleural effusion, infiltrate or mass. F/u CT chest showed pulmonary vascular congestion w/ b/l pleural effusions, compressive atelectasis and a loculated fluid on R. Patient was started on IV Lasix 40 mg QD for CHF exacerbation, albuterol and symbicort inhalers, IV 40mg solumedrol Q8hrs for COPD exacerbation, azithromycin for possible infection, amlodipine for HTN, ASA, clopidogrel and atorvastatin 40 mg PO QD for hx of CAD, PAD, HLD.    On admission, was found to be saturating 53% on RA, placed on 6L NC. Called patient's cardiologist, Dr. Amanda Kumari, who stated that the patient saw her yesterday 2/24 for regular follow up visit and was saturating at 44%, otherwise vitals were normal. Placed on O2 in the office but is not currently on O2 at home as it was taken away from him many years ago. Dr. Kumari stated that the patient felt dizzy, weak, tired and vomited when he walked into the office. Stated patient has a hx of PAD, latest cardiac stent in 2016, carotid endarterectomy in 2019, stents in RLE and a recent LLE stent placement. BNP 3706. Alk phos 248 --> 237 --> 201, ordered US abdomen that showed XYZ. Patient had RUSTY, BUN/Cr trending up from 33/2.73 --> 46/3.22 --> 69/3.66, US kidneys showed XYZ, measured post void residual due to hx of bladder and prostate, no post void residual urinating freely. Trops were elevated on admission (0.165) but downtrended to 0.101 --> 0.089. Hb was 9.5, started on PO iron as iron studies showed ANNA.    Cardio Dr. Boles was consulted and recommended daily I/O and weight monitoring, cardiac echo, c/w IV Lasix and to increase the atorvastatin to 80mg as the patient's cholesterol was elevated (cholesterol 193, HDL 43, , non , TG 94).     Pulm Dr. Urias were consulted and thinks that patient's sxs are likely due to CHF exacerbation and recommended to manage the CHF exacerbation, d/c systemic steroids and symbicort and start spiriva instead w/ PRN albuterol.     Neprho Dr. Rosario was consulted and recommended to c/w IV Lasix, no further nephro w/u warranted at this time.    Repeat CXR on 2/26 improved. Was saturating 98% on 6L NC, titrated down to 4L and is saturating 96%. HbA1c was found to be elevated to 6.9% so we started the patient on Admelog insulin silding scale.      GOC: full code 80 year old male with medical history of  HTN, HLD, CAD on ASA/Plavix, PAD, CKD, prostate ca s/p radiation, anemia, COPD not on home O2, PAD, presents with SOB, dry cough, and dizziness x 2 days. Patient states he went to  Dr. Kumari cardio today and reportedly saturating. 44% on RA and patient was sent to ED. Patient states he feels little better. Denied any fever or phlegm production in the ED. He added that he uses 2 pillows at night to sleep. Spoke to patients partner, she states patient was prescribed on water pill , but he never likes to take it. Patient was first placed on 3L NC but was saturating 93% so was placed on 11L NRB and was saturating 100%. CXR showed moderate pulmonary edema and moderate to marked cardiomegaly, peripheral R lung opacity that may represent a loculated pleural effusion, infiltrate or mass. F/u CT chest showed pulmonary vascular congestion w/ b/l pleural effusions, compressive atelectasis and a loculated fluid on R. Patient was started on IV Lasix 40 mg QD for CHF exacerbation, albuterol and symbicort inhalers, IV 40mg solumedrol Q8hrs for COPD exacerbation, azithromycin for possible infection, amlodipine for HTN, ASA, clopidogrel and atorvastatin 40 mg PO QD for hx of CAD, PAD, HLD.    On admission, was found to be saturating 53% on RA, placed on 6L NC. Called patient's cardiologist, Dr. Amanda Kumari, who stated that the patient saw her yesterday 2/24 for regular follow up visit and was saturating at 44%, otherwise vitals were normal. Placed on O2 in the office but is not currently on O2 at home as it was taken away from him many years ago. Dr. Kumari stated that the patient felt dizzy, weak, tired and vomited when he walked into the office. Stated patient has a hx of PAD, latest cardiac stent in 2016, carotid endarterectomy in 2019, stents in RLE and a recent LLE stent placement. BNP 3706. Alk phos 248 --> 237 --> 201, ordered US abdomen that showed cholelithiasis and echogenic kidneys c/w renal parenchymal disease. Patient had RUSTY, BUN/Cr trending up from 33/2.73 --> 46/3.22 --> 69/3.66, measured post void residual due to hx of bladder and prostate, no post void residual urinating freely. Trops were elevated on admission (0.165) but downtrended to 0.101 --> 0.089. Hb was 9.5, started on PO iron as iron studies showed ANNA.    Cardio Dr. Boles was consulted and recommended daily I/O and weight monitoring, cardiac echo, c/w IV Lasix and to increase the atorvastatin to 80mg as the patient's cholesterol was elevated (cholesterol 193, HDL 43, , non , TG 94).     Pulm Dr. Urias were consulted and thinks that patient's sxs are likely due to CHF exacerbation and recommended to manage the CHF exacerbation, d/c systemic steroids and symbicort and start spiriva instead w/ PRN albuterol.     Neprho Dr. Rosario was consulted and recommended to c/w IV Lasix, no further nephro w/u warranted at this time.    Repeat CXR on 2/26 improved. Was saturating 98% on 6L NC, titrated down to 4L and is saturating 96%. HbA1c was found to be elevated to 6.9% so we started the patient on Admelog insulin silding scale. Patient was desaturating to 85% on RA but was able to titrate patient down to 2L NC. He was saturating 97% on 2L NC.    Followup appointment with Dr. Kumari on 3/11 @ 12:00 pm at the [facility] ([facility] [place] [address]; phone: [phone]). 80 year old male with medical history of  HTN, HLD, CAD on ASA/Plavix, PAD, CKD, prostate ca s/p radiation, anemia, COPD not on home O2, PAD, presents with SOB, dry cough, and dizziness x 2 days. Patient states he went to  Dr. Kumari cardio today and reportedly saturating. 44% on RA and patient was sent to ED. Patient states he feels little better. Denied any fever or phlegm production in the ED. He added that he uses 2 pillows at night to sleep. Spoke to patients partner, she states patient was prescribed on water pill , but he never likes to take it. Patient was first placed on 3L NC but was saturating 93% so was placed on 11L NRB and was saturating 100%. CXR showed moderate pulmonary edema and moderate to marked cardiomegaly, peripheral R lung opacity that may represent a loculated pleural effusion, infiltrate or mass. F/u CT chest showed pulmonary vascular congestion w/ b/l pleural effusions, compressive atelectasis and a loculated fluid on R. Patient was started on IV Lasix 40 mg QD for CHF exacerbation, albuterol and symbicort inhalers, IV 40mg solumedrol Q8hrs for COPD exacerbation, azithromycin for possible infection, amlodipine for HTN, ASA, clopidogrel and atorvastatin 40 mg PO QD for hx of CAD, PAD, HLD.    On admission, was found to be saturating 53% on RA, placed on 6L NC. Called patient's cardiologist, Dr. Amanda Kumari, who stated that the patient saw her yesterday 2/24 for regular follow up visit and was saturating at 44%, otherwise vitals were normal. Placed on O2 in the office but is not currently on O2 at home as it was taken away from him many years ago. Dr. Kumari stated that the patient felt dizzy, weak, tired and vomited when he walked into the office. Stated patient has a hx of PAD, latest cardiac stent in 2016, carotid endarterectomy in 2019, stents in RLE and a recent LLE stent placement. BNP 3706. Alk phos 248 --> 237 --> 201, ordered US abdomen that showed cholelithiasis and echogenic kidneys c/w renal parenchymal disease. Patient had RUSTY, BUN/Cr trending up from 33/2.73 --> 46/3.22 --> 69/3.66, measured post void residual due to hx of bladder and prostate, no post void residual urinating freely. Trops were elevated on admission (0.165) but downtrended to 0.101 --> 0.089. Hb was 9.5, started on PO iron as iron studies showed ANNA.    Cardio Dr. Boles was consulted and recommended daily I/O and weight monitoring, cardiac echo, c/w IV Lasix and to increase the atorvastatin to 80mg as the patient's cholesterol was elevated (cholesterol 193, HDL 43, , non , TG 94).     Pulm Dr. Urias were consulted and thinks that patient's sxs are likely due to CHF exacerbation and recommended to manage the CHF exacerbation, d/c systemic steroids and symbicort and start spiriva instead w/ PRN albuterol.     Neprho Dr. Rosario was consulted and recommended to c/w IV Lasix, no further nephro w/u warranted at this time.    Repeat CXR on 2/26 improved. Was saturating 98% on 6L NC, titrated down to 4L and is saturating 96%. HbA1c was found to be elevated to 6.9% so we started the patient on Admelog insulin silding scale. Patient was desaturating to 85% on RA but was able to titrate patient down to 2L NC. He was saturating 97% on 2L NC.    Patient to be discharged with PO Lasix.     Followup appointment with Dr. Kumari on 3/11 @ 12:00 pm at the [facility] ([address] [place] [address]; phone: [phone]). 80 year old male with medical history of  HTN, HLD, CAD on ASA/Plavix, PAD, CKD, prostate ca s/p radiation, anemia, COPD not on home O2, PAD, presents with SOB, dry cough, and dizziness x 2 days. Patient states he went to  Dr. Kumari cardio today and reportedly saturating. 44% on RA and patient was sent to ED. Patient states he feels little better. Denied any fever or phlegm production in the ED. He added that he uses 2 pillows at night to sleep. Spoke to patients partner, she states patient was prescribed on water pill , but he never likes to take it. Patient was first placed on 3L NC but was saturating 93% so was placed on 11L NRB and was saturating 100%. CXR showed moderate pulmonary edema and moderate to marked cardiomegaly, peripheral R lung opacity that may represent a loculated pleural effusion, infiltrate or mass. F/u CT chest showed pulmonary vascular congestion w/ b/l pleural effusions, compressive atelectasis and a loculated fluid on R. Patient was started on IV Lasix 40 mg QD for CHF exacerbation, albuterol and symbicort inhalers, IV 40mg solumedrol Q8hrs for COPD exacerbation, azithromycin for possible infection, amlodipine for HTN, ASA, clopidogrel and atorvastatin 40 mg PO QD for hx of CAD, PAD, HLD.    On admission, was found to be saturating 53% on RA, placed on 6L NC. Called patient's cardiologist, Dr. Amanda Kumari, who stated that the patient saw her yesterday 2/24 for regular follow up visit and was saturating at 44%, otherwise vitals were normal. Placed on O2 in the office but is not currently on O2 at home as it was taken away from him many years ago. Dr. Kumari stated that the patient felt dizzy, weak, tired and vomited when he walked into the office. Stated patient has a hx of PAD, latest cardiac stent in 2016, carotid endarterectomy in 2019, stents in RLE and a recent LLE stent placement. BNP 3706. Alk phos 248 --> 237 --> 201, ordered US abdomen that showed cholelithiasis and echogenic kidneys c/w renal parenchymal disease. Patient had RUSTY, BUN/Cr trending up, from 33/2.73 --> 46/3.22 --> 69/3.66 --> 88/2.90, measured post void residual due to hx of bladder and prostate, no post void residual urinating freely. Trops were elevated on admission (0.165) but downtrended to 0.101 --> 0.089. Hb was 9.5, started on PO iron as iron studies showed ANNA. Hb level came up to 11.2 on discharge.    Cardio Dr. Boles was consulted and recommended daily I/O and weight monitoring, cardiac echo, c/w IV Lasix and to increase the atorvastatin to 80mg as the patient's cholesterol was elevated (cholesterol 193, HDL 43, , non , TG 94). Cardiac echo showed EF 65%, moderate mitral regurg, mild aortic stenosis, mildly dilated LA, mild LV enlargement, grade 2 diastolic dysfunction, mildly increased RV systolic P (45 mmHg), moderate tricuspid regurg.     Pulm Dr. Urias were consulted and thinks that patient's sxs are likely due to CHF exacerbation and recommended to manage the CHF exacerbation, d/c systemic steroids and symbicort and start spiriva instead w/ PRN albuterol.     Neprho Dr. Rosario was consulted and recommended to c/w IV Lasix, no further nephro w/u warranted at this time.    Repeat CXR on 2/26 improved. Was saturating 98% on 6L NC, titrated down to 4L and is saturating 96%. HbA1c was found to be elevated to 6.9% so we started the patient on Admelog insulin silding scale. Patient was desaturating to 85% on RA but was able to titrate patient down to 2L NC. He was saturating 97% on 2L NC.    Patient to be discharged with PO Lasix and 2L home O2.    Followup appointment with Dr. Kumari on 3/11 @ 12:00 pm at the [facility] ([address] [place] [address]; phone: [phone]). 80 year old male with medical history of  HTN, HLD, CAD on ASA/Plavix, PAD, CKD, prostate ca s/p radiation, anemia, COPD not on home O2, PAD, presents with SOB, dry cough, and dizziness x 2 days. Patient states he went to  Dr. Kumari cardio today and reportedly saturating. 44% on RA and patient was sent to ED. Patient states he feels little better. Denied any fever or phlegm production in the ED. He added that he uses 2 pillows at night to sleep. Spoke to patients partner, she states patient was prescribed on water pill , but he never likes to take it. Patient was first placed on 3L NC but was saturating 93% so was placed on 11L NRB and was saturating 100%. CXR showed moderate pulmonary edema and moderate to marked cardiomegaly, peripheral R lung opacity that may represent a loculated pleural effusion, infiltrate or mass. F/u CT chest showed pulmonary vascular congestion w/ b/l pleural effusions, compressive atelectasis and a loculated fluid on R. Patient was started on IV Lasix 40 mg QD for CHF exacerbation, albuterol and symbicort inhalers, IV 40mg solumedrol Q8hrs for COPD exacerbation, azithromycin for possible infection, amlodipine for HTN, ASA, clopidogrel and atorvastatin 40 mg PO QD for hx of CAD, PAD, HLD.    On admission, was found to be saturating 53% on RA, placed on 6L NC. Called patient's cardiologist, Dr. Amanda Kumari, who stated that the patient saw her yesterday 2/24 for regular follow up visit and was saturating at 44%, otherwise vitals were normal. Placed on O2 in the office but is not currently on O2 at home as it was taken away from him many years ago. Dr. Kumari stated that the patient felt dizzy, weak, tired and vomited when he walked into the office. Stated patient has a hx of PAD, latest cardiac stent in 2016, carotid endarterectomy in 2019, stents in RLE and a recent LLE stent placement. BNP 3706. Alk phos 248 --> 237 --> 201, ordered US abdomen that showed cholelithiasis and echogenic kidneys c/w renal parenchymal disease. Patient had RUSTY, BUN/Cr trending up, from 33/2.73 --> 46/3.22 --> 69/3.66 --> 88/2.90, measured post void residual due to hx of bladder and prostate, no post void residual urinating freely. Trops were elevated on admission (0.165) but downtrended to 0.101 --> 0.089. Hb was 9.5, started on PO iron as iron studies showed ANNA. Hb level came up to 11.2 on discharge.    Cardio Dr. Boles was consulted and recommended daily I/O and weight monitoring, cardiac echo, c/w IV Lasix and to increase the atorvastatin to 80mg as the patient's cholesterol was elevated (cholesterol 193, HDL 43, , non , TG 94). Cardiac echo showed EF 65%, moderate mitral regurg, mild aortic stenosis, mildly dilated LA, mild LV enlargement, grade 2 diastolic dysfunction, mildly increased RV systolic P (45 mmHg), moderate tricuspid regurg.     Pulm Dr. Urias were consulted and thinks that patient's sxs are likely due to CHF exacerbation and recommended to manage the CHF exacerbation, d/c systemic steroids and symbicort and start spiriva instead w/ PRN albuterol.     Neprho Dr. Rosario was consulted and recommended to c/w IV Lasix, no further nephro w/u warranted at this time.    Repeat CXR on 2/26 improved. Was saturating 98% on 6L NC, titrated down to 4L and is saturating 96%. HbA1c was found to be elevated to 6.9% so we started the patient on Admelog insulin silding scale. Patient was desaturating to 85% on RA but was able to titrate patient down to 2L NC. He was saturating 97% on 2L NC.    Patient to be discharged with PO Lasix and 2L home O2.    Followup appointment with Dr. Kumari on 3/11 @ 12:00 pm at the [facility] ([address] [place] [address]; phone: [phone]).     Med Attending Day of discharge note  Patient seen and evaluated at bedside on the day of discharge. Pulmonary exam revealed decreased breath sounds but clear on the day of dischage. I again reviewed his active diagnosis of heart failure with pulmonary edema and acute hypoxic respiratory failure on admission complicated by acute renal failure. Patient has a difficult time believing all his diagnoses because as per him " I only came in because of this breathing problem" Primary etiology of hypoxia is cardiac etiology w/out clinical evidence of acute COPD exacerbation.  Component of medication non compliance contributing to presentation as well. Patient's hypoxia improved through out hospitalization, however resting room air saturations was 85% with arrangements made for discharge with home 02. Mr. Patterson will follow with his primary cardiologist on March 11, 2021.  35 min spent at bedside and on patient coordination care.

## 2021-02-26 NOTE — PROGRESS NOTE ADULT - PROBLEM SELECTOR PLAN 8
Cholesterol 193, HDL 43, , non , TG 94  Increased atorvastatin to 80 mg PO QHS BP remains stable, monitor BP  c/w amlodipine  No ACEi/ARB due to RUSTY

## 2021-02-26 NOTE — PROGRESS NOTE ADULT - PROBLEM SELECTOR PLAN 3
BUN 69, Cr 3.66 (2/26), baseline Cr 1.93  f/u urine urea  Voiding freely, no post void residual  f/u renal US  Nephro Dr Rosario

## 2021-02-26 NOTE — PROGRESS NOTE ADULT - PROBLEM SELECTOR PLAN 2
Saturating 53% on RA  Titrated down to 4L NC, saturating 96%  ABG - 7.31/48/66/24  d/c solumedrol, azithromycin, symbicort  Imaging c/w congestion w/ R loculated fluid collection in fissure  c/w albuterol  start spiriva per pulm  Monitor O2 saturation, titrate O2 as needed  Pulm Dr. Urias

## 2021-02-26 NOTE — PROGRESS NOTE ADULT - PROBLEM SELECTOR PLAN 9
RISK                                                          Points  [] Previous VTE                                           3  [] Thrombophilia                                        2  [] Lower limb paralysis                              2   [] Current Cancer                                       2   [x] Immobilization > 24 hrs                        1  [] ICU/CCU stay > 24 hours                       1  [x] Age > 60                                                   1  DVT ppx: Subq Heparin  GI ppx: Protonix  Diet: Regular DASH  Electrolytes replaced PRN  Dispo: Pending clinical course  FULL CODE Cholesterol 193, HDL 43, , non , TG 94  Increased atorvastatin to 80 mg PO QHS

## 2021-02-26 NOTE — PROGRESS NOTE ADULT - ASSESSMENT
80 year old male with medical history of  HTN, HLD, CAD on ASA/Plavix, PAD, CKD, prostate ca s/p radiation, anemia, COPD not on home O2, PAD, presents with SOB, dry cough, and dizziness x 2 days. Patient admitted for COPD v CHF exacerbation. 80 year old male with medical history of  HTN, HLD, DM, CAD on ASA/Plavix, PAD, CKD, prostate ca s/p radiation, anemia, COPD not on home O2, PAD, presents with SOB, dry cough, and dizziness x 2 days. Patient admitted for COPD v CHF exacerbation.

## 2021-02-26 NOTE — PROGRESS NOTE ADULT - PROBLEM SELECTOR PLAN 7
BP remains stable, monitor BP  c/w amlodipine  No ACEi/ARB due to RUSTY A1C 6.9   Started SSI FSACHS  Nutrition consult  Lifestyle modification

## 2021-02-26 NOTE — DISCHARGE NOTE PROVIDER - NSDCFUSCHEDAPPT_GEN_ALL_CORE_FT
CADET, JEAN CLAUDE ; 03/11/2021 ; [facility] [address]  CADET, JEAN CLAUDE ; 04/30/2021 ; [facility] [address]  CADET, JEAN CLAUDE ; 04/30/2021 ; [facility] [address]  CADET, JEAN CLAUDE ; 04/30/2021 ; [facility] [address]  CADET, JEAN CLAUDE ; 04/30/2021 ; [facility] [address] CADET, JEAN CLAUDE ; 04/30/2021 ; [facility] [address]  CADET, JEAN CLAUDE ; 04/30/2021 ; [facility] [address]  CADET, JEAN CLAUDE ; 04/30/2021 ; [facility] [address]  CADET, JEAN CLAUDE ; 04/30/2021 ; [facility] [address]  CADET, JEAN CLAUDE ; 06/10/2021 ; [facility] [address]  CADET, JEAN CLAUDE ; 06/10/2021 ; [facility] [address]

## 2021-02-26 NOTE — PROGRESS NOTE ADULT - SUBJECTIVE AND OBJECTIVE BOX
PRESENTING CC: Shortness of breath    SUBJ:     In summary, this is a 81 yo M with HTN, HLD, T2DM, CAD s/p MI and PCI (FEMI to mLCx 2016, RCA with  and good collaterals), COPD not on home O2, CKD III, and PAD s/p stenting (L SFA 2019) who presented with dyspnea and hypoxia in setting of COPD and combined systolic and diastolic HF exacerbation.      Overnight, there were no acute events. Patient       Cardiologist: Dr. Kumari  ------------------------  PMH: As above, also [place] (hard of hearing), Malignant neoplasm of bladder s/p resection and XRT, Femoral artery occlusion, left superficial- 2019- surgically resolved; Occlusion and stenosis of right carotid artery, Anemia, Gout, Rectal bleeding (2018), BPH, S/P carotid endarterectomy  right 01/2019,  History of colon surgery 2007, right wrist surgery with hardware 27 years ago, Cataract surgery 30 years ago    Allergies    Novocain (Faint)    MEDICATIONS  (STANDING):  amLODIPine   Tablet 10 milliGRAM(s) Oral daily  aspirin enteric coated 81 milliGRAM(s) Oral daily  atorvastatin 80 milliGRAM(s) Oral at bedtime  clopidogrel Tablet 75 milliGRAM(s) Oral daily  ferrous    sulfate 325 milliGRAM(s) Oral daily  folic acid 1 milliGRAM(s) Oral daily  furosemide   Injectable 40 milliGRAM(s) IV Push every 12 hours  heparin   Injectable 5000 Unit(s) SubCutaneous every 12 hours  insulin lispro (ADMELOG) corrective regimen sliding scale   SubCutaneous Before meals and at bedtime  pantoprazole    Tablet 40 milliGRAM(s) Oral before breakfast  tiotropium 18 MICROgram(s) Capsule 1 Capsule(s) Inhalation daily    MEDICATIONS  (PRN):  ALBUTerol    90 MICROgram(s) HFA Inhaler 2 Puff(s) Inhalation every 6 hours PRN Shortness of Breath and/or Wheezing      FAMILY HISTORY:  Family history of heart disease    Family history of diabetes mellitus (Mother)      Reviewed; no change from my prior note    SOCIAL HISTORY:  Reviewed, no change from my prior note    REVIEW OF SYSTEMS:  Constitutional: [ ] fever, [ ]weight loss,  [ ]fatigue  Eyes: [ ] visual changes  Respiratory: [ ]shortness of breath;  [ ] cough, [ ]wheezing, [ ]chills, [ ]hemoptysis  Cardiovascular: [ ] chest pain, [ ]palpitations, [ ]dizziness,  [ ]leg swelling [ ]syncope  Gastrointestinal: [ ] abdominal pain, [ ]nausea, [ ]vomiting,  [ ]diarrhea   Genitourinary: [ ] dysuria, [ ] hematuria  Neurologic: [ ] headaches [ ] tremors [ ] weakness [ ] lightheadedness  Skin: [ ] itching, [ ]burning, [ ] rashes  Endocrine: [ ] heat or cold intolerance  Musculoskeletal: [ ] joint pain or swelling; [ ] muscle, back, or extremity pain  Psychiatric: [ ] depression, [ ]anxiety, [ ]mood swings, or [ ]difficulty sleeping  Hematologic: [ ] easy bruising, [ ] bleeding gums    [x] All remaining systems negative except as per above.   [  ] Unable to obtain    Vital Signs Last 24 Hrs  T(C): 36.3 (26 Feb 2021 11:29), Max: 36.7 (25 Feb 2021 23:25)  T(F): 97.3 (26 Feb 2021 11:29), Max: 98 (25 Feb 2021 23:25)  HR: 67 (26 Feb 2021 11:29) (60 - 72)  BP: 123/42 (26 Feb 2021 11:29) (104/52 - 124/48)  BP(mean): --  RR: 18 (26 Feb 2021 11:29) (18 - 18)  SpO2: 100% (26 Feb 2021 11:29) (93% - 100%)  I&O's Summary    25 Feb 2021 07:01  -  26 Feb 2021 07:00  --------------------------------------------------------  IN: 120 mL / OUT: 350 mL / NET: -230 mL    26 Feb 2021 07:01  -  26 Feb 2021 14:47  --------------------------------------------------------  IN: 0 mL / OUT: 200 mL / NET: -200 mL    PHYSICAL EXAM:  General: No acute distress  HEENT: EOMI, PERRL  Neck: Supple, No JVD  Lungs: Clear to auscultation bilaterally; No rales or wheezing  Heart: Regular rate and rhythm; No murmurs, rubs, or gallops  Abdomen: Nontender, bowel sounds present  Extremities: No clubbing, cyanosis, or edema  Nervous system:  Alert & Oriented X3, no focal deficits  Psychiatric: Normal affect  Skin: No rashes or lesions    LABS:  02-26    139  |  102  |  69<H>  ----------------------------<  151<H>  4.2   |  27  |  3.66<H>    Ca    8.4      26 Feb 2021 08:52  Phos  6.1     02-26  Mg     2.4     02-26    TPro  7.3  /  Alb  3.1<L>  /  TBili  0.3  /  DBili  x   /  AST  17  /  ALT  30  /  AlkPhos  201<H>  02-26    Creatinine Trend: 3.66<--, 3.22<--, 2.73<--                        9.1    11.90 )-----------( 427      ( 26 Feb 2021 08:52 )             32.4       Lipid Panel:   Cardiac Enzymes: CARDIAC MARKERS ( 25 Feb 2021 09:16 )  0.089 ng/mL / x     / x     / x     / x      CARDIAC MARKERS ( 24 Feb 2021 22:41 )  0.101 ng/mL / x     / x     / x     / 1.7 ng/mL  CARDIAC MARKERS ( 24 Feb 2021 15:06 )  0.165 ng/mL / x     / x     / x     / x          Serum Pro-Brain Natriuretic Peptide: 3706 pg/mL (02-24-21 @ 15:54)    RADIOLOGY:     < from: Xray Chest 1 View- PORTABLE-Urgent (Xray Chest 1 View- PORTABLE-Urgent .) (02.26.21 @ 09:21) >  IMPRESSION:    Improving right pleural effusion.    Stable bilateral interstitial infiltrates.    < end of copied text >      TELEMETRY:    ECHO: Preliminary review shows preserved EF, moderate MR, mild AS                  PRESENTING CC: Shortness of breath    SUBJ:     In summary, this is a 81 yo M with HTN, HLD, T2DM, CAD s/p MI and PCI (FEMI to mLCx 2016, RCA with  and good collaterals), COPD not on home O2, CKD III, and PAD s/p stenting (L SFA 2019) who presented with dyspnea and hypoxia in setting of COPD and combined systolic and diastolic HF exacerbation.      Overnight, there were no acute events. Patient       Cardiologist: Dr. Kumari  ------------------------  PMH: As above, also [place] (hard of hearing), Malignant neoplasm of bladder s/p resection and XRT, Femoral artery occlusion, left superficial- 2019- surgically resolved; Occlusion and stenosis of right carotid artery, Anemia, Gout, Rectal bleeding (2018), BPH, S/P carotid endarterectomy  right 01/2019,  History of colon surgery 2007, right wrist surgery with hardware 27 years ago, Cataract surgery 30 years ago    Allergies    Novocain (Faint)    MEDICATIONS  (STANDING):  amLODIPine   Tablet 10 milliGRAM(s) Oral daily  aspirin enteric coated 81 milliGRAM(s) Oral daily  atorvastatin 80 milliGRAM(s) Oral at bedtime  clopidogrel Tablet 75 milliGRAM(s) Oral daily  ferrous    sulfate 325 milliGRAM(s) Oral daily  folic acid 1 milliGRAM(s) Oral daily  furosemide   Injectable 40 milliGRAM(s) IV Push every 12 hours  heparin   Injectable 5000 Unit(s) SubCutaneous every 12 hours  insulin lispro (ADMELOG) corrective regimen sliding scale   SubCutaneous Before meals and at bedtime  pantoprazole    Tablet 40 milliGRAM(s) Oral before breakfast  tiotropium 18 MICROgram(s) Capsule 1 Capsule(s) Inhalation daily    MEDICATIONS  (PRN):  ALBUTerol    90 MICROgram(s) HFA Inhaler 2 Puff(s) Inhalation every 6 hours PRN Shortness of Breath and/or Wheezing      FAMILY HISTORY:  Family history of heart disease    Family history of diabetes mellitus (Mother)      Reviewed; no change from my prior note    SOCIAL HISTORY:  Reviewed, no change from my prior note    REVIEW OF SYSTEMS:  Constitutional: [ ] fever, [ ]weight loss,  [ ]fatigue  Eyes: [ ] visual changes  Respiratory: [ ]shortness of breath;  [ ] cough, [ ]wheezing, [ ]chills, [ ]hemoptysis  Cardiovascular: [ ] chest pain, [ ]palpitations, [ ]dizziness,  [ ]leg swelling [ ]syncope  Gastrointestinal: [ ] abdominal pain, [ ]nausea, [ ]vomiting,  [ ]diarrhea   Genitourinary: [ ] dysuria, [ ] hematuria  Neurologic: [ ] headaches [ ] tremors [ ] weakness [ ] lightheadedness  Skin: [ ] itching, [ ]burning, [ ] rashes  Endocrine: [ ] heat or cold intolerance  Musculoskeletal: [ ] joint pain or swelling; [ ] muscle, back, or extremity pain  Psychiatric: [ ] depression, [ ]anxiety, [ ]mood swings, or [ ]difficulty sleeping  Hematologic: [ ] easy bruising, [ ] bleeding gums    [x] All remaining systems negative except as per above.   [  ] Unable to obtain    Vital Signs Last 24 Hrs  T(C): 36.3 (26 Feb 2021 11:29), Max: 36.7 (25 Feb 2021 23:25)  T(F): 97.3 (26 Feb 2021 11:29), Max: 98 (25 Feb 2021 23:25)  HR: 67 (26 Feb 2021 11:29) (60 - 72)  BP: 123/42 (26 Feb 2021 11:29) (104/52 - 124/48)  BP(mean): --  RR: 18 (26 Feb 2021 11:29) (18 - 18)  SpO2: 100% (26 Feb 2021 11:29) (93% - 100%)  I&O's Summary    25 Feb 2021 07:01  -  26 Feb 2021 07:00  --------------------------------------------------------  IN: 120 mL / OUT: 350 mL / NET: -230 mL    26 Feb 2021 07:01  -  26 Feb 2021 14:47  --------------------------------------------------------  IN: 0 mL / OUT: 200 mL / NET: -200 mL    PHYSICAL EXAM:  General: No acute distress  HEENT: EOMI, PERRL  Neck: Supple, No JVD  Lungs: Clear to auscultation bilaterally; No rales or wheezing  Heart: Regular rate and rhythm; No murmurs, rubs, or gallops  Abdomen: Nontender, bowel sounds present  Extremities: No clubbing, cyanosis, or edema  Nervous system:  Alert & Oriented X3, no focal deficits  Psychiatric: Normal affect  Skin: No rashes or lesions    LABS:  02-26    139  |  102  |  69<H>  ----------------------------<  151<H>  4.2   |  27  |  3.66<H>    Ca    8.4      26 Feb 2021 08:52  Phos  6.1     02-26  Mg     2.4     02-26    TPro  7.3  /  Alb  3.1<L>  /  TBili  0.3  /  DBili  x   /  AST  17  /  ALT  30  /  AlkPhos  201<H>  02-26    Creatinine Trend: 3.66<--, 3.22<--, 2.73<--                        9.1    11.90 )-----------( 427      ( 26 Feb 2021 08:52 )             32.4       Lipid Panel:   Cardiac Enzymes: CARDIAC MARKERS ( 25 Feb 2021 09:16 )  0.089 ng/mL / x     / x     / x     / x      CARDIAC MARKERS ( 24 Feb 2021 22:41 )  0.101 ng/mL / x     / x     / x     / 1.7 ng/mL  CARDIAC MARKERS ( 24 Feb 2021 15:06 )  0.165 ng/mL / x     / x     / x     / x          Serum Pro-Brain Natriuretic Peptide: 3706 pg/mL (02-24-21 @ 15:54)    RADIOLOGY:     < from: Xray Chest 1 View- PORTABLE-Urgent (Xray Chest 1 View- PORTABLE-Urgent .) (02.26.21 @ 09:21) >  IMPRESSION:    Improving right pleural effusion.    Stable bilateral interstitial infiltrates.    < end of copied text >      TELEMETRY: Sinus rhythm, runs of NSVT    ECHO: Preliminary review shows preserved EF, moderate MR, mild AS                  PRESENTING CC: Shortness of breath    SUBJ:     In summary, this is a 81 yo M with HTN, HLD, T2DM, CAD s/p MI and PCI (FEMI to mLCx 2016, RCA with  and good collaterals), COPD not on home O2, CKD III, and PAD s/p stenting (L SFA 2019) who presented with dyspnea and hypoxia in setting of COPD and combined systolic and diastolic HF exacerbation.      Overnight, there were no acute events. Patient reports he feels well, denies any chest pain, dyspnea, palpitations, lightheadedness, or syncope. Patient did not walk around today, he has been refusing PT eval x3.      Cardiologist: Dr. Kumari  ------------------------  PMH: As above, also [place] (hard of hearing), Malignant neoplasm of bladder s/p resection and XRT, Femoral artery occlusion, left superficial- 2019- surgically resolved; Occlusion and stenosis of right carotid artery, Anemia, Gout, Rectal bleeding (2018), BPH, S/P carotid endarterectomy  right 01/2019,  History of colon surgery 2007, right wrist surgery with hardware 27 years ago, Cataract surgery 30 years ago    Allergies    Novocain (Faint)    MEDICATIONS  (STANDING):  amLODIPine   Tablet 10 milliGRAM(s) Oral daily  aspirin enteric coated 81 milliGRAM(s) Oral daily  atorvastatin 80 milliGRAM(s) Oral at bedtime  clopidogrel Tablet 75 milliGRAM(s) Oral daily  ferrous    sulfate 325 milliGRAM(s) Oral daily  folic acid 1 milliGRAM(s) Oral daily  furosemide   Injectable 40 milliGRAM(s) IV Push every 12 hours  heparin   Injectable 5000 Unit(s) SubCutaneous every 12 hours  insulin lispro (ADMELOG) corrective regimen sliding scale   SubCutaneous Before meals and at bedtime  pantoprazole    Tablet 40 milliGRAM(s) Oral before breakfast  tiotropium 18 MICROgram(s) Capsule 1 Capsule(s) Inhalation daily    MEDICATIONS  (PRN):  ALBUTerol    90 MICROgram(s) HFA Inhaler 2 Puff(s) Inhalation every 6 hours PRN Shortness of Breath and/or Wheezing      FAMILY HISTORY:  Family history of heart disease    Family history of diabetes mellitus (Mother)      Reviewed; no change from my prior note    SOCIAL HISTORY:  Reviewed, no change from my prior note    REVIEW OF SYSTEMS:  Constitutional: [ ] fever, [ ]weight loss,  [ ]fatigue  Eyes: [ ] visual changes  Respiratory: [ ]shortness of breath;  [ ] cough, [ ]wheezing, [ ]chills, [ ]hemoptysis  Cardiovascular: [ ] chest pain, [ ]palpitations, [ ]dizziness,  [ ]leg swelling [ ]syncope  Gastrointestinal: [ ] abdominal pain, [ ]nausea, [ ]vomiting,  [ ]diarrhea   Genitourinary: [ ] dysuria, [ ] hematuria  Neurologic: [ ] headaches [ ] tremors [ ] weakness [ ] lightheadedness  Skin: [ ] itching, [ ]burning, [ ] rashes  Endocrine: [ ] heat or cold intolerance  Musculoskeletal: [ ] joint pain or swelling; [ ] muscle, back, or extremity pain  Psychiatric: [ ] depression, [ ]anxiety, [ ]mood swings, or [ ]difficulty sleeping  Hematologic: [ ] easy bruising, [ ] bleeding gums    [x] All remaining systems negative except as per above.   [  ] Unable to obtain    Vital Signs Last 24 Hrs  T(C): 36.3 (26 Feb 2021 11:29), Max: 36.7 (25 Feb 2021 23:25)  T(F): 97.3 (26 Feb 2021 11:29), Max: 98 (25 Feb 2021 23:25)  HR: 67 (26 Feb 2021 11:29) (60 - 72)  BP: 123/42 (26 Feb 2021 11:29) (104/52 - 124/48)  BP(mean): --  RR: 18 (26 Feb 2021 11:29) (18 - 18)  SpO2: 100% (26 Feb 2021 11:29) (93% - 100%)  I&O's Summary    25 Feb 2021 07:01  -  26 Feb 2021 07:00  --------------------------------------------------------  IN: 120 mL / OUT: 350 mL / NET: -230 mL    26 Feb 2021 07:01  -  26 Feb 2021 14:47  --------------------------------------------------------  IN: 0 mL / OUT: 200 mL / NET: -200 mL    PHYSICAL EXAM:  General: No acute distress  HEENT: EOMI, PERRL  Neck: Supple, No JVD  Lungs: Bibasilar crackles, improved compared to yesterday.   Heart: Regular rate and rhythm; No murmurs, rubs, or gallops  Abdomen: Nontender, bowel sounds present  Extremities: No clubbing, cyanosis, or edema  Nervous system:  Alert & Oriented X3, no focal deficits  Psychiatric: Normal affect  Skin: No rashes or lesions    LABS:  02-26    139  |  102  |  69<H>  ----------------------------<  151<H>  4.2   |  27  |  3.66<H>    Ca    8.4      26 Feb 2021 08:52  Phos  6.1     02-26  Mg     2.4     02-26    TPro  7.3  /  Alb  3.1<L>  /  TBili  0.3  /  DBili  x   /  AST  17  /  ALT  30  /  AlkPhos  201<H>  02-26    Creatinine Trend: 3.66<--, 3.22<--, 2.73<--                        9.1    11.90 )-----------( 427      ( 26 Feb 2021 08:52 )             32.4       Lipid Panel:   Cardiac Enzymes: CARDIAC MARKERS ( 25 Feb 2021 09:16 )  0.089 ng/mL / x     / x     / x     / x      CARDIAC MARKERS ( 24 Feb 2021 22:41 )  0.101 ng/mL / x     / x     / x     / 1.7 ng/mL  CARDIAC MARKERS ( 24 Feb 2021 15:06 )  0.165 ng/mL / x     / x     / x     / x          Serum Pro-Brain Natriuretic Peptide: 3706 pg/mL (02-24-21 @ 15:54)    RADIOLOGY:     < from: Xray Chest 1 View- PORTABLE-Urgent (Xray Chest 1 View- PORTABLE-Urgent .) (02.26.21 @ 09:21) >  IMPRESSION:    Improving right pleural effusion.    Stable bilateral interstitial infiltrates.    < end of copied text >      TELEMETRY: Sinus rhythm, runs of NSVT    ECHO: Preliminary review shows preserved EF, moderate MR, mild AS

## 2021-02-26 NOTE — DISCHARGE NOTE PROVIDER - NSDCMRMEDTOKEN_GEN_ALL_CORE_FT
Lipitor 40 mg oral tablet: 1 tab(s) orally once a day (at bedtime)  Plavix 75 mg oral tablet: 1 tab(s) orally once a day   albuterol 90 mcg/inh inhalation aerosol: 2 puff(s) inhaled every 6 hours, As needed, Shortness of Breath and/or Wheezing  amLODIPine 5 mg oral tablet: 1 tab(s) orally once a day  aspirin 81 mg oral delayed release tablet: 1 tab(s) orally once a day  atorvastatin 80 mg oral tablet: 1 tab(s) orally once a day (at bedtime)  carvedilol 3.125 mg oral tablet: 1 tab(s) orally every 12 hours  ferrous sulfate 325 mg (65 mg elemental iron) oral tablet: 1 tab(s) orally once a day  folic acid 1 mg oral tablet: 1 tab(s) orally once a day  furosemide 40 mg oral tablet: 1 tab(s) orally once a day  Plavix 75 mg oral tablet: 1 tab(s) orally once a day  tiotropium 18 mcg inhalation capsule: 1 cap(s) inhaled once a day

## 2021-02-26 NOTE — PROGRESS NOTE ADULT - SUBJECTIVE AND OBJECTIVE BOX
Nephrology Followup Note - [phone] - Dr Serna / Dr Yeung / Dr Lang / Dr Charles / Dr Garcia / Dr Méndez / Dr Pagan / Dr Rosario  Pt seen and examined at bedside  No acute events overnight. No complaints.   Denies SOB, pain, diarrhea Good appetite.     Allergies:  Novocain (Faint)    Hospital Medications:   MEDICATIONS  (STANDING):  amLODIPine   Tablet 10 milliGRAM(s) Oral daily  aspirin enteric coated 81 milliGRAM(s) Oral daily  atorvastatin 80 milliGRAM(s) Oral at bedtime  clopidogrel Tablet 75 milliGRAM(s) Oral daily  ferrous    sulfate 325 milliGRAM(s) Oral daily  folic acid 1 milliGRAM(s) Oral daily  furosemide   Injectable 40 milliGRAM(s) IV Push every 12 hours  heparin   Injectable 5000 Unit(s) SubCutaneous every 12 hours  insulin lispro (ADMELOG) corrective regimen sliding scale   SubCutaneous Before meals and at bedtime  pantoprazole    Tablet 40 milliGRAM(s) Oral before breakfast  tiotropium 18 MICROgram(s) Capsule 1 Capsule(s) Inhalation daily      VITALS:  T(F): 97.3 (21 @ 11:29), Max: 98 (21 @ 23:25)  HR: 67 (21 @ 11:29)  BP: 123/42 (21 @ 11:29)  RR: 18 (21 @ 11:29)  SpO2: 100% (21 @ 11:29)  Wt(kg): --     @ 07:01  -   @ 07:00  --------------------------------------------------------  IN: 120 mL / OUT: 350 mL / NET: -230 mL     @ 07:01  -   @ 13:06  --------------------------------------------------------  IN: 0 mL / OUT: 200 mL / NET: -200 mL        PHYSICAL EXAM:  Constitutional: NAD  HEENT: anicteric sclera, oropharynx clear, MMM  Neck: No JVD  Respiratory: CTAB, no wheezes, rales or rhonchi  Cardiovascular: S1, S2, RRR  Gastrointestinal: BS+, soft, NT/ND  Extremities: No cyanosis or clubbing. No peripheral edema  Neurological: A/O x 3, no focal deficits  Psychiatric: Normal mood, normal affect  : No CVA tenderness. condom cath   Skin: No rashes    LABS:      139  |  102  |  69<H>  ----------------------------<  151<H>  4.2   |  27  |  3.66<H>    Ca    8.4      2021 08:52  Phos  6.1       Mg     2.4         TPro  7.3  /  Alb  3.1<L>  /  TBili  0.3  /  DBili      /  AST  17  /  ALT  30  /  AlkPhos  201<H>      Creatinine Trend: 3.66 <--, 3.22 <--, 2.73 <--                        9.1    11.90 )-----------( 427      ( 2021 08:52 )             32.4     Urine Studies:  Urinalysis Basic - ( 2021 06:24 )    Color: Yellow / Appearance: Clear / S.015 / pH:   Gluc:  / Ketone: Negative  / Bili: Negative / Urobili: Negative   Blood:  / Protein: 15 / Nitrite: Negative   Leuk Esterase: Small / RBC: 2-5 /HPF / WBC 11-25 /HPF   Sq Epi:  / Non Sq Epi: Few /HPF / Bacteria: Few /HPF      Creatinine, Random Urine: 112 mg/dL ( @ 06:24)  Sodium, Random Urine: 15 mmol/L ( @ 06:24)  Osmolality, Random Urine: 341 mos/kg ( @ 06:24)    RADIOLOGY & ADDITIONAL STUDIES:

## 2021-02-26 NOTE — DISCHARGE NOTE PROVIDER - NSDCFUADDAPPT_GEN_ALL_CORE_FT
Follow up appointment with Dr. Kumari on Thursday, March 11th at 12:00 PM   [facility] Office  [address] [place] [address]  Phone: [phone]

## 2021-02-26 NOTE — DISCHARGE NOTE PROVIDER - PROVIDER TOKENS
PROVIDER:[TOKEN:[[id number]],SCHEDULEDAPPT:[03/11/2021],SCHEDULEDAPPTTIME:[12:00 PM],ESTABLISHEDPATIENT:[T]]

## 2021-02-26 NOTE — DISCHARGE NOTE PROVIDER - NSDCCPCAREPLAN_GEN_ALL_CORE_FT
PRINCIPAL DISCHARGE DIAGNOSIS  Diagnosis: CHF exacerbation  Assessment and Plan of Treatment:       SECONDARY DISCHARGE DIAGNOSES  Diagnosis: Lung consolidation  Assessment and Plan of Treatment:     Diagnosis: Anemia  Assessment and Plan of Treatment:     Diagnosis: RUSTY (acute kidney injury)  Assessment and Plan of Treatment:     Diagnosis: Demand ischemia of myocardium  Assessment and Plan of Treatment:     Diagnosis: COPD (chronic obstructive pulmonary disease)  Assessment and Plan of Treatment: on chest xray     PRINCIPAL DISCHARGE DIAGNOSIS  Diagnosis: CHF exacerbation  Assessment and Plan of Treatment: You came in with shortness of breath, cough and dizziness for two days likely due to a congestive heart failure exacerbation. Your oxygen saturation in your blood was very low, so we placed you on supplemental oxygen. You had a xray of your chest which showed some fluid congestion. You then had a follow up catscan which confirmed that there was fluid in your lungs. You were started on Lasix to help decrease the fluid in your body. Cardiology saw you and recommended a cardiac echo, which showed XYZ. Please follow up with your cardiologist and primary care provider.      SECONDARY DISCHARGE DIAGNOSES  Diagnosis: COPD (chronic obstructive pulmonary disease)  Assessment and Plan of Treatment: You have a history of COPD and came in with shortness of breath, dizziness and cough. Your oxygen saturation was very low so you were started on supplemental oxygen, which increased your oxygen levels. You had a chest xray and cat scan which showed fluid in your lungs. You were started on steroids, which were eventaully stopped, and inhalers to improve your breathing. Pulmonology saw you in the hospital. We monitored for any signs/symptoms of difficulty breathing. Please follow up with a pulmonologist and your primary care provider.    Diagnosis: Anemia  Assessment and Plan of Treatment: Your blood level was found to be low. We started you on oral iron tablets, which increased your blood level. Please follow up with your primary care provider.    Diagnosis: RUSTY (acute kidney injury)  Assessment and Plan of Treatment: You have a history of chronic kidney disease. Your kindey function was found to be elevated while in the hospital. Nephrology saw you in the hospital. We monitored your kidney function through blood tests and ordered an ultrasound of your kidneys and stomach which showed XYZ. Please follow up with a nephrologist and primary care provider.    Diagnosis: HLD (hyperlipidemia)  Assessment and Plan of Treatment: Your cholesterol levels were found to be increased. We started you on a statin to help decrease these levels. Please follow up with your primary care provider.    Diagnosis: Essential hypertension  Assessment and Plan of Treatment: You have a history of high blood pressure. Your blood pressure was elevated when you came to the ED. We started you on amlodipine, which you stated that you take at home. Your blood pressure has been stable and controlled while in the hospital. Please follow up with your primary care provider.    Diagnosis: Elevated alkaline phosphatase level  Assessment and Plan of Treatment: A blood level that measures your liver and biliary system function was increased. We ordered an ultrasound of your stomach which showed XYZ. Please follow up with your primary care provider.     PRINCIPAL DISCHARGE DIAGNOSIS  Diagnosis: CHF exacerbation  Assessment and Plan of Treatment: You came in with shortness of breath, cough and dizziness for two days likely due to a congestive heart failure exacerbation. Your oxygen saturation in your blood was very low, so we placed you on supplemental oxygen. You had a xray of your chest which showed some fluid congestion. You then had a follow up catscan which confirmed that there was fluid in your lungs. You were started on Lasix to help decrease the fluid in your body. Cardiology saw you and recommended a cardiac echo, which showed that your heard was not pumping as it should and was enlarged due to the fluid overload. You will be given oxygen to use at home. Please follow up with your cardiologist and primary care provider.      SECONDARY DISCHARGE DIAGNOSES  Diagnosis: COPD (chronic obstructive pulmonary disease)  Assessment and Plan of Treatment: You have a history of COPD and came in with shortness of breath, dizziness and cough. Your oxygen saturation was very low so you were started on supplemental oxygen, which increased your oxygen levels. You had a chest xray and cat scan which showed fluid in your lungs. You were started on steroids, which were eventually stopped, and inhalers to improve your breathing. Pulmonology saw you in the hospital. We monitored for any signs/symptoms of difficulty breathing. You will be give oxygen to use at home. Please follow up with a pulmonologist and your primary care provider.    Diagnosis: Anemia  Assessment and Plan of Treatment: Your blood level was found to be low. We started you on oral iron tablets, which increased your blood level. Please follow up with your primary care provider.    Diagnosis: RUSTY (acute kidney injury)  Assessment and Plan of Treatment: You have a history of chronic kidney disease. Your kindey function was found to be elevated while in the hospital. Nephrology saw you in the hospital. We monitored your kidney function through blood tests and ordered an ultrasound of your kidneys and stomach which showed some stones in your gallbladder and some evidence of kidney disease likely due to your chronic kidney disease and history of high blood pressure. Please follow up with a nephrologist and primary care provider.    Diagnosis: HLD (hyperlipidemia)  Assessment and Plan of Treatment: Your cholesterol levels were found to be increased. We started you on a statin to help decrease these levels. Please follow up with your primary care provider.    Diagnosis: Essential hypertension  Assessment and Plan of Treatment: You have a history of high blood pressure. Your blood pressure was elevated when you came to the ED. We started you on amlodipine, which you stated that you take at home. Your blood pressure has been stable and controlled while in the hospital. Please follow up with your primary care provider.    Diagnosis: Elevated alkaline phosphatase level  Assessment and Plan of Treatment: A blood level that measures your liver and biliary system function was increased. We ordered an ultrasound of your stomach which showed some stones in your gallbladder. Please follow up with your primary care provider.     PRINCIPAL DISCHARGE DIAGNOSIS  Diagnosis: CHF exacerbation  Assessment and Plan of Treatment: You came in with shortness of breath, cough and dizziness for two days likely due to a congestive heart failure exacerbation. Your oxygen saturation in your blood was very low, so we placed you on supplemental oxygen. You had a xray of your chest which showed some fluid congestion. You then had a follow up catscan which confirmed that there was fluid in your lungs. You were started on Lasix to help decrease the fluid in your body. Cardiology saw you and recommended a cardiac echo, which showed that your heard was not pumping as it should and was enlarged due to the fluid overload.  Ejection fraction was 65%. You will be given oxygen to use at home. Please follow up with your cardiologist and primary care provider. Take Lasix as prescribed.      SECONDARY DISCHARGE DIAGNOSES  Diagnosis: Elevated alkaline phosphatase level  Assessment and Plan of Treatment: A blood level that measures your liver and biliary system function was increased. We ordered an ultrasound of your stomach which showed some stones in your gallbladder. Please follow up with your primary care provider.    Diagnosis: Essential hypertension  Assessment and Plan of Treatment: You have a history of high blood pressure. Your blood pressure was elevated when you came to the ED. We started you on amlodipine, which you stated that you take at home. Your blood pressure has been stable and controlled while in the hospital. Please follow up with your primary care provider.    Diagnosis: HLD (hyperlipidemia)  Assessment and Plan of Treatment: Your cholesterol levels were found to be increased. We started you on a statin to help decrease these levels. Please follow up with your primary care provider.    Diagnosis: Anemia  Assessment and Plan of Treatment: Your blood level was found to be low. We started you on oral iron tablets, which increased your blood level. Please follow up with your primary care provider.    Diagnosis: RUSTY (acute kidney injury)  Assessment and Plan of Treatment: You have a history of chronic kidney disease. Your kindey function was found to be elevated while in the hospital to Creatinine 2.6. Nephrology saw you in the hospital. We monitored your kidney function through blood tests and ordered an ultrasound of your kidneys and stomach which showed some stones in your gallbladder and some evidence of kidney disease likely due to your chronic kidney disease and history of high blood pressure. Please follow up with a nephrologist and primary care provider.    Diagnosis: COPD (chronic obstructive pulmonary disease)  Assessment and Plan of Treatment: You have a history of COPD and came in with shortness of breath, dizziness and cough. Your oxygen saturation was very low so you were started on supplemental oxygen, which increased your oxygen levels. You had a chest xray and cat scan which showed fluid in your lungs. You were started on steroids, which were eventually stopped, and inhalers to improve your breathing. Pulmonology saw you in the hospital. We monitored for any signs/symptoms of difficulty breathing. You will be give oxygen to use at home. Please follow up with a pulmonologist and your primary care provider.     PRINCIPAL DISCHARGE DIAGNOSIS  Diagnosis: CHF exacerbation  Assessment and Plan of Treatment: You came in with shortness of breath, cough and dizziness for two days likely due to a congestive heart failure exacerbation. Your oxygen saturation in your blood was very low, so we placed you on supplemental oxygen. You had a xray of your chest which showed some fluid congestion. You then had a follow up catscan which confirmed that there was fluid in your lungs. You were started on Lasix to help decrease the fluid in your body. Cardiology saw you and recommended a cardiac echo, which showed that your heard was not pumping as it should and was enlarged due to the fluid overload.  Ejection fraction was 65%. You will be given oxygen to use at home. Please follow up with your cardiologist and primary care provider. Take Lasix as prescribed.      SECONDARY DISCHARGE DIAGNOSES  Diagnosis: COPD (chronic obstructive pulmonary disease)  Assessment and Plan of Treatment: You have a history of COPD and came in with shortness of breath, dizziness and cough. Your oxygen saturation was very low so you were started on supplemental oxygen, which increased your oxygen levels. You had a chest xray and cat scan which showed fluid in your lungs. You were started on steroids, which were eventually stopped, and inhalers to improve your breathing. Pulmonology saw you in the hospital. We monitored for any signs/symptoms of difficulty breathing. You will be give oxygen to use at home. Please continue to use your albuterol as needed and spiriva daily. Please follow up with a pulmonologist and your primary care provider.    Diagnosis: Anemia  Assessment and Plan of Treatment: Your blood level was found to be low. We started you on oral iron tablets, which increased your blood level. Take Iron and Folate as prescribed. Please follow up with your primary care provider.    Diagnosis: RUSTY (acute kidney injury)  Assessment and Plan of Treatment: You have a history of chronic kidney disease. Your kindey function was found to be elevated while in the hospital to Creatinine 2.6. Nephrology saw you in the hospital. We monitored your kidney function through blood tests and ordered an ultrasound of your kidneys and stomach which showed some stones in your gallbladder and some evidence of kidney disease likely due to your chronic kidney disease and history of high blood pressure. Please follow up with a nephrologist and primary care provider.    Diagnosis: HLD (hyperlipidemia)  Assessment and Plan of Treatment: Your cholesterol levels were found to be increased. You were originally started on 40mg Atorvastatin, but we later increased this to 80mg to help further decrease these levels. Please follow up with your primary care provider. Take Atorvastatin as prescribed.    Diagnosis: Essential hypertension  Assessment and Plan of Treatment: You have a history of high blood pressure. Your blood pressure was elevated when you came to the ED. We started you on Amlodipine, which you stated that you take at home. Your blood pressure has been stable and controlled while in the hospital. Please follow up with your primary care provider. Take Amlodipine as prescribed.    Diagnosis: Elevated alkaline phosphatase level  Assessment and Plan of Treatment: A blood level that measures your liver and biliary system function was increased. We ordered an ultrasound of your stomach which showed some stones in your gallbladder. Please follow up with your primary care provider.    Diagnosis: CAD (coronary artery disease)  Assessment and Plan of Treatment: You have a history of coronary artery disease. You were started on Aspirin and Plavix. Please take these medications as prescribed.     PRINCIPAL DISCHARGE DIAGNOSIS  Diagnosis: CHF exacerbation  Assessment and Plan of Treatment: You came in with shortness of breath, cough and dizziness for two days likely due to a congestive heart failure exacerbation. Your oxygen saturation in your blood was very low, so we placed you on supplemental oxygen. You had a xray of your chest which showed some fluid congestion. You then had a follow up catscan which confirmed that there was fluid in your lungs. You were started on Lasix to help decrease the fluid in your body. Cardiology saw you and recommended a cardiac echo, which showed that your heard was not pumping as it should and was enlarged due to the fluid overload.  Ejection fraction was 65%. You will be given oxygen to use at home. Please follow up with your cardiologist and primary care provider. Take Lasix as prescribed.      SECONDARY DISCHARGE DIAGNOSES  Diagnosis: COPD (chronic obstructive pulmonary disease)  Assessment and Plan of Treatment: You have a history of COPD and came in with shortness of breath, dizziness and cough. Your oxygen saturation was very low so you were started on supplemental oxygen, which increased your oxygen levels. You had a chest xray and cat scan which showed fluid in your lungs. You were started on steroids, which were eventually stopped, and inhalers to improve your breathing. Pulmonology saw you in the hospital. We monitored for any signs/symptoms of difficulty breathing. You will be give oxygen to use at home. Please continue to use your albuterol as needed and spiriva daily. Please follow up with a pulmonologist and your primary care provider.    Diagnosis: Anemia  Assessment and Plan of Treatment: Your blood level was found to be low. We started you on oral iron tablets, which increased your blood level. Take Iron and Folate as prescribed. Please follow up with your primary care provider.    Diagnosis: RUSTY (acute kidney injury)  Assessment and Plan of Treatment: You have a history of chronic kidney disease. Your kindey function was found to be elevated while in the hospital to Creatinine 2.6. Nephrology saw you in the hospital. We monitored your kidney function through blood tests and ordered an ultrasound of your kidneys and stomach which showed some stones in your gallbladder and some evidence of kidney disease likely due to your chronic kidney disease and history of high blood pressure. Please follow up with a nephrologist and primary care provider.    Diagnosis: HLD (hyperlipidemia)  Assessment and Plan of Treatment: Your cholesterol levels were found to be increased. You were originally started on 40mg Atorvastatin, but we later increased this to 80mg to help further decrease these levels. Please follow up with your primary care provider. Take Atorvastatin, Aspirin and Plavix as prescribed.    Diagnosis: Essential hypertension  Assessment and Plan of Treatment: You have a history of high blood pressure. Your blood pressure was elevated when you came to the ED. We started you on Amlodipine, which you stated that you take at home. Your blood pressure has been stable and controlled while in the hospital. Please follow up with your primary care provider. Take Amlodipine as prescribed.    Diagnosis: Elevated alkaline phosphatase level  Assessment and Plan of Treatment: A blood level that measures your liver and biliary system function was increased. We ordered an ultrasound of your stomach which showed some stones in your gallbladder. Please follow up with your primary care provider.    Diagnosis: CAD (coronary artery disease)  Assessment and Plan of Treatment: You have a history of coronary artery disease. You were started on Aspirin and Plavix. Please take these medications as prescribed. Follow up with your primary care provider and cardiologist.     PRINCIPAL DISCHARGE DIAGNOSIS  Diagnosis: CHF exacerbation  Assessment and Plan of Treatment: You came in with shortness of breath, cough and dizziness for two days due to a congestive heart failure exacerbation. Your oxygen saturation in your blood was very low, so we placed you on supplemental oxygen. You had a xray of your chest which showed some fluid congestion. You then had a follow up ct scan which confirmed that there was fluid in your lungs. You were started on Lasix to help decrease the fluid in your body. Cardiology saw you and recommended a cardiac echo, which showed that your heard was not pumping as it should and was enlarged due to the fluid overload.  Ejection fraction was 65%. You will be given oxygen to use at home. Take Lasix, coreg, statin, aspirin as prescribed. You are not on any ACE/ARB medication due to renal dysfunction. Please follow up with your cardiologist and primary care provider.      SECONDARY DISCHARGE DIAGNOSES  Diagnosis: CAD (coronary artery disease)  Assessment and Plan of Treatment: You have a history of coronary artery disease. You were started on Aspirin and Plavix. Please take these medications as prescribed. Follow up with your primary care provider and cardiologist.    Diagnosis: Elevated alkaline phosphatase level  Assessment and Plan of Treatment: A blood level that measures your liver and biliary system function was increased. We ordered an ultrasound of your stomach which showed some stones in your gallbladder, which does not need any intervention. Please follow up with your primary care provider.    Diagnosis: Essential hypertension  Assessment and Plan of Treatment: You have a history of high blood pressure. Your blood pressure was elevated when you came to the ED. We started you on Amlodipine, which you stated that you take at home. Your blood pressure has been stable and controlled while in the hospital. Please follow up with your primary care provider. Take Amlodipine 5mg as prescribed.    Diagnosis: HLD (hyperlipidemia)  Assessment and Plan of Treatment: Your cholesterol levels were found to be increased. You were originally started on 40mg Atorvastatin, but we later increased this to 80mg to help further decrease these levels. Please follow up with your primary care provider. Take Atorvastatin, Aspirin and Plavix as prescribed.    Diagnosis: Anemia  Assessment and Plan of Treatment: Your blood level was found to be low. We started you on oral iron tablets, which increased your blood level. Take Iron and Folate as prescribed. Please follow up with your primary care provider.    Diagnosis: RUSTY (acute kidney injury)  Assessment and Plan of Treatment: You have a history of chronic kidney disease. Your kindey function was found to be elevated while in the hospital to Creatinine 2.6. Nephrology saw you in the hospital. We monitored your kidney function through blood tests and ordered an ultrasound of your kidneys and stomach which showed some stones in your gallbladder and some evidence of kidney disease likely due to your chronic kidney disease and history of high blood pressure. Please follow up with a nephrologist and primary care provider.    Diagnosis: COPD (chronic obstructive pulmonary disease)  Assessment and Plan of Treatment: You have a history of COPD and came in with shortness of breath, dizziness and cough. Your oxygen saturation was very low so you were started on supplemental oxygen, which increased your oxygen levels. You had a chest xray and cat scan which showed fluid in your lungs. You were started on steroids, which were eventually stopped, and inhalers to improve your breathing. Pulmonology saw you in the hospital. We monitored for any signs/symptoms of difficulty breathing. You will be give oxygen to use at home. Please continue to use your albuterol as needed and spiriva daily. Please follow up with a pulmonologist and your primary care provider.

## 2021-02-26 NOTE — PROGRESS NOTE ADULT - ASSESSMENT
79 yo M with HTN, HLD, T2DM, CAD s/p MI and PCI (FEMI to mLCx 2016, RCA with  and good collaterals), COPD not on home O2, CKD III, and PAD s/p stenting (L SFA 2019) who presented with mild dyspnea and decreased O2 saturation.    1. 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   -Hypoxia improved  -Echocardiogram showed normalization of EF; valvular regurgitation grossly stable from prior    2. CAD s/p PCI:  On aspirin, clopidogrel, atorvastatin, and Imdur  -Likely not on beta blocker in setting of COPD    3. HTN: On amlodipine  -Holding off ACEi/ARB due to RUSTY on CKD    4. HLD: On atorvastatin, suboptimally controlled  -Increased atorvastatin to 80mg PO QHS    ***Note that this is a preliminary note and any recommendations should NOT be carried out until this note is finalized. *** 81 yo M with HTN, HLD, T2DM, CAD s/p MI and PCI (FEMI to mLCx 2016, RCA with  and good collaterals), COPD not on home O2, CKD III, and PAD s/p stenting (L SFA 2019) who presented with mild dyspnea and decreased O2 saturation.    1. 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   -Hypoxia improved  -Echocardiogram showed normalization of EF; valvular regurgitation grossly stable from prior    2. CAD s/p PCI:  On aspirin, clopidogrel, atorvastatin, and Imdur  -Likely not on beta blocker in setting of COPD    3. HTN: On amlodipine  -Holding off ACEi/ARB due to RUSTY on CKD    4. HLD: On atorvastatin, suboptimally controlled  -Increased atorvastatin to 80mg PO QHS    5. NSVT: Patient has no symptoms, this is usually not concern if EF >40%  -Recommend trial of low-dose beta blocker (carvedilol 3.125mg Q12H) given his history of MI and prior cardiomyopathy.     ***Note that this is a preliminary note and any recommendations should NOT be carried out until this note is finalized. *** 79 yo M with HTN, HLD, T2DM, CAD s/p MI and PCI (FEMI to mLCx 2016, RCA with  and good collaterals), COPD not on home O2, CKD III, and PAD s/p stenting (L SFA 2019) who presented with mild dyspnea and decreased O2 saturation.    1. 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   -Hypoxia improved  -Echocardiogram showed normalization of EF; valvular regurgitation grossly stable from prior  -Patient encouraged to ambulate with PT to help mobilize fluid    2. CAD s/p PCI:  On aspirin, clopidogrel, atorvastatin, and Imdur  -Add on carvedilol 3.125mg Q12H as per below    3. HTN: On amlodipine  -Holding off ACEi/ARB due to RUSTY on CKD    4. HLD: On atorvastatin, suboptimally controlled  -Increased atorvastatin to 80mg PO QHS    5. NSVT: Patient has no symptoms, this is usually not concern if EF >40%  -Recommend trial of low-dose beta blocker (carvedilol 3.125mg Q12H) given his history of MI and prior cardiomyopathy.

## 2021-02-27 LAB
ALBUMIN SERPL ELPH-MCNC: 3 G/DL — LOW (ref 3.5–5)
ALP SERPL-CCNC: 187 U/L — HIGH (ref 40–120)
ALT FLD-CCNC: 30 U/L DA — SIGNIFICANT CHANGE UP (ref 10–60)
ANION GAP SERPL CALC-SCNC: 9 MMOL/L — SIGNIFICANT CHANGE UP (ref 5–17)
AST SERPL-CCNC: 12 U/L — SIGNIFICANT CHANGE UP (ref 10–40)
BILIRUB SERPL-MCNC: 0.4 MG/DL — SIGNIFICANT CHANGE UP (ref 0.2–1.2)
BUN SERPL-MCNC: 84 MG/DL — HIGH (ref 7–18)
CALCIUM SERPL-MCNC: 8.1 MG/DL — LOW (ref 8.4–10.5)
CHLORIDE SERPL-SCNC: 104 MMOL/L — SIGNIFICANT CHANGE UP (ref 96–108)
CO2 SERPL-SCNC: 28 MMOL/L — SIGNIFICANT CHANGE UP (ref 22–31)
CREAT SERPL-MCNC: 3.19 MG/DL — HIGH (ref 0.5–1.3)
GLUCOSE BLDC GLUCOMTR-MCNC: 137 MG/DL — HIGH (ref 70–99)
GLUCOSE BLDC GLUCOMTR-MCNC: 145 MG/DL — HIGH (ref 70–99)
GLUCOSE BLDC GLUCOMTR-MCNC: 174 MG/DL — HIGH (ref 70–99)
GLUCOSE BLDC GLUCOMTR-MCNC: 184 MG/DL — HIGH (ref 70–99)
GLUCOSE SERPL-MCNC: 129 MG/DL — HIGH (ref 70–99)
HCT VFR BLD CALC: 29.1 % — LOW (ref 39–50)
HGB BLD-MCNC: 8.6 G/DL — LOW (ref 13–17)
MAGNESIUM SERPL-MCNC: 2.4 MG/DL — SIGNIFICANT CHANGE UP (ref 1.6–2.6)
MCHC RBC-ENTMCNC: 26.7 PG — LOW (ref 27–34)
MCHC RBC-ENTMCNC: 29.6 GM/DL — LOW (ref 32–36)
MCV RBC AUTO: 90.4 FL — SIGNIFICANT CHANGE UP (ref 80–100)
NRBC # BLD: 1 /100 WBCS — HIGH (ref 0–0)
PHOSPHATE SERPL-MCNC: 4.9 MG/DL — HIGH (ref 2.5–4.5)
PLATELET # BLD AUTO: 405 K/UL — HIGH (ref 150–400)
POTASSIUM SERPL-MCNC: 4.2 MMOL/L — SIGNIFICANT CHANGE UP (ref 3.5–5.3)
POTASSIUM SERPL-SCNC: 4.2 MMOL/L — SIGNIFICANT CHANGE UP (ref 3.5–5.3)
PROT SERPL-MCNC: 6.9 G/DL — SIGNIFICANT CHANGE UP (ref 6–8.3)
RBC # BLD: 3.22 M/UL — LOW (ref 4.2–5.8)
RBC # FLD: 18.6 % — HIGH (ref 10.3–14.5)
SODIUM SERPL-SCNC: 141 MMOL/L — SIGNIFICANT CHANGE UP (ref 135–145)
WBC # BLD: 14.06 K/UL — HIGH (ref 3.8–10.5)
WBC # FLD AUTO: 14.06 K/UL — HIGH (ref 3.8–10.5)

## 2021-02-27 PROCEDURE — 76700 US EXAM ABDOM COMPLETE: CPT | Mod: 26

## 2021-02-27 PROCEDURE — 99232 SBSQ HOSP IP/OBS MODERATE 35: CPT

## 2021-02-27 PROCEDURE — 99233 SBSQ HOSP IP/OBS HIGH 50: CPT

## 2021-02-27 RX ORDER — LANOLIN ALCOHOL/MO/W.PET/CERES
3 CREAM (GRAM) TOPICAL AT BEDTIME
Refills: 0 | Status: DISCONTINUED | OUTPATIENT
Start: 2021-02-27 | End: 2021-03-03

## 2021-02-27 RX ADMIN — Medication 1: at 21:29

## 2021-02-27 RX ADMIN — CARVEDILOL PHOSPHATE 3.12 MILLIGRAM(S): 80 CAPSULE, EXTENDED RELEASE ORAL at 17:51

## 2021-02-27 RX ADMIN — Medication 40 MILLIGRAM(S): at 06:24

## 2021-02-27 RX ADMIN — PANTOPRAZOLE SODIUM 40 MILLIGRAM(S): 20 TABLET, DELAYED RELEASE ORAL at 06:24

## 2021-02-27 RX ADMIN — AMLODIPINE BESYLATE 10 MILLIGRAM(S): 2.5 TABLET ORAL at 06:24

## 2021-02-27 RX ADMIN — Medication 40 MILLIGRAM(S): at 17:51

## 2021-02-27 RX ADMIN — Medication 1 MILLIGRAM(S): at 11:47

## 2021-02-27 RX ADMIN — ATORVASTATIN CALCIUM 80 MILLIGRAM(S): 80 TABLET, FILM COATED ORAL at 21:29

## 2021-02-27 RX ADMIN — Medication 1: at 12:06

## 2021-02-27 RX ADMIN — Medication 81 MILLIGRAM(S): at 11:47

## 2021-02-27 RX ADMIN — Medication 325 MILLIGRAM(S): at 11:47

## 2021-02-27 RX ADMIN — HEPARIN SODIUM 5000 UNIT(S): 5000 INJECTION INTRAVENOUS; SUBCUTANEOUS at 06:24

## 2021-02-27 RX ADMIN — TIOTROPIUM BROMIDE 1 CAPSULE(S): 18 CAPSULE ORAL; RESPIRATORY (INHALATION) at 12:08

## 2021-02-27 RX ADMIN — CLOPIDOGREL BISULFATE 75 MILLIGRAM(S): 75 TABLET, FILM COATED ORAL at 11:47

## 2021-02-27 RX ADMIN — CARVEDILOL PHOSPHATE 3.12 MILLIGRAM(S): 80 CAPSULE, EXTENDED RELEASE ORAL at 06:24

## 2021-02-27 RX ADMIN — HEPARIN SODIUM 5000 UNIT(S): 5000 INJECTION INTRAVENOUS; SUBCUTANEOUS at 17:51

## 2021-02-27 NOTE — PHYSICAL THERAPY INITIAL EVALUATION ADULT - GENERAL OBSERVATIONS, REHAB EVAL
pt pmh copd from home, on supplemental nc 02, independent personal quad cane transfers, stairs negotiations and bedside ambulation with steady gait

## 2021-02-27 NOTE — PROGRESS NOTE ADULT - SUBJECTIVE AND OBJECTIVE BOX
Nephrology Followup Note - [phone] - Dr Serna / Dr Yeung / Dr Lang / Dr Charles / Dr Garcia / Dr Méndez / Dr Pagan / Dr Rosario  Pt seen and examined at bedside  No acute events overnight. No complaints. Denies SOB or cough  Tolerating meals     Allergies:  Novocain (Faint)    Hospital Medications:   MEDICATIONS  (STANDING):  amLODIPine   Tablet 10 milliGRAM(s) Oral daily  aspirin enteric coated 81 milliGRAM(s) Oral daily  atorvastatin 80 milliGRAM(s) Oral at bedtime  carvedilol 3.125 milliGRAM(s) Oral every 12 hours  clopidogrel Tablet 75 milliGRAM(s) Oral daily  ferrous    sulfate 325 milliGRAM(s) Oral daily  folic acid 1 milliGRAM(s) Oral daily  furosemide   Injectable 40 milliGRAM(s) IV Push every 12 hours  heparin   Injectable 5000 Unit(s) SubCutaneous every 12 hours  insulin lispro (ADMELOG) corrective regimen sliding scale   SubCutaneous Before meals and at bedtime  pantoprazole    Tablet 40 milliGRAM(s) Oral before breakfast  tiotropium 18 MICROgram(s) Capsule 1 Capsule(s) Inhalation daily      VITALS:  T(F): 97.5 (21 @ 11:32), Max: 97.8 (21 @ 05:01)  HR: 63 (21 @ 11:32)  BP: 135/63 (21 @ 11:32)  RR: 18 (21 @ 11:32)  SpO2: 95% (21 @ 11:32)  Wt(kg): --     @ 07:01  -   @ 07:00  --------------------------------------------------------  IN: 838 mL / OUT: 1530 mL / NET: -692 mL     @ 07:01  -   @ 11:54  --------------------------------------------------------  IN: 0 mL / OUT: 450 mL / NET: -450 mL        PHYSICAL EXAM:  Constitutional: NAD  HEENT: anicteric sclera, oropharynx clear, MMM  Neck: No JVD  Respiratory: CTAB, no wheezes, rales or rhonchi  Cardiovascular: S1, S2, RRR  Gastrointestinal: BS+, soft, NT/ND  Extremities: No cyanosis or clubbing. No peripheral edema  Neurological: A/O x 3, no focal deficits  Psychiatric: Normal mood, normal affect  : No CVA tenderness. No swenson.   Skin: No rashes    LABS:      141  |  104  |  84<H>  ----------------------------<  129<H>  4.2   |  28  |  3.19<H>    Ca    8.1<L>      2021 08:22  Phos  4.9       Mg     2.4         TPro  6.9  /  Alb  3.0<L>  /  TBili  0.4  /  DBili      /  AST  12  /  ALT  30  /  AlkPhos  187<H>      Creatinine Trend: 3.19 <--, 3.66 <--, 3.22 <--, 2.73 <--                        8.6    14.06 )-----------( 405      ( 2021 08:22 )             29.1     Urine Studies:  Urinalysis Basic - ( 2021 06:24 )    Color: Yellow / Appearance: Clear / S.015 / pH:   Gluc:  / Ketone: Negative  / Bili: Negative / Urobili: Negative   Blood:  / Protein: 15 / Nitrite: Negative   Leuk Esterase: Small / RBC: 2-5 /HPF / WBC 11-25 /HPF   Sq Epi:  / Non Sq Epi: Few /HPF / Bacteria: Few /HPF      Creatinine, Random Urine: 112 mg/dL ( @ 06:24)  Sodium, Random Urine: 15 mmol/L ( @ 06:24)  Osmolality, Random Urine: 341 mos/kg ( @ 06:24)    RADIOLOGY & ADDITIONAL STUDIES:

## 2021-02-27 NOTE — PROGRESS NOTE ADULT - PROBLEM SELECTOR PLAN 1
CXR - b/l pulmonary edema - now improved  c/w IV Lasix 40 BID  Strict Is&Os and daily weights  Trops trending down, elevation likely 2/2 demand ischemia  BNP 3706  F/u cardiac echo  Cardio Dr. Boles

## 2021-02-27 NOTE — PROGRESS NOTE ADULT - PROBLEM SELECTOR PLAN 4
Hg slight downtrend 8.6  Iron studies likely ANNA  C/w oral iron supplement  Monitor for s/s bleeding

## 2021-02-27 NOTE — PROGRESS NOTE ADULT - PROBLEM SELECTOR PLAN 3
3.19, baseline Cr 1.93 improving  f/u urine urea  Voiding freely, no post void residual  f/u renal US  Nephro Dr Rosario

## 2021-02-27 NOTE — PROGRESS NOTE ADULT - SUBJECTIVE AND OBJECTIVE BOX
Pt feels better, able to ambulate. O2 supp down to 3L n/c.    REVIEW OF SYSTEMS:  Constitutional: [ ] fever, [ ]weight loss,  [ ]fatigue  Eyes: [ ] visual changes  Respiratory: [ ]shortness of breath;  [ ] cough, [ ]wheezing, [ ]chills, [ ]hemoptysis  Cardiovascular: [ ] chest pain, [ ]palpitations, [ ]dizziness,  [ ]leg swelling [ ]syncope  Gastrointestinal: [ ] abdominal pain, [ ]nausea, [ ]vomiting,  [ ]diarrhea   Genitourinary: [ ] dysuria, [ ] hematuria  Neurologic: [ ] headaches [ ] tremors  [ ] weakness [ ] lightheadedness  Skin: [ ] itching, [ ]burning, [ ] rashes  Endocrine: [ ] heat or cold intolerance  Musculoskeletal: [ ] joint pain or swelling; [ ] muscle, back, or extremity pain  Psychiatric: [ ] depression, [ ]anxiety, [ ]mood swings, or [ ]difficulty sleeping  Hematologic: [ ] easy bruising, [ ] bleeding gums       [ x] All others negative	  [ ] Unable to obtain      Vital Signs Last 24 Hrs  T(C): 36.4 (27 Feb 2021 11:32), Max: 36.6 (27 Feb 2021 05:01)  T(F): 97.5 (27 Feb 2021 11:32), Max: 97.8 (27 Feb 2021 05:01)  HR: 63 (27 Feb 2021 11:32) (57 - 73)  BP: 135/63 (27 Feb 2021 11:32) (119/98 - 138/54)  BP(mean): --  RR: 18 (27 Feb 2021 11:32) (17 - 18)  SpO2: 95% (27 Feb 2021 11:32) (93% - 100%)                   PHYSICAL EXAM:  General: No acute distress  HEENT: EOMI, PERRL  Neck: Supple, No JVD  Lungs: Decreased BS at bases B/L with rales, no wheezing  Heart: Regular rate and rhythm; No murmurs, rubs, or gallops  Abdomen: Nontender, bowel sounds present  Extremities: No clubbing, cyanosis, or edema  Nervous system:  Alert & Oriented X3, no focal deficits  Psychiatric: Normal affect  Skin: No rashes or lesions      LABS:                          8.6    14.06 )-----------( 405      ( 27 Feb 2021 08:22 )             29.1   02-27    141  |  104  |  84<H>  ----------------------------<  129<H>  4.2   |  28  |  3.19<H>    Ca    8.1<L>      27 Feb 2021 08:22  Phos  4.9     02-27  Mg     2.4     02-27    TPro  6.9  /  Alb  3.0<L>  /  TBili  0.4  /  DBili  x   /  AST  12  /  ALT  30  /  AlkPhos  187<H>  02-27

## 2021-02-27 NOTE — PROGRESS NOTE ADULT - SUBJECTIVE AND OBJECTIVE BOX
PGY 1 Note discussed with supervising resident and primary attending  PGY-1: Tonia Riddle MD  PAGER #: [phone] TILL 5:00 PM  Please contact On Call team 5PM - 8:30PM  Please contact Nightfloat team 8:30PM - 7:30AM  Patient is a 80y old  Male who presents with a chief complaint of Hypoxia (2021 14:47)    Brief Hospital Course:  80 year old male with medical history of  HTN, HLD, DM, CAD on ASA/Plavix, PAD, CKD, prostate ca s/p radiation, anemia, COPD not on home O2, PAD, presents with SOB, dry cough, and dizziness x 2 days. Patient states he went to  Dr. Kumari cardio today and reportedly saturating. 44% on RA and patient was sent to ED. Patient states he feels little better. Denied any fever or phlegm production in the ED. He added that he uses 2 pillows at night to sleep. Spoke to patients partner, she states patient was prescribed on water pill , but he never likes to take it. Found to be saturating 53% on RA, placed on 6L NC. Called patient's cardiologist, Dr. Amanda Kumari, who stated that the patient saw her yesterday  for regular follow up visit and was saturating at 44%, otherwise vitals were normal. Placed on O2 in the office but is not currently on O2 at home as it was taken away from him many years ago. Dr. Kumari stated that the patient felt dizzy, weak, tired and vomited when he walked into the office. Stated patient has a hx of PAD, latest cardiac stent in , carotid endarterectomy in 2019, stents in RLE and a recent LLE stent placement. Cardio Dr. Boles and Pulm Dr. Urias.    GOC: full code    INTERVAL HPI/OVERNIGHT EVENTS: No acute events noted overnight. Pt feeling well. Reports frequent urination. C/w diuresis. No events on telemetry.     MEDICATIONS  (STANDING):  amLODIPine   Tablet 10 milliGRAM(s) Oral daily  aspirin enteric coated 81 milliGRAM(s) Oral daily  atorvastatin 80 milliGRAM(s) Oral at bedtime  carvedilol 3.125 milliGRAM(s) Oral every 12 hours  clopidogrel Tablet 75 milliGRAM(s) Oral daily  ferrous    sulfate 325 milliGRAM(s) Oral daily  folic acid 1 milliGRAM(s) Oral daily  furosemide   Injectable 40 milliGRAM(s) IV Push every 12 hours  heparin   Injectable 5000 Unit(s) SubCutaneous every 12 hours  insulin lispro (ADMELOG) corrective regimen sliding scale   SubCutaneous Before meals and at bedtime  pantoprazole    Tablet 40 milliGRAM(s) Oral before breakfast  tiotropium 18 MICROgram(s) Capsule 1 Capsule(s) Inhalation daily    MEDICATIONS  (PRN):  ALBUTerol    90 MICROgram(s) HFA Inhaler 2 Puff(s) Inhalation every 6 hours PRN Shortness of Breath and/or Wheezing      __________________________________________________  REVIEW OF SYSTEMS:  CONSTITUTIONAL: No fever, weight loss, or fatigue  RESPIRATORY: + SOB at night, No cough, wheezing, chills or hemoptysis; No shortness of breath  CARDIOVASCULAR:  No chest pain, palpitations, dizziness, or leg swelling  GASTROINTESTINAL: No abdominal pain. No nausea, vomiting, or hematemesis; No diarrhea or constipation. No melena or hematochezia.  GENITOURINARY: No dysuria or hematuria, urinary frequency  NEUROLOGICAL: No headaches, memory loss, loss of strength, numbness, or tremors  SKIN: No itching, burning, rashes, or lesions       Vital Signs Last 24 Hrs  T(C): 36.4 (2021 08:20), Max: 36.6 (2021 05:01)  T(F): 97.5 (2021 08:20), Max: 97.8 (2021 05:01)  HR: 57 (2021 08:20) (57 - 73)  BP: 132/68 (2021 08:20) (119/98 - 138/54)  BP(mean): --  RR: 17 (2021 08:20) (17 - 18)  SpO2: 95% (2021 08:20) (95% - 100%)    ________________________________________________  PHYSICAL EXAMINATION:  GENERAL: NAD, well built  HEAD:  Atraumatic, Normocephalic  EYES:  conjunctiva and sclera clear  NECK: Supple, No JVD  CHEST/LUNG: + slight crackles in RLL, + slight decreased air entry b/l - improved Clear to auscultation. Clear to percussion bilaterally; No rales, rhonchi, wheezing, or rubs  HEART: + AS murmur, Regular rate and rhythm; No rubs, or gallops  ABDOMEN: + distended, Soft, Nontender; Bowel sounds present  NERVOUS SYSTEM:  Alert & Oriented X3,    EXTREMITIES:  2+ Peripheral Pulses, No clubbing, cyanosis, or edema  SKIN: warm dry  _________________________________________________  LABS:                        8.6    14.06 )-----------( 405      ( 2021 08:22 )             29.1         141  |  104  |  84<H>  ----------------------------<  129<H>  4.2   |  28  |  3.19<H>    Ca    8.1<L>      2021 08:22  Phos  4.9       Mg     2.4         TPro  6.9  /  Alb  3.0<L>  /  TBili  0.4  /  DBili  x   /  AST  12  /  ALT  30  /  AlkPhos  187<H>        Urinalysis Basic - ( 2021 06:24 )    Color: Yellow / Appearance: Clear / S.015 / pH: x  Gluc: x / Ketone: Negative  / Bili: Negative / Urobili: Negative   Blood: x / Protein: 15 / Nitrite: Negative   Leuk Esterase: Small / RBC: 2-5 /HPF / WBC 11-25 /HPF   Sq Epi: x / Non Sq Epi: Few /HPF / Bacteria: Few /HPF      CAPILLARY BLOOD GLUCOSE      POCT Blood Glucose.: 145 mg/dL (2021 07:44)  POCT Blood Glucose.: 156 mg/dL (2021 21:41)  POCT Blood Glucose.: 159 mg/dL (2021 16:38)  POCT Blood Glucose.: 233 mg/dL (2021 11:22)        RADIOLOGY & ADDITIONAL TESTS:    Imaging Personally Reviewed:  YES    Consultant(s) Notes Reviewed:   YES    Care Discussed with Consultants : YES     Plan of care was discussed with patient and /or primary care giver; all questions and concerns were addressed and care was aligned with patient's wishes.     PGY 1 Note discussed with supervising resident and primary attending  PGY-1: Tonia Riddle MD  PAGER #: [phone] TILL 5:00 PM  Please contact On Call team 5PM - 8:30PM  Please contact Nightfloat team 8:30PM - 7:30AM  Patient is a 80y old  Male who presents with a chief complaint of Hypoxia (2021 14:47)    80 year old male with medical history of  HTN, HLD, DM, CAD on ASA/Plavix, PAD, CKD, prostate ca s/p radiation, anemia, COPD not on home O2, PAD, presents with SOB, dry cough, and dizziness x 2 days. Patient states he went to  Dr. Kumari cardio today and reportedly saturating. 44% on RA and patient was sent to ED. Patient states he feels little better. Denied any fever or phlegm production in the ED. He added that he uses 2 pillows at night to sleep. Spoke to patients partner, she states patient was prescribed on water pill , but he never likes to take it. Found to be saturating 53% on RA, placed on 6L NC. Called patient's cardiologist, Dr. Amanda Kumari, who stated that the patient saw her yesterday  for regular follow up visit and was saturating at 44%, otherwise vitals were normal. Placed on O2 in the office but is not currently on O2 at home as it was taken away from him many years ago. Dr. Kumari stated that the patient felt dizzy, weak, tired and vomited when he walked into the office. Stated patient has a hx of PAD, latest cardiac stent in , carotid endarterectomy in 2019, stents in RLE and a recent LLE stent placement. Cardio Dr. Boles and Pulm Dr. Urias.    GOC: full code  INTERVAL HPI/OVERNIGHT EVENTS: No acute events noted overnight. Pt feeling well. Reports frequent urination. C/w diuresis. No events on telemetry.     MEDICATIONS  (STANDING):  amLODIPine   Tablet 10 milliGRAM(s) Oral daily  aspirin enteric coated 81 milliGRAM(s) Oral daily  atorvastatin 80 milliGRAM(s) Oral at bedtime  carvedilol 3.125 milliGRAM(s) Oral every 12 hours  clopidogrel Tablet 75 milliGRAM(s) Oral daily  ferrous    sulfate 325 milliGRAM(s) Oral daily  folic acid 1 milliGRAM(s) Oral daily  furosemide   Injectable 40 milliGRAM(s) IV Push every 12 hours  heparin   Injectable 5000 Unit(s) SubCutaneous every 12 hours  insulin lispro (ADMELOG) corrective regimen sliding scale   SubCutaneous Before meals and at bedtime  pantoprazole    Tablet 40 milliGRAM(s) Oral before breakfast  tiotropium 18 MICROgram(s) Capsule 1 Capsule(s) Inhalation daily    MEDICATIONS  (PRN):  ALBUTerol    90 MICROgram(s) HFA Inhaler 2 Puff(s) Inhalation every 6 hours PRN Shortness of Breath and/or Wheezing  _________________________________________________  REVIEW OF SYSTEMS:  CONSTITUTIONAL: No fever, weight loss, or fatigue  RESPIRATORY: + SOB at night, No cough, wheezing, chills or hemoptysis; No shortness of breath  CARDIOVASCULAR:  No chest pain, palpitations, dizziness, or leg swelling  GASTROINTESTINAL: No abdominal pain. No nausea, vomiting, or hematemesis; No diarrhea or constipation. No melena or hematochezia.  GENITOURINARY: No dysuria or hematuria, urinary frequency  NEUROLOGICAL: No headaches, memory loss, loss of strength, numbness, or tremors  SKIN: No itching, burning, rashes, or lesions     Vital Signs Last 24 Hrs  T(C): 36.4 (2021 08:20), Max: 36.6 (2021 05:01)  T(F): 97.5 (2021 08:20), Max: 97.8 (2021 05:01)  HR: 57 (2021 08:20) (57 - 73)  BP: 132/68 (2021 08:20) (119/98 - 138/54)  BP(mean): --  RR: 17 (2021 08:20) (17 - 18)  SpO2: 95% (2021 08:20) (95% - 100%)    ________________________________________________  PHYSICAL EXAMINATION:  GENERAL: NAD, well built  HEAD:  Atraumatic, Normocephalic  EYES:  conjunctiva and sclera clear  NECK: Supple, No JVD  CHEST/LUNG: + slight crackles in RLL, + slight decreased air entry b/l - improved Clear to auscultation. Clear to percussion bilaterally; No rales, rhonchi, wheezing, or rubs  HEART: + AS murmur, Regular rate and rhythm; No rubs, or gallops  ABDOMEN: + distended, Soft, Nontender; Bowel sounds present  NERVOUS SYSTEM:  Alert & Oriented X3,    EXTREMITIES:  2+ Peripheral Pulses, No clubbing, cyanosis, or edema  SKIN: warm dry  _________________________________________________  LABS:                        8.6    14.06 )-----------( 405      ( 2021 08:22 )             29.1         141  |  104  |  84<H>  ----------------------------<  129<H>  4.2   |  28  |  3.19<H>    Ca    8.1<L>      2021 08:22  Phos  4.9       Mg     2.4         TPro  6.9  /  Alb  3.0<L>  /  TBili  0.4  /  DBili  x   /  AST  12  /  ALT  30  /  AlkPhos  187<H>        Urinalysis Basic - ( 2021 06:24 )    Color: Yellow / Appearance: Clear / S.015 / pH: x  Gluc: x / Ketone: Negative  / Bili: Negative / Urobili: Negative   Blood: x / Protein: 15 / Nitrite: Negative   Leuk Esterase: Small / RBC: 2-5 /HPF / WBC 11-25 /HPF   Sq Epi: x / Non Sq Epi: Few /HPF / Bacteria: Few /HPF      CAPILLARY BLOOD GLUCOSE      POCT Blood Glucose.: 145 mg/dL (2021 07:44)  POCT Blood Glucose.: 156 mg/dL (2021 21:41)  POCT Blood Glucose.: 159 mg/dL (2021 16:38)  POCT Blood Glucose.: 233 mg/dL (2021 11:22)        RADIOLOGY & ADDITIONAL TESTS:    Imaging Personally Reviewed:  YES    Consultant(s) Notes Reviewed:   YES    Care Discussed with Consultants : YES     Plan of care was discussed with patient and /or primary care giver; all questions and concerns were addressed and care was aligned with patient's wishes.

## 2021-02-27 NOTE — PROGRESS NOTE ADULT - ASSESSMENT
# RUSTY on CKD. patient with CHF excerbation.  cardio-renal syndrome is a differential, vs ATN vs obstruction vs AIN  Rise in Cr noted, but improved today. hemodynamically stable.   No identifiable nephrotoxins.   No recent NSAIDS and iv contrast agent use.   UA bland.   Await renal US. Consider lowering lasix dose.

## 2021-02-27 NOTE — PHYSICAL THERAPY INITIAL EVALUATION ADULT - MANUAL MUSCLE TESTING RESULTS, REHAB EVAL
CHIEF COMPLAINT AND HISTORY OF PRESENT ILLNESS:  Ref Dr. Villar for strabismus surgery consult - patient is adopted and has no know medical history. Patient has been seen in the past by Dr. Copeland and had done patching and worn glasses, but no surgery. She has multiple pair of glasses, but all have been damaged and she has none with her today and was told by Dr. Villar not to wear them. Mom was concerned as Dr. Copeland was recommending LASIK surgery to correct her vision.    PAST OCULAR HISTORY:  - Hyperopic astigmatism both eyes  - Strabismic amblyopia left eye  - Constant left esotropia    Past medical, social, and family medical histories were reviewed and can be found in Epic.    Medications, allergies, problem list, and review of systems were reviewed and can be found in Epic.     ASSESSMENT:  - Hyperopic astigmatism both eyes - the patient would benefit from her cycloplegic refraction on a constant basis.  - Strabismic amblyopia left eye - significant reduction in acuity as the patient has not had effective occlusive therapy or optical penalization.  - Partially accommodative constant left esotropia - the patient has a small reduction in the angle of deviation with correction versus without correction on today's measurement.    PLAN:   - Reviewed the findings of today's exam with the patient and her parents in detail. They were given an informational brochure regarding strabismus and esotropia today.  - Given the cycloplegic refraction - recommend obtaining as distance single vision with the full correction in each eye. Also recommend obtaining a second pair of glasses with [place] on the right and the full correction on the left.  - Begin Cyclogyl 1% 1 drop right eye every night for optical penalization. The patient should wear her full correction at school and wear the glasses without correction on the right in the evenings and on weekends.  - Recommend sunglasses with 100% UV protection when outdoors to  reduce the risk of eyelid skin cancer, cataracts, and macular degeneration.    FOLLOWUP: Return to clinic 1 month - check present glasses and reevaluate strabismic amblyopia left eye and partially accommodative constant left esotropia.    On 1/30/2017, Francie SCHMIDT scribed the services personally performed by Antonette Mosqueda MD.    The documentation recorded by the scribe accurately and completely reflects the service(s) I personally performed and the decisions made by me.        gross normal limits/grossly assessed due to

## 2021-02-27 NOTE — PROGRESS NOTE ADULT - ASSESSMENT
81 yo M former smoker and vasculopath with HTN, HLD, T2DM, CAD s/p MI and PCI, reported COPD, CKD III, and PAD s/p stenting, admitted with acute hypoxic respiratory failure and hypoxemia out of proportion to clinical findings.    CT chest personally reviewed by me: bilateral ground glass and interlobular septal thickening with bilateral moderate pleural effusions loculated in right minor fissure.   This is consistent with pulmonary vascular congestion and edema due to CHF, likely exacerbated by RUSTY on CKD and medication noncompliance.  Pulse ox can be unreliable in pt with vasculopathy and PAD and hypoxemia can be somewhat exaggerated. ABG on 6L n/c noted with O2 sat 89% and paO2 66.    Clinically pt is not wheezing, cough is dry and nonproductive. Low suspicion for copd exacerbation.  Clinically improving with diuresis, CXR noted and looks improved.    Recommend:  - management of acute CHF exacerbation and diuresis as renal function tolerates  - cont on spiriva with prn albuterol mdi      Thank you for this consult, will follow with you.

## 2021-02-27 NOTE — PROGRESS NOTE ADULT - ASSESSMENT
80 year old male with medical history of  HTN, HLD, DM, CAD on ASA/Plavix, PAD, CKD, prostate ca s/p radiation, anemia, COPD not on home O2, PAD, presents with SOB, dry cough, and dizziness x 2 days. Patient admitted for COPD v CHF exacerbation.

## 2021-02-28 LAB
ALBUMIN SERPL ELPH-MCNC: 3.2 G/DL — LOW (ref 3.5–5)
ALP SERPL-CCNC: 189 U/L — HIGH (ref 40–120)
ALT FLD-CCNC: 25 U/L DA — SIGNIFICANT CHANGE UP (ref 10–60)
ANION GAP SERPL CALC-SCNC: 7 MMOL/L — SIGNIFICANT CHANGE UP (ref 5–17)
AST SERPL-CCNC: 10 U/L — SIGNIFICANT CHANGE UP (ref 10–40)
BILIRUB SERPL-MCNC: 0.6 MG/DL — SIGNIFICANT CHANGE UP (ref 0.2–1.2)
BUN SERPL-MCNC: 86 MG/DL — HIGH (ref 7–18)
CALCIUM SERPL-MCNC: 8.6 MG/DL — SIGNIFICANT CHANGE UP (ref 8.4–10.5)
CHLORIDE SERPL-SCNC: 104 MMOL/L — SIGNIFICANT CHANGE UP (ref 96–108)
CO2 SERPL-SCNC: 29 MMOL/L — SIGNIFICANT CHANGE UP (ref 22–31)
CREAT SERPL-MCNC: 2.94 MG/DL — HIGH (ref 0.5–1.3)
GLUCOSE BLDC GLUCOMTR-MCNC: 101 MG/DL — HIGH (ref 70–99)
GLUCOSE BLDC GLUCOMTR-MCNC: 112 MG/DL — HIGH (ref 70–99)
GLUCOSE BLDC GLUCOMTR-MCNC: 259 MG/DL — HIGH (ref 70–99)
GLUCOSE BLDC GLUCOMTR-MCNC: 84 MG/DL — SIGNIFICANT CHANGE UP (ref 70–99)
GLUCOSE SERPL-MCNC: 103 MG/DL — HIGH (ref 70–99)
HCT VFR BLD CALC: 31.6 % — LOW (ref 39–50)
HGB BLD-MCNC: 9.4 G/DL — LOW (ref 13–17)
MAGNESIUM SERPL-MCNC: 2.4 MG/DL — SIGNIFICANT CHANGE UP (ref 1.6–2.6)
MCHC RBC-ENTMCNC: 26.5 PG — LOW (ref 27–34)
MCHC RBC-ENTMCNC: 29.7 GM/DL — LOW (ref 32–36)
MCV RBC AUTO: 89 FL — SIGNIFICANT CHANGE UP (ref 80–100)
NRBC # BLD: 0 /100 WBCS — SIGNIFICANT CHANGE UP (ref 0–0)
PHOSPHATE SERPL-MCNC: 4 MG/DL — SIGNIFICANT CHANGE UP (ref 2.5–4.5)
PLATELET # BLD AUTO: 420 K/UL — HIGH (ref 150–400)
POTASSIUM SERPL-MCNC: 4.3 MMOL/L — SIGNIFICANT CHANGE UP (ref 3.5–5.3)
POTASSIUM SERPL-SCNC: 4.3 MMOL/L — SIGNIFICANT CHANGE UP (ref 3.5–5.3)
PROT SERPL-MCNC: 7.3 G/DL — SIGNIFICANT CHANGE UP (ref 6–8.3)
RBC # BLD: 3.55 M/UL — LOW (ref 4.2–5.8)
RBC # FLD: 18.8 % — HIGH (ref 10.3–14.5)
SODIUM SERPL-SCNC: 140 MMOL/L — SIGNIFICANT CHANGE UP (ref 135–145)
WBC # BLD: 10.79 K/UL — HIGH (ref 3.8–10.5)
WBC # FLD AUTO: 10.79 K/UL — HIGH (ref 3.8–10.5)

## 2021-02-28 PROCEDURE — 99233 SBSQ HOSP IP/OBS HIGH 50: CPT

## 2021-02-28 RX ADMIN — Medication 325 MILLIGRAM(S): at 12:38

## 2021-02-28 RX ADMIN — Medication 3 MILLIGRAM(S): at 21:22

## 2021-02-28 RX ADMIN — ATORVASTATIN CALCIUM 80 MILLIGRAM(S): 80 TABLET, FILM COATED ORAL at 21:22

## 2021-02-28 RX ADMIN — CLOPIDOGREL BISULFATE 75 MILLIGRAM(S): 75 TABLET, FILM COATED ORAL at 12:38

## 2021-02-28 RX ADMIN — Medication 1 MILLIGRAM(S): at 12:38

## 2021-02-28 RX ADMIN — AMLODIPINE BESYLATE 10 MILLIGRAM(S): 2.5 TABLET ORAL at 05:43

## 2021-02-28 RX ADMIN — CARVEDILOL PHOSPHATE 3.12 MILLIGRAM(S): 80 CAPSULE, EXTENDED RELEASE ORAL at 17:06

## 2021-02-28 RX ADMIN — Medication 40 MILLIGRAM(S): at 17:06

## 2021-02-28 RX ADMIN — Medication 40 MILLIGRAM(S): at 05:44

## 2021-02-28 RX ADMIN — Medication 81 MILLIGRAM(S): at 13:21

## 2021-02-28 RX ADMIN — CARVEDILOL PHOSPHATE 3.12 MILLIGRAM(S): 80 CAPSULE, EXTENDED RELEASE ORAL at 05:43

## 2021-02-28 RX ADMIN — PANTOPRAZOLE SODIUM 40 MILLIGRAM(S): 20 TABLET, DELAYED RELEASE ORAL at 05:43

## 2021-02-28 RX ADMIN — Medication 3: at 12:37

## 2021-02-28 RX ADMIN — HEPARIN SODIUM 5000 UNIT(S): 5000 INJECTION INTRAVENOUS; SUBCUTANEOUS at 05:43

## 2021-02-28 RX ADMIN — TIOTROPIUM BROMIDE 1 CAPSULE(S): 18 CAPSULE ORAL; RESPIRATORY (INHALATION) at 12:38

## 2021-02-28 RX ADMIN — HEPARIN SODIUM 5000 UNIT(S): 5000 INJECTION INTRAVENOUS; SUBCUTANEOUS at 17:06

## 2021-02-28 NOTE — DIETITIAN INITIAL EVALUATION ADULT. - PERTINENT MEDS FT
MEDICATIONS  (STANDING):  amLODIPine   Tablet 10 milliGRAM(s) Oral daily  aspirin enteric coated 81 milliGRAM(s) Oral daily  atorvastatin 80 milliGRAM(s) Oral at bedtime  carvedilol 3.125 milliGRAM(s) Oral every 12 hours  clopidogrel Tablet 75 milliGRAM(s) Oral daily  ferrous    sulfate 325 milliGRAM(s) Oral daily  folic acid 1 milliGRAM(s) Oral daily  furosemide   Injectable 40 milliGRAM(s) IV Push every 12 hours  heparin   Injectable 5000 Unit(s) SubCutaneous every 12 hours  insulin lispro (ADMELOG) corrective regimen sliding scale   SubCutaneous Before meals and at bedtime  melatonin 3 milliGRAM(s) Oral at bedtime  pantoprazole    Tablet 40 milliGRAM(s) Oral before breakfast  tiotropium 18 MICROgram(s) Capsule 1 Capsule(s) Inhalation daily

## 2021-02-28 NOTE — PROGRESS NOTE ADULT - SUBJECTIVE AND OBJECTIVE BOX
Nephrology Followup Note - [phone] - Dr Serna / Dr Yeung / Dr Lang / Dr Charles / Dr Garcia / Dr Méndez / Dr Pagan / Dr Rosario  Pt seen and examined at bedside  No acute events overnight. No complaints. Denies SOB, even when laying flat.     Allergies:  Novocain (Faint)    Hospital Medications:   MEDICATIONS  (STANDING):  amLODIPine   Tablet 10 milliGRAM(s) Oral daily  aspirin enteric coated 81 milliGRAM(s) Oral daily  atorvastatin 80 milliGRAM(s) Oral at bedtime  carvedilol 3.125 milliGRAM(s) Oral every 12 hours  clopidogrel Tablet 75 milliGRAM(s) Oral daily  ferrous    sulfate 325 milliGRAM(s) Oral daily  folic acid 1 milliGRAM(s) Oral daily  furosemide   Injectable 40 milliGRAM(s) IV Push every 12 hours  heparin   Injectable 5000 Unit(s) SubCutaneous every 12 hours  insulin lispro (ADMELOG) corrective regimen sliding scale   SubCutaneous Before meals and at bedtime  melatonin 3 milliGRAM(s) Oral at bedtime  pantoprazole    Tablet 40 milliGRAM(s) Oral before breakfast  tiotropium 18 MICROgram(s) Capsule 1 Capsule(s) Inhalation daily      VITALS:  T(F): 97.5 (21 @ 13:17), Max: 98.3 (21 @ 23:25)  HR: 56 (21 @ 13:17)  BP: 125/47 (21 @ 13:17)  RR: 18 (21 @ 13:17)  SpO2: 94% (21 @ 13:17)  Wt(kg): --     @ 07:01  -   @ 07:00  --------------------------------------------------------  IN: 0 mL / OUT: 2210 mL / NET: -2210 mL        PHYSICAL EXAM:  Constitutional: NAD  HEENT: anicteric sclera, oropharynx clear, MMM  Neck: No JVD  Respiratory: CTAB, no wheezes, rales or rhonchi  Cardiovascular: S1, S2, RRR  Gastrointestinal: BS+, soft, NT/ND  Extremities: No cyanosis or clubbing. No peripheral edema  Neurological: A/O x 3, no focal deficits  Psychiatric: Normal mood, normal affect  : No CVA tenderness. No swenson.   Skin: No rashes    LABS:      140  |  104  |  86<H>  ----------------------------<  103<H>  4.3   |  29  |  2.94<H>    Ca    8.6      2021 07:48  Phos  4.0       Mg     2.4         TPro  7.3  /  Alb  3.2<L>  /  TBili  0.6  /  DBili      /  AST  10  /  ALT  25  /  AlkPhos  189<H>      Creatinine Trend: 2.94 <--, 3.19 <--, 3.66 <--, 3.22 <--, 2.73 <--                        9.4    10.79 )-----------( 420      ( 2021 07:48 )             31.6     Urine Studies:  Urinalysis Basic - ( 2021 06:24 )    Color: Yellow / Appearance: Clear / S.015 / pH:   Gluc:  / Ketone: Negative  / Bili: Negative / Urobili: Negative   Blood:  / Protein: 15 / Nitrite: Negative   Leuk Esterase: Small / RBC: 2-5 /HPF / WBC 11-25 /HPF   Sq Epi:  / Non Sq Epi: Few /HPF / Bacteria: Few /HPF      Creatinine, Random Urine: 112 mg/dL ( @ 06:24)  Sodium, Random Urine: 15 mmol/L ( @ 06:24)  Osmolality, Random Urine: 341 mos/kg ( @ 06:24)    RADIOLOGY & ADDITIONAL STUDIES:  r< from: US Abdomen Complete (US Abdomen Complete .) (21 @ 11:40) >  FINDINGS: Limited due to bowel gas and difficulty in patient cooperating.    Liver: Within normal limits. Normal in echogenicity without evidence of liver mass.  Bile ducts: Normal caliber. Common bile duct measures 3.  Gallbladder: Cholelithiasis in a contracted gallbladder.  Pancreas: Limited evaluation. Visualized portions are within normal limits.  Spleen: 8.7. Within normal limits.  Right kidney: 9.1 cm. No hydronephrosis. Increased in renal cortical echogenicity.  Left kidney: 10.7 cm.  No hydronephrosis. Lower pole cysts measure up to 3.6 cm. Increased in renal cortical echogenicity.  Ascites: None.  Aorta and IVC: Visualized portions are within normal limits.    IMPRESSION:  Echogenic kidneys, in keeping with parenchymal renal disease.    Cholelithiasis.    < end of copied text >

## 2021-02-28 NOTE — DIETITIAN INITIAL EVALUATION ADULT. - PERTINENT LABORATORY DATA
02-28 Na140 mmol/L Glu 103 mg/dL<H> K+ 4.3 mmol/L Cr  2.94 mg/dL<H> BUN 86 mg/dL<H>   02-28 Phos 4.0 mg/dL   02-28 Alb 3.2 g/dL<L>       02-25 Chol 193 mg/dL LDL --    HDL 43 mg/dL Trig 94 mg/dL  02-25-21 @ 13:34 HgbA1C 6.9 [4.0 - 5.6]

## 2021-02-28 NOTE — PROGRESS NOTE ADULT - SUBJECTIVE AND OBJECTIVE BOX
Patient is a 80y old  Male who presents with a chief complaint of Hypoxia (28 Feb 2021 16:52)    HPI:  80 year old male with medical history of  HTN, HLD, CAD on ASA/Plavix, PAD, CKD, prostate ca s/p radiation, anemia, COPD not on home O2, PAD, presents with SOB, dry cough, and dizziness x 2 days. Patient states he went to  Dr. Kumari cardio today and reportedly saturating. 44% on RA and patient was sent to ED. Patient states he feels little better. denies any fever or phlegm production. He added that he uses 2 pillows at night to sleep.  Spoke to patients partner, she states patient was prescribed on water pill , but he never likes to take it.    GOC: full code (24 Feb 2021 20:06)    Today: Pt states that his breathing is the same. He is on 3litres oxygen via nasal canula. He is voiding freely. No fevers. No cough. No chest pain.     PAST MEDICAL & SURGICAL HISTORY:  Hematuria    [place] (hard of hearing)  right    Frequent UTI    Malignant neoplasm of bladder, unspecified    Femoral artery occlusion, left  superficial- 2019- surgically resolved    Occlusion and stenosis of unspecified carotid artery  right carotid artery    CAD (coronary artery disease)  coronary stent x 1 - 2016- pt stopped Pletal and Aspirin 325mg , called Dr. mack Garvey , to start ASA 81 mg today including am of sx, office of Tali alonso notified    Anemia    COPD (chronic obstructive pulmonary disease)  on chest xray    Gout    Rectal bleeding  November 2018    Injury of head, initial encounter  12/2016 - s/p fall in hospital    Bladder mass  s/p resection    Pulmonary edema  May 2016-resolved    Urinary retention    CKD (chronic kidney disease) stage 3, GFR 30-59 ml/min    Diabetes mellitus, type 2  controlled with diet    Hearing Decreased left ear    PAD--Left leg stent 2002  done at [facility] in [place]  --[facility] in [place], s/p balloon on LLE in 2006    approximately 2001  MI/Coronary Artery Disease    BPH    6/09 diagnosed with Prostate Ca  s/p radiation and brachytherapy w/ seed implantation 5 years ago s/p TURP    Hypercholesteremia    Hypertension    History of vascular access device  05/2020  insertion in left upper arm ( PICC) , removal after 2 weeks of abx    H/O vascular surgery  left suprficial femoral artery stent- 2019 November    S/P carotid endarterectomy  right 01/2019    FH: carotid endarterectomy    Bladder mass  cystoscopy, TURBT, transurethral incision of bladder neck- 06/2018    S/P cardiac catheterization  2016 with 1 FEMI stent    History of bladder surgery    S/P TURP (status post transurethral resection of prostate)    Exposure to radiation  for prostate cancer (seed implant)    History of colon surgery  2007    Acute myocardial infarction  as per pt in 2001, no coronary stent    2002 left leg stent    right wrist surgery with hardware 27 years ago    Cataract surgery 30 years ago      MEDICATIONS  (STANDING):  amLODIPine   Tablet 10 milliGRAM(s) Oral daily  aspirin enteric coated 81 milliGRAM(s) Oral daily  atorvastatin 80 milliGRAM(s) Oral at bedtime  carvedilol 3.125 milliGRAM(s) Oral every 12 hours  clopidogrel Tablet 75 milliGRAM(s) Oral daily  ferrous    sulfate 325 milliGRAM(s) Oral daily  folic acid 1 milliGRAM(s) Oral daily  furosemide   Injectable 40 milliGRAM(s) IV Push every 12 hours  heparin   Injectable 5000 Unit(s) SubCutaneous every 12 hours  insulin lispro (ADMELOG) corrective regimen sliding scale   SubCutaneous Before meals and at bedtime  melatonin 3 milliGRAM(s) Oral at bedtime  pantoprazole    Tablet 40 milliGRAM(s) Oral before breakfast  tiotropium 18 MICROgram(s) Capsule 1 Capsule(s) Inhalation daily    MEDICATIONS  (PRN):  ALBUTerol    90 MICROgram(s) HFA Inhaler 2 Puff(s) Inhalation every 6 hours PRN Shortness of Breath and/or Wheezing      EXAM:  Vital Signs Last 24 Hrs  T(C): 36.5 (28 Feb 2021 15:45), Max: 36.8 (27 Feb 2021 23:25)  T(F): 97.7 (28 Feb 2021 15:45), Max: 98.3 (27 Feb 2021 23:25)  HR: 56 (28 Feb 2021 15:45) (56 - 86)  BP: 120/60 (28 Feb 2021 15:45) (120/60 - 136/54)  BP(mean): --  RR: 18 (28 Feb 2021 15:45) (18 - 18)  SpO2: 93% (28 Feb 2021 15:45) (93% - 97%)    02-27 @ 07:01  -  02-28 @ 07:00  --------------------------------------------------------  IN: 0 mL / OUT: 2210 mL / NET: -2210 mL    02-28 @ 07:01  -  02-28 @ 18:25  --------------------------------------------------------  IN: 0 mL / OUT: 350 mL / NET: -350 mL        PHYSICAL EXAM:  Constitutional: awake on nasal canula.   Neck: supple  Respiratory: bilateral fair air entry. +rales.   Cardiovascular: s1s2, rrr, no murmurs.   Gastrointestinal: soft, non tender, +bowel sounds, no rebound, no guarding.   Extremities: no edema.   Neurological: alert and oriented to person, date and place.   Skin: intact no rash, warm to touch.   Musculoskeletal: moves all 4 extremities  Psychiatric: appropriate affect.     LABS:  LIVER FUNCTIONS - ( 28 Feb 2021 07:48 )  Alb: 3.2 g/dL / Pro: 7.3 g/dL / ALK PHOS: 189 U/L / ALT: 25 U/L DA / AST: 10 U/L / GGT: x                                 9.4    10.79 )-----------( 420      ( 28 Feb 2021 07:48 )             31.6     02-28    140  |  104  |  86<H>  ----------------------------<  103<H>  4.3   |  29  |  2.94<H>    Ca    8.6      28 Feb 2021 07:48  Phos  4.0     02-28  Mg     2.4     02-28    TPro  7.3  /  Alb  3.2<L>  /  TBili  0.6  /  DBili  x   /  AST  10  /  ALT  25  /  AlkPhos  189<H>  02-28

## 2021-02-28 NOTE — DIETITIAN INITIAL EVALUATION ADULT. - OTHER INFO
Pt lives home PTA, alert, oriented, able to communicate fairly, but with difficult due to hard of hearing; appetite ok, unclear recent wt changes, denied GI distress, chewing or swallowing problem at present, no specific food choices reported; tolerating 90 to 100% of meals per flow sheet; GteC5O=3.9, h/o DM noted

## 2021-02-28 NOTE — DIETITIAN INITIAL EVALUATION ADULT. - DIET TYPE
May consider oral nutritional supplement ( Glucerna Shake) if persistently decreased intake/DASH/TLC (sodium and cholesterol restricted diet)/soft/consistent carbohydrate (evening snack) May consider oral nutritional supplement ( Glucerna Shake) if persistently decreased intake./DASH/TLC (sodium and cholesterol restricted diet)/soft/consistent carbohydrate (evening snack)

## 2021-02-28 NOTE — PROGRESS NOTE ADULT - ASSESSMENT
# RUSTY on CKD. patient with CHF excerbation.  cardio-renal syndrome is a differential, vs ATN vs obstruction vs AIN  Rise in Cr noted, but improved today. hemodynamically stable.   No identifiable nephrotoxins.   No recent NSAIDS and iv contrast agent use.   UA bland.   Renal US unremarkable, without  obstruction.

## 2021-03-01 DIAGNOSIS — E44.0 MODERATE PROTEIN-CALORIE MALNUTRITION: ICD-10-CM

## 2021-03-01 DIAGNOSIS — Z51.5 ENCOUNTER FOR PALLIATIVE CARE: ICD-10-CM

## 2021-03-01 DIAGNOSIS — R53.81 OTHER MALAISE: ICD-10-CM

## 2021-03-01 LAB
ALBUMIN SERPL ELPH-MCNC: 3.1 G/DL — LOW (ref 3.5–5)
ALP SERPL-CCNC: 191 U/L — HIGH (ref 40–120)
ALT FLD-CCNC: 23 U/L DA — SIGNIFICANT CHANGE UP (ref 10–60)
ANION GAP SERPL CALC-SCNC: 11 MMOL/L — SIGNIFICANT CHANGE UP (ref 5–17)
AST SERPL-CCNC: 7 U/L — LOW (ref 10–40)
BILIRUB SERPL-MCNC: 0.5 MG/DL — SIGNIFICANT CHANGE UP (ref 0.2–1.2)
BUN SERPL-MCNC: 80 MG/DL — HIGH (ref 7–18)
CALCIUM SERPL-MCNC: 8.8 MG/DL — SIGNIFICANT CHANGE UP (ref 8.4–10.5)
CHLORIDE SERPL-SCNC: 100 MMOL/L — SIGNIFICANT CHANGE UP (ref 96–108)
CO2 SERPL-SCNC: 29 MMOL/L — SIGNIFICANT CHANGE UP (ref 22–31)
CREAT SERPL-MCNC: 2.68 MG/DL — HIGH (ref 0.5–1.3)
CULTURE RESULTS: SIGNIFICANT CHANGE UP
CULTURE RESULTS: SIGNIFICANT CHANGE UP
GLUCOSE BLDC GLUCOMTR-MCNC: 137 MG/DL — HIGH (ref 70–99)
GLUCOSE BLDC GLUCOMTR-MCNC: 254 MG/DL — HIGH (ref 70–99)
GLUCOSE BLDC GLUCOMTR-MCNC: 88 MG/DL — SIGNIFICANT CHANGE UP (ref 70–99)
GLUCOSE BLDC GLUCOMTR-MCNC: 95 MG/DL — SIGNIFICANT CHANGE UP (ref 70–99)
GLUCOSE SERPL-MCNC: 180 MG/DL — HIGH (ref 70–99)
HCT VFR BLD CALC: 34.5 % — LOW (ref 39–50)
HGB BLD-MCNC: 10.2 G/DL — LOW (ref 13–17)
MAGNESIUM SERPL-MCNC: 2.5 MG/DL — SIGNIFICANT CHANGE UP (ref 1.6–2.6)
MCHC RBC-ENTMCNC: 26.4 PG — LOW (ref 27–34)
MCHC RBC-ENTMCNC: 29.6 GM/DL — LOW (ref 32–36)
MCV RBC AUTO: 89.4 FL — SIGNIFICANT CHANGE UP (ref 80–100)
NRBC # BLD: 0 /100 WBCS — SIGNIFICANT CHANGE UP (ref 0–0)
PHOSPHATE SERPL-MCNC: 4.8 MG/DL — HIGH (ref 2.5–4.5)
PLATELET # BLD AUTO: 445 K/UL — HIGH (ref 150–400)
POTASSIUM SERPL-MCNC: 4.3 MMOL/L — SIGNIFICANT CHANGE UP (ref 3.5–5.3)
POTASSIUM SERPL-SCNC: 4.3 MMOL/L — SIGNIFICANT CHANGE UP (ref 3.5–5.3)
PROT SERPL-MCNC: 7.2 G/DL — SIGNIFICANT CHANGE UP (ref 6–8.3)
RBC # BLD: 3.86 M/UL — LOW (ref 4.2–5.8)
RBC # FLD: 19 % — HIGH (ref 10.3–14.5)
SODIUM SERPL-SCNC: 140 MMOL/L — SIGNIFICANT CHANGE UP (ref 135–145)
SPECIMEN SOURCE: SIGNIFICANT CHANGE UP
SPECIMEN SOURCE: SIGNIFICANT CHANGE UP
WBC # BLD: 8.79 K/UL — SIGNIFICANT CHANGE UP (ref 3.8–10.5)
WBC # FLD AUTO: 8.79 K/UL — SIGNIFICANT CHANGE UP (ref 3.8–10.5)

## 2021-03-01 PROCEDURE — 99497 ADVNCD CARE PLAN 30 MIN: CPT | Mod: 25

## 2021-03-01 PROCEDURE — 99223 1ST HOSP IP/OBS HIGH 75: CPT

## 2021-03-01 PROCEDURE — 99232 SBSQ HOSP IP/OBS MODERATE 35: CPT

## 2021-03-01 PROCEDURE — 99233 SBSQ HOSP IP/OBS HIGH 50: CPT | Mod: GC

## 2021-03-01 RX ORDER — FUROSEMIDE 40 MG
40 TABLET ORAL DAILY
Refills: 0 | Status: DISCONTINUED | OUTPATIENT
Start: 2021-03-01 | End: 2021-03-02

## 2021-03-01 RX ADMIN — AMLODIPINE BESYLATE 10 MILLIGRAM(S): 2.5 TABLET ORAL at 06:57

## 2021-03-01 RX ADMIN — Medication 325 MILLIGRAM(S): at 12:03

## 2021-03-01 RX ADMIN — Medication 1 MILLIGRAM(S): at 12:03

## 2021-03-01 RX ADMIN — TIOTROPIUM BROMIDE 1 CAPSULE(S): 18 CAPSULE ORAL; RESPIRATORY (INHALATION) at 12:03

## 2021-03-01 RX ADMIN — Medication 40 MILLIGRAM(S): at 06:57

## 2021-03-01 RX ADMIN — Medication 40 MILLIGRAM(S): at 17:55

## 2021-03-01 RX ADMIN — Medication 3: at 12:03

## 2021-03-01 RX ADMIN — CARVEDILOL PHOSPHATE 3.12 MILLIGRAM(S): 80 CAPSULE, EXTENDED RELEASE ORAL at 17:55

## 2021-03-01 RX ADMIN — HEPARIN SODIUM 5000 UNIT(S): 5000 INJECTION INTRAVENOUS; SUBCUTANEOUS at 17:54

## 2021-03-01 RX ADMIN — PANTOPRAZOLE SODIUM 40 MILLIGRAM(S): 20 TABLET, DELAYED RELEASE ORAL at 06:57

## 2021-03-01 RX ADMIN — CARVEDILOL PHOSPHATE 3.12 MILLIGRAM(S): 80 CAPSULE, EXTENDED RELEASE ORAL at 06:57

## 2021-03-01 RX ADMIN — HEPARIN SODIUM 5000 UNIT(S): 5000 INJECTION INTRAVENOUS; SUBCUTANEOUS at 06:57

## 2021-03-01 RX ADMIN — Medication 81 MILLIGRAM(S): at 12:03

## 2021-03-01 RX ADMIN — CLOPIDOGREL BISULFATE 75 MILLIGRAM(S): 75 TABLET, FILM COATED ORAL at 12:03

## 2021-03-01 RX ADMIN — ATORVASTATIN CALCIUM 80 MILLIGRAM(S): 80 TABLET, FILM COATED ORAL at 21:59

## 2021-03-01 RX ADMIN — Medication 3 MILLIGRAM(S): at 21:59

## 2021-03-01 NOTE — PROGRESS NOTE ADULT - SUBJECTIVE AND OBJECTIVE BOX
Pt feels better, able to ambulate. O2 supp down to 2L n/c.    REVIEW OF SYSTEMS:  Constitutional: [ ] fever, [ ]weight loss,  [ ]fatigue  Eyes: [ ] visual changes  Respiratory: [ ]shortness of breath;  [ ] cough, [ ]wheezing, [ ]chills, [ ]hemoptysis  Cardiovascular: [ ] chest pain, [ ]palpitations, [ ]dizziness,  [ ]leg swelling [ ]syncope  Gastrointestinal: [ ] abdominal pain, [ ]nausea, [ ]vomiting,  [ ]diarrhea   Genitourinary: [ ] dysuria, [ ] hematuria  Neurologic: [ ] headaches [ ] tremors  [ ] weakness [ ] lightheadedness  Skin: [ ] itching, [ ]burning, [ ] rashes  Endocrine: [ ] heat or cold intolerance  Musculoskeletal: [ ] joint pain or swelling; [ ] muscle, back, or extremity pain  Psychiatric: [ ] depression, [ ]anxiety, [ ]mood swings, or [ ]difficulty sleeping  Hematologic: [ ] easy bruising, [ ] bleeding gums       [ x] All others negative	  [ ] Unable to obtain      Vital Signs Last 24 Hrs  T(C): 36.8 (01 Mar 2021 11:15), Max: 37.3 (01 Mar 2021 04:34)  T(F): 98.2 (01 Mar 2021 11:15), Max: 99.1 (01 Mar 2021 04:34)  HR: 54 (01 Mar 2021 11:15) (54 - 87)  BP: 109/44 (01 Mar 2021 11:15) (109/44 - 138/62)  BP(mean): --  RR: 18 (01 Mar 2021 11:15) (17 - 18)  SpO2: 100% (01 Mar 2021 11:15) (93% - 100%)                   PHYSICAL EXAM:  General: No acute distress  HEENT: EOMI, PERRL  Neck: Supple, No JVD  Lungs: Decreased BS at bases B/L with rales, no wheezing  Heart: Regular rate and rhythm; No murmurs, rubs, or gallops  Abdomen: Nontender, bowel sounds present  Extremities: No clubbing, cyanosis, or edema  Nervous system:  Alert & Oriented X3, no focal deficits  Psychiatric: Normal affect  Skin: No rashes or lesions      LABS:                          8.6    14.06 )-----------( 405      ( 27 Feb 2021 08:22 )             29.1   02-27    141  |  104  |  84<H>  ----------------------------<  129<H>  4.2   |  28  |  3.19<H>    Ca    8.1<L>      27 Feb 2021 08:22  Phos  4.9     02-27  Mg     2.4     02-27    TPro  6.9  /  Alb  3.0<L>  /  TBili  0.4  /  DBili  x   /  AST  12  /  ALT  30  /  AlkPhos  187<H>  02-27

## 2021-03-01 NOTE — PROGRESS NOTE ADULT - PROBLEM SELECTOR PLAN 1
CXR - b/l pulmonary edema - now improved  Decreased IV Lasix to 40 QD  Strict Is&Os and daily weights  Trops trending down, elevation likely 2/2 demand ischemia  BNP 3706  Cardiac echo - EF 65%, mod MR, mild AS and AR, G2DD  Cardio Dr. Boles CXR - b/l pulmonary edema - now improved  Decreased IV Lasix to 40 QD  Strict Is&Os and daily weights  Trops trending down, elevation likely 2/2 demand ischemia  BNP 3706  Net negative   Echo - EF 65%, mod MR, mild AS and AR, G2DD  Cardio Dr. Boles CXR - b/l pulmonary edema - now improved  Decreased IV Lasix to 40 QD  Strict Is&Os and daily weights  Trops trending down, elevation likely 2/2 demand ischemia  BNP 3706  Net negative   Echo - EF 65%, mod MR, mild AS and AR, G2DD  Palliative consulted   Cardio Dr. Boles

## 2021-03-01 NOTE — CONSULT NOTE ADULT - CONVERSATION DETAILS
Met with the pt at he bedside AOX3, his partner Priscilla Fisher was present vis phone conference.  Discussed his current clinical condition and goals of care.  Discussed the role of HCP.  Pt assigned his life partner Priscilla fisher as his HCP.  HCP document completed and placed in the chart.   Discussed cardiopulmonary resuscitation and intubation along with the risks and benefits.  Mr. Patterson deferred to his partner.  Ms. Fisher stated she will have to speak with his daughter before making decisions about advanced care planning.   All questions answered.  Support provided.

## 2021-03-01 NOTE — CONSULT NOTE ADULT - ATTENDING COMMENTS
Thank you for the courtesy of a consultation, please contact me for any additional questions
80 y M w multiple comorbidities, adm for SOB, CHF exacerbation. Alert, appears comfortable, NAD. Will likely need home O2. OOB/PT eval as tolerated. Has designated partner as HCP, defers decisionmaking to her. She will discuss w patient's daughter regarding GOC.

## 2021-03-01 NOTE — PROGRESS NOTE ADULT - SUBJECTIVE AND OBJECTIVE BOX
[facility]-3 Progress Note discussed with PGY-1 and attending    PAGER #: [] TILL 5:00 PM  PLEASE CONTACT ON CALL TEAM:   - On Call Team (Please refer to Janneth) FROM 5:00 PM - 8:30PM  - Nightfloat Team FROM 8:30 -7:30 AM    CHIEF COMPLAINT & BRIEF HOSPITAL COURSE: 80 year old male with medical history of  HTN, HLD, DM, CAD on ASA/Plavix, PAD, CKD, prostate ca s/p radiation, anemia, COPD not on home O2, PAD, presents with SOB, dry cough, and dizziness x 2 days. Patient states he went to  Dr. Kumari cardio today and reportedly saturating. 44% on RA and patient was sent to ED. Patient states he feels little better. Denied any fever or phlegm production in the ED. He added that he uses 2 pillows at night to sleep. Spoke to patients partner, she states patient was prescribed on water pill , but he never likes to take it. Found to be saturating 53% on RA, placed on 6L NC. Called patient's cardiologist, Dr. Amanda Kumari, who stated that the patient saw her yesterday 2/24 for regular follow up visit and was saturating at 44%, otherwise vitals were normal. Placed on O2 in the office but is not currently on O2 at home as it was taken away from him many years ago. Dr. Kumari stated that the patient felt dizzy, weak, tired and vomited when he walked into the office. Stated patient has a hx of PAD, latest cardiac stent in 2016, carotid endarterectomy in 2019, stents in RLE and a recent LLE stent placement. Cardio Dr. Boles and Pulm Dr. Urias. Repeat CXR on 2/26 improved. Saturating 98% on 6L NC, titrated down to 4L and is saturating 96%. HbA1c elevated at 6.9%, started on insulin sliding scale. Nephro Dr. oRsario. Cardiac echo showed new moderate mitral regurg and mild aortic regurg when compared to 2010 echo, mild aortic stenosis, G2DD, EF 65%. US abdomen showed cholelithiasis and echogenic kidneys c/w renal parenchymal disease.       INTERVAL HPI/OVERNIGHT EVENTS: No acute events overnight. Patient has no complaints. Missed beats and NSVT on tele. Urinating freely. Denies CP, SOB, abdominal pain. Saturating 93-95% on 3L NC. Pending respiratory consult for COPD education.      REVIEW OF SYSTEMS:  CONSTITUTIONAL: No fever, weight loss, or fatigue  RESPIRATORY: No cough, wheezing, chills or hemoptysis; No shortness of breath  CARDIOVASCULAR: No chest pain, palpitations, dizziness, or leg swelling  GASTROINTESTINAL: No abdominal pain. No nausea, vomiting, or hematemesis; No diarrhea or constipation. No melena or hematochezia.  GENITOURINARY: No dysuria or hematuria, urinary frequency  NEUROLOGICAL: No headaches, memory loss, loss of strength, numbness, or tremors  SKIN: No itching, burning, rashes, or lesions     MEDICATIONS  (STANDING):  amLODIPine   Tablet 10 milliGRAM(s) Oral daily  aspirin enteric coated 81 milliGRAM(s) Oral daily  atorvastatin 80 milliGRAM(s) Oral at bedtime  carvedilol 3.125 milliGRAM(s) Oral every 12 hours  clopidogrel Tablet 75 milliGRAM(s) Oral daily  ferrous    sulfate 325 milliGRAM(s) Oral daily  folic acid 1 milliGRAM(s) Oral daily  furosemide   Injectable 40 milliGRAM(s) IV Push daily  heparin   Injectable 5000 Unit(s) SubCutaneous every 12 hours  insulin lispro (ADMELOG) corrective regimen sliding scale   SubCutaneous Before meals and at bedtime  melatonin 3 milliGRAM(s) Oral at bedtime  pantoprazole    Tablet 40 milliGRAM(s) Oral before breakfast  tiotropium 18 MICROgram(s) Capsule 1 Capsule(s) Inhalation daily    MEDICATIONS  (PRN):  ALBUTerol    90 MICROgram(s) HFA Inhaler 2 Puff(s) Inhalation every 6 hours PRN Shortness of Breath and/or Wheezing      Vital Signs Last 24 Hrs  T(C): 36.8 (01 Mar 2021 07:24), Max: 37.3 (01 Mar 2021 04:34)  T(F): 98.3 (01 Mar 2021 07:24), Max: 99.1 (01 Mar 2021 04:34)  HR: 57 (01 Mar 2021 07:24) (54 - 87)  BP: 116/36 (01 Mar 2021 07:24) (116/36 - 138/62)  BP(mean): --  RR: 18 (01 Mar 2021 07:24) (17 - 18)  SpO2: 97% (01 Mar 2021 07:24) (93% - 97%)    PHYSICAL EXAMINATION:  GENERAL: NAD, well built  HEAD:  Atraumatic, Normocephalic  EYES:  conjunctiva and sclera clear  NECK: Supple, No JVD, Normal thyroid  CHEST/LUNG: + decreased L posterior breath sounds, clear to percussion bilaterally; No rales, rhonchi, wheezing, or rubs  HEART: + faint AS, AR and MR murmurs, Regular rate and rhythm; No rubs, or gallops  ABDOMEN: Soft, Nontender, Nondistended; Bowel sounds present  NERVOUS SYSTEM:  AAOx3  EXTREMITIES:  2+ Peripheral Pulses, No clubbing, cyanosis, or edema  SKIN: warm dry                          10.2   8.79  )-----------( 445      ( 01 Mar 2021 09:31 )             34.5     03-01    140  |  100  |  80<H>  ----------------------------<  180<H>  4.3   |  29  |  2.68<H>    Ca    8.8      01 Mar 2021 09:31  Phos  4.8     03-01  Mg     2.5     03-01    TPro  7.2  /  Alb  3.1<L>  /  TBili  0.5  /  DBili  x   /  AST  7<L>  /  ALT  23  /  AlkPhos  191<H>  03-01    LIVER FUNCTIONS - ( 01 Mar 2021 09:31 )  Alb: 3.1 g/dL / Pro: 7.2 g/dL / ALK PHOS: 191 U/L / ALT: 23 U/L DA / AST: 7 U/L / GGT: x                       I&O's Summary    28 Feb 2021 07:01  -  01 Mar 2021 07:00  --------------------------------------------------------  IN: 0 mL / OUT: 900 mL / NET: -900 mL    01 Mar 2021 07:01  -  01 Mar 2021 10:35  --------------------------------------------------------  IN: 0 mL / OUT: 200 mL / NET: -200 mL        CAPILLARY BLOOD GLUCOSE  112 (28 Feb 2021 21:14)      POCT Blood Glucose.: 95 mg/dL (01 Mar 2021 08:01)    CAPILLARY BLOOD GLUCOSE  112 (28 Feb 2021 21:14)      POCT Blood Glucose.: 95 mg/dL (01 Mar 2021 08:01)  POCT Blood Glucose.: 112 mg/dL (28 Feb 2021 20:59)  POCT Blood Glucose.: 84 mg/dL (28 Feb 2021 15:59)  POCT Blood Glucose.: 259 mg/dL (28 Feb 2021 12:08)      RADIOLOGY & ADDITIONAL TESTS:                   [facility]-3 Progress Note discussed with PGY-1 and attending    PLEASE CONTACT ON CALL TEAM:   - On Call Team (Please refer to Janneth) FROM 5:00 PM - 8:30PM  - Nightfloat Team FROM 8:30 -7:30 AM    CHIEF COMPLAINT & BRIEF HOSPITAL COURSE: 80 year old male with medical history of  HTN, HLD, DM, CAD on ASA/Plavix, PAD, CKD, prostate ca s/p radiation, anemia, COPD not on home O2, PAD, presents with SOB, dry cough, and dizziness x 2 days. Patient states he went to  Dr. Kumari cardio today and reportedly saturating. 44% on RA and patient was sent to ED. Patient states he feels little better. Denied any fever or phlegm production in the ED. He added that he uses 2 pillows at night to sleep. Spoke to patients partner, she states patient was prescribed on water pill , but he never likes to take it. Found to be saturating 53% on RA, placed on 6L NC. Called patient's cardiologist, Dr. Amanda Kumari, who stated that the patient saw her yesterday 2/24 for regular follow up visit and was saturating at 44%, otherwise vitals were normal. Placed on O2 in the office but is not currently on O2 at home as it was taken away from him many years ago. Dr. Kumari stated that the patient felt dizzy, weak, tired and vomited when he walked into the office. Stated patient has a hx of PAD, latest cardiac stent in 2016, carotid endarterectomy in 2019, stents in RLE and a recent LLE stent placement. Cardio Dr. Boles and Pulm Dr. Urias. Repeat CXR on 2/26 improved. Saturating 98% on 6L NC, titrated down to 4L and is saturating 96%. HbA1c elevated at 6.9%, started on insulin sliding scale. Nephro Dr. Rosario. Cardiac echo showed new moderate mitral regurg and mild aortic regurg when compared to 2010 echo, mild aortic stenosis, G2DD, EF 65%. US abdomen showed cholelithiasis and echogenic kidneys c/w renal parenchymal disease.       INTERVAL HPI/OVERNIGHT EVENTS: No acute events overnight. Patient has no complaints. Missed beats and NSVT on tele. Urinating freely. Denies CP, SOB, abdominal pain. Saturating 93-95% on 3L NC. Pending respiratory consult for COPD education.      REVIEW OF SYSTEMS:  CONSTITUTIONAL: No fever, weight loss, or fatigue  RESPIRATORY: No cough, wheezing, chills or hemoptysis; No shortness of breath  CARDIOVASCULAR: No chest pain, palpitations, dizziness, or leg swelling  GASTROINTESTINAL: No abdominal pain. No nausea, vomiting, or hematemesis; No diarrhea or constipation. No melena or hematochezia.  GENITOURINARY: No dysuria or hematuria, urinary frequency  NEUROLOGICAL: No headaches, memory loss, loss of strength, numbness, or tremors  SKIN: No itching, burning, rashes, or lesions     MEDICATIONS  (STANDING):  amLODIPine   Tablet 10 milliGRAM(s) Oral daily  aspirin enteric coated 81 milliGRAM(s) Oral daily  atorvastatin 80 milliGRAM(s) Oral at bedtime  carvedilol 3.125 milliGRAM(s) Oral every 12 hours  clopidogrel Tablet 75 milliGRAM(s) Oral daily  ferrous    sulfate 325 milliGRAM(s) Oral daily  folic acid 1 milliGRAM(s) Oral daily  furosemide   Injectable 40 milliGRAM(s) IV Push daily  heparin   Injectable 5000 Unit(s) SubCutaneous every 12 hours  insulin lispro (ADMELOG) corrective regimen sliding scale   SubCutaneous Before meals and at bedtime  melatonin 3 milliGRAM(s) Oral at bedtime  pantoprazole    Tablet 40 milliGRAM(s) Oral before breakfast  tiotropium 18 MICROgram(s) Capsule 1 Capsule(s) Inhalation daily    MEDICATIONS  (PRN):  ALBUTerol    90 MICROgram(s) HFA Inhaler 2 Puff(s) Inhalation every 6 hours PRN Shortness of Breath and/or Wheezing      Vital Signs Last 24 Hrs  T(C): 36.8 (01 Mar 2021 07:24), Max: 37.3 (01 Mar 2021 04:34)  T(F): 98.3 (01 Mar 2021 07:24), Max: 99.1 (01 Mar 2021 04:34)  HR: 57 (01 Mar 2021 07:24) (54 - 87)  BP: 116/36 (01 Mar 2021 07:24) (116/36 - 138/62)  BP(mean): --  RR: 18 (01 Mar 2021 07:24) (17 - 18)  SpO2: 97% (01 Mar 2021 07:24) (93% - 97%)    PHYSICAL EXAMINATION:  GENERAL: NAD, well built  HEAD:  Atraumatic, Normocephalic  EYES:  conjunctiva and sclera clear  NECK: Supple, No JVD, Normal thyroid  CHEST/LUNG: + decreased L posterior breath sounds, clear to percussion bilaterally; No rales, rhonchi, wheezing, or rubs  HEART: + faint AS, AR and MR murmurs, Regular rate and rhythm; No rubs, or gallops  ABDOMEN: Soft, Nontender, Nondistended; Bowel sounds present  NERVOUS SYSTEM:  AAOx3  EXTREMITIES:  2+ Peripheral Pulses, No clubbing, cyanosis, or edema  SKIN: warm dry                          10.2   8.79  )-----------( 445      ( 01 Mar 2021 09:31 )             34.5     03-01    140  |  100  |  80<H>  ----------------------------<  180<H>  4.3   |  29  |  2.68<H>    Ca    8.8      01 Mar 2021 09:31  Phos  4.8     03-01  Mg     2.5     03-01    TPro  7.2  /  Alb  3.1<L>  /  TBili  0.5  /  DBili  x   /  AST  7<L>  /  ALT  23  /  AlkPhos  191<H>  03-01    LIVER FUNCTIONS - ( 01 Mar 2021 09:31 )  Alb: 3.1 g/dL / Pro: 7.2 g/dL / ALK PHOS: 191 U/L / ALT: 23 U/L DA / AST: 7 U/L / GGT: x                       I&O's Summary    28 Feb 2021 07:01  -  01 Mar 2021 07:00  --------------------------------------------------------  IN: 0 mL / OUT: 900 mL / NET: -900 mL    01 Mar 2021 07:01  -  01 Mar 2021 10:35  --------------------------------------------------------  IN: 0 mL / OUT: 200 mL / NET: -200 mL        CAPILLARY BLOOD GLUCOSE  112 (28 Feb 2021 21:14)      POCT Blood Glucose.: 95 mg/dL (01 Mar 2021 08:01)    CAPILLARY BLOOD GLUCOSE  112 (28 Feb 2021 21:14)      POCT Blood Glucose.: 95 mg/dL (01 Mar 2021 08:01)  POCT Blood Glucose.: 112 mg/dL (28 Feb 2021 20:59)  POCT Blood Glucose.: 84 mg/dL (28 Feb 2021 15:59)  POCT Blood Glucose.: 259 mg/dL (28 Feb 2021 12:08)      RADIOLOGY & ADDITIONAL TESTS:                   [facility]-3 Progress Note discussed with PGY-1 and attending    PLEASE CONTACT ON CALL TEAM:   - On Call Team (Please refer to Janneth) FROM 5:00 PM - 8:30PM  - Nightfloat Team FROM 8:30 -7:30 AM    CHIEF COMPLAINT & BRIEF HOSPITAL COURSE: 80 year old male with medical history of  HTN, HLD, DM, CAD on ASA/Plavix, PAD, CKD, prostate ca s/p radiation, anemia, COPD not on home O2, PAD, presents with SOB, dry cough, and dizziness x 2 days. Patient states he went to  Dr. Kumari cardio today and reportedly saturating. 44% on RA and patient was sent to ED. Patient states he feels little better. Denied any fever or phlegm production in the ED. He added that he uses 2 pillows at night to sleep. Spoke to patients partner, she states patient was prescribed on water pill , but he never likes to take it. Found to be saturating 53% on RA, placed on 6L NC. Called patient's cardiologist, Dr. Amanda Kumari, who stated that the patient saw her yesterday 2/24 for regular follow up visit and was saturating at 44%, otherwise vitals were normal. Placed on O2 in the office but is not currently on O2 at home as it was taken away from him many years ago. Dr. Kumari stated that the patient felt dizzy, weak, tired and vomited when he walked into the office. Stated patient has a hx of PAD, latest cardiac stent in 2016, carotid endarterectomy in 2019, stents in RLE and a recent LLE stent placement. Cardio Dr. Boles and Pulm Dr. Urias. Repeat CXR on 2/26 improved. Saturating 98% on 6L NC, titrated down to 4L and is saturating 96%. HbA1c elevated at 6.9%, started on insulin sliding scale. Nephro Dr. Rosario. Cardiac echo showed new moderate mitral regurg and mild aortic regurg when compared to 2010 echo, mild aortic stenosis, G2DD, EF 65%. US abdomen showed cholelithiasis and echogenic kidneys c/w renal parenchymal disease.       INTERVAL HPI/OVERNIGHT EVENTS: No acute events overnight. Patient has no complaints. Missed beats and NSVT on tele. Urinating freely. Denies CP, SOB, abdominal pain. Saturating 93-95% on 3L NC. Patient saturated 85% on RA, titrated to 2L NC, now saturating 93%. Palliative saw patient, pending recs. Pending respiratory consult for COPD education.      REVIEW OF SYSTEMS:  CONSTITUTIONAL: No fever, weight loss, or fatigue  RESPIRATORY: No cough, wheezing, chills or hemoptysis; No shortness of breath  CARDIOVASCULAR: No chest pain, palpitations, dizziness, or leg swelling  GASTROINTESTINAL: No abdominal pain. No nausea, vomiting, or hematemesis; No diarrhea or constipation. No melena or hematochezia.  GENITOURINARY: No dysuria or hematuria, urinary frequency  NEUROLOGICAL: No headaches, memory loss, loss of strength, numbness, or tremors  SKIN: No itching, burning, rashes, or lesions     MEDICATIONS  (STANDING):  amLODIPine   Tablet 10 milliGRAM(s) Oral daily  aspirin enteric coated 81 milliGRAM(s) Oral daily  atorvastatin 80 milliGRAM(s) Oral at bedtime  carvedilol 3.125 milliGRAM(s) Oral every 12 hours  clopidogrel Tablet 75 milliGRAM(s) Oral daily  ferrous    sulfate 325 milliGRAM(s) Oral daily  folic acid 1 milliGRAM(s) Oral daily  furosemide   Injectable 40 milliGRAM(s) IV Push daily  heparin   Injectable 5000 Unit(s) SubCutaneous every 12 hours  insulin lispro (ADMELOG) corrective regimen sliding scale   SubCutaneous Before meals and at bedtime  melatonin 3 milliGRAM(s) Oral at bedtime  pantoprazole    Tablet 40 milliGRAM(s) Oral before breakfast  tiotropium 18 MICROgram(s) Capsule 1 Capsule(s) Inhalation daily    MEDICATIONS  (PRN):  ALBUTerol    90 MICROgram(s) HFA Inhaler 2 Puff(s) Inhalation every 6 hours PRN Shortness of Breath and/or Wheezing      Vital Signs Last 24 Hrs  T(C): 36.8 (01 Mar 2021 07:24), Max: 37.3 (01 Mar 2021 04:34)  T(F): 98.3 (01 Mar 2021 07:24), Max: 99.1 (01 Mar 2021 04:34)  HR: 57 (01 Mar 2021 07:24) (54 - 87)  BP: 116/36 (01 Mar 2021 07:24) (116/36 - 138/62)  BP(mean): --  RR: 18 (01 Mar 2021 07:24) (17 - 18)  SpO2: 97% (01 Mar 2021 07:24) (93% - 97%)    PHYSICAL EXAMINATION:  GENERAL: NAD, well built  HEAD:  Atraumatic, Normocephalic  EYES:  conjunctiva and sclera clear  NECK: Supple, No JVD, Normal thyroid  CHEST/LUNG: + decreased L posterior breath sounds, clear to percussion bilaterally; No rales, rhonchi, wheezing, or rubs  HEART: + faint AS, AR and MR murmurs, Regular rate and rhythm; No rubs, or gallops  ABDOMEN: Soft, Nontender, Nondistended; Bowel sounds present  NERVOUS SYSTEM:  AAOx3  EXTREMITIES:  2+ Peripheral Pulses, No clubbing, cyanosis, or edema  SKIN: warm dry                          10.2   8.79  )-----------( 445      ( 01 Mar 2021 09:31 )             34.5     03-01    140  |  100  |  80<H>  ----------------------------<  180<H>  4.3   |  29  |  2.68<H>    Ca    8.8      01 Mar 2021 09:31  Phos  4.8     03-01  Mg     2.5     03-01    TPro  7.2  /  Alb  3.1<L>  /  TBili  0.5  /  DBili  x   /  AST  7<L>  /  ALT  23  /  AlkPhos  191<H>  03-01    LIVER FUNCTIONS - ( 01 Mar 2021 09:31 )  Alb: 3.1 g/dL / Pro: 7.2 g/dL / ALK PHOS: 191 U/L / ALT: 23 U/L DA / AST: 7 U/L / GGT: x                       I&O's Summary    28 Feb 2021 07:01  -  01 Mar 2021 07:00  --------------------------------------------------------  IN: 0 mL / OUT: 900 mL / NET: -900 mL    01 Mar 2021 07:01  -  01 Mar 2021 10:35  --------------------------------------------------------  IN: 0 mL / OUT: 200 mL / NET: -200 mL        CAPILLARY BLOOD GLUCOSE  112 (28 Feb 2021 21:14)      POCT Blood Glucose.: 95 mg/dL (01 Mar 2021 08:01)    CAPILLARY BLOOD GLUCOSE  112 (28 Feb 2021 21:14)      POCT Blood Glucose.: 95 mg/dL (01 Mar 2021 08:01)  POCT Blood Glucose.: 112 mg/dL (28 Feb 2021 20:59)  POCT Blood Glucose.: 84 mg/dL (28 Feb 2021 15:59)  POCT Blood Glucose.: 259 mg/dL (28 Feb 2021 12:08)      RADIOLOGY & ADDITIONAL TESTS:                   OM[facility]-3 Progress Note discussed with PGY-1 and attending    PLEASE CONTACT ON CALL TEAM:   - On Call Team (Please refer to Janneth) FROM 5:00 PM - 8:30PM  - Nightfloat Team FROM 8:30 -7:30 AM    CHIEF COMPLAINT & BRIEF HOSPITAL COURSE: 80 year old male with medical history of  HTN, HLD, DM, CAD on ASA/Plavix, PAD, CKD, prostate ca s/p radiation, anemia, COPD not on home O2, PAD, presents with SOB, dry cough, and dizziness x 2 days. Patient states he went to  Dr. Kumari cardio today and reportedly saturating. 44% on RA and patient was sent to ED. Patient states he feels little better. Denied any fever or phlegm production in the ED. He added that he uses 2 pillows at night to sleep. Spoke to patients partner, she states patient was prescribed on water pill , but he never likes to take it. Found to be saturating 53% on RA, placed on 6L NC. Called patient's cardiologist, Dr. Amanda Kumari, who stated that the patient saw her yesterday 2/24 for regular follow up visit and was saturating at 44%, otherwise vitals were normal. Placed on O2 in the office but is not currently on O2 at home as it was taken away from him many years ago. Dr. Kumari stated that the patient felt dizzy, weak, tired and vomited when he walked into the office. Stated patient has a hx of PAD, latest cardiac stent in 2016, carotid endarterectomy in 2019, stents in RLE and a recent LLE stent placement. Cardio Dr. Boles and Pulm Dr. Urias. Repeat CXR on 2/26 improved. Saturating 98% on 6L NC, titrated down to 4L and is saturating 96%. HbA1c elevated at 6.9%, started on insulin sliding scale. Nephro Dr. Rosario. Cardiac echo showed new moderate mitral regurg and mild aortic regurg when compared to 2010 echo, mild aortic stenosis, G2DD, EF 65%. US abdomen showed cholelithiasis and echogenic kidneys c/w renal parenchymal disease.       INTERVAL HPI/OVERNIGHT EVENTS: No acute events overnight. Patient has no complaints. Missed beats and NSVT on tele. Urinating freely. Denies CP, SOB, abdominal pain. Saturating 93-95% on 3L NC. Patient saturated 85% on RA, titrated to 2L NC, now saturating 93%. Palliative saw patient, pending recs. Pending respiratory consult for COPD education.        MEDICATIONS  (STANDING):  amLODIPine   Tablet 10 milliGRAM(s) Oral daily  aspirin enteric coated 81 milliGRAM(s) Oral daily  atorvastatin 80 milliGRAM(s) Oral at bedtime  carvedilol 3.125 milliGRAM(s) Oral every 12 hours  clopidogrel Tablet 75 milliGRAM(s) Oral daily  ferrous    sulfate 325 milliGRAM(s) Oral daily  folic acid 1 milliGRAM(s) Oral daily  furosemide   Injectable 40 milliGRAM(s) IV Push daily  heparin   Injectable 5000 Unit(s) SubCutaneous every 12 hours  insulin lispro (ADMELOG) corrective regimen sliding scale   SubCutaneous Before meals and at bedtime  melatonin 3 milliGRAM(s) Oral at bedtime  pantoprazole    Tablet 40 milliGRAM(s) Oral before breakfast  tiotropium 18 MICROgram(s) Capsule 1 Capsule(s) Inhalation daily    MEDICATIONS  (PRN):  ALBUTerol    90 MICROgram(s) HFA Inhaler 2 Puff(s) Inhalation every 6 hours PRN Shortness of Breath and/or Wheezing      Vital Signs Last 24 Hrs  T(C): 36.8 (01 Mar 2021 07:24), Max: 37.3 (01 Mar 2021 04:34)  T(F): 98.3 (01 Mar 2021 07:24), Max: 99.1 (01 Mar 2021 04:34)  HR: 57 (01 Mar 2021 07:24) (54 - 87)  BP: 116/36 (01 Mar 2021 07:24) (116/36 - 138/62)  BP(mean): --  RR: 18 (01 Mar 2021 07:24) (17 - 18)  SpO2: 97% (01 Mar 2021 07:24) (93% - 97%)    PHYSICAL EXAMINATION:  GENERAL: NAD, well built  HEAD:  Atraumatic, Normocephalic  EYES:  conjunctiva and sclera clear  NECK: Supple, No JVD, Normal thyroid  CHEST/LUNG: + decreased L posterior breath sounds, clear to percussion bilaterally; No rales, rhonchi, wheezing, or rubs  HEART: + faint AS, AR and MR murmurs, Regular rate and rhythm; No rubs, or gallops  ABDOMEN: Soft, Nontender, Nondistended; Bowel sounds present  NERVOUS SYSTEM:  AAOx3  EXTREMITIES:  2+ Peripheral Pulses, No clubbing, cyanosis, or edema  SKIN: warm dry                          10.2   8.79  )-----------( 445      ( 01 Mar 2021 09:31 )             34.5     03-01    140  |  100  |  80<H>  ----------------------------<  180<H>  4.3   |  29  |  2.68<H>    Ca    8.8      01 Mar 2021 09:31  Phos  4.8     03-01  Mg     2.5     03-01    TPro  7.2  /  Alb  3.1<L>  /  TBili  0.5  /  DBili  x   /  AST  7<L>  /  ALT  23  /  AlkPhos  191<H>  03-01    LIVER FUNCTIONS - ( 01 Mar 2021 09:31 )  Alb: 3.1 g/dL / Pro: 7.2 g/dL / ALK PHOS: 191 U/L / ALT: 23 U/L DA / AST: 7 U/L / GGT: x                       I&O's Summary    28 Feb 2021 07:01  -  01 Mar 2021 07:00  --------------------------------------------------------  IN: 0 mL / OUT: 900 mL / NET: -900 mL    01 Mar 2021 07:01  -  01 Mar 2021 10:35  --------------------------------------------------------  IN: 0 mL / OUT: 200 mL / NET: -200 mL        CAPILLARY BLOOD GLUCOSE  112 (28 Feb 2021 21:14)      POCT Blood Glucose.: 95 mg/dL (01 Mar 2021 08:01)    CAPILLARY BLOOD GLUCOSE  112 (28 Feb 2021 21:14)      POCT Blood Glucose.: 95 mg/dL (01 Mar 2021 08:01)  POCT Blood Glucose.: 112 mg/dL (28 Feb 2021 20:59)  POCT Blood Glucose.: 84 mg/dL (28 Feb 2021 15:59)  POCT Blood Glucose.: 259 mg/dL (28 Feb 2021 12:08)

## 2021-03-01 NOTE — PROGRESS NOTE ADULT - PROBLEM SELECTOR PLAN 2
Saturating 53% on RA  Titrated down to 3L NC, saturating 95%  ABG - 7.31/48/66/24  d/c solumedrol, azithromycin, symbicort  Imaging c/w congestion w/ R loculated fluid collection in fissure  c/w albuterol, spiriva per pulm  Monitor O2 saturation, titrate O2 as needed  Pulm Dr. Urias On admission saturating 53% on RA  Titrated down to 3L NC, saturating 95%  ABG - 7.31/48/66/24  S/p solumedrol, azithromycin  Imaging c/w congestion w/ R loculated fluid collection in fissure  C/w Albuterol, Spiriva per pulm  Monitor O2 saturation, titrate down O2 as tolerated   Pulm Dr. Urias On admission saturating 53% on RA  Titrated down to 3L NC, saturating 95%  ABG - 7.31/48/66/24  S/p solumedrol, azithromycin  Imaging c/w congestion w/ R loculated fluid collection in fissure  C/w Albuterol, Spiriva per pulm  Monitor O2 saturation, titrate down O2 as tolerated   Respiratory consulted for COPD and inhalers edu  Pulm Dr. Urias

## 2021-03-01 NOTE — PROGRESS NOTE ADULT - ASSESSMENT
81 yo M former smoker and vasculopath with HTN, HLD, T2DM, CAD s/p MI and PCI, reported COPD, CKD III, and PAD s/p stenting, admitted with acute hypoxic respiratory failure and hypoxemia  Clinically improving with diuresis, supplemental O2 down to 2L    Recommend:  - cont management of acute CHF exacerbation and diuresis as renal function tolerates  - cont on spiriva with prn albuterol mdi  - assess home O2 needs, including portable O2 tank  - pt needs outpt pulm f/u, can schedule with me       Thank you for this consult, please call with any further questions

## 2021-03-01 NOTE — PROGRESS NOTE ADULT - ASSESSMENT
# RUSTY on CKD. patient with CHF excerbation.  volume status improved  SCR improving  hemodynamically stable.   lasix dose decreased to 40mg daily   No recent NSAIDS and iv contrast agent use.   UA bland.   Renal US unremarkable, without  obstruction.

## 2021-03-01 NOTE — CONSULT NOTE ADULT - PROBLEM SELECTOR RECOMMENDATION 2
Likely cardio-renal. RUSTY on CKD III.   Elevated Scr. Nephrology following    Avoid nephrotoxic medications/NSAIDs

## 2021-03-01 NOTE — PROGRESS NOTE ADULT - PROBLEM SELECTOR PLAN 6
Only on Plavix at home  c/w aspirin, clopidogrel, atorvastatin, carvedilol Only on Plavix at home  C/w aspirin, clopidogrel, atorvastatin, carvedilol

## 2021-03-01 NOTE — PROGRESS NOTE ADULT - PROBLEM SELECTOR PLAN 5
Stable 230s - GGT slight elevation 52  US abdomen - cholelithiasis Stable 190- GGT slight elevation 52  US abdomen - cholelithiasis

## 2021-03-01 NOTE — PROGRESS NOTE ADULT - SUBJECTIVE AND OBJECTIVE BOX
[facility] : Progress Note :: [phone], (office [phone]),   Dr Rosario / Dr Pagan / Dr Lang / Dr Serna / Dr Jose JIMÉNEZ / Dr Charles / Dr Yeung / Dr Sancho rivera  _____________________________________________________________________________________________    SOB better    Novocain (Faint)    Hospital Medications:   MEDICATIONS  (STANDING):  amLODIPine   Tablet 10 milliGRAM(s) Oral daily  aspirin enteric coated 81 milliGRAM(s) Oral daily  atorvastatin 80 milliGRAM(s) Oral at bedtime  carvedilol 3.125 milliGRAM(s) Oral every 12 hours  clopidogrel Tablet 75 milliGRAM(s) Oral daily  ferrous    sulfate 325 milliGRAM(s) Oral daily  folic acid 1 milliGRAM(s) Oral daily  furosemide   Injectable 40 milliGRAM(s) IV Push daily  heparin   Injectable 5000 Unit(s) SubCutaneous every 12 hours  insulin lispro (ADMELOG) corrective regimen sliding scale   SubCutaneous Before meals and at bedtime  melatonin 3 milliGRAM(s) Oral at bedtime  pantoprazole    Tablet 40 milliGRAM(s) Oral before breakfast  tiotropium 18 MICROgram(s) Capsule 1 Capsule(s) Inhalation daily        VITALS:  T(F): 98.2 (21 @ 11:15), Max: 99.1 (21 @ 04:34)  HR: 54 (21 @ 11:15)  BP: 109/44 (21 @ 11:15)  RR: 18 (21 @ 11:15)  SpO2: 100% (21 @ 11:15)  Wt(kg): --     @ 07:01  -   @ 07:00  --------------------------------------------------------  IN: 0 mL / OUT: 900 mL / NET: -900 mL     @ 07:01  -   @ 13:58  --------------------------------------------------------  IN: 0 mL / OUT: 200 mL / NET: -200 mL        PHYSICAL EXAM:  Constitutional: NAD  HEENT: anicteric sclera, oropharynx clear.  Neck: No JVD  Respiratory: CTAB, no wheezes, rales or rhonchi  Cardiovascular: S1, S2, RRR  Gastrointestinal: BS+, soft, NT/ND  Extremities: No cyanosis or clubbing. No peripheral edema  Neurological: A/O x 3, no focal deficits  : No CVA tenderness. No swenson.       LABS:      140  |  100  |  80<H>  ----------------------------<  180<H>  4.3   |  29  |  2.68<H>    Ca    8.8      01 Mar 2021 09:31  Phos  4.8       Mg     2.5         TPro  7.2  /  Alb  3.1<L>  /  TBili  0.5  /  DBili      /  AST  7<L>  /  ALT  23  /  AlkPhos  191<H>      Creatinine Trend: 2.68 <--, 2.94 <--, 3.19 <--, 3.66 <--, 3.22 <--, 2.73 <--                        10.2   8.79  )-----------( 445      ( 01 Mar 2021 09:31 )             34.5     Urine Studies:  Urinalysis Basic - ( 2021 06:24 )    Color: Yellow / Appearance: Clear / S.015 / pH:   Gluc:  / Ketone: Negative  / Bili: Negative / Urobili: Negative   Blood:  / Protein: 15 / Nitrite: Negative   Leuk Esterase: Small / RBC: 2-5 /HPF / WBC 11-25 /HPF   Sq Epi:  / Non Sq Epi: Few /HPF / Bacteria: Few /HPF      Creatinine, Random Urine: 112 mg/dL ( @ 06:24)  Sodium, Random Urine: 15 mmol/L ( @ 06:24)  Osmolality, Random Urine: 341 mos/kg ( @ :24)    RADIOLOGY & ADDITIONAL STUDIES:

## 2021-03-01 NOTE — PROGRESS NOTE ADULT - PROBLEM SELECTOR PLAN 7
A1C 6.9   Started SSI FSACHS  Nutrition consult  Lifestyle modification A1C 6.9   C/w HSS   Target  -180   FS before meals and at bedtime

## 2021-03-01 NOTE — PROGRESS NOTE ADULT - PROBLEM SELECTOR PLAN 3
Cr 2.68, baseline Cr 1.93  f/u urine urea  Voiding freely, no post void residual  Renal US - echogenic kidneys c/w renal parenchymal dz  Nephro Dr Rosario Cr 2.68, baseline Cr 1.93  Voiding freely, no post void residual  Abdomen US - Echogenic kidneys, in keeping with parenchymal renal disease. No hydronephrosis   Nephro Dr Rosario

## 2021-03-01 NOTE — ADVANCED PRACTICE NURSE CONSULT - ASSESSMENT
This is a 80yr old male patient admitted for Heart Failure, to which the patients skin is intact without pressure injury

## 2021-03-01 NOTE — PROGRESS NOTE ADULT - PROBLEM SELECTOR PLAN 9
Cholesterol 193, HDL 43, , non , TG 94  c/w atorvastatin to 80 mg PO QHS Cholesterol 193, HDL 43, , non , TG 94  C/w atorvastatin to 80 mg PO QHS

## 2021-03-01 NOTE — PROGRESS NOTE ADULT - ASSESSMENT
80 year old male with medical history of  HTN, HLD, DM, CAD on ASA/Plavix, PAD, CKD, prostate ca s/p radiation, anemia, COPD not on home O2, PAD, presents with SOB, dry cough, and dizziness x 2 days. Patient admitted for COPD v CHF exacerbation. 80 year old male with medical history of  HTN, HLD, DM, CAD on ASA/Plavix, PAD, CKD, prostate ca s/p radiation, anemia, COPD not on home O2, PAD, presented with SOB, dry cough, and dizziness x 2 days. Patient admitted for COPD vs CHF exacerbation.

## 2021-03-01 NOTE — PROGRESS NOTE ADULT - PROBLEM SELECTOR PLAN 8
BP remains stable, monitor BP  c/w amlodipine, carvedilol  No ACEi/ARB due to RUSTY BP remains stable, monitor BP  C/w amlodipine, carvedilol  No ACEi/ARB due to RUSTY

## 2021-03-01 NOTE — CONSULT NOTE ADULT - PROBLEM SELECTOR RECOMMENDATION 4
Pt reports being independent prior to admission.  He has not been able to get out of the bed since admission.    Pt tavo.

## 2021-03-01 NOTE — CONSULT NOTE ADULT - SUBJECTIVE AND OBJECTIVE BOX
HPI:  80 year old male with medical history of  HTN, HLD, CAD on ASA/Plavix, PAD, CKD, prostate ca s/p radiation, anemia, COPD not on home O2, PAD, presents with SOB, dry cough, and dizziness x 2 days. Patient states he went to  Dr. Kumari cardio today and reportedly saturating. 44% on RA and patient was sent to ED. Patient states he feels little better. denies any fever or phlegm production. He added that he uses 2 pillows at night to sleep.  Spoke to patients partner, she states patient was prescribed on water pill , but he never likes to take it.    Interval hx: Pt seen and examined at the bedside, AOX3.  NAD.  His life partner Priscilla Duran was on the phone with the pt at the time.  Palliative care to establish goals of care.        PAST MEDICAL & SURGICAL HISTORY:  Hematuria    [place] (hard of hearing)  right    Frequent UTI    Malignant neoplasm of bladder, unspecified    Femoral artery occlusion, left  superficial- 2019- surgically resolved    Occlusion and stenosis of unspecified carotid artery  right carotid artery    CAD (coronary artery disease)  coronary stent x 1 - 2016- pt stopped Pletal and Aspirin 325mg , called Dr. mack Garvey , to start ASA 81 mg today including am of sx, office of Tali alonso notified    Anemia    COPD (chronic obstructive pulmonary disease)  on chest xray    Gout    Rectal bleeding  November 2018    Injury of head, initial encounter  12/2016 - s/p fall in hospital    Bladder mass  s/p resection    Pulmonary edema  May 2016-resolved    Urinary retention    CKD (chronic kidney disease) stage 3, GFR 30-59 ml/min    Diabetes mellitus, type 2  controlled with diet    Hearing Decreased left ear    PAD--Left leg stent 2002  done at [facility] in [place]  --[facility] in [place], s/p balloon on LLE in 2006    approximately 2001  MI/Coronary Artery Disease    BPH    6/09 diagnosed with Prostate Ca  s/p radiation and brachytherapy w/ seed implantation 5 years ago s/p TURP    Hypercholesteremia    Hypertension    History of vascular access device  05/2020  insertion in left upper arm ( PICC) , removal after 2 weeks of abx    H/O vascular surgery  left suprficial femoral artery stent- 2019 November    S/P carotid endarterectomy  right 01/2019    FH: carotid endarterectomy    Bladder mass  cystoscopy, TURBT, transurethral incision of bladder neck- 06/2018    S/P cardiac catheterization  2016 with 1 FEMI stent    History of bladder surgery    S/P TURP (status post transurethral resection of prostate)    Exposure to radiation  for prostate cancer (seed implant)    History of colon surgery  2007    Acute myocardial infarction  as per pt in 2001, no coronary stent    2002 left leg stent    right wrist surgery with hardware 27 years ago    Cataract surgery 30 years ago        SOCIAL HISTORY:    Admitted from:  home with family  Pt assigned his life partner Priscilla Duran as his HCP.    Evangelical:  Gnosticism                                      Surrogate/HCP/Guardian:  Priscilla Duran          Phone#: [phone]    FAMILY HISTORY:  Family history of heart disease    Family history of diabetes mellitus (Mother)      Baseline ADLs (prior to admission): independent    Allergies    Novocain (Faint)    Intolerances      Present Symptoms:   Dyspnea: yes  Nausea/Vomiting: denies  Anxiety: denies  Loss of appetite: denies  Pain:    denies                                  Review of Systems: [All others negative     MEDICATIONS  (STANDING):  amLODIPine   Tablet 10 milliGRAM(s) Oral daily  aspirin enteric coated 81 milliGRAM(s) Oral daily  atorvastatin 80 milliGRAM(s) Oral at bedtime  carvedilol 3.125 milliGRAM(s) Oral every 12 hours  clopidogrel Tablet 75 milliGRAM(s) Oral daily  ferrous    sulfate 325 milliGRAM(s) Oral daily  folic acid 1 milliGRAM(s) Oral daily  furosemide   Injectable 40 milliGRAM(s) IV Push daily  heparin   Injectable 5000 Unit(s) SubCutaneous every 12 hours  insulin lispro (ADMELOG) corrective regimen sliding scale   SubCutaneous Before meals and at bedtime  melatonin 3 milliGRAM(s) Oral at bedtime  pantoprazole    Tablet 40 milliGRAM(s) Oral before breakfast  tiotropium 18 MICROgram(s) Capsule 1 Capsule(s) Inhalation daily    MEDICATIONS  (PRN):  ALBUTerol    90 MICROgram(s) HFA Inhaler 2 Puff(s) Inhalation every 6 hours PRN Shortness of Breath and/or Wheezing      PHYSICAL EXAM:    Vital Signs Last 24 Hrs  T(C): 36.8 (01 Mar 2021 11:15), Max: 37.3 (01 Mar 2021 04:34)  T(F): 98.2 (01 Mar 2021 11:15), Max: 99.1 (01 Mar 2021 04:34)  HR: 54 (01 Mar 2021 11:15) (54 - 87)  BP: 109/44 (01 Mar 2021 11:15) (109/44 - 138/62)  BP(mean): --  RR: 18 (01 Mar 2021 11:15) (17 - 18)  SpO2: 100% (01 Mar 2021 11:15) (93% - 100%)    General: Elderly man, AOX3. Unlabored on NC.  NAD.   Karnofsky Performance Score/Palliative Performance Status Version2: 40   %    HEENT: atraumatic, moist mucous membrane  Lungs: unlabored on RA  CV: RRR  GI: soft, non tender   : urinating  Musculoskeletal: no edema  Skin: no rash or lesions noted  Neuro: no deficits cognitive impairment   Oral intake ability: moderate po intake      LABS:                        10.2   8.79  )-----------( 445      ( 01 Mar 2021 09:31 )             34.5     03-01    140  |  100  |  80<H>  ----------------------------<  180<H>  4.3   |  29  |  2.68<H>    Ca    8.8      01 Mar 2021 09:31  Phos  4.8     03-01  Mg     2.5     03-01    TPro  7.2  /  Alb  3.1<L>  /  TBili  0.5  /  DBili  x   /  AST  7<L>  /  ALT  23  /  AlkPhos  191<H>  03-01    < from: CT Chest No Cont (02.24.21 @ 19:54) >  EXAM:  CT CHEST                            PROCEDURE DATE:  02/24/2021          INTERPRETATION:  CLINICAL INFORMATION: Lung mass. Heart failure. Shortness of breath    COMPARISON: Chest x-ray of earlier in the day and CT chest of 11/30/2016    PROCEDURE:  CT of the Chest was performed without intravenous contrast.  Sagittal and coronal reformats were performed.    FINDINGS:    LUNGS AND AIRWAYS: Patent central airways.  Lungs are remarkable for passive atelectasis at the lung bases.  PLEURA: Small to moderate bilateral pleural effusions. There is fluid loculated in the minor fissure on the right corresponding to the chest x-ray abnormality  MEDIASTINUM AND ANALISA: Shotty mediastinal lymphadenopathy.  VESSELS: There is atherosclerotic calcification of the aorta and of the coronary arteries  HEART: The heart is mildly enlarged. No pericardial effusion.  CHEST WALL AND LOWER NECK: Within normal limits.  VISUALIZED UPPER ABDOMEN: Cholelithiasis. Left renal cyst lower pole.  BONES: Degenerative changes are appreciated    IMPRESSION: Probable pulmonary vascular congestion with small to moderate bilateral pleural effusions and compressive atelectasis at the lung bases are loculated fluid on the right corresponds to chest x-ray abnormality      < end of copied text >  < from: Xray Chest 1 View- PORTABLE-Urgent (Xray Chest 1 View- PORTABLE-Urgent .) (02.26.21 @ 09:21) >    EXAM:  XR CHEST PORTABLE URGENT 1V                            PROCEDURE DATE:  02/26/2021          INTERPRETATION:  CLINICAL INDICATION: 80 years  Male with chf.    COMPARISON: 2/24/2021    AP view of the chest demonstrates improving right lateral basilar opacity corresponding to pleural effusion in the major fissure. Stable bilateral interstitial infiltrates. No left pleural effusion. There is no pneumothorax.    The heart is enlarged. The mediastinum and analisa cannot be assessed due to projection.    The pulmonary vasculature is normal.    Mild thoracic degenerative changes are present.    IMPRESSION:    Improving right pleural effusion.    Stable bilateral interstitial infiltrates.    < end of copied text >      RADIOLOGY & ADDITIONAL STUDIES: Reviewed    ADVANCE DIRECTIVES: FULL CODE

## 2021-03-01 NOTE — CONSULT NOTE ADULT - PROBLEM SELECTOR RECOMMENDATION 9
Pt reports being SOB on admission.  Unlabored on NC.  Diurese as tolerated.  Pt's partner shared he is non compliant with his diuretics.  Discussed importance of taking medication as prescribed.  Cardiology following     Pt is a FULL CODE

## 2021-03-02 LAB
ALBUMIN SERPL ELPH-MCNC: 3.2 G/DL — LOW (ref 3.5–5)
ALP SERPL-CCNC: 179 U/L — HIGH (ref 40–120)
ALT FLD-CCNC: 20 U/L DA — SIGNIFICANT CHANGE UP (ref 10–60)
ANION GAP SERPL CALC-SCNC: 7 MMOL/L — SIGNIFICANT CHANGE UP (ref 5–17)
AST SERPL-CCNC: 10 U/L — SIGNIFICANT CHANGE UP (ref 10–40)
BILIRUB SERPL-MCNC: 0.5 MG/DL — SIGNIFICANT CHANGE UP (ref 0.2–1.2)
BUN SERPL-MCNC: 88 MG/DL — HIGH (ref 7–18)
CALCIUM SERPL-MCNC: 8.7 MG/DL — SIGNIFICANT CHANGE UP (ref 8.4–10.5)
CHLORIDE SERPL-SCNC: 101 MMOL/L — SIGNIFICANT CHANGE UP (ref 96–108)
CO2 SERPL-SCNC: 31 MMOL/L — SIGNIFICANT CHANGE UP (ref 22–31)
CREAT SERPL-MCNC: 2.9 MG/DL — HIGH (ref 0.5–1.3)
GLUCOSE BLDC GLUCOMTR-MCNC: 101 MG/DL — HIGH (ref 70–99)
GLUCOSE BLDC GLUCOMTR-MCNC: 131 MG/DL — HIGH (ref 70–99)
GLUCOSE BLDC GLUCOMTR-MCNC: 388 MG/DL — HIGH (ref 70–99)
GLUCOSE BLDC GLUCOMTR-MCNC: 77 MG/DL — SIGNIFICANT CHANGE UP (ref 70–99)
GLUCOSE SERPL-MCNC: 140 MG/DL — HIGH (ref 70–99)
HCT VFR BLD CALC: 36.2 % — LOW (ref 39–50)
HGB BLD-MCNC: 10.4 G/DL — LOW (ref 13–17)
MAGNESIUM SERPL-MCNC: 2.3 MG/DL — SIGNIFICANT CHANGE UP (ref 1.6–2.6)
MCHC RBC-ENTMCNC: 25.7 PG — LOW (ref 27–34)
MCHC RBC-ENTMCNC: 28.7 GM/DL — LOW (ref 32–36)
MCV RBC AUTO: 89.6 FL — SIGNIFICANT CHANGE UP (ref 80–100)
NRBC # BLD: 0 /100 WBCS — SIGNIFICANT CHANGE UP (ref 0–0)
PHOSPHATE SERPL-MCNC: 5 MG/DL — HIGH (ref 2.5–4.5)
PLATELET # BLD AUTO: 454 K/UL — HIGH (ref 150–400)
POTASSIUM SERPL-MCNC: 4.2 MMOL/L — SIGNIFICANT CHANGE UP (ref 3.5–5.3)
POTASSIUM SERPL-SCNC: 4.2 MMOL/L — SIGNIFICANT CHANGE UP (ref 3.5–5.3)
PROT SERPL-MCNC: 7.4 G/DL — SIGNIFICANT CHANGE UP (ref 6–8.3)
RBC # BLD: 4.04 M/UL — LOW (ref 4.2–5.8)
RBC # FLD: 18.6 % — HIGH (ref 10.3–14.5)
SARS-COV-2 RNA SPEC QL NAA+PROBE: SIGNIFICANT CHANGE UP
SODIUM SERPL-SCNC: 139 MMOL/L — SIGNIFICANT CHANGE UP (ref 135–145)
UUN UR-MCNC: 444 MG/DL — SIGNIFICANT CHANGE UP
WBC # BLD: 8.91 K/UL — SIGNIFICANT CHANGE UP (ref 3.8–10.5)
WBC # FLD AUTO: 8.91 K/UL — SIGNIFICANT CHANGE UP (ref 3.8–10.5)

## 2021-03-02 PROCEDURE — 99233 SBSQ HOSP IP/OBS HIGH 50: CPT | Mod: GC

## 2021-03-02 RX ORDER — FUROSEMIDE 40 MG
40 TABLET ORAL DAILY
Refills: 0 | Status: DISCONTINUED | OUTPATIENT
Start: 2021-03-02 | End: 2021-03-03

## 2021-03-02 RX ORDER — AMLODIPINE BESYLATE 2.5 MG/1
5 TABLET ORAL DAILY
Refills: 0 | Status: DISCONTINUED | OUTPATIENT
Start: 2021-03-02 | End: 2021-03-03

## 2021-03-02 RX ADMIN — HEPARIN SODIUM 5000 UNIT(S): 5000 INJECTION INTRAVENOUS; SUBCUTANEOUS at 17:36

## 2021-03-02 RX ADMIN — CARVEDILOL PHOSPHATE 3.12 MILLIGRAM(S): 80 CAPSULE, EXTENDED RELEASE ORAL at 07:49

## 2021-03-02 RX ADMIN — CARVEDILOL PHOSPHATE 3.12 MILLIGRAM(S): 80 CAPSULE, EXTENDED RELEASE ORAL at 17:36

## 2021-03-02 RX ADMIN — Medication 5: at 11:41

## 2021-03-02 RX ADMIN — ATORVASTATIN CALCIUM 80 MILLIGRAM(S): 80 TABLET, FILM COATED ORAL at 22:23

## 2021-03-02 RX ADMIN — Medication 40 MILLIGRAM(S): at 07:07

## 2021-03-02 RX ADMIN — TIOTROPIUM BROMIDE 1 CAPSULE(S): 18 CAPSULE ORAL; RESPIRATORY (INHALATION) at 11:22

## 2021-03-02 RX ADMIN — CLOPIDOGREL BISULFATE 75 MILLIGRAM(S): 75 TABLET, FILM COATED ORAL at 11:21

## 2021-03-02 RX ADMIN — AMLODIPINE BESYLATE 10 MILLIGRAM(S): 2.5 TABLET ORAL at 07:07

## 2021-03-02 RX ADMIN — Medication 81 MILLIGRAM(S): at 11:21

## 2021-03-02 RX ADMIN — Medication 325 MILLIGRAM(S): at 11:22

## 2021-03-02 RX ADMIN — Medication 1 MILLIGRAM(S): at 11:21

## 2021-03-02 RX ADMIN — PANTOPRAZOLE SODIUM 40 MILLIGRAM(S): 20 TABLET, DELAYED RELEASE ORAL at 07:07

## 2021-03-02 RX ADMIN — HEPARIN SODIUM 5000 UNIT(S): 5000 INJECTION INTRAVENOUS; SUBCUTANEOUS at 07:07

## 2021-03-02 RX ADMIN — Medication 3 MILLIGRAM(S): at 22:23

## 2021-03-02 NOTE — PROGRESS NOTE ADULT - ASSESSMENT
80 year old male with medical history of  HTN, HLD, DM, CAD on ASA/Plavix, PAD, CKD, prostate ca s/p radiation, anemia, COPD not on home O2, PAD, presented with SOB, dry cough, and dizziness x 2 days. Patient admitted for COPD vs CHF exacerbation.

## 2021-03-02 NOTE — PROGRESS NOTE ADULT - REASON FOR ADMISSION
Hypoxia

## 2021-03-02 NOTE — PROGRESS NOTE ADULT - SUBJECTIVE AND OBJECTIVE BOX
[facility] : Progress Note :: [phone], (office [phone]),   Dr Rosario / Dr Pagan / Dr Lang / Dr Serna / Dr Jose JIMÉNEZ / Dr Charles / Dr Yeung / Dr Sancho rivera  _____________________________________________________________________________________________    no SOB    Novocain (Faint)    Hospital Medications:   MEDICATIONS  (STANDING):  amLODIPine   Tablet 5 milliGRAM(s) Oral daily  aspirin enteric coated 81 milliGRAM(s) Oral daily  atorvastatin 80 milliGRAM(s) Oral at bedtime  carvedilol 3.125 milliGRAM(s) Oral every 12 hours  clopidogrel Tablet 75 milliGRAM(s) Oral daily  ferrous    sulfate 325 milliGRAM(s) Oral daily  folic acid 1 milliGRAM(s) Oral daily  furosemide    Tablet 40 milliGRAM(s) Oral daily  heparin   Injectable 5000 Unit(s) SubCutaneous every 12 hours  insulin lispro (ADMELOG) corrective regimen sliding scale   SubCutaneous Before meals and at bedtime  melatonin 3 milliGRAM(s) Oral at bedtime  pantoprazole    Tablet 40 milliGRAM(s) Oral before breakfast  tiotropium 18 MICROgram(s) Capsule 1 Capsule(s) Inhalation daily        VITALS:  T(F): 97.9 (21 @ 15:41), Max: 98.2 (21 @ 11:11)  HR: 56 (21 @ 15:41)  BP: 119/81 (21 @ 15:41)  RR: 18 (21 @ 15:41)  SpO2: 99% (21 @ 15:41)  Wt(kg): --     @ 07:01  -   @ 07:00  --------------------------------------------------------  IN: 1106 mL / OUT: 2000 mL / NET: -894 mL     @ 07:01  -   @ 18:18  --------------------------------------------------------  IN: 550 mL / OUT: 400 mL / NET: 150 mL        PHYSICAL EXAM:  Constitutional: NAD  HEENT: anicteric sclera, oropharynx clear.  Neck: No JVD  Respiratory: CTAB, no wheezes, rales or rhonchi  Cardiovascular: S1, S2, RRR  Gastrointestinal: BS+, soft, NT/ND  Extremities:  No peripheral edema  Neurological: A/O x 3, no focal deficits  : No CVA tenderness. No swenson.       LABS:      139  |  101  |  88<H>  ----------------------------<  140<H>  4.2   |  31  |  2.90<H>    Ca    8.7      02 Mar 2021 09:14  Phos  5.0       Mg     2.3         TPro  7.4  /  Alb  3.2<L>  /  TBili  0.5  /  DBili      /  AST  10  /  ALT  20  /  AlkPhos  179<H>      Creatinine Trend: 2.90 <--, 2.68 <--, 2.94 <--, 3.19 <--, 3.66 <--, 3.22 <--, 2.73 <--                        10.4   8.91  )-----------( 454      ( 02 Mar 2021 09:14 )             36.2     Urine Studies:  Urinalysis Basic - ( 2021 06:24 )    Color: Yellow / Appearance: Clear / S.015 / pH:   Gluc:  / Ketone: Negative  / Bili: Negative / Urobili: Negative   Blood:  / Protein: 15 / Nitrite: Negative   Leuk Esterase: Small / RBC: 2-5 /HPF / WBC 11-25 /HPF   Sq Epi:  / Non Sq Epi: Few /HPF / Bacteria: Few /HPF      Creatinine, Random Urine: 112 mg/dL ( @ 06:24)  Sodium, Random Urine: 15 mmol/L ( @ 06:24)  Osmolality, Random Urine: 341 mos/kg ( @ :24)    RADIOLOGY & ADDITIONAL STUDIES:

## 2021-03-02 NOTE — PROGRESS NOTE ADULT - PROBLEM SELECTOR PLAN 8
BP remains stable, monitor BP  C/w amlodipine, carvedilol  No ACEi/ARB due to RUSTY BP remains stable but soft (100s-110s/30s-40s), monitor BP  C/w carvedilol  Decreased amlodipine to 5mg PO QD  No ACEi/ARB due to RUSTY

## 2021-03-02 NOTE — PROGRESS NOTE ADULT - PROBLEM SELECTOR PLAN 3
Cr 2.68, baseline Cr 1.93  Voiding freely, no post void residual  Abdomen US - Echogenic kidneys, in keeping with parenchymal renal disease. No hydronephrosis   Nephro Dr Rosario Cr 2.90 - increasing, baseline Cr 1.93  Voiding freely, no post void residual  Abdomen US - Echogenic kidneys, in keeping with parenchymal renal disease. No hydronephrosis   Nephro Dr Rosario

## 2021-03-02 NOTE — PROGRESS NOTE ADULT - ASSESSMENT
# RUSTY stable on  CKD.  Patient with CHF exercebation    volume status improved  SCR fluctuating but relatively stable.   cont lasix  40mg daily   No recent NSAIDS and iv contrast agent use.   UA bland.   Renal US unremarkable, without  obstruction.

## 2021-03-02 NOTE — PROGRESS NOTE ADULT - PROBLEM SELECTOR PLAN 1
CXR - b/l pulmonary edema - now improved  Decreased IV Lasix to 40 QD  Strict Is&Os and daily weights, net negative  Trops trending down, elevation likely 2/2 demand ischemia  BNP 3706  Echo - EF 65%, mod MR, mild AS and AR, G2DD  Palliative Dr. Tejeda  Cardio Dr. Boles CXR - b/l pulmonary edema - now improved  Transitioned Lasix to 40 QD PO  Strict Is&Os and daily weights, net negative  Trops trended down, elevation likely 2/2 demand ischemia  BNP 3706  Echo - EF 65%, mod MR, mild AS and AR, G2DD  Palliative Dr. Tejeda  Cardio Dr. Boles

## 2021-03-02 NOTE — PROGRESS NOTE ADULT - PROBLEM SELECTOR PLAN 4
Hg stable 10.2  Iron studies likely ANNA  C/w oral iron supplement  Monitor for s/s bleeding Hg stable 10.4  Iron studies likely ANNA  C/w oral iron supplement  Monitor for s/s bleeding

## 2021-03-02 NOTE — PROGRESS NOTE ADULT - PROBLEM SELECTOR PLAN 2
On admission saturating 53% on RA  Titrated down to 2L NC, saturating 97%  ABG - 7.31/48/66/24  S/p solumedrol, azithromycin  Imaging c/w congestion w/ R loculated fluid collection in fissure  C/w Albuterol, Spiriva per pulm  Monitor O2 saturation, titrate down O2 as tolerated   Respiratory consulted for COPD and inhalers edu  Pulm Dr. Urias On admission saturating 53% on RA  Titrated down to 2L NC, saturating 97%, 86% on RA  To be sent home w/ home O2  Not in exacerbation  S/p solumedrol, azithromycin  C/w Albuterol, Spiriva per pulm  Respiratory consulted for COPD and inhalers education  Pulm Dr. Urias

## 2021-03-02 NOTE — PROGRESS NOTE ADULT - ATTENDING COMMENTS
Aden Serna MD  [facility]  Office [phone]  Ans Serv [phone] cell - [phone]
Aden Serna MD  [facility]  Office [phone]  Ans Serv [phone] cell - [phone]
Aden Serna MD  [facility]  Office [phone]  Ans Serv [phone] cell - [phone]
Thank you for the courtesy of a consultation, please contact me for any additional questions
Pt seen and examined. 81 yo M with hypoxic respiratory failure secondary to systolic congestive heart failure and COPD. Pt still requiring 3-6 liters of oxygen via nasal canula. Will continue with diuresis as tolerated. Pt likely need assessment for home o2. Pt states that he would like a portable O2 tank for home.
Patient seen/evaluated at bedside on 3/2/21. I agree with the resident progress note/outlined plan of care. My independent findings and conclusions are documented.      SOB/orthopnea improved. Reviewed plan of care with patient  Resting room air saturations 86%    Briefly this is an 80 year old male with medical history of  HTN, HLD, DM, CAD on ASA/Plavix, PAD, CKD, prostate ca s/p radiation, anemia, COPD previously on home O2 (but patient self dc'd) PAD presents with acute diastolic CHF complicated by likely acute on chronic hypoxia.  Clinically improved. Meets criteria for home oxygen on discharge which was reviewed with the patient and case management.  Discharge planning in progress. Will require arranged follow up appt  w/in 1 week of discharge
Patient seen and examined.  Case discussed with house staff.  Agree with above as edited.  80yoM with h/o CAD s/p PCI in 2016, CKD III, PAD, HTN, HLD, CAD on ASA/Plavix, PAD, prostate ca s/p radiation, COPD not on home o2 a/w acute hypoxic respiratory failure and pulmonary edema.  Hypoxic respiratory Failure - Sat was in 50s on RA on 2/25. Improved rapidly with NC. d/w cardiology and pulmonary. Diuresing. Treating for acute pulmonary edema 2/2  Acute CHFrEF. Unlikely COPD exacerbation.  Acute pulmonary edema - secondary to CHF - c/w diuresis as ordered  Suspect Acute on chronic CHFrEF - Diuresis, tele, cards eval. I/Os, weights. TTE  NSVT - d/w cardiology - coreg ordered. c/w tele  RUSTY on CKD III - Suspect hemodynamic mediated in setting of CHF. Monitor response to diuresis. Nephrology following.
Patient seen and examined.  Case discussed with house staff.  Agree with above as edited.  80yoM with h/o CAD s/p PCI in 2016, CKD III, PAD, HTN, HLD, CAD on ASA/Plavix, PAD, prostate ca s/p radiation, COPD not on home o2 a/w acute hypoxic respiratory failure and pulmonary edema.  Hypoxic respiratory Failure - Sat was in 50s on RA. Improved rapidly with NC 6L. Cardiology consult appreciated. Pulm consult pending. Suspect component of Acute CHFrEF and COPD exacerbation.  Acute pulmonary edema - secondary to CHF - c/w diuresis  Suspect Acute on chronic CHFrEF - Diuresis, tele, cards eval. I/Os, weights. TTE  Acute exacerbation of COPD - On solumedrol. Likely change to prednisone tomorrow. Pulm eval pending. d/c ceftriaxone. c/w azithromycin. c/w inhalers  RUSTY on CKD III - Urinating well with low PVR per report. Suspect hemodynamic mediated in setting of CHF. Monitor response to diuresis. If no improvement, will consult nephrology.

## 2021-03-02 NOTE — PROGRESS NOTE ADULT - PROVIDER SPECIALTY LIST ADULT
Cardiology
Internal Medicine
Nephrology
Pulmonology
Nephrology
Pulmonology
Hospitalist
Nephrology
Internal Medicine

## 2021-03-02 NOTE — PROGRESS NOTE ADULT - SUBJECTIVE AND OBJECTIVE BOX
PGY-1 Progress Note discussed with attending    PAGER #: [[phone]] TILL 5:00 PM  PLEASE CONTACT ON CALL TEAM:   - On Call Team (Please refer to Janneth) FROM 5:00 PM - 8:30PM  - Nightfloat Team FROM 8:30 -7:30 AM    CHIEF COMPLAINT & BRIEF HOSPITAL COURSE: 80 year old male with medical history of  HTN, HLD, DM, CAD on ASA/Plavix, PAD, CKD, prostate ca s/p radiation, anemia, COPD not on home O2, PAD, presents with SOB, dry cough, and dizziness x 2 days. Patient states he went to  Dr. Kumari cardio today and reportedly saturating. 44% on RA and patient was sent to ED. Patient states he feels little better. Denied any fever or phlegm production in the ED. He added that he uses 2 pillows at night to sleep. Spoke to patients partner, she states patient was prescribed on water pill , but he never likes to take it. Found to be saturating 53% on RA, placed on 6L NC. Called patient's cardiologist, Dr. Amanda Kumari, who stated that the patient saw her yesterday 2/24 for regular follow up visit and was saturating at 44%, otherwise vitals were normal. Placed on O2 in the office but is not currently on O2 at home as it was taken away from him many years ago. Dr. Kumari stated that the patient felt dizzy, weak, tired and vomited when he walked into the office. Stated patient has a hx of PAD, latest cardiac stent in 2016, carotid endarterectomy in 2019, stents in RLE and a recent LLE stent placement. Cardio Dr. Boles and Pulm Dr. Urias. Repeat CXR on 2/26 improved. Saturating 98% on 6L NC, titrated down to 4L and is saturating 96%. HbA1c elevated at 6.9%, started on insulin sliding scale. Nephro Dr. Rosario. Cardiac echo showed new moderate mitral regurg and mild aortic regurg when compared to 2010 echo, mild aortic stenosis, G2DD, EF 65%. US abdomen showed cholelithiasis and echogenic kidneys c/w renal parenchymal disease.       INTERVAL HPI/OVERNIGHT EVENTS: No acute events overnight. Patient has no complaints. Titrated down to 2L NC yesterday, saturating well. Missed beats and girma to 40s overnight. Denies SOB, CP, abdominal pain.       REVIEW OF SYSTEMS:  CONSTITUTIONAL: No fever, weight loss, or fatigue  RESPIRATORY: No cough, wheezing, chills or hemoptysis; No shortness of breath  CARDIOVASCULAR: No chest pain, palpitations, dizziness, or leg swelling  GASTROINTESTINAL: No abdominal pain. No nausea, vomiting, or hematemesis; No diarrhea or constipation. No melena or hematochezia.  GENITOURINARY: No dysuria or hematuria, urinary frequency  NEUROLOGICAL: No headaches, memory loss, loss of strength, numbness, or tremors  SKIN: No itching, burning, rashes, or lesions     MEDICATIONS  (STANDING):  amLODIPine   Tablet 10 milliGRAM(s) Oral daily  aspirin enteric coated 81 milliGRAM(s) Oral daily  atorvastatin 80 milliGRAM(s) Oral at bedtime  carvedilol 3.125 milliGRAM(s) Oral every 12 hours  clopidogrel Tablet 75 milliGRAM(s) Oral daily  ferrous    sulfate 325 milliGRAM(s) Oral daily  folic acid 1 milliGRAM(s) Oral daily  furosemide   Injectable 40 milliGRAM(s) IV Push daily  heparin   Injectable 5000 Unit(s) SubCutaneous every 12 hours  insulin lispro (ADMELOG) corrective regimen sliding scale   SubCutaneous Before meals and at bedtime  melatonin 3 milliGRAM(s) Oral at bedtime  pantoprazole    Tablet 40 milliGRAM(s) Oral before breakfast  tiotropium 18 MICROgram(s) Capsule 1 Capsule(s) Inhalation daily    MEDICATIONS  (PRN):  ALBUTerol    90 MICROgram(s) HFA Inhaler 2 Puff(s) Inhalation every 6 hours PRN Shortness of Breath and/or Wheezing      Vital Signs Last 24 Hrs  T(C): 36.4 (02 Mar 2021 07:21), Max: 36.8 (01 Mar 2021 11:15)  T(F): 97.5 (02 Mar 2021 07:21), Max: 98.2 (01 Mar 2021 11:15)  HR: 54 (02 Mar 2021 07:21) (54 - 56)  BP: 109/39 (02 Mar 2021 07:21) (109/39 - 129/43)  BP(mean): --  RR: 18 (02 Mar 2021 07:21) (18 - 20)  SpO2: 97% (02 Mar 2021 07:21) (94% - 100%)    PHYSICAL EXAMINATION:  GENERAL: NAD, obese  HEAD:  Atraumatic, Normocephalic  EYES:  conjunctiva and sclera clear  NECK: Supple, No JVD  CHEST/LUNG: + decreased air entry in L posterior lobe, Clear to percussion bilaterally; No rales, rhonchi, wheezing, or rubs  HEART: Regular rate and rhythm; No murmurs, rubs, or gallops  ABDOMEN: Soft, Nontender, Nondistended; Bowel sounds present  NERVOUS SYSTEM:  Alert & Oriented X3,    EXTREMITIES:  2+ Peripheral Pulses, No clubbing, cyanosis, or edema  SKIN: warm dry                          10.2   8.79  )-----------( 445      ( 01 Mar 2021 09:31 )             34.5     03-01    140  |  100  |  80<H>  ----------------------------<  180<H>  4.3   |  29  |  2.68<H>    Ca    8.8      01 Mar 2021 09:31  Phos  4.8     03-01  Mg     2.5     03-01    TPro  7.2  /  Alb  3.1<L>  /  TBili  0.5  /  DBili  x   /  AST  7<L>  /  ALT  23  /  AlkPhos  191<H>  03-01    LIVER FUNCTIONS - ( 01 Mar 2021 09:31 )  Alb: 3.1 g/dL / Pro: 7.2 g/dL / ALK PHOS: 191 U/L / ALT: 23 U/L DA / AST: 7 U/L / GGT: x                       I&O's Summary    01 Mar 2021 07:01  -  02 Mar 2021 07:00  --------------------------------------------------------  IN: 1106 mL / OUT: 2000 mL / NET: -894 mL        CAPILLARY BLOOD GLUCOSE  112 (28 Feb 2021 21:14)      POCT Blood Glucose.: 101 mg/dL (02 Mar 2021 07:42)    CAPILLARY BLOOD GLUCOSE      POCT Blood Glucose.: 101 mg/dL (02 Mar 2021 07:42)  POCT Blood Glucose.: 137 mg/dL (01 Mar 2021 21:28)  POCT Blood Glucose.: 88 mg/dL (01 Mar 2021 16:53)  POCT Blood Glucose.: 254 mg/dL (01 Mar 2021 11:56)      RADIOLOGY & ADDITIONAL TESTS:                   OMS-3 Progress Note discussed with PGY-1 and attending    PAGER #: [[phone]] TILL 5:00 PM  PLEASE CONTACT ON CALL TEAM:   - On Call Team (Please refer to Janneth) FROM 5:00 PM - 8:30PM  - Nightfloat Team FROM 8:30 -7:30 AM    CHIEF COMPLAINT & BRIEF HOSPITAL COURSE: 80 year old male with medical history of  HTN, HLD, DM, CAD on ASA/Plavix, PAD, CKD, prostate ca s/p radiation, anemia, COPD not on home O2, PAD, presents with SOB, dry cough, and dizziness x 2 days. Patient states he went to  Dr. Kumari cardio today and reportedly saturating. 44% on RA and patient was sent to ED. Patient states he feels little better. Denied any fever or phlegm production in the ED. He added that he uses 2 pillows at night to sleep. Spoke to patients partner, she states patient was prescribed on water pill , but he never likes to take it. Found to be saturating 53% on RA, placed on 6L NC. Called patient's cardiologist, Dr. Amanda Kumari, who stated that the patient saw her yesterday 2/24 for regular follow up visit and was saturating at 44%, otherwise vitals were normal. Placed on O2 in the office but is not currently on O2 at home as it was taken away from him many years ago. Dr. Kumari stated that the patient felt dizzy, weak, tired and vomited when he walked into the office. Stated patient has a hx of PAD, latest cardiac stent in 2016, carotid endarterectomy in 2019, stents in RLE and a recent LLE stent placement. Cardio Dr. Boles and Pulm Dr. Urias. Repeat CXR on 2/26 improved. Saturating 98% on 6L NC, titrated down to 4L and is saturating 96%. HbA1c elevated at 6.9%, started on insulin sliding scale. Nephro Dr. Rosario. Cardiac echo showed new moderate mitral regurg and mild aortic regurg when compared to 2010 echo, mild aortic stenosis, G2DD, EF 65%. US abdomen showed cholelithiasis and echogenic kidneys c/w renal parenchymal disease.       INTERVAL HPI/OVERNIGHT EVENTS: No acute events overnight. Patient has no complaints. Titrated down to 2L NC yesterday, saturating well. Missed beats and girma to 40s overnight. Patient saturating 86% on RA at rest, remains on 2L NC. Denies SOB, CP, abdominal pain.       REVIEW OF SYSTEMS:  CONSTITUTIONAL: No fever, weight loss, or fatigue  RESPIRATORY: No cough, wheezing, chills or hemoptysis; No shortness of breath  CARDIOVASCULAR: No chest pain, palpitations, dizziness, or leg swelling  GASTROINTESTINAL: No abdominal pain. No nausea, vomiting, or hematemesis; No diarrhea or constipation. No melena or hematochezia.  GENITOURINARY: No dysuria or hematuria, urinary frequency  NEUROLOGICAL: No headaches, memory loss, loss of strength, numbness, or tremors  SKIN: No itching, burning, rashes, or lesions     MEDICATIONS  (STANDING):  amLODIPine   Tablet 10 milliGRAM(s) Oral daily  aspirin enteric coated 81 milliGRAM(s) Oral daily  atorvastatin 80 milliGRAM(s) Oral at bedtime  carvedilol 3.125 milliGRAM(s) Oral every 12 hours  clopidogrel Tablet 75 milliGRAM(s) Oral daily  ferrous    sulfate 325 milliGRAM(s) Oral daily  folic acid 1 milliGRAM(s) Oral daily  furosemide   Injectable 40 milliGRAM(s) IV Push daily  heparin   Injectable 5000 Unit(s) SubCutaneous every 12 hours  insulin lispro (ADMELOG) corrective regimen sliding scale   SubCutaneous Before meals and at bedtime  melatonin 3 milliGRAM(s) Oral at bedtime  pantoprazole    Tablet 40 milliGRAM(s) Oral before breakfast  tiotropium 18 MICROgram(s) Capsule 1 Capsule(s) Inhalation daily    MEDICATIONS  (PRN):  ALBUTerol    90 MICROgram(s) HFA Inhaler 2 Puff(s) Inhalation every 6 hours PRN Shortness of Breath and/or Wheezing      Vital Signs Last 24 Hrs  T(C): 36.4 (02 Mar 2021 07:21), Max: 36.8 (01 Mar 2021 11:15)  T(F): 97.5 (02 Mar 2021 07:21), Max: 98.2 (01 Mar 2021 11:15)  HR: 54 (02 Mar 2021 07:21) (54 - 56)  BP: 109/39 (02 Mar 2021 07:21) (109/39 - 129/43)  BP(mean): --  RR: 18 (02 Mar 2021 07:21) (18 - 20)  SpO2: 97% (02 Mar 2021 07:21) (94% - 100%)    PHYSICAL EXAMINATION:  GENERAL: NAD, obese  HEAD:  Atraumatic, Normocephalic  EYES:  conjunctiva and sclera clear  NECK: Supple, No JVD  CHEST/LUNG: + decreased air entry in L posterior lobe, Clear to percussion bilaterally; No rales, rhonchi, wheezing, or rubs  HEART: Regular rate and rhythm; No murmurs, rubs, or gallops  ABDOMEN: Soft, Nontender, Nondistended; Bowel sounds present  NERVOUS SYSTEM:  Alert & Oriented X3,    EXTREMITIES:  2+ Peripheral Pulses, No clubbing, cyanosis, or edema  SKIN: warm dry                          10.2   8.79  )-----------( 445      ( 01 Mar 2021 09:31 )             34.5     03-01    140  |  100  |  80<H>  ----------------------------<  180<H>  4.3   |  29  |  2.68<H>    Ca    8.8      01 Mar 2021 09:31  Phos  4.8     03-01  Mg     2.5     03-01    TPro  7.2  /  Alb  3.1<L>  /  TBili  0.5  /  DBili  x   /  AST  7<L>  /  ALT  23  /  AlkPhos  191<H>  03-01    LIVER FUNCTIONS - ( 01 Mar 2021 09:31 )  Alb: 3.1 g/dL / Pro: 7.2 g/dL / ALK PHOS: 191 U/L / ALT: 23 U/L DA / AST: 7 U/L / GGT: x                       I&O's Summary    01 Mar 2021 07:01  -  02 Mar 2021 07:00  --------------------------------------------------------  IN: 1106 mL / OUT: 2000 mL / NET: -894 mL        CAPILLARY BLOOD GLUCOSE  112 (28 Feb 2021 21:14)      POCT Blood Glucose.: 101 mg/dL (02 Mar 2021 07:42)    CAPILLARY BLOOD GLUCOSE      POCT Blood Glucose.: 101 mg/dL (02 Mar 2021 07:42)  POCT Blood Glucose.: 137 mg/dL (01 Mar 2021 21:28)  POCT Blood Glucose.: 88 mg/dL (01 Mar 2021 16:53)  POCT Blood Glucose.: 254 mg/dL (01 Mar 2021 11:56)      RADIOLOGY & ADDITIONAL TESTS:                   OMS-3 Progress Note discussed with PGY-1 and attending    PAGER #: [[phone]] TILL 5:00 PM  PLEASE CONTACT ON CALL TEAM:   - On Call Team (Please refer to Janneth) FROM 5:00 PM - 8:30PM  - Nightfloat Team FROM 8:30 -7:30 AM    CHIEF COMPLAINT & BRIEF HOSPITAL COURSE: 80 year old male with medical history of  HTN, HLD, DM, CAD on ASA/Plavix, PAD, CKD, prostate ca s/p radiation, anemia, COPD not on home O2, PAD, presents with SOB, dry cough, and dizziness x 2 days. Patient states he went to  Dr. Kumari cardio today and reportedly saturating. 44% on RA and patient was sent to ED. Patient states he feels little better. Denied any fever or phlegm production in the ED. He added that he uses 2 pillows at night to sleep. Spoke to patients partner, she states patient was prescribed on water pill , but he never likes to take it. Found to be saturating 53% on RA, placed on 6L NC. Called patient's cardiologist, Dr. Amanda Kumari, who stated that the patient saw her yesterday 2/24 for regular follow up visit and was saturating at 44%, otherwise vitals were normal. Placed on O2 in the office but is not currently on O2 at home as it was taken away from him many years ago. Dr. Kumari stated that the patient felt dizzy, weak, tired and vomited when he walked into the office. Stated patient has a hx of PAD, latest cardiac stent in 2016, carotid endarterectomy in 2019, stents in RLE and a recent LLE stent placement. Cardio Dr. Boles and Pulm Dr. Urias. Repeat CXR on 2/26 improved. Saturating 98% on 6L NC, titrated down to 4L and is saturating 96%. HbA1c elevated at 6.9%, started on insulin sliding scale. Nephro Dr. Rosario. Cardiac echo showed new moderate mitral regurg and mild aortic regurg when compared to 2010 echo, mild aortic stenosis, G2DD, EF 65%. US abdomen showed cholelithiasis and echogenic kidneys c/w renal parenchymal disease.       INTERVAL HPI/OVERNIGHT EVENTS: No acute events overnight. Patient has no complaints. Titrated down to 2L NC yesterday, saturating well. Missed beats and girma to 40s overnight. Patient saturating 86% on RA at rest, remains on 2L NC. Denies SOB, CP, abdominal pain. Amlodipine decreased to 5mg for borderline low BPs. Lasix switched to PO.       REVIEW OF SYSTEMS:  CONSTITUTIONAL: No fever, weight loss, or fatigue  RESPIRATORY: No cough, wheezing, chills or hemoptysis; No shortness of breath  CARDIOVASCULAR: No chest pain, palpitations, dizziness, or leg swelling  GASTROINTESTINAL: No abdominal pain. No nausea, vomiting, or hematemesis; No diarrhea or constipation. No melena or hematochezia.  GENITOURINARY: No dysuria or hematuria, urinary frequency  NEUROLOGICAL: No headaches, memory loss, loss of strength, numbness, or tremors  SKIN: No itching, burning, rashes, or lesions     MEDICATIONS  (STANDING):  amLODIPine   Tablet 10 milliGRAM(s) Oral daily  aspirin enteric coated 81 milliGRAM(s) Oral daily  atorvastatin 80 milliGRAM(s) Oral at bedtime  carvedilol 3.125 milliGRAM(s) Oral every 12 hours  clopidogrel Tablet 75 milliGRAM(s) Oral daily  ferrous    sulfate 325 milliGRAM(s) Oral daily  folic acid 1 milliGRAM(s) Oral daily  furosemide   Injectable 40 milliGRAM(s) IV Push daily  heparin   Injectable 5000 Unit(s) SubCutaneous every 12 hours  insulin lispro (ADMELOG) corrective regimen sliding scale   SubCutaneous Before meals and at bedtime  melatonin 3 milliGRAM(s) Oral at bedtime  pantoprazole    Tablet 40 milliGRAM(s) Oral before breakfast  tiotropium 18 MICROgram(s) Capsule 1 Capsule(s) Inhalation daily    MEDICATIONS  (PRN):  ALBUTerol    90 MICROgram(s) HFA Inhaler 2 Puff(s) Inhalation every 6 hours PRN Shortness of Breath and/or Wheezing      Vital Signs Last 24 Hrs  T(C): 36.4 (02 Mar 2021 07:21), Max: 36.8 (01 Mar 2021 11:15)  T(F): 97.5 (02 Mar 2021 07:21), Max: 98.2 (01 Mar 2021 11:15)  HR: 54 (02 Mar 2021 07:21) (54 - 56)  BP: 109/39 (02 Mar 2021 07:21) (109/39 - 129/43)  BP(mean): --  RR: 18 (02 Mar 2021 07:21) (18 - 20)  SpO2: 97% (02 Mar 2021 07:21) (94% - 100%)    PHYSICAL EXAMINATION:  GENERAL: NAD, obese  HEAD:  Atraumatic, Normocephalic  EYES:  conjunctiva and sclera clear  NECK: Supple, No JVD  CHEST/LUNG: + decreased air entry in L posterior lobe, Clear to percussion bilaterally; No rales, rhonchi, wheezing, or rubs  HEART: Regular rate and rhythm; No murmurs, rubs, or gallops  ABDOMEN: Soft, Nontender, Nondistended; Bowel sounds present  NERVOUS SYSTEM:  Alert & Oriented X3,    EXTREMITIES:  2+ Peripheral Pulses, No clubbing, cyanosis, or edema  SKIN: warm dry                          10.2   8.79  )-----------( 445      ( 01 Mar 2021 09:31 )             34.5     03-01    140  |  100  |  80<H>  ----------------------------<  180<H>  4.3   |  29  |  2.68<H>    Ca    8.8      01 Mar 2021 09:31  Phos  4.8     03-01  Mg     2.5     03-01    TPro  7.2  /  Alb  3.1<L>  /  TBili  0.5  /  DBili  x   /  AST  7<L>  /  ALT  23  /  AlkPhos  191<H>  03-01    LIVER FUNCTIONS - ( 01 Mar 2021 09:31 )  Alb: 3.1 g/dL / Pro: 7.2 g/dL / ALK PHOS: 191 U/L / ALT: 23 U/L DA / AST: 7 U/L / GGT: x                       I&O's Summary    01 Mar 2021 07:01  -  02 Mar 2021 07:00  --------------------------------------------------------  IN: 1106 mL / OUT: 2000 mL / NET: -894 mL        CAPILLARY BLOOD GLUCOSE  112 (28 Feb 2021 21:14)      POCT Blood Glucose.: 101 mg/dL (02 Mar 2021 07:42)    CAPILLARY BLOOD GLUCOSE      POCT Blood Glucose.: 101 mg/dL (02 Mar 2021 07:42)  POCT Blood Glucose.: 137 mg/dL (01 Mar 2021 21:28)  POCT Blood Glucose.: 88 mg/dL (01 Mar 2021 16:53)  POCT Blood Glucose.: 254 mg/dL (01 Mar 2021 11:56)      RADIOLOGY & ADDITIONAL TESTS:

## 2021-03-02 NOTE — PROGRESS NOTE ADULT - PROBLEM SELECTOR PLAN 10
RISK                                                          Points  [] Previous VTE                                           3  [] Thrombophilia                                        2  [] Lower limb paralysis                              2   [] Current Cancer                                       2   [x] Immobilization > 24 hrs                        1  [] ICU/CCU stay > 24 hours                       1  [x] Age > 60                                                   1  DVT ppx: Subq Heparin  GI ppx: Protonix  Diet: Regular DASH  Electrolytes replaced PRN  FULL CODE

## 2021-03-03 VITALS
OXYGEN SATURATION: 97 % | HEART RATE: 59 BPM | RESPIRATION RATE: 18 BRPM | SYSTOLIC BLOOD PRESSURE: 102 MMHG | DIASTOLIC BLOOD PRESSURE: 54 MMHG | TEMPERATURE: 98 F

## 2021-03-03 LAB
ALBUMIN SERPL ELPH-MCNC: 3.4 G/DL — LOW (ref 3.5–5)
ALP SERPL-CCNC: 194 U/L — HIGH (ref 40–120)
ALT FLD-CCNC: 19 U/L DA — SIGNIFICANT CHANGE UP (ref 10–60)
ANION GAP SERPL CALC-SCNC: 6 MMOL/L — SIGNIFICANT CHANGE UP (ref 5–17)
AST SERPL-CCNC: 9 U/L — LOW (ref 10–40)
BILIRUB SERPL-MCNC: 0.7 MG/DL — SIGNIFICANT CHANGE UP (ref 0.2–1.2)
BUN SERPL-MCNC: 77 MG/DL — HIGH (ref 7–18)
CALCIUM SERPL-MCNC: 9.2 MG/DL — SIGNIFICANT CHANGE UP (ref 8.4–10.5)
CHLORIDE SERPL-SCNC: 105 MMOL/L — SIGNIFICANT CHANGE UP (ref 96–108)
CO2 SERPL-SCNC: 29 MMOL/L — SIGNIFICANT CHANGE UP (ref 22–31)
CREAT SERPL-MCNC: 2.6 MG/DL — HIGH (ref 0.5–1.3)
GLUCOSE BLDC GLUCOMTR-MCNC: 101 MG/DL — HIGH (ref 70–99)
GLUCOSE BLDC GLUCOMTR-MCNC: 283 MG/DL — HIGH (ref 70–99)
GLUCOSE SERPL-MCNC: 130 MG/DL — HIGH (ref 70–99)
HCT VFR BLD CALC: 36.9 % — LOW (ref 39–50)
HGB BLD-MCNC: 11.2 G/DL — LOW (ref 13–17)
MAGNESIUM SERPL-MCNC: 2.6 MG/DL — SIGNIFICANT CHANGE UP (ref 1.6–2.6)
MCHC RBC-ENTMCNC: 26.8 PG — LOW (ref 27–34)
MCHC RBC-ENTMCNC: 30.4 GM/DL — LOW (ref 32–36)
MCV RBC AUTO: 88.3 FL — SIGNIFICANT CHANGE UP (ref 80–100)
NRBC # BLD: 0 /100 WBCS — SIGNIFICANT CHANGE UP (ref 0–0)
PHOSPHATE SERPL-MCNC: 4.1 MG/DL — SIGNIFICANT CHANGE UP (ref 2.5–4.5)
PLATELET # BLD AUTO: 490 K/UL — HIGH (ref 150–400)
POTASSIUM SERPL-MCNC: 4.7 MMOL/L — SIGNIFICANT CHANGE UP (ref 3.5–5.3)
POTASSIUM SERPL-SCNC: 4.7 MMOL/L — SIGNIFICANT CHANGE UP (ref 3.5–5.3)
PROT SERPL-MCNC: 7.9 G/DL — SIGNIFICANT CHANGE UP (ref 6–8.3)
RBC # BLD: 4.18 M/UL — LOW (ref 4.2–5.8)
RBC # FLD: 19.1 % — HIGH (ref 10.3–14.5)
SODIUM SERPL-SCNC: 140 MMOL/L — SIGNIFICANT CHANGE UP (ref 135–145)
WBC # BLD: 9.57 K/UL — SIGNIFICANT CHANGE UP (ref 3.8–10.5)
WBC # FLD AUTO: 9.57 K/UL — SIGNIFICANT CHANGE UP (ref 3.8–10.5)

## 2021-03-03 PROCEDURE — 99239 HOSP IP/OBS DSCHRG MGMT >30: CPT

## 2021-03-03 RX ORDER — ATORVASTATIN CALCIUM 80 MG/1
1 TABLET, FILM COATED ORAL
Qty: 30 | Refills: 0
Start: 2021-03-03 | End: 2021-04-01

## 2021-03-03 RX ORDER — TIOTROPIUM BROMIDE 18 UG/1
1 CAPSULE ORAL; RESPIRATORY (INHALATION)
Qty: 30 | Refills: 0
Start: 2021-03-03 | End: 2021-04-01

## 2021-03-03 RX ORDER — ATORVASTATIN CALCIUM 80 MG/1
1 TABLET, FILM COATED ORAL
Qty: 0 | Refills: 0 | DISCHARGE

## 2021-03-03 RX ORDER — FUROSEMIDE 40 MG
1 TABLET ORAL
Qty: 30 | Refills: 0
Start: 2021-03-03 | End: 2021-04-01

## 2021-03-03 RX ORDER — FOLIC ACID 0.8 MG
1 TABLET ORAL
Qty: 30 | Refills: 0
Start: 2021-03-03 | End: 2021-04-01

## 2021-03-03 RX ORDER — ASPIRIN/CALCIUM CARB/MAGNESIUM 324 MG
1 TABLET ORAL
Qty: 30 | Refills: 0
Start: 2021-03-03 | End: 2021-04-01

## 2021-03-03 RX ORDER — FERROUS SULFATE 325(65) MG
1 TABLET ORAL
Qty: 30 | Refills: 0
Start: 2021-03-03 | End: 2021-04-01

## 2021-03-03 RX ORDER — CARVEDILOL PHOSPHATE 80 MG/1
1 CAPSULE, EXTENDED RELEASE ORAL
Qty: 60 | Refills: 0
Start: 2021-03-03 | End: 2021-04-01

## 2021-03-03 RX ORDER — ALBUTEROL 90 UG/1
2 AEROSOL, METERED ORAL
Qty: 240 | Refills: 0
Start: 2021-03-03 | End: 2021-04-01

## 2021-03-03 RX ORDER — AMLODIPINE BESYLATE 2.5 MG/1
1 TABLET ORAL
Qty: 30 | Refills: 0
Start: 2021-03-03 | End: 2021-04-01

## 2021-03-03 RX ADMIN — PANTOPRAZOLE SODIUM 40 MILLIGRAM(S): 20 TABLET, DELAYED RELEASE ORAL at 06:49

## 2021-03-03 RX ADMIN — TIOTROPIUM BROMIDE 1 CAPSULE(S): 18 CAPSULE ORAL; RESPIRATORY (INHALATION) at 11:44

## 2021-03-03 RX ADMIN — HEPARIN SODIUM 5000 UNIT(S): 5000 INJECTION INTRAVENOUS; SUBCUTANEOUS at 06:49

## 2021-03-03 RX ADMIN — Medication 325 MILLIGRAM(S): at 11:44

## 2021-03-03 RX ADMIN — Medication 40 MILLIGRAM(S): at 06:49

## 2021-03-03 RX ADMIN — AMLODIPINE BESYLATE 5 MILLIGRAM(S): 2.5 TABLET ORAL at 06:49

## 2021-03-03 RX ADMIN — Medication 3: at 11:44

## 2021-03-03 RX ADMIN — CLOPIDOGREL BISULFATE 75 MILLIGRAM(S): 75 TABLET, FILM COATED ORAL at 11:44

## 2021-03-03 RX ADMIN — Medication 1 MILLIGRAM(S): at 11:44

## 2021-03-03 RX ADMIN — Medication 81 MILLIGRAM(S): at 11:44

## 2021-03-03 NOTE — CHART NOTE - NSCHARTNOTEFT_GEN_A_CORE
Assessment:     Nutrition consult requested for assessment. Chart reviewed, pt visited, able to communicate well despite of hard of hearing; on discharge planning per team, pt not interested in diet education/nutrition information at present    Factors impacting intake: [ ] none [ ] nausea  [ ] vomiting [ ] diarrhea [ ] constipation  [ ]chewing problems [ ] swallowing issues  [ X ] other: Nondenominational/ethnic/cultural/personal food preferences; acute on chronic comorbidities including CHF    Diet Prescription: Diet, Regular:   DASH/TLC {Sodium & Cholesterol Restricted} (21 @ 23:32)    Intake:     Daily Weight in k.7 (03 Mar 2021 04:32)  Weight in k.5 (02 Mar 2021 04:54)  Weight in k.7 (01 Mar 2021 04:34)  Weight in k (2021 04:50)  Weight in k.2 (2021 05:01)  Weight in k.6 (2021 05:06)  Weight in k.2 (2021 04:31)    % Weight Change: see above     Pertinent Medications: MEDICATIONS  (STANDING):  amLODIPine   Tablet 5 milliGRAM(s) Oral daily  aspirin enteric coated 81 milliGRAM(s) Oral daily  atorvastatin 80 milliGRAM(s) Oral at bedtime  carvedilol 3.125 milliGRAM(s) Oral every 12 hours  clopidogrel Tablet 75 milliGRAM(s) Oral daily  ferrous    sulfate 325 milliGRAM(s) Oral daily  folic acid 1 milliGRAM(s) Oral daily  furosemide    Tablet 40 milliGRAM(s) Oral daily  heparin   Injectable 5000 Unit(s) SubCutaneous every 12 hours  insulin lispro (ADMELOG) corrective regimen sliding scale   SubCutaneous Before meals and at bedtime  melatonin 3 milliGRAM(s) Oral at bedtime  pantoprazole    Tablet 40 milliGRAM(s) Oral before breakfast  tiotropium 18 MICROgram(s) Capsule 1 Capsule(s) Inhalation daily    MEDICATIONS  (PRN):  ALBUTerol    90 MICROgram(s) HFA Inhaler 2 Puff(s) Inhalation every 6 hours PRN Shortness of Breath and/or Wheezing    Pertinent Labs:  Na140 mmol/L Glu 130 mg/dL<H> K+ 4.7 mmol/L Cr  2.60 mg/dL<H> BUN 77 mg/dL<H>  Phos 4.1 mg/dL  Alb 3.4 g/dL<L>  Chol 193 mg/dL LDL --    HDL 43 mg/dL Trig 94 mg/dL     CAPILLARY BLOOD GLUCOSE    POCT Blood Glucose.: 283 mg/dL (03 Mar 2021 11:33)  POCT Blood Glucose.: 101 mg/dL (03 Mar 2021 07:51)  POCT Blood Glucose.: 131 mg/dL (02 Mar 2021 20:50)  POCT Blood Glucose.: 77 mg/dL (02 Mar 2021 16:34)    Skin: intact    Estimated Needs:   [ X ] no change since previous assessment  [ ] recalculated:     Previous Nutrition Diagnosis:   [ X ]Inadequate Oral Intake    Nutrition Diagnosis is [ X ] ongoing  [ ] Improving   [ ] resolved [ ] not applicable     New Nutrition Diagnosis: [ X ] not applicable       Interventions:   Recommend  [ ] Change Diet To:  [ ] Nutrition Supplement  [ ] Nutrition Support  [ X ] Other: change die    Monitoring and Evaluation:   [ ] PO intake [ x ] Tolerance to diet prescription [ x ] weights [ x ] labs[ x ] follow up per protocol  [ ] other: Assessment:     Nutrition consult requested for assessment. Chart reviewed, pt visited, able to communicate well despite of hard of hearing; on discharge planning per team, pt not interested in diet education/nutrition information at present    Factors impacting intake: [ ] none [ ] nausea  [ ] vomiting [ ] diarrhea [ ] constipation  [ ]chewing problems [ ] swallowing issues  [ X ] other: Buddhism/ethnic/cultural/personal food preferences; acute on chronic comorbidities including CHF    Diet Prescription: Diet, Regular:   DASH/TLC {Sodium & Cholesterol Restricted} (21 @ 23:32)    Intake:     Daily Weight in k.7 (03 Mar 2021 04:32)  Weight in k.5 (02 Mar 2021 04:54)  Weight in k.7 (01 Mar 2021 04:34)  Weight in k (2021 04:50)  Weight in k.2 (2021 05:01)  Weight in k.6 (2021 05:06)  Weight in k.2 (2021 04:31)    % Weight Change: see above     Pertinent Medications: MEDICATIONS  (STANDING):  amLODIPine   Tablet 5 milliGRAM(s) Oral daily  aspirin enteric coated 81 milliGRAM(s) Oral daily  atorvastatin 80 milliGRAM(s) Oral at bedtime  carvedilol 3.125 milliGRAM(s) Oral every 12 hours  clopidogrel Tablet 75 milliGRAM(s) Oral daily  ferrous    sulfate 325 milliGRAM(s) Oral daily  folic acid 1 milliGRAM(s) Oral daily  furosemide    Tablet 40 milliGRAM(s) Oral daily  heparin   Injectable 5000 Unit(s) SubCutaneous every 12 hours  insulin lispro (ADMELOG) corrective regimen sliding scale   SubCutaneous Before meals and at bedtime  melatonin 3 milliGRAM(s) Oral at bedtime  pantoprazole    Tablet 40 milliGRAM(s) Oral before breakfast  tiotropium 18 MICROgram(s) Capsule 1 Capsule(s) Inhalation daily    MEDICATIONS  (PRN):  ALBUTerol    90 MICROgram(s) HFA Inhaler 2 Puff(s) Inhalation every 6 hours PRN Shortness of Breath and/or Wheezing    Pertinent Labs:  Na140 mmol/L Glu 130 mg/dL<H> K+ 4.7 mmol/L Cr  2.60 mg/dL<H> BUN 77 mg/dL<H>  Phos 4.1 mg/dL  Alb 3.4 g/dL<L>  Chol 193 mg/dL LDL --    HDL 43 mg/dL Trig 94 mg/dL     CAPILLARY BLOOD GLUCOSE    POCT Blood Glucose.: 283 mg/dL (03 Mar 2021 11:33)  POCT Blood Glucose.: 101 mg/dL (03 Mar 2021 07:51)  POCT Blood Glucose.: 131 mg/dL (02 Mar 2021 20:50)  POCT Blood Glucose.: 77 mg/dL (02 Mar 2021 16:34)    Skin: intact    Estimated Needs:   [ X ] no change since previous assessment  [ ] recalculated:     Previous Nutrition Diagnosis:   [ X ]Inadequate Oral Intake    Nutrition Diagnosis is [ X ] ongoing  [ ] Improving   [ ] resolved [ ] not applicable     New Nutrition Diagnosis: [ X ] not applicable       Interventions:   Recommend  [ X ] Change Diet To: Soft, Consistent CHO DASH diet as medically feasible   [ ] Nutrition Supplement  [ ] Nutrition Support  [ X ] Other: Discussed with MD/RN                    Provide food choices within diet Rx as available/updated     Monitoring and Evaluation:   [ X ] PO intake [ x ] Tolerance to diet prescription [ x ] weights [ x ] labs[ x ] follow up per protocol  [ ] other:

## 2021-03-11 ENCOUNTER — APPOINTMENT (OUTPATIENT)
Dept: UROLOGY | Facility: CLINIC | Age: 81
End: 2021-03-11
Payer: MEDICARE

## 2021-03-11 PROCEDURE — 99214 OFFICE O/P EST MOD 30 MIN: CPT

## 2021-03-11 NOTE — HISTORY OF PRESENT ILLNESS
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

## 2021-03-16 PROCEDURE — 80061 LIPID PANEL: CPT

## 2021-03-16 PROCEDURE — 96374 THER/PROPH/DIAG INJ IV PUSH: CPT

## 2021-03-16 PROCEDURE — 71045 X-RAY EXAM CHEST 1 VIEW: CPT

## 2021-03-16 PROCEDURE — 85027 COMPLETE CBC AUTOMATED: CPT

## 2021-03-16 PROCEDURE — 82553 CREATINE MB FRACTION: CPT

## 2021-03-16 PROCEDURE — 76700 US EXAM ABDOM COMPLETE: CPT

## 2021-03-16 PROCEDURE — 87635 SARS-COV-2 COVID-19 AMP PRB: CPT

## 2021-03-16 PROCEDURE — 86140 C-REACTIVE PROTEIN: CPT

## 2021-03-16 PROCEDURE — 83036 HEMOGLOBIN GLYCOSYLATED A1C: CPT

## 2021-03-16 PROCEDURE — 82607 VITAMIN B-12: CPT

## 2021-03-16 PROCEDURE — 84155 ASSAY OF PROTEIN SERUM: CPT

## 2021-03-16 PROCEDURE — 82570 ASSAY OF URINE CREATININE: CPT

## 2021-03-16 PROCEDURE — 83540 ASSAY OF IRON: CPT

## 2021-03-16 PROCEDURE — 83735 ASSAY OF MAGNESIUM: CPT

## 2021-03-16 PROCEDURE — 84100 ASSAY OF PHOSPHORUS: CPT

## 2021-03-16 PROCEDURE — 82803 BLOOD GASES ANY COMBINATION: CPT

## 2021-03-16 PROCEDURE — 82962 GLUCOSE BLOOD TEST: CPT

## 2021-03-16 PROCEDURE — 93306 TTE W/DOPPLER COMPLETE: CPT

## 2021-03-16 PROCEDURE — 84484 ASSAY OF TROPONIN QUANT: CPT

## 2021-03-16 PROCEDURE — 71250 CT THORAX DX C-: CPT

## 2021-03-16 PROCEDURE — 84300 ASSAY OF URINE SODIUM: CPT

## 2021-03-16 PROCEDURE — 82746 ASSAY OF FOLIC ACID SERUM: CPT

## 2021-03-16 PROCEDURE — 87040 BLOOD CULTURE FOR BACTERIA: CPT

## 2021-03-16 PROCEDURE — U0005: CPT

## 2021-03-16 PROCEDURE — 82977 ASSAY OF GGT: CPT

## 2021-03-16 PROCEDURE — 99291 CRITICAL CARE FIRST HOUR: CPT | Mod: 25

## 2021-03-16 PROCEDURE — 83880 ASSAY OF NATRIURETIC PEPTIDE: CPT

## 2021-03-16 PROCEDURE — 86769 SARS-COV-2 COVID-19 ANTIBODY: CPT

## 2021-03-16 PROCEDURE — 36415 COLL VENOUS BLD VENIPUNCTURE: CPT

## 2021-03-16 PROCEDURE — 97161 PT EVAL LOW COMPLEX 20 MIN: CPT

## 2021-03-16 PROCEDURE — 85045 AUTOMATED RETICULOCYTE COUNT: CPT

## 2021-03-16 PROCEDURE — 82728 ASSAY OF FERRITIN: CPT

## 2021-03-16 PROCEDURE — 83935 ASSAY OF URINE OSMOLALITY: CPT

## 2021-03-16 PROCEDURE — 93005 ELECTROCARDIOGRAM TRACING: CPT

## 2021-03-16 PROCEDURE — 83605 ASSAY OF LACTIC ACID: CPT

## 2021-03-16 PROCEDURE — 85025 COMPLETE CBC W/AUTO DIFF WBC: CPT

## 2021-03-16 PROCEDURE — 94640 AIRWAY INHALATION TREATMENT: CPT

## 2021-03-16 PROCEDURE — 81001 URINALYSIS AUTO W/SCOPE: CPT

## 2021-03-16 PROCEDURE — 80053 COMPREHEN METABOLIC PANEL: CPT

## 2021-03-16 PROCEDURE — 84443 ASSAY THYROID STIM HORMONE: CPT

## 2021-03-16 PROCEDURE — 84165 PROTEIN E-PHORESIS SERUM: CPT

## 2021-03-16 PROCEDURE — 83550 IRON BINDING TEST: CPT

## 2021-03-16 PROCEDURE — 85379 FIBRIN DEGRADATION QUANT: CPT

## 2021-03-16 PROCEDURE — 84540 ASSAY OF URINE/UREA-N: CPT

## 2021-03-16 PROCEDURE — 84145 PROCALCITONIN (PCT): CPT

## 2021-03-16 PROCEDURE — 84156 ASSAY OF PROTEIN URINE: CPT

## 2021-03-16 PROCEDURE — 0225U NFCT DS DNA&RNA 21 SARSCOV2: CPT

## 2021-04-30 ENCOUNTER — APPOINTMENT (OUTPATIENT)
Dept: VASCULAR SURGERY | Facility: CLINIC | Age: 81
End: 2021-04-30
Payer: MEDICARE

## 2021-04-30 ENCOUNTER — LABORATORY RESULT (OUTPATIENT)
Age: 81
End: 2021-04-30

## 2021-04-30 VITALS
HEIGHT: 67 IN | DIASTOLIC BLOOD PRESSURE: 73 MMHG | BODY MASS INDEX: 27 KG/M2 | SYSTOLIC BLOOD PRESSURE: 151 MMHG | HEART RATE: 66 BPM | WEIGHT: 172 LBS

## 2021-04-30 VITALS — TEMPERATURE: 96.8 F

## 2021-04-30 PROCEDURE — 99214 OFFICE O/P EST MOD 30 MIN: CPT

## 2021-04-30 PROCEDURE — 93926 LOWER EXTREMITY STUDY: CPT

## 2021-04-30 PROCEDURE — 93978 VASCULAR STUDY: CPT

## 2021-04-30 PROCEDURE — 93880 EXTRACRANIAL BILAT STUDY: CPT

## 2021-04-30 NOTE — PHYSICAL EXAM
[JVD] : no jugular venous distention  [Normal Breath Sounds] : Normal breath sounds [Normal Heart Sounds] : normal heart sounds [Ankle Swelling (On Exam)] : not present [Varicose Veins Of Lower Extremities] : not present [] : not present [Abdomen Masses] : No abdominal masses [No Rash or Lesion] : No rash or lesion [Skin Ulcer] : no ulcer [Alert] : alert [Oriented to Person] : oriented to person [Oriented to Place] : oriented to place [Calm] : calm [de-identified] : appears well  [de-identified] :  strength 5/5 b/l upper extremities\par  [de-identified] : cn 2-12 intact

## 2021-04-30 NOTE — ASSESSMENT
[FreeTextEntry1] : 79 yo male with history of cad, htn, pad s/p iliac stents and left sfa stent, carotid stenosis s/p cea presents for follow up\par \par Patient reports new symptoms of left calf coldness and pain especially at night for the past 1 to 2 months.  There is severe IntraStent stenosis of the left SFA.  I had a long conversation with the patient and the patient's wife.  I discussed different options including conservative management, repeat angiogram with placement of covered stents and anticoagulation to follow.  Versus left femoral-popliteal bypass.\par \par At this point the patient would like to proceed with repeat angiogram and reintervention endovascularly.  We will schedule him for the procedure

## 2021-04-30 NOTE — HISTORY OF PRESENT ILLNESS
[FreeTextEntry1] : 79 yo male with history of cad, htn, pad s/p iliac stents and left sfa stent, carotid stenosis s/p cea presents for follow up.  p patient with worsening symptoms of left calf coolness and pain especially at night for the past 1 to 2 months.  No CVA or TIA

## 2021-05-04 LAB
ALBUMIN SERPL ELPH-MCNC: 4.7 G/DL
ALP BLD-CCNC: 228 U/L
ALT SERPL-CCNC: 15 U/L
ANION GAP SERPL CALC-SCNC: 12 MMOL/L
APTT BLD: 34.4 SEC
AST SERPL-CCNC: 22 U/L
BASOPHILS # BLD AUTO: 0.04 K/UL
BASOPHILS NFR BLD AUTO: 0.5 %
BILIRUB SERPL-MCNC: 0.4 MG/DL
BUN SERPL-MCNC: 40 MG/DL
CALCIUM SERPL-MCNC: 10.3 MG/DL
CHLORIDE SERPL-SCNC: 105 MMOL/L
CO2 SERPL-SCNC: 26 MMOL/L
CREAT SERPL-MCNC: 2.64 MG/DL
EOSINOPHIL # BLD AUTO: 0.37 K/UL
EOSINOPHIL NFR BLD AUTO: 5 %
GLUCOSE SERPL-MCNC: 118 MG/DL
HCT VFR BLD CALC: 48.2 %
HGB BLD-MCNC: 14.2 G/DL
IMM GRANULOCYTES NFR BLD AUTO: 0.3 %
INR PPP: 0.99 RATIO
LYMPHOCYTES # BLD AUTO: 1.29 K/UL
LYMPHOCYTES NFR BLD AUTO: 17.5 %
MAN DIFF?: NORMAL
MCHC RBC-ENTMCNC: 29.2 PG
MCHC RBC-ENTMCNC: 29.5 GM/DL
MCV RBC AUTO: 99 FL
MONOCYTES # BLD AUTO: 0.57 K/UL
MONOCYTES NFR BLD AUTO: 7.7 %
NEUTROPHILS # BLD AUTO: 5.09 K/UL
NEUTROPHILS NFR BLD AUTO: 69 %
PLATELET # BLD AUTO: 336 K/UL
POTASSIUM SERPL-SCNC: 5.6 MMOL/L
PROT SERPL-MCNC: 7.5 G/DL
PT BLD: 11.7 SEC
RBC # BLD: 4.87 M/UL
RBC # FLD: 21.2 %
SODIUM SERPL-SCNC: 143 MMOL/L
WBC # FLD AUTO: 7.38 K/UL

## 2021-05-11 ENCOUNTER — RESULT REVIEW (OUTPATIENT)
Age: 81
End: 2021-05-11

## 2021-05-11 ENCOUNTER — APPOINTMENT (OUTPATIENT)
Dept: ENDOVASCULAR SURGERY | Facility: CLINIC | Age: 81
End: 2021-05-11
Payer: MEDICARE

## 2021-05-11 VITALS
HEIGHT: 67 IN | RESPIRATION RATE: 16 BRPM | OXYGEN SATURATION: 100 % | BODY MASS INDEX: 27 KG/M2 | HEART RATE: 68 BPM | SYSTOLIC BLOOD PRESSURE: 194 MMHG | DIASTOLIC BLOOD PRESSURE: 67 MMHG | WEIGHT: 172 LBS | TEMPERATURE: 97.9 F

## 2021-05-11 PROCEDURE — 37222Z: CUSTOM | Mod: 59,LT

## 2021-05-11 PROCEDURE — 37221Z: CUSTOM | Mod: LT

## 2021-05-11 PROCEDURE — 37220Z: CUSTOM | Mod: 59,RT

## 2021-05-11 PROCEDURE — 75625 CONTRAST EXAM ABDOMINL AORTA: CPT

## 2021-05-11 RX ORDER — CHLORHEXIDINE GLUCONATE 4 %
325 (65 FE) LIQUID (ML) TOPICAL
Qty: 30 | Refills: 0 | Status: ACTIVE | COMMUNITY
Start: 2021-03-03

## 2021-05-11 RX ORDER — FUROSEMIDE 40 MG/1
40 TABLET ORAL
Qty: 30 | Refills: 0 | Status: ACTIVE | COMMUNITY
Start: 2021-03-03

## 2021-05-11 RX ORDER — ALBUTEROL SULFATE 90 UG/1
108 (90 BASE) INHALANT RESPIRATORY (INHALATION)
Qty: 20 | Refills: 0 | Status: ACTIVE | COMMUNITY
Start: 2021-03-03

## 2021-05-21 ENCOUNTER — APPOINTMENT (OUTPATIENT)
Dept: VASCULAR SURGERY | Facility: CLINIC | Age: 81
End: 2021-05-21
Payer: MEDICARE

## 2021-05-21 VITALS — DIASTOLIC BLOOD PRESSURE: 68 MMHG | HEART RATE: 66 BPM | SYSTOLIC BLOOD PRESSURE: 155 MMHG

## 2021-05-21 VITALS — TEMPERATURE: 97.2 F

## 2021-05-21 PROCEDURE — 99214 OFFICE O/P EST MOD 30 MIN: CPT

## 2021-05-21 PROCEDURE — 93979 VASCULAR STUDY: CPT

## 2021-05-21 PROCEDURE — 93926 LOWER EXTREMITY STUDY: CPT | Mod: 59

## 2021-05-21 NOTE — HISTORY OF PRESENT ILLNESS
[FreeTextEntry1] : 79 yo male with history of cad, htn, pad s/p iliac stents and left sfa stent, carotid stenosis s/p cea presents for follow up.  Patient reports 1-2 block claudication symptoms.  No significant rest pain.  No history of tissue loss.  Status post left leg angiogram.  No CVA or TIA

## 2021-05-21 NOTE — ASSESSMENT
[FreeTextEntry1] : 79 yo male with history of cad, htn, pad s/p iliac stents and left sfa stent, carotid stenosis s/p cea presents for follow up\par At this point patient only has 1-2 block claudication symptoms with no significant rest pain.  Based on the angiogram patient would require left femoral endarterectomy and a left femoropopliteal bypass.  At this point the patient would like to continue with conservative management.  No acute limb threatening ischemia at this time.  Follow-up in 2 months for PVRs.\par \par No significant carotid stenosis.  Continue Conservative management with follow-up in 6 months.\par \par

## 2021-05-21 NOTE — PHYSICAL EXAM
[JVD] : no jugular venous distention  [Normal Breath Sounds] : Normal breath sounds [Normal Heart Sounds] : normal heart sounds [Ankle Swelling (On Exam)] : not present [Varicose Veins Of Lower Extremities] : not present [] : not present [Abdomen Masses] : No abdominal masses [No Rash or Lesion] : No rash or lesion [Skin Ulcer] : no ulcer [Alert] : alert [Oriented to Person] : oriented to person [Oriented to Place] : oriented to place [Calm] : calm [de-identified] : appears well  [de-identified] :  strength 5/5 b/l upper extremities\par  [de-identified] : cn 2-12 intact

## 2021-06-01 NOTE — H&P PST ADULT - NEGATIVE ENDOCRINE SYMPTOMS
Doxycycline Counseling:  Patient counseled regarding possible photosensitivity and increased risk for sunburn.  Patient instructed to avoid sunlight, if possible.  When exposed to sunlight, patients should wear protective clothing, sunglasses, and sunscreen.  The patient was instructed to call the office immediately if the following severe adverse effects occur:  hearing changes, easy bruising/bleeding, severe headache, or vision changes.  The patient verbalized understanding of the proper use and possible adverse effects of doxycycline.  All of the patient's questions and concerns were addressed. Minocycline Pregnancy And Lactation Text: This medication is Pregnancy Category D and not consider safe during pregnancy. It is also excreted in breast milk. Include Pregnancy/Lactation Warning?: No Topical Retinoid counseling:  Patient advised to apply a pea-sized amount only at bedtime and wait 30 minutes after washing their face before applying.  If too drying, patient may add a non-comedogenic moisturizer. The patient verbalized understanding of the proper use and possible adverse effects of retinoids.  All of the patient's questions and concerns were addressed. Bactrim Pregnancy And Lactation Text: This medication is Pregnancy Category D and is known to cause fetal risk.  It is also excreted in breast milk. Spironolactone Pregnancy And Lactation Text: This medication can cause feminization of the male fetus and should be avoided during pregnancy. The active metabolite is also found in breast milk. Topical Clindamycin Counseling: Patient counseled that this medication may cause skin irritation or allergic reactions.  In the event of skin irritation, the patient was advised to reduce the amount of the drug applied or use it less frequently.   The patient verbalized understanding of the proper use and possible adverse effects of clindamycin.  All of the patient's questions and concerns were addressed. Isotretinoin Counseling: Patient should get monthly blood tests, not donate blood, not drive at night if vision affected, not share medication, and not undergo elective surgery for 6 months after tx completed. Side effects reviewed, pt to contact office should one occur. Dapsone Pregnancy And Lactation Text: This medication is Pregnancy Category C and is not considered safe during pregnancy or breast feeding. Benzoyl Peroxide Pregnancy And Lactation Text: This medication is Pregnancy Category C. It is unknown if benzoyl peroxide is excreted in breast milk. Minocycline Counseling: Patient advised regarding possible photosensitivity and discoloration of the teeth, skin, lips, tongue and gums.  Patient instructed to avoid sunlight, if possible.  When exposed to sunlight, patients should wear protective clothing, sunglasses, and sunscreen.  The patient was instructed to call the office immediately if the following severe adverse effects occur:  hearing changes, easy bruising/bleeding, severe headache, or vision changes.  The patient verbalized understanding of the proper use and possible adverse effects of minocycline.  All of the patient's questions and concerns were addressed. Spironolactone Counseling: Patient advised regarding risks of diarrhea, abdominal pain, hyperkalemia, birth defects (for female patients), liver toxicity and renal toxicity. The patient may need blood work to monitor liver and kidney function and potassium levels while on therapy. The patient verbalized understanding of the proper use and possible adverse effects of spironolactone.  All of the patient's questions and concerns were addressed. Bactrim Counseling:  I discussed with the patient the risks of sulfa antibiotics including but not limited to GI upset, allergic reaction, drug rash, diarrhea, dizziness, photosensitivity, and yeast infections.  Rarely, more serious reactions can occur including but not limited to aplastic anemia, agranulocytosis, methemoglobinemia, blood dyscrasias, liver or kidney failure, lung infiltrates or desquamative/blistering drug rashes. Tazorac Pregnancy And Lactation Text: This medication is not safe during pregnancy. It is unknown if this medication is excreted in breast milk. Erythromycin Pregnancy And Lactation Text: This medication is Pregnancy Category B and is considered safe during pregnancy. It is also excreted in breast milk. Dapsone Counseling: I discussed with the patient the risks of dapsone including but not limited to hemolytic anemia, agranulocytosis, rashes, methemoglobinemia, kidney failure, peripheral neuropathy, headaches, GI upset, and liver toxicity.  Patients who start dapsone require monitoring including baseline LFTs and weekly CBCs for the first month, then every month thereafter.  The patient verbalized understanding of the proper use and possible adverse effects of dapsone.  All of the patient's questions and concerns were addressed. High Dose Vitamin A Pregnancy And Lactation Text: High dose vitamin A therapy is contraindicated during pregnancy and breast feeding. Benzoyl Peroxide Counseling: Patient counseled that medicine may cause skin irritation and bleach clothing.  In the event of skin irritation, the patient was advised to reduce the amount of the drug applied or use it less frequently.   The patient verbalized understanding of the proper use and possible adverse effects of benzoyl peroxide.  All of the patient's questions and concerns were addressed. Topical Sulfur Applications Pregnancy And Lactation Text: This medication is Pregnancy Category C and has an unknown safety profile during pregnancy. It is unknown if this topical medication is excreted in breast milk. Tazorac Counseling:  Patient advised that medication is irritating and drying.  Patient may need to apply sparingly and wash off after an hour before eventually leaving it on overnight.  The patient verbalized understanding of the proper use and possible adverse effects of tazorac.  All of the patient's questions and concerns were addressed. Azithromycin Pregnancy And Lactation Text: This medication is considered safe during pregnancy and is also secreted in breast milk. Erythromycin Counseling:  I discussed with the patient the risks of erythromycin including but not limited to GI upset, allergic reaction, drug rash, diarrhea, increase in liver enzymes, and yeast infections. High Dose Vitamin A Counseling: Side effects reviewed, pt to contact office should one occur. Birth Control Pills Pregnancy And Lactation Text: This medication should be avoided if pregnant and for the first 30 days post-partum. Topical Sulfur Applications Counseling: Topical Sulfur Counseling: Patient counseled that this medication may cause skin irritation or allergic reactions.  In the event of skin irritation, the patient was advised to reduce the amount of the drug applied or use it less frequently.   The patient verbalized understanding of the proper use and possible adverse effects of topical sulfur application.  All of the patient's questions and concerns were addressed. Azithromycin Counseling:  I discussed with the patient the risks of azithromycin including but not limited to GI upset, allergic reaction, drug rash, diarrhea, and yeast infections. Topical Retinoid Pregnancy And Lactation Text: This medication is Pregnancy Category C. It is unknown if this medication is excreted in breast milk. Doxycycline Pregnancy And Lactation Text: This medication is Pregnancy Category D and not consider safe during pregnancy. It is also excreted in breast milk but is considered safe for shorter treatment courses. Sarecycline Counseling: Patient advised regarding possible photosensitivity and discoloration of the teeth, skin, lips, tongue and gums.  Patient instructed to avoid sunlight, if possible.  When exposed to sunlight, patients should wear protective clothing, sunglasses, and sunscreen.  The patient was instructed to call the office immediately if the following severe adverse effects occur:  hearing changes, easy bruising/bleeding, severe headache, or vision changes.  The patient verbalized understanding of the proper use and possible adverse effects of sarecycline.  All of the patient's questions and concerns were addressed. Isotretinoin Pregnancy And Lactation Text: This medication is Pregnancy Category X and is considered extremely dangerous during pregnancy. It is unknown if it is excreted in breast milk. Tetracycline Counseling: Patient counseled regarding possible photosensitivity and increased risk for sunburn.  Patient instructed to avoid sunlight, if possible.  When exposed to sunlight, patients should wear protective clothing, sunglasses, and sunscreen.  The patient was instructed to call the office immediately if the following severe adverse effects occur:  hearing changes, easy bruising/bleeding, severe headache, or vision changes.  The patient verbalized understanding of the proper use and possible adverse effects of tetracycline.  All of the patient's questions and concerns were addressed. Patient understands to avoid pregnancy while on therapy due to potential birth defects. Topical Clindamycin Pregnancy And Lactation Text: This medication is Pregnancy Category B and is considered safe during pregnancy. It is unknown if it is excreted in breast milk. Detail Level: Zone Birth Control Pills Counseling: Birth Control Pill Counseling: I discussed with the patient the potential side effects of OCPs including but not limited to increased risk of stroke, heart attack, thrombophlebitis, deep venous thrombosis, hepatic adenomas, breast changes, GI upset, headaches, and depression.  The patient verbalized understanding of the proper use and possible adverse effects of OCPs. All of the patient's questions and concerns were addressed. no heat intolerance/no cold intolerance

## 2021-06-03 NOTE — PHYSICAL EXAM
GI Treatment Plan  07/10/2019  1:48 AM    Called by ICU regarding Mr. Hamilton, who is a 69 year old male transferred from [place] for a GI bleed. In [place] received multiple units of blood/FFP and had an EGD with gastric ulcer.     Last melanic BM 12 hours ago, currently in ICU slightly tachycardiac with labs pending.    Recommendations:  -NPO  -Maintain IV access with 2 large bore IVs  -Intravascular resuscitation/support with IVFs   -Serial H/H's and pRBCs transfusion as indicated  -Protonix 80mg IV bolus x 1 then gtt at 8mg/hr  -Discontinue all NSAIDs and Heparin products  -Please correct any coagulopathy with platelets and FFP to a goal of platelets >50K and INR <2.0  -Please promptly notify GI team if there is significant change in patient's clinical status    Jess Lee M.D.  Gastroenterology Fellow, PGY-VI  Pager: [phone]  [facility]     [Normal Breath Sounds] : Normal breath sounds [Normal Heart Sounds] : normal heart sounds [Right Carotid Bruit] : right carotid bruit heard [Left Carotid Bruit] : left carotid bruit heard [2+] : right 2+ [1+] : left 1+ [0] : left 0 [No Rash or Lesion] : No rash or lesion [Alert] : alert [Oriented to Person] : oriented to person [Oriented to Place] : oriented to place [Calm] : calm [Ankle Swelling (On Exam)] : not present [JVD] : no jugular venous distention  [Varicose Veins Of Lower Extremities] : not present [] : not present [Abdomen Masses] : No abdominal masses [Skin Ulcer] : no ulcer [de-identified] :  strength 5/5 b/l upper extremities\par  [de-identified] : appears well  [de-identified] : cn 2-12 intact

## 2021-06-03 NOTE — HISTORY OF PRESENT ILLNESS
[FreeTextEntry1] : Covid  not detected 5/7/2021-\par Cr: 2.64 4/30/2021\par accompanied by partner Priscilla Duran [phone]\par alert and oriented\par no reported falls\par took aspirin, isosorbide, plavix and amlodipine this Am [FreeTextEntry5] : yesterday at 8pm [FreeTextEntry6] : Dr Reveles

## 2021-06-03 NOTE — ASSESSMENT
[FreeTextEntry1] : \par \par Patient reports new symptoms of left calf coldness and pain especially at night for the past 1 to 2 months.  There is severe IntraStent stenosis of the left SFA.  Plan for fistulogram and possible intervention\par \par

## 2021-06-03 NOTE — REASON FOR VISIT
[Other ___] : a [unfilled] visit for [FreeTextEntry2] : PAD/left calf coldness and pain with intra stent stenosis of left SFA

## 2021-06-03 NOTE — PROCEDURE
[Groin check for bleeding/hematoma, vitals checked, and Doppler/pulse checked on admission, then every 15 minutes for an hour and every 30 minutes for 3 hours thereafter.] : Groin check for bleeding/hematoma, vitals checked, and Doppler/pulse checked on admission, then every 15 minutes for an hour and every 30 minutes for 3 hours thereafter.  [IVF: ____ 0.9 NS] : IVF: [unfilled] 0.9 NS [Keep flat for 2 hours, then elevate head gradually as tolerated] : Keep flat for 2 hours, then elevate head gradually as tolerated [Bed rest for 3.5 hours, then ambulate and observe site for bleeding] : Bed rest for 3.5 hours, then ambulate and observe site for bleeding [Resume Diet] : resume diet [Discharge at _____] : Discharge at [unfilled]. [D/C IV on discharge] : D/C IV on discharge [Resume diet] : resume diet [FreeTextEntry1] : aortogram, left leg angiogram/angioplasty/stent

## 2021-06-03 NOTE — PAST MEDICAL HISTORY
[Increasing age ( >40 years old)] : Increasing age ( >40 years old) [Malignancy] : Malignancy [No therapy indicated for cases scheduled for less than one hour] : No therapy indicated for cases scheduled for less than one hour. [FreeTextEntry1] : Malignant Hyperthermia Screening Tool and Risk of Bleeding Assessment\par Mr. HEENA KEYES denies family history of unexpected death following Anesthesia or Exercise.\par Denies Family history of Malignant Hyperthermia, Muscle or Neuromuscular disorder and High Temperature following exercise.\par \par Mr. HEENA KEYES denies history of Muscle Spasm, Dark or Chocolate - Colored urine and Unanticipated fever immediately following anesthesia or serious exercise. \par Mr. KEYES also denies bleeding tendencies/ Risks of Bleeding.\par

## 2021-06-10 ENCOUNTER — APPOINTMENT (OUTPATIENT)
Dept: UROLOGY | Facility: CLINIC | Age: 81
End: 2021-06-10

## 2021-06-10 ENCOUNTER — APPOINTMENT (OUTPATIENT)
Dept: UROLOGY | Facility: CLINIC | Age: 81
End: 2021-06-10
Payer: MEDICARE

## 2021-06-10 PROCEDURE — 52000 CYSTOURETHROSCOPY: CPT

## 2021-06-16 NOTE — ASU PREOP CHECKLIST - PATIENT SENT TO
Addended by: Jaida Haddad on: 6/16/2021 01:52 PM     Modules accepted: Orders
Addended by: Joaquin Kern on: 6/14/2021 02:28 PM     Modules accepted: Orders
Addended by: Louie Serrato on: 6/14/2021 09:02 AM     Modules accepted: Orders
operating room

## 2021-07-16 ENCOUNTER — APPOINTMENT (OUTPATIENT)
Dept: VASCULAR SURGERY | Facility: CLINIC | Age: 81
End: 2021-07-16
Payer: MEDICARE

## 2021-07-16 PROCEDURE — 99213 OFFICE O/P EST LOW 20 MIN: CPT

## 2021-07-16 PROCEDURE — 93923 UPR/LXTR ART STDY 3+ LVLS: CPT

## 2021-07-16 NOTE — PHYSICAL EXAM
[JVD] : no jugular venous distention  [Normal Breath Sounds] : Normal breath sounds [Normal Heart Sounds] : normal heart sounds [Ankle Swelling (On Exam)] : not present [Varicose Veins Of Lower Extremities] : not present [] : not present [Abdomen Masses] : No abdominal masses [No Rash or Lesion] : No rash or lesion [Skin Ulcer] : no ulcer [Alert] : alert [Oriented to Person] : oriented to person [Oriented to Place] : oriented to place [Calm] : calm [de-identified] : appears well  [de-identified] :  strength 5/5 b/l upper extremities\par  [de-identified] : cn 2-12 intact

## 2021-07-16 NOTE — ASSESSMENT
[FreeTextEntry1] : 79 yo male with history of cad, htn, pad s/p iliac stents and left sfa stent, carotid stenosis s/p cea presents for follow up\par At this point patient only has 1-2 block claudication symptoms with no significant rest pain.  Based on the angiogram patient would require left femoral endarterectomy and a left femoropopliteal bypass.  At this point the patient would like to continue with conservative management.  No acute limb threatening ischemia at this time.  Follow-up in 3 months.  Iliac stents duplex\par \par

## 2021-07-22 NOTE — ED PROVIDER NOTE - CPE EDP CARDIAC NORM
-- DO NOT REPLY / DO NOT REPLY ALL --  -- Message is from the [facility]--    COVID-19 Universal Screening: N/A - Not about scheduling    General Patient Message      Reason for Call: Patient is calling to find out the results of her x-ray taken on 7/20 at the office located on [address] [place] [address]. Patient would like a call back at [phone]    Caller Information       Type Contact Phone    07/22/2021 02:06 PM CDT Phone (Incoming) Sally Johnston (Self) [phone] (M)          Alternative phone number: none    Turnaround time given to caller:   \"This message will be sent to [state Provider's name]. The clinical team will fulfill your request as soon as they review your message.\"     normal...

## 2021-10-14 NOTE — H&P PST ADULT - OCCUPATION
Chief Complaint   Patient presents with    Injections     allergy shot     Administrations This Visit     ALLERGEN EXTRACT 0.35 mL     Admin Date  10/14/2021  16:05 Action  Given Dose  0.35 mL Route  SubCUTAneous Site  Arm Left Administered By  Milena Maldonado MA    Ordering Provider: GENOVEVA Hall    Patient Supplied?: Yes              Patient tolerated injection well. Patient advised to wait 20 minutes in the office following the injection. No signs/symptoms of reaction noted after 20 minutes.
retired

## 2021-10-29 ENCOUNTER — APPOINTMENT (OUTPATIENT)
Dept: VASCULAR SURGERY | Facility: CLINIC | Age: 81
End: 2021-10-29

## 2021-11-12 ENCOUNTER — APPOINTMENT (OUTPATIENT)
Dept: VASCULAR SURGERY | Facility: CLINIC | Age: 81
End: 2021-11-12

## 2021-11-19 ENCOUNTER — APPOINTMENT (OUTPATIENT)
Dept: VASCULAR SURGERY | Facility: CLINIC | Age: 81
End: 2021-11-19
Payer: MEDICARE

## 2021-11-19 VITALS
HEART RATE: 61 BPM | BODY MASS INDEX: 26.21 KG/M2 | SYSTOLIC BLOOD PRESSURE: 148 MMHG | DIASTOLIC BLOOD PRESSURE: 67 MMHG | WEIGHT: 167 LBS | HEIGHT: 67 IN

## 2021-11-19 PROCEDURE — 99213 OFFICE O/P EST LOW 20 MIN: CPT

## 2021-11-19 PROCEDURE — 93880 EXTRACRANIAL BILAT STUDY: CPT

## 2021-11-19 NOTE — ASSESSMENT
[FreeTextEntry1] : 81 yo male with history of cad, htn, pad s/p iliac stents and left sfa stent, carotid stenosis s/p cea presents for follow up\par At this point patient only has 1-2 block claudication symptoms with no significant rest pain.  Based on the angiogram patient would require left femoral endarterectomy and a left femoropopliteal bypass.  At this point the patient would like to continue with conservative management.  No acute limb threatening ischemia at this time.  \par Will follow up with iliac stents duplex.\par \par No recent CVA or TIA.  Carotid duplex shows patent CEA with moderate stenosis of contralateral side.  Follow-up in 6 months for repeat duplex.\par \par

## 2021-11-19 NOTE — PHYSICAL EXAM
[JVD] : no jugular venous distention  [Normal Breath Sounds] : Normal breath sounds [Normal Heart Sounds] : normal heart sounds [Ankle Swelling (On Exam)] : not present [Varicose Veins Of Lower Extremities] : not present [] : not present [Abdomen Masses] : No abdominal masses [No Rash or Lesion] : No rash or lesion [Skin Ulcer] : no ulcer [Alert] : alert [Oriented to Person] : oriented to person [Oriented to Place] : oriented to place [Calm] : calm [de-identified] : appears well  [de-identified] :  strength 5/5 b/l upper extremities\par  [de-identified] : cn 2-12 intact

## 2021-11-22 NOTE — ED PROVIDER NOTE - CARE PLAN
Attempted to call patient, however there's no answer. Left message to return my call.     Principal Discharge DX:	Urinary retention

## 2021-12-09 ENCOUNTER — APPOINTMENT (OUTPATIENT)
Dept: UROLOGY | Facility: CLINIC | Age: 81
End: 2021-12-09
Payer: MEDICARE

## 2021-12-09 PROCEDURE — 99213 OFFICE O/P EST LOW 20 MIN: CPT

## 2021-12-09 NOTE — ASSESSMENT
[FreeTextEntry1] : feels well does not want cysto today \par will check [facility] cytology \par if cyt pos will do cysto

## 2021-12-09 NOTE — HISTORY OF PRESENT ILLNESS
[FreeTextEntry1] : cc f/u bladder ca\par 81 yo male h/o bladder ca recurrent uti \par last turbt 12/20\par benign nonkeratinizing squamous tissue with changes c/w prior surgical therapy \par voiding well\par baseline urgency uui dribbling \par no dysuria or hematuria \par last csyto 6/21

## 2021-12-13 NOTE — PROGRESS NOTE ADULT - ASSESSMENT
Follow-up with your PCP.  Return to the emergency department if symptoms recur, there are any new symptoms or any cause for concern.   
78M from home, AAOx3, PMHx prostate CA (s/p radiation and brachytherapy w/ seed implantation 5 years ago, s/p TURP), CKD stage 3, Benign bladder mass, T2DM, HTN, HLD, CAD, PAD (stents) who presented to the ED c/o painless rectal bleed x 1 week. Patient being admitted for LGIB due to proctitis vs diverticular bleed.
78M w/h/o prostate ca s/p RTx p/w hematochezia.  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
78M from home, AAOx3, PMHx prostate CA (s/p radiation and brachytherapy w/ seed implantation 5 years ago, s/p TURP), CKD stage 3, Benign bladder mass, T2DM, HTN, HLD, CAD, PAD (stents) who presented to the ED c/o painless rectal bleed x 1 week. Patient refers a total of 4 bloody BM's w/ clots in the morning, which prompted patient to come to the ED. Patient also mentions that he had similar episodes 7 yrs ago, for which underwent colonoscopy, however patient is unable to give further details, and does not know source of bleeding during that time. Denies rectal pain, abdominal pain, fever/chills, arthralgias, nausea/vomiting, weight loss, anorexia or any other complaint.      Patient being admitted for LGIB due to proctitis vs diverticular bleed.

## 2021-12-14 LAB
BACTERIA UR CULT: ABNORMAL
URINE CYTOLOGY: NORMAL

## 2021-12-16 ENCOUNTER — NON-APPOINTMENT (OUTPATIENT)
Age: 81
End: 2021-12-16

## 2022-01-31 NOTE — PHARMACOTHERAPY INTERVENTION NOTE - COMMENTS
Medications review performed, counselling and education materials provided to the patient Medications review performed, patient refused counselling and education materials (02/25/2021 2.50pm) 4 = No assist / stand by assistance

## 2022-02-25 ENCOUNTER — APPOINTMENT (OUTPATIENT)
Dept: VASCULAR SURGERY | Facility: CLINIC | Age: 82
End: 2022-02-25
Payer: MEDICARE

## 2022-02-25 PROCEDURE — 93979 VASCULAR STUDY: CPT

## 2022-02-25 PROCEDURE — 99214 OFFICE O/P EST MOD 30 MIN: CPT

## 2022-02-25 PROCEDURE — 93926 LOWER EXTREMITY STUDY: CPT

## 2022-02-25 NOTE — HISTORY OF PRESENT ILLNESS
[FreeTextEntry1] : Patient is an 82-year-old gentleman with past medical history significant for severe peripheral vascular disease, status post iliac angioplasty and stent with a left SFA stent in place with complaints of severe left foot coldness and discomfort.  Patient has had cracks when the plantar aspect of the foot.  Patient is status post bilateral common iliac and left external iliac artery stent placement in the past.  Patient currently is on Eliquis, Plavix, and Pletal.  Patient has chronic renal insufficiency.

## 2022-02-25 NOTE — PHYSICAL EXAM
[Normal Breath Sounds] : Normal breath sounds [Normal Heart Sounds] : normal heart sounds [Skin Ulcer] : ulcer [de-identified] : Left foot is cool to touch with small superficial cracks on the left metatarsal head area

## 2022-02-25 NOTE — ASSESSMENT
[FreeTextEntry1] : Patient with severe peripheral vascular disease.  Recommend continuation of Eliquis and Plavix.  We will schedule her for left leg angiogram for recanalization of left SFA.  This was discussed with the patient in detail.  Renal insufficiency is a concern.  We will hydrate the patient.

## 2022-02-26 LAB
ALBUMIN SERPL ELPH-MCNC: 4.8 G/DL
ALP BLD-CCNC: 201 U/L
ALT SERPL-CCNC: 9 U/L
ANION GAP SERPL CALC-SCNC: 17 MMOL/L
AST SERPL-CCNC: 15 U/L
BASOPHILS # BLD AUTO: 0.05 K/UL
BASOPHILS NFR BLD AUTO: 0.6 %
BILIRUB SERPL-MCNC: 0.5 MG/DL
BUN SERPL-MCNC: 39 MG/DL
CALCIUM SERPL-MCNC: 10 MG/DL
CHLORIDE SERPL-SCNC: 104 MMOL/L
CO2 SERPL-SCNC: 23 MMOL/L
CREAT SERPL-MCNC: 2.15 MG/DL
EOSINOPHIL # BLD AUTO: 0.43 K/UL
EOSINOPHIL NFR BLD AUTO: 5.3 %
GLUCOSE SERPL-MCNC: 105 MG/DL
HCT VFR BLD CALC: 49 %
HGB BLD-MCNC: 14.7 G/DL
IMM GRANULOCYTES NFR BLD AUTO: 0.4 %
LYMPHOCYTES # BLD AUTO: 1.74 K/UL
LYMPHOCYTES NFR BLD AUTO: 21.4 %
MAN DIFF?: NORMAL
MCHC RBC-ENTMCNC: 30 GM/DL
MCHC RBC-ENTMCNC: 30.8 PG
MCV RBC AUTO: 102.7 FL
MONOCYTES # BLD AUTO: 0.57 K/UL
MONOCYTES NFR BLD AUTO: 7 %
NEUTROPHILS # BLD AUTO: 5.31 K/UL
NEUTROPHILS NFR BLD AUTO: 65.3 %
PLATELET # BLD AUTO: 264 K/UL
POTASSIUM SERPL-SCNC: 4.7 MMOL/L
PROT SERPL-MCNC: 7.8 G/DL
RBC # BLD: 4.77 M/UL
RBC # FLD: 14.9 %
SODIUM SERPL-SCNC: 144 MMOL/L
WBC # FLD AUTO: 8.13 K/UL

## 2022-02-28 NOTE — HISTORY OF PRESENT ILLNESS
[FreeTextEntry1] : Patient is an 80-year-old with peripheral vascular disease, status post left lower extremity intervention.  Status post left SFA stent placement.  Patient complains of new onset of left lower extremity claudication and rest pain.  No tissue loss.  Patient was in aspirin and Plavix, but the Plavix was stopped based on urological condition that the patient has.
no concerns

## 2022-03-01 ENCOUNTER — RESULT REVIEW (OUTPATIENT)
Age: 82
End: 2022-03-01

## 2022-03-01 ENCOUNTER — APPOINTMENT (OUTPATIENT)
Dept: ENDOVASCULAR SURGERY | Facility: CLINIC | Age: 82
End: 2022-03-01
Payer: MEDICARE

## 2022-03-01 ENCOUNTER — NON-APPOINTMENT (OUTPATIENT)
Age: 82
End: 2022-03-01

## 2022-03-01 VITALS
WEIGHT: 170 LBS | OXYGEN SATURATION: 98 % | RESPIRATION RATE: 18 BRPM | SYSTOLIC BLOOD PRESSURE: 154 MMHG | HEART RATE: 62 BPM | HEIGHT: 67 IN | TEMPERATURE: 98.1 F | BODY MASS INDEX: 26.68 KG/M2 | DIASTOLIC BLOOD PRESSURE: 56 MMHG

## 2022-03-01 PROCEDURE — 37197Z: CUSTOM | Mod: 59,58

## 2022-03-01 PROCEDURE — 37227Z: CUSTOM | Mod: LT

## 2022-03-01 PROCEDURE — 37220Z: CUSTOM | Mod: 59,LT

## 2022-03-01 NOTE — ASSESSMENT
[FreeTextEntry1] : 83 yo male with medical history significant for severe peripheral vascular disease s/p iliac angioplasty and stent with a left SFA stent in place, with complaints of severe left foot coldness and discomfort. Patient has had cracks in plantar aspect of foot. Patient is s/p bilateral common iliac and left external iliac artery stent placement in the past. Plan for left lower extremity angiogram and possible intervention

## 2022-03-01 NOTE — HISTORY OF PRESENT ILLNESS
[FreeTextEntry1] : Rapid covid test: not detected 3/1/22\par Cr: 2.15 2/25/22 - preop iv hydration given \par accompanied by partner Priscilla Duran [phone]\par alert and oriented x 3\par no reported falls\par took plavix this morning/gave aspirin at 830am\par last eliquis: yesterday morning \par patient is unsure of medications he takes at home  [FreeTextEntry4] : N/A [FreeTextEntry5] : yesterday 6pm  [FreeTextEntry6] : Dr Valentin

## 2022-03-01 NOTE — PROCEDURE
[Groin check for bleeding/hematoma, vitals checked, and Doppler/pulse checked on admission, then every 15 minutes for an hour and every 30 minutes for 3 hours thereafter.] : Groin check for bleeding/hematoma, vitals checked, and Doppler/pulse checked on admission, then every 15 minutes for an hour and every 30 minutes for 3 hours thereafter.  [Resume Diet] : resume diet [D/C IV on discharge] : D/C IV on discharge [Resume diet] : resume diet [Asprin 81mg] : Aspirin 81mg [FreeTextEntry1] : aortogram, left leg angiogram, angioplasty, atherectomy, stent

## 2022-03-16 ENCOUNTER — APPOINTMENT (OUTPATIENT)
Dept: UROLOGY | Facility: CLINIC | Age: 82
End: 2022-03-16
Payer: MEDICARE

## 2022-03-16 DIAGNOSIS — R31.29 OTHER MICROSCOPIC HEMATURIA: ICD-10-CM

## 2022-03-16 PROCEDURE — 99214 OFFICE O/P EST MOD 30 MIN: CPT

## 2022-03-17 LAB
APPEARANCE: CLEAR
BACTERIA: ABNORMAL
BILIRUBIN URINE: NEGATIVE
BLOOD URINE: NEGATIVE
COLOR: NORMAL
GLUCOSE QUALITATIVE U: NEGATIVE
HYALINE CASTS: 4 /LPF
KETONES URINE: NEGATIVE
LEUKOCYTE ESTERASE URINE: ABNORMAL
MICROSCOPIC-UA: NORMAL
NITRITE URINE: NEGATIVE
PH URINE: 6.5
PROTEIN URINE: ABNORMAL
RED BLOOD CELLS URINE: 0 /HPF
SPECIFIC GRAVITY URINE: 1.01
SQUAMOUS EPITHELIAL CELLS: 0 /HPF
UROBILINOGEN URINE: NORMAL
WHITE BLOOD CELLS URINE: 85 /HPF

## 2022-03-18 ENCOUNTER — APPOINTMENT (OUTPATIENT)
Dept: VASCULAR SURGERY | Facility: CLINIC | Age: 82
End: 2022-03-18
Payer: MEDICARE

## 2022-03-18 PROCEDURE — 99214 OFFICE O/P EST MOD 30 MIN: CPT

## 2022-03-18 PROCEDURE — 93979 VASCULAR STUDY: CPT

## 2022-03-18 PROCEDURE — 93926 LOWER EXTREMITY STUDY: CPT

## 2022-03-18 NOTE — PHYSICAL EXAM
[Normal Breath Sounds] : Normal breath sounds [Normal Heart Sounds] : normal heart sounds [Skin Ulcer] : ulcer [de-identified] : Left foot is cool to touch with small superficial cracks on the left metatarsal head area

## 2022-03-18 NOTE — ASSESSMENT
[FreeTextEntry1] : Patient with severe peripheral vascular disease status post bilateral lower extremity intervention in the past.  No evidence of significant ischemia.\par \par Continue Plavix and Eliquis.\par \par Follow-up in 3 months.

## 2022-03-18 NOTE — HISTORY OF PRESENT ILLNESS
[FreeTextEntry1] : Patient is an 81-year-old with severe peripheral vascular disease status post bilateral iliac stent placement and left SFA and popliteal artery intervention.  Patient recently had intervention of the left leg.  Patient reports significant improvement of symptoms of coldness and pain.  No pain in the right lower extremity

## 2022-03-21 NOTE — HISTORY OF PRESENT ILLNESS
[FreeTextEntry1] : cc f/u bladder ca\par 82 yo male h/o bladder ca recurrent uti \par last turbt 12/20\par benign nonkeratinizing squamous tissue with changes c/w prior surgical therapy \par voiding well\par baseline urgency uui dribbling \par no dysuria or hematuria \par last csyto 6/21\par saw dr baez reported blood in urine

## 2022-03-21 NOTE — ASSESSMENT
[FreeTextEntry1] : repeat ua cx \par has chronic bacteruria \par currently has no complaints\par f/u cysto

## 2022-03-22 ENCOUNTER — NON-APPOINTMENT (OUTPATIENT)
Age: 82
End: 2022-03-22

## 2022-03-22 LAB — BACTERIA UR CULT: ABNORMAL

## 2022-04-12 NOTE — H&P PST ADULT - ENDOCRINE
Hello,   Patient has asymptomatic bradycardia in clinic today. Patient states she has increased her metoprolol dosage within the past few weeks. Advised she follow up with PCP and cardiologist. Thank you. details…

## 2022-06-09 ENCOUNTER — APPOINTMENT (OUTPATIENT)
Dept: UROLOGY | Facility: CLINIC | Age: 82
End: 2022-06-09

## 2022-06-16 ENCOUNTER — APPOINTMENT (OUTPATIENT)
Dept: UROLOGY | Facility: CLINIC | Age: 82
End: 2022-06-16
Payer: MEDICARE

## 2022-06-16 VITALS — DIASTOLIC BLOOD PRESSURE: 64 MMHG | HEART RATE: 54 BPM | SYSTOLIC BLOOD PRESSURE: 180 MMHG | RESPIRATION RATE: 15 BRPM

## 2022-06-16 PROCEDURE — 52000 CYSTOURETHROSCOPY: CPT

## 2022-06-24 ENCOUNTER — APPOINTMENT (OUTPATIENT)
Dept: VASCULAR SURGERY | Facility: CLINIC | Age: 82
End: 2022-06-24
Payer: MEDICARE

## 2022-06-24 VITALS
HEIGHT: 67 IN | WEIGHT: 170 LBS | BODY MASS INDEX: 26.68 KG/M2 | HEART RATE: 60 BPM | SYSTOLIC BLOOD PRESSURE: 124 MMHG | DIASTOLIC BLOOD PRESSURE: 55 MMHG | OXYGEN SATURATION: 97 %

## 2022-06-24 PROCEDURE — 93880 EXTRACRANIAL BILAT STUDY: CPT

## 2022-06-24 PROCEDURE — 93923 UPR/LXTR ART STDY 3+ LVLS: CPT

## 2022-06-24 PROCEDURE — 93926 LOWER EXTREMITY STUDY: CPT

## 2022-06-24 PROCEDURE — 99214 OFFICE O/P EST MOD 30 MIN: CPT

## 2022-06-24 NOTE — ASSESSMENT
[FreeTextEntry1] : 79 yo male with history of cad, htn, pad s/p b/l iliac stents and left sfa stent, carotid stenosis s/p cea presents for follow up.\par \par carotid duplex shows <50% stenosis of the right ica with occluded vertebral artery and 50-69% stenosis of the left ica \par \par left sfa stent with >75% in stent stenosis at the proximal thigh / sfa \par \par arjun/pvr shows moderate disease bilaterally with arjun of 0.64 on the right and 0.79 on the left \par \par pt is currently on eliquis, plavix and pletal \par \par at this time given severe in stent stenosis of the left lower extremity will plan for left lower extremity angiogram \par followed by possible right lower extremity angiogram given claudications if symptoms continue or worsen \par

## 2022-06-24 NOTE — HISTORY OF PRESENT ILLNESS
[FreeTextEntry1] : 81 yo male with history of cad, htn, pad s/p b/l iliac stents and left sfa stent, carotid stenosis s/p cea presents for follow up.  pt reports right lower extremity 1 block claudications.  pt denies any history of rest pain or open wounds or ulcers

## 2022-06-24 NOTE — PHYSICAL EXAM
[0] : left 0 [No Rash or Lesion] : No rash or lesion [Alert] : alert [Calm] : calm [JVD] : no jugular venous distention  [Ankle Swelling (On Exam)] : not present [Varicose Veins Of Lower Extremities] : not present [] : not present [Skin Ulcer] : no ulcer [de-identified] : appears well  [de-identified] : \par

## 2022-06-25 LAB
ANION GAP SERPL CALC-SCNC: 19 MMOL/L
APTT BLD: 36 SEC
BASOPHILS # BLD AUTO: 0.06 K/UL
BASOPHILS NFR BLD AUTO: 0.7 %
BUN SERPL-MCNC: 58 MG/DL
CALCIUM SERPL-MCNC: 10.1 MG/DL
CHLORIDE SERPL-SCNC: 106 MMOL/L
CO2 SERPL-SCNC: 20 MMOL/L
CREAT SERPL-MCNC: 2.72 MG/DL
EGFR: 22 ML/MIN/1.73M2
EOSINOPHIL # BLD AUTO: 0.4 K/UL
EOSINOPHIL NFR BLD AUTO: 4.9 %
GLUCOSE SERPL-MCNC: 80 MG/DL
HCT VFR BLD CALC: 46.4 %
HGB BLD-MCNC: 14.8 G/DL
IMM GRANULOCYTES NFR BLD AUTO: 0.4 %
INR PPP: 1.1 RATIO
LYMPHOCYTES # BLD AUTO: 1.73 K/UL
LYMPHOCYTES NFR BLD AUTO: 21 %
MAN DIFF?: NORMAL
MCHC RBC-ENTMCNC: 31.9 GM/DL
MCHC RBC-ENTMCNC: 32.8 PG
MCV RBC AUTO: 102.9 FL
MONOCYTES # BLD AUTO: 0.7 K/UL
MONOCYTES NFR BLD AUTO: 8.5 %
NEUTROPHILS # BLD AUTO: 5.3 K/UL
NEUTROPHILS NFR BLD AUTO: 64.5 %
PLATELET # BLD AUTO: 281 K/UL
POTASSIUM SERPL-SCNC: 5.8 MMOL/L
PT BLD: 13 SEC
RBC # BLD: 4.51 M/UL
RBC # FLD: 13.5 %
SARS-COV-2 N GENE NPH QL NAA+PROBE: NOT DETECTED
SODIUM SERPL-SCNC: 146 MMOL/L
WBC # FLD AUTO: 8.22 K/UL

## 2022-07-01 ENCOUNTER — LABORATORY RESULT (OUTPATIENT)
Age: 82
End: 2022-07-01

## 2022-07-05 ENCOUNTER — APPOINTMENT (OUTPATIENT)
Dept: ENDOVASCULAR SURGERY | Facility: CLINIC | Age: 82
End: 2022-07-05

## 2022-07-05 ENCOUNTER — LABORATORY RESULT (OUTPATIENT)
Age: 82
End: 2022-07-05

## 2022-07-05 ENCOUNTER — RESULT REVIEW (OUTPATIENT)
Age: 82
End: 2022-07-05

## 2022-07-05 VITALS
TEMPERATURE: 97.9 F | BODY MASS INDEX: 25.71 KG/M2 | OXYGEN SATURATION: 93 % | WEIGHT: 160 LBS | RESPIRATION RATE: 16 BRPM | HEIGHT: 66 IN | HEART RATE: 65 BPM | SYSTOLIC BLOOD PRESSURE: 159 MMHG | DIASTOLIC BLOOD PRESSURE: 51 MMHG

## 2022-07-05 PROCEDURE — 75625 CONTRAST EXAM ABDOMINL AORTA: CPT

## 2022-07-05 PROCEDURE — 37225Z: CUSTOM | Mod: LT

## 2022-07-05 RX ORDER — CILOSTAZOL 50 MG/1
50 TABLET ORAL
Refills: 0 | Status: DISCONTINUED | COMMUNITY
End: 2022-07-05

## 2022-07-05 RX ORDER — AMOXICILLIN AND CLAVULANATE POTASSIUM 875; 125 MG/1; MG/1
875-125 TABLET, COATED ORAL
Qty: 14 | Refills: 0 | Status: DISCONTINUED | COMMUNITY
Start: 2022-06-16 | End: 2022-07-05

## 2022-07-05 RX ORDER — CEFUROXIME AXETIL 500 MG/1
500 TABLET ORAL
Qty: 14 | Refills: 0 | Status: DISCONTINUED | COMMUNITY
Start: 2020-06-29 | End: 2022-07-05

## 2022-07-05 RX ORDER — CLOPIDOGREL 75 MG/1
75 TABLET, FILM COATED ORAL
Refills: 0 | Status: DISCONTINUED | COMMUNITY
End: 2022-07-05

## 2022-07-05 RX ORDER — OMEPRAZOLE 40 MG/1
40 CAPSULE, DELAYED RELEASE ORAL
Refills: 0 | Status: DISCONTINUED | COMMUNITY
End: 2022-07-05

## 2022-07-05 RX ORDER — CEFUROXIME AXETIL 500 MG/1
500 TABLET ORAL
Qty: 14 | Refills: 0 | Status: DISCONTINUED | COMMUNITY
Start: 2021-12-14 | End: 2022-07-05

## 2022-07-05 RX ORDER — CIPROFLOXACIN HYDROCHLORIDE 500 MG/1
500 TABLET, FILM COATED ORAL
Qty: 10 | Refills: 0 | Status: DISCONTINUED | COMMUNITY
Start: 2020-11-30 | End: 2022-07-05

## 2022-07-05 RX ORDER — CEFUROXIME AXETIL 250 MG/1
250 TABLET ORAL
Qty: 14 | Refills: 0 | Status: DISCONTINUED | COMMUNITY
Start: 2020-07-31 | End: 2022-07-05

## 2022-07-05 RX ORDER — MEROPENEM 1 G/30ML
1 INJECTION INTRAVENOUS
Qty: 14 | Refills: 0 | Status: DISCONTINUED | COMMUNITY
Start: 2020-05-19 | End: 2022-07-05

## 2022-07-05 RX ORDER — AMOXICILLIN AND CLAVULANATE POTASSIUM 875; 125 MG/1; MG/1
875-125 TABLET, COATED ORAL
Qty: 14 | Refills: 0 | Status: DISCONTINUED | COMMUNITY
Start: 2020-11-23 | End: 2022-07-05

## 2022-07-05 RX ORDER — CEFUROXIME AXETIL 250 MG/1
250 TABLET ORAL
Qty: 20 | Refills: 0 | Status: DISCONTINUED | COMMUNITY
Start: 2019-11-09 | End: 2022-07-05

## 2022-07-06 NOTE — PHYSICAL EXAM
[JVD] : no jugular venous distention  [Ankle Swelling (On Exam)] : not present [Varicose Veins Of Lower Extremities] : not present [] : not present [Abdomen Masses] : No abdominal masses [Skin Ulcer] : no ulcer [de-identified] : appears well  [de-identified] : \par

## 2022-07-06 NOTE — HISTORY OF PRESENT ILLNESS
[FreeTextEntry1] : covid test: not detected 6/24//22\par Cr: 2.7 6/24/2022 - preop iv hydration given \par accompanied by partner Priscilla Duran [phone]\par alert and oriented x 3\par no reported falls\par took plavix and bp medications \par last eliquis: Saturday \par gave aspirin at 8:35am  [FreeTextEntry4] : N/A [FreeTextEntry5] : yesterday 7pm  [FreeTextEntry6] : Dr Reveles

## 2022-07-06 NOTE — ASSESSMENT
[FreeTextEntry1] : 81 yo male with history of cad, htn, pad s/p b/l iliac stents and left sfa stent,\par \par left sfa stent with >75% in stent stenosis at the proximal thigh / sfa \par \par arjun/pvr shows moderate disease bilaterally with arjun of 0.64 on the right and 0.79 on the left \par \par pt is currently on eliquis, plavix and pletal \par \par at this time given severe in stent stenosis of the left lower extremity will plan for left lower extremity angiogram \par \par

## 2022-07-06 NOTE — PROCEDURE
[Groin check for bleeding/hematoma, vitals checked, and Doppler/pulse checked on admission, then every 15 minutes for an hour and every 30 minutes for 3 hours thereafter.] : Groin check for bleeding/hematoma, vitals checked, and Doppler/pulse checked on admission, then every 15 minutes for an hour and every 30 minutes for 3 hours thereafter.  [Resume Diet] : resume diet [Discharge at _____] : Discharge at [unfilled]. [FreeTextEntry1] : aortogram, left leg angiogram, angioplasty, atherectomy

## 2022-07-21 ENCOUNTER — APPOINTMENT (OUTPATIENT)
Dept: UROLOGY | Facility: CLINIC | Age: 82
End: 2022-07-21

## 2022-07-21 VITALS
SYSTOLIC BLOOD PRESSURE: 148 MMHG | HEIGHT: 66 IN | HEART RATE: 72 BPM | WEIGHT: 160 LBS | DIASTOLIC BLOOD PRESSURE: 62 MMHG | BODY MASS INDEX: 25.71 KG/M2 | TEMPERATURE: 98.1 F

## 2022-07-21 PROCEDURE — 99213 OFFICE O/P EST LOW 20 MIN: CPT

## 2022-07-22 ENCOUNTER — APPOINTMENT (OUTPATIENT)
Dept: VASCULAR SURGERY | Facility: CLINIC | Age: 82
End: 2022-07-22

## 2022-07-22 VITALS
WEIGHT: 160 LBS | DIASTOLIC BLOOD PRESSURE: 75 MMHG | HEIGHT: 66 IN | HEART RATE: 67 BPM | BODY MASS INDEX: 25.71 KG/M2 | SYSTOLIC BLOOD PRESSURE: 130 MMHG

## 2022-07-22 PROCEDURE — 99214 OFFICE O/P EST MOD 30 MIN: CPT

## 2022-07-22 PROCEDURE — 93926 LOWER EXTREMITY STUDY: CPT

## 2022-07-22 NOTE — PHYSICAL EXAM
[Normal Breath Sounds] : Normal breath sounds [Normal Heart Sounds] : normal heart sounds [Skin Ulcer] : ulcer [de-identified] : Left foot is cool to touch with small superficial cracks on the left metatarsal head area

## 2022-08-01 NOTE — HISTORY OF PRESENT ILLNESS
[FreeTextEntry1] : cc f/u bladder ca\par 82 yo male h/o bladder ca recurrent uti \par last turbt 12/20\par benign nonkeratinizing squamous tissue with changes c/w prior surgical therapy \par voiding well\par baseline urgency uui dribbling \par no dysuria or hematuria \par last csyto 6/21\par saw dr baez reported blood in urine\par cysto inflammation in bladder \par pt on ac

## 2022-08-15 LAB
BACTERIA UR CULT: ABNORMAL
URINE CYTOLOGY: NORMAL

## 2022-09-01 RX ORDER — APIXABAN 2.5 MG/1
2.5 TABLET, FILM COATED ORAL
Qty: 180 | Refills: 0 | Status: ACTIVE | COMMUNITY
Start: 2021-05-11 | End: 1900-01-01

## 2022-09-16 ENCOUNTER — APPOINTMENT (OUTPATIENT)
Dept: VASCULAR SURGERY | Facility: CLINIC | Age: 82
End: 2022-09-16

## 2022-09-16 VITALS — SYSTOLIC BLOOD PRESSURE: 141 MMHG | HEIGHT: 66 IN | DIASTOLIC BLOOD PRESSURE: 54 MMHG | HEART RATE: 56 BPM

## 2022-09-16 PROCEDURE — 99214 OFFICE O/P EST MOD 30 MIN: CPT

## 2022-09-16 PROCEDURE — 93978 VASCULAR STUDY: CPT

## 2022-09-16 PROCEDURE — 93926 LOWER EXTREMITY STUDY: CPT

## 2022-09-16 NOTE — PHYSICAL EXAM
[Normal Breath Sounds] : Normal breath sounds [Normal Heart Sounds] : normal heart sounds [Skin Ulcer] : ulcer [de-identified] : Left foot is cool to touch with small superficial cracks on the left metatarsal head area

## 2022-10-31 NOTE — H&P PST ADULT - NSANTHAGERD_ENT_A_CORE
Post-Op Assessment Note    CV Status:  Stable  Pain Score: 0    Pain management: adequate     Mental Status:  Alert and awake   Hydration Status:  Euvolemic   PONV Controlled:  Controlled   Airway Patency:  Patent   Two or more mitigation strategies used for obstructive sleep apnea   Post Op Vitals Reviewed: Yes      Staff: CRNA         No complications documented      BP  177/90   Temp   97 1   Pulse  76   Resp   14   SpO2   98 Yes

## 2022-11-08 NOTE — PHYSICAL THERAPY INITIAL EVALUATION ADULT - TRANSFER SAFETY CONCERNS NOTED: SIT/STAND, REHAB EVAL
Impression: Drusen (degenerative) of macula, bilateral: H35.363. Bilateral. Plan: Discussed diagnosis in detail with patient. Counseling given about the benefits and/or risks of the Age-Related Eye Disease Study (AREDS) formulation for preventing progression of age-related macular degeneration (AMD). Add lutein 20-30 mg, zeaxanthin 2-5mg per day with MV or AREDS 2 MV PO daily as directed. Will continue to observe condition and or symptoms. Call if [address] worsens. Dispensed and explained use of Amsler grid.
Impression: Other chronic allergic conjunctivitis: H10.45. Bilateral. Plan: Discussed diagnosis with patient. Recommend OTC oral antihistamine, and Ketotifen 1 drop bid ou, prn. Rub excess into lids. Recommend refrigerating drops.
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 Plan: Discussed diagnosis with patient and treatment options. Recommend YAG consult with Dr. Faby Loya.
safe

## 2022-12-30 ENCOUNTER — APPOINTMENT (OUTPATIENT)
Dept: VASCULAR SURGERY | Facility: CLINIC | Age: 82
End: 2022-12-30

## 2023-06-13 NOTE — H&P PST ADULT - ANESTHESIA, PREVIOUS REACTION, PROFILE
none Winlevi Counseling:  I discussed with the patient the risks of topical clascoterone including but not limited to erythema, scaling, itching, and stinging. Patient voiced their understanding.

## 2023-06-15 NOTE — PROGRESS NOTE ADULT - PROBLEM SELECTOR PROBLEM 6
[Very Good] : ~his/her~  mood as very good [0] : 2) Feeling down, depressed, or hopeless: Not at all (0) [PHQ-2 Negative - No further assessment needed] : PHQ-2 Negative - No further assessment needed [[id number]] : 0 [Never] : Never CAD (coronary artery disease)

## 2023-06-16 ENCOUNTER — APPOINTMENT (OUTPATIENT)
Dept: VASCULAR SURGERY | Facility: CLINIC | Age: 83
End: 2023-06-16

## 2023-08-24 ENCOUNTER — APPOINTMENT (OUTPATIENT)
Dept: UROLOGY | Facility: CLINIC | Age: 83
End: 2023-08-24
Payer: MEDICARE

## 2023-08-24 PROCEDURE — 99214 OFFICE O/P EST MOD 30 MIN: CPT

## 2023-08-25 LAB
APPEARANCE: CLEAR
BACTERIA: NEGATIVE /HPF
BILIRUBIN URINE: NEGATIVE
BLOOD URINE: ABNORMAL
CAST: 12 /LPF
COLOR: ABNORMAL
EPITHELIAL CELLS: 2 /HPF
GLUCOSE QUALITATIVE U: NEGATIVE MG/DL
HYALINE CASTS: PRESENT
KETONES URINE: NEGATIVE MG/DL
LEUKOCYTE ESTERASE URINE: NEGATIVE
MICROSCOPIC-UA: NORMAL
NITRITE URINE: NEGATIVE
PH URINE: 5
PROTEIN URINE: 100 MG/DL
RED BLOOD CELLS URINE: >1900 /HPF
REVIEW: NORMAL
SPECIFIC GRAVITY URINE: 1.01
UROBILINOGEN URINE: 0.2 MG/DL
WHITE BLOOD CELLS URINE: 6 /HPF

## 2023-08-28 LAB — BACTERIA UR CULT: ABNORMAL

## 2023-08-29 LAB — URINE CYTOLOGY: NORMAL

## 2023-08-29 RX ORDER — SULFAMETHOXAZOLE AND TRIMETHOPRIM 800; 160 MG/1; MG/1
800-160 TABLET ORAL TWICE DAILY
Qty: 10 | Refills: 0 | Status: ACTIVE | COMMUNITY
Start: 2023-08-29 | End: 1900-01-01

## 2023-08-29 NOTE — HISTORY OF PRESENT ILLNESS
[FreeTextEntry1] : cc blood in urine  84 yo male h/o bladder ca recurrent uti  last turbt 12/20 benign nonkeratinizing squamous tissue with changes c/w prior surgical therapy  voiding well baseline urgency uui dribbling  no dysuria or hematuria  last csyto 6/21 saw dr baez reported blood in urine cysto inflammation in bladder  pt on ac

## 2023-09-03 ENCOUNTER — EMERGENCY (EMERGENCY)
Facility: HOSPITAL | Age: 83
LOS: 1 days | Discharge: HOME | End: 2023-09-03
Attending: STUDENT IN AN ORGANIZED HEALTH CARE EDUCATION/TRAINING PROGRAM
Payer: MEDICARE

## 2023-09-03 VITALS
SYSTOLIC BLOOD PRESSURE: 152 MMHG | DIASTOLIC BLOOD PRESSURE: 62 MMHG | HEART RATE: 65 BPM | OXYGEN SATURATION: 85 % | TEMPERATURE: 97 F | RESPIRATION RATE: 18 BRPM | WEIGHT: 143.3 LBS

## 2023-09-03 DIAGNOSIS — Z98.890 OTHER SPECIFIED POSTPROCEDURAL STATES: Chronic | ICD-10-CM

## 2023-09-03 DIAGNOSIS — N32.89 OTHER SPECIFIED DISORDERS OF BLADDER: Chronic | ICD-10-CM

## 2023-09-03 DIAGNOSIS — I21.3 ST ELEVATION (STEMI) MYOCARDIAL INFARCTION OF UNSPECIFIED SITE: Chronic | ICD-10-CM

## 2023-09-03 DIAGNOSIS — Z82.49 FAMILY HISTORY OF ISCHEMIC HEART DISEASE AND OTHER DISEASES OF THE CIRCULATORY SYSTEM: Chronic | ICD-10-CM

## 2023-09-03 DIAGNOSIS — Z90.79 ACQUIRED ABSENCE OF OTHER GENITAL ORGAN(S): Chronic | ICD-10-CM

## 2023-09-03 DIAGNOSIS — Z77.123 CONTACT WITH AND (SUSPECTED) EXPOSURE TO RADON AND OTHER NATURALLY OCCURRING RADIATION: Chronic | ICD-10-CM

## 2023-09-03 LAB
ANION GAP SERPL CALC-SCNC: 10 MMOL/L — SIGNIFICANT CHANGE UP (ref 5–17)
BASOPHILS # BLD AUTO: 0.03 K/UL — SIGNIFICANT CHANGE UP (ref 0–0.2)
BASOPHILS NFR BLD AUTO: 0.3 % — SIGNIFICANT CHANGE UP (ref 0–2)
BUN SERPL-MCNC: 63 MG/DL — HIGH (ref 7–18)
CALCIUM SERPL-MCNC: 9.2 MG/DL — SIGNIFICANT CHANGE UP (ref 8.4–10.5)
CHLORIDE SERPL-SCNC: 107 MMOL/L — SIGNIFICANT CHANGE UP (ref 96–108)
CO2 SERPL-SCNC: 20 MMOL/L — LOW (ref 22–31)
CREAT SERPL-MCNC: 3.38 MG/DL — HIGH (ref 0.5–1.3)
EGFR: 17 ML/MIN/1.73M2 — LOW
EOSINOPHIL # BLD AUTO: 0.02 K/UL — SIGNIFICANT CHANGE UP (ref 0–0.5)
EOSINOPHIL NFR BLD AUTO: 0.2 % — SIGNIFICANT CHANGE UP (ref 0–6)
GLUCOSE SERPL-MCNC: 155 MG/DL — HIGH (ref 70–99)
HCT VFR BLD CALC: 37 % — LOW (ref 39–50)
HGB BLD-MCNC: 11.6 G/DL — LOW (ref 13–17)
IMM GRANULOCYTES NFR BLD AUTO: 0.3 % — SIGNIFICANT CHANGE UP (ref 0–0.9)
LYMPHOCYTES # BLD AUTO: 1.08 K/UL — SIGNIFICANT CHANGE UP (ref 1–3.3)
LYMPHOCYTES # BLD AUTO: 11.1 % — LOW (ref 13–44)
MCHC RBC-ENTMCNC: 30.6 PG — SIGNIFICANT CHANGE UP (ref 27–34)
MCHC RBC-ENTMCNC: 31.4 GM/DL — LOW (ref 32–36)
MCV RBC AUTO: 97.6 FL — SIGNIFICANT CHANGE UP (ref 80–100)
MONOCYTES # BLD AUTO: 0.38 K/UL — SIGNIFICANT CHANGE UP (ref 0–0.9)
MONOCYTES NFR BLD AUTO: 3.9 % — SIGNIFICANT CHANGE UP (ref 2–14)
NEUTROPHILS # BLD AUTO: 8.16 K/UL — HIGH (ref 1.8–7.4)
NEUTROPHILS NFR BLD AUTO: 84.2 % — HIGH (ref 43–77)
NRBC # BLD: 0 /100 WBCS — SIGNIFICANT CHANGE UP (ref 0–0)
PLATELET # BLD AUTO: 238 K/UL — SIGNIFICANT CHANGE UP (ref 150–400)
POTASSIUM SERPL-MCNC: 4.6 MMOL/L — SIGNIFICANT CHANGE UP (ref 3.5–5.3)
POTASSIUM SERPL-SCNC: 4.6 MMOL/L — SIGNIFICANT CHANGE UP (ref 3.5–5.3)
RBC # BLD: 3.79 M/UL — LOW (ref 4.2–5.8)
RBC # FLD: 17.5 % — HIGH (ref 10.3–14.5)
SODIUM SERPL-SCNC: 137 MMOL/L — SIGNIFICANT CHANGE UP (ref 135–145)
WBC # BLD: 9.7 K/UL — SIGNIFICANT CHANGE UP (ref 3.8–10.5)
WBC # FLD AUTO: 9.7 K/UL — SIGNIFICANT CHANGE UP (ref 3.8–10.5)

## 2023-09-03 PROCEDURE — 51702 INSERT TEMP BLADDER CATH: CPT

## 2023-09-03 PROCEDURE — 99284 EMERGENCY DEPT VISIT MOD MDM: CPT

## 2023-09-03 PROCEDURE — 85025 COMPLETE CBC W/AUTO DIFF WBC: CPT

## 2023-09-03 PROCEDURE — 99283 EMERGENCY DEPT VISIT LOW MDM: CPT | Mod: 25

## 2023-09-03 PROCEDURE — 36415 COLL VENOUS BLD VENIPUNCTURE: CPT

## 2023-09-03 PROCEDURE — 87086 URINE CULTURE/COLONY COUNT: CPT

## 2023-09-03 PROCEDURE — 80048 BASIC METABOLIC PNL TOTAL CA: CPT

## 2023-09-03 NOTE — ED PROVIDER NOTE - PATIENT PORTAL LINK FT
You can access the FollowMyHealth Patient Portal offered by [facility] by registering at the following website: [email or web address]. By joining [employer]’s FollowMyHealth portal, you will also be able to view your health information using other applications (apps) compatible with our system.

## 2023-09-03 NOTE — ED PROVIDER NOTE - CLINICAL SUMMARY MEDICAL DECISION MAKING FREE TEXT BOX
83-year-old male with extensive pmhx including BPH coming in with urinary retention since last night.  History of similar symptoms in the past when he needed العلي placed.  No changes in strength or sensation in lower extremities.  Uses a cane at baseline ambulate.  Denies any back pain.  Also reports that about 5 days ago he had hematuria.  He went to his urologist who started him on ciprofloxacin.  Urine culture resistant to ciprofloxacin patient was switched over to Bactrim.  Today is day 3 of Bactrim.  No fevers, chills, nausea, vomiting.  Differential diagnoses include but not limited to urinary retention likely secondary to BPH versus UTI.  Patient is already on appropriate antibiotics.  Elderly male.  There is blood in his urine.  Patient reports symptoms resolved after catheter is placed.  Denies any pain in the penis or the abdomen.  Urinary obstruction was likely due to small blood clots.  Eval for anemia, RUSTY.    Elevated creatinine - last cr in the system is from two years ago. Pt is not sure about the numbers but is already following up with Dr. Pagan the nephrologist. Has an appointment in one month. Instructed to get an appt within 1 week. Strict return precautions are discussed.   Urine is now clear. H/H is stable.   Fu with Dr. Roman Cesar within 1 week.

## 2023-09-03 NOTE — ED ADULT NURSE NOTE - NSFALLRISKINTERV_ED_ALL_ED

## 2023-09-03 NOTE — ED PROVIDER NOTE - NSFOLLOWUPINSTRUCTIONS_ED_ALL_ED_FT
You were seen today for urinary retention.  العلي catheter is placed.  Please follow-up with your urologist within 1 week for continued care.  If you start developing abdominal pain again and العلي is not draining please seek medical assistance immediately.  If you start developing fevers, chills, chest pain, shortness of breath or any other concerning symptoms please seek medical assistance.    Your kidney function is poor.  Please make an appointment with your nephrologist within 1 week for continued care.  If you start developing shortness of breath, chest pain, swelling in your lower extremities, weakness or any other concerning symptoms please return to emergency department immediately.

## 2023-09-03 NOTE — ED PROVIDER NOTE - NS_EDPROVIDERDISPOUSERTYPE_ED_A_ED
Attending Attestation (For Attendings USE Only)...
I will STOP taking the medications listed below when I get home from the hospital:    oxyCODONE 5 mg oral tablet  -- 1 tab(s) by mouth every 6 hours, As Needed MDD:8  -- Caution federal law prohibits the transfer of this drug to any person other  than the person for whom it was prescribed.  It is very important that you take or use this exactly as directed.  Do not skip doses or discontinue unless directed by your doctor.  May cause drowsiness.  Alcohol may intensify this effect.  Use care when operating dangerous machinery.  This prescription cannot be refilled.  Using more of this medication than prescribed may cause serious breathing problems.

## 2023-09-04 LAB
CULTURE RESULTS: SIGNIFICANT CHANGE UP
SPECIMEN SOURCE: SIGNIFICANT CHANGE UP

## 2023-09-08 ENCOUNTER — APPOINTMENT (OUTPATIENT)
Dept: UROLOGY | Facility: CLINIC | Age: 83
End: 2023-09-08
Payer: MEDICARE

## 2023-09-08 VITALS
WEIGHT: 140 LBS | HEART RATE: 55 BPM | DIASTOLIC BLOOD PRESSURE: 48 MMHG | TEMPERATURE: 97 F | SYSTOLIC BLOOD PRESSURE: 128 MMHG | BODY MASS INDEX: 22.5 KG/M2 | HEIGHT: 66 IN | OXYGEN SATURATION: 90 %

## 2023-09-08 DIAGNOSIS — R31.0 GROSS HEMATURIA: ICD-10-CM

## 2023-09-08 PROCEDURE — 99213 OFFICE O/P EST LOW 20 MIN: CPT | Mod: 25

## 2023-09-08 PROCEDURE — 52001 CYSTO W/IRRG&EVAC MLT CLOTS: CPT

## 2023-09-09 ENCOUNTER — EMERGENCY (EMERGENCY)
Facility: HOSPITAL | Age: 83
LOS: 1 days | Discharge: ANOTHER HOSPITAL | End: 2023-09-09
Attending: PERSONAL EMERGENCY RESPONSE ATTENDANT | Admitting: PERSONAL EMERGENCY RESPONSE ATTENDANT
Payer: MEDICARE

## 2023-09-09 ENCOUNTER — INPATIENT (INPATIENT)
Facility: HOSPITAL | Age: 83
LOS: 4 days | Discharge: HOME | DRG: 669 | End: 2023-09-14
Attending: STUDENT IN AN ORGANIZED HEALTH CARE EDUCATION/TRAINING PROGRAM | Admitting: STUDENT IN AN ORGANIZED HEALTH CARE EDUCATION/TRAINING PROGRAM
Payer: MEDICARE

## 2023-09-09 VITALS
TEMPERATURE: 99 F | RESPIRATION RATE: 18 BRPM | SYSTOLIC BLOOD PRESSURE: 152 MMHG | DIASTOLIC BLOOD PRESSURE: 96 MMHG | HEART RATE: 56 BPM | OXYGEN SATURATION: 96 %

## 2023-09-09 VITALS
TEMPERATURE: 98 F | SYSTOLIC BLOOD PRESSURE: 137 MMHG | HEIGHT: 66 IN | OXYGEN SATURATION: 96 % | HEART RATE: 69 BPM | WEIGHT: 139.99 LBS | DIASTOLIC BLOOD PRESSURE: 47 MMHG | RESPIRATION RATE: 16 BRPM

## 2023-09-09 VITALS
HEART RATE: 70 BPM | OXYGEN SATURATION: 99 % | DIASTOLIC BLOOD PRESSURE: 47 MMHG | TEMPERATURE: 98 F | RESPIRATION RATE: 18 BRPM | SYSTOLIC BLOOD PRESSURE: 145 MMHG

## 2023-09-09 DIAGNOSIS — I21.3 ST ELEVATION (STEMI) MYOCARDIAL INFARCTION OF UNSPECIFIED SITE: Chronic | ICD-10-CM

## 2023-09-09 DIAGNOSIS — Z98.890 OTHER SPECIFIED POSTPROCEDURAL STATES: Chronic | ICD-10-CM

## 2023-09-09 DIAGNOSIS — Z77.123 CONTACT WITH AND (SUSPECTED) EXPOSURE TO RADON AND OTHER NATURALLY OCCURRING RADIATION: Chronic | ICD-10-CM

## 2023-09-09 DIAGNOSIS — Z29.9 ENCOUNTER FOR PROPHYLACTIC MEASURES, UNSPECIFIED: ICD-10-CM

## 2023-09-09 DIAGNOSIS — Z90.79 ACQUIRED ABSENCE OF OTHER GENITAL ORGAN(S): Chronic | ICD-10-CM

## 2023-09-09 DIAGNOSIS — Z82.49 FAMILY HISTORY OF ISCHEMIC HEART DISEASE AND OTHER DISEASES OF THE CIRCULATORY SYSTEM: Chronic | ICD-10-CM

## 2023-09-09 DIAGNOSIS — I10 ESSENTIAL (PRIMARY) HYPERTENSION: ICD-10-CM

## 2023-09-09 DIAGNOSIS — Z01.818 ENCOUNTER FOR OTHER PREPROCEDURAL EXAMINATION: ICD-10-CM

## 2023-09-09 DIAGNOSIS — R31.9 HEMATURIA, UNSPECIFIED: ICD-10-CM

## 2023-09-09 DIAGNOSIS — I25.10 ATHEROSCLEROTIC HEART DISEASE OF NATIVE CORONARY ARTERY WITHOUT ANGINA PECTORIS: ICD-10-CM

## 2023-09-09 DIAGNOSIS — N32.89 OTHER SPECIFIED DISORDERS OF BLADDER: Chronic | ICD-10-CM

## 2023-09-09 LAB
ALBUMIN SERPL ELPH-MCNC: 4.5 G/DL — SIGNIFICANT CHANGE UP (ref 3.3–5)
ALLERGY+IMMUNOLOGY DIAG STUDY NOTE: SIGNIFICANT CHANGE UP
ALP SERPL-CCNC: 161 U/L — HIGH (ref 40–120)
ALT FLD-CCNC: 23 U/L — SIGNIFICANT CHANGE UP (ref 4–41)
ANION GAP SERPL CALC-SCNC: 15 MMOL/L — HIGH (ref 7–14)
APPEARANCE UR: ABNORMAL
APTT BLD: 36.4 SEC — HIGH (ref 24.5–35.6)
AST SERPL-CCNC: 28 U/L — SIGNIFICANT CHANGE UP (ref 4–40)
BACTERIA # UR AUTO: NEGATIVE /HPF — SIGNIFICANT CHANGE UP
BASOPHILS # BLD AUTO: 0.02 K/UL — SIGNIFICANT CHANGE UP (ref 0–0.2)
BASOPHILS NFR BLD AUTO: 0.3 % — SIGNIFICANT CHANGE UP (ref 0–2)
BILIRUB SERPL-MCNC: 0.5 MG/DL — SIGNIFICANT CHANGE UP (ref 0.2–1.2)
BILIRUB UR-MCNC: ABNORMAL
BLD GP AB SCN SERPL QL: POSITIVE — SIGNIFICANT CHANGE UP
BLD GP AB SCN SERPL QL: SIGNIFICANT CHANGE UP
BUN SERPL-MCNC: 53 MG/DL — HIGH (ref 7–23)
CALCIUM SERPL-MCNC: 9.4 MG/DL — SIGNIFICANT CHANGE UP (ref 8.4–10.5)
CAST: 1 /LPF — SIGNIFICANT CHANGE UP (ref 0–4)
CHLORIDE SERPL-SCNC: 104 MMOL/L — SIGNIFICANT CHANGE UP (ref 98–107)
CO2 SERPL-SCNC: 20 MMOL/L — LOW (ref 22–31)
COLOR SPEC: ABNORMAL
CREAT SERPL-MCNC: 3.52 MG/DL — HIGH (ref 0.5–1.3)
DIFF PNL FLD: ABNORMAL
EGFR: 16 ML/MIN/1.73M2 — LOW
EOSINOPHIL # BLD AUTO: 0.43 K/UL — SIGNIFICANT CHANGE UP (ref 0–0.5)
EOSINOPHIL NFR BLD AUTO: 7.1 % — HIGH (ref 0–6)
GLUCOSE SERPL-MCNC: 130 MG/DL — HIGH (ref 70–99)
GLUCOSE UR QL: NEGATIVE MG/DL — SIGNIFICANT CHANGE UP
HCT VFR BLD CALC: 36 % — LOW (ref 39–50)
HGB BLD-MCNC: 11.1 G/DL — LOW (ref 13–17)
IANC: 3.93 K/UL — SIGNIFICANT CHANGE UP (ref 1.8–7.4)
IMM GRANULOCYTES NFR BLD AUTO: 0.2 % — SIGNIFICANT CHANGE UP (ref 0–0.9)
INR BLD: 1.11 RATIO — SIGNIFICANT CHANGE UP (ref 0.85–1.18)
KETONES UR-MCNC: NEGATIVE MG/DL — SIGNIFICANT CHANGE UP
LEUKOCYTE ESTERASE UR-ACNC: ABNORMAL
LYMPHOCYTES # BLD AUTO: 1.34 K/UL — SIGNIFICANT CHANGE UP (ref 1–3.3)
LYMPHOCYTES # BLD AUTO: 22.3 % — SIGNIFICANT CHANGE UP (ref 13–44)
MAGNESIUM SERPL-MCNC: 2.4 MG/DL — SIGNIFICANT CHANGE UP (ref 1.6–2.6)
MCHC RBC-ENTMCNC: 30.4 PG — SIGNIFICANT CHANGE UP (ref 27–34)
MCHC RBC-ENTMCNC: 30.8 GM/DL — LOW (ref 32–36)
MCV RBC AUTO: 98.6 FL — SIGNIFICANT CHANGE UP (ref 80–100)
MONOCYTES # BLD AUTO: 0.29 K/UL — SIGNIFICANT CHANGE UP (ref 0–0.9)
MONOCYTES NFR BLD AUTO: 4.8 % — SIGNIFICANT CHANGE UP (ref 2–14)
NEUTROPHILS # BLD AUTO: 3.93 K/UL — SIGNIFICANT CHANGE UP (ref 1.8–7.4)
NEUTROPHILS NFR BLD AUTO: 65.3 % — SIGNIFICANT CHANGE UP (ref 43–77)
NITRITE UR-MCNC: POSITIVE
NRBC # BLD: 0 /100 WBCS — SIGNIFICANT CHANGE UP (ref 0–0)
NRBC # FLD: 0 K/UL — SIGNIFICANT CHANGE UP (ref 0–0)
PH UR: 5 — SIGNIFICANT CHANGE UP (ref 5–8)
PLATELET # BLD AUTO: 259 K/UL — SIGNIFICANT CHANGE UP (ref 150–400)
POTASSIUM SERPL-MCNC: 5.1 MMOL/L — SIGNIFICANT CHANGE UP (ref 3.5–5.3)
POTASSIUM SERPL-SCNC: 5.1 MMOL/L — SIGNIFICANT CHANGE UP (ref 3.5–5.3)
PROT SERPL-MCNC: 8.3 G/DL — SIGNIFICANT CHANGE UP (ref 6–8.3)
PROT UR-MCNC: 300 MG/DL
PROTHROM AB SERPL-ACNC: 12.5 SEC — SIGNIFICANT CHANGE UP (ref 9.5–13)
RBC # BLD: 3.65 M/UL — LOW (ref 4.2–5.8)
RBC # FLD: 17.1 % — HIGH (ref 10.3–14.5)
RBC CASTS # UR COMP ASSIST: 8616 /HPF — HIGH (ref 0–4)
REVIEW: SIGNIFICANT CHANGE UP
RH IG SCN BLD-IMP: POSITIVE — SIGNIFICANT CHANGE UP
SODIUM SERPL-SCNC: 139 MMOL/L — SIGNIFICANT CHANGE UP (ref 135–145)
SP GR SPEC: 1.01 — SIGNIFICANT CHANGE UP (ref 1–1.03)
SQUAMOUS # UR AUTO: 2 /HPF — SIGNIFICANT CHANGE UP (ref 0–5)
UROBILINOGEN FLD QL: 0.2 MG/DL — SIGNIFICANT CHANGE UP (ref 0.2–1)
WBC # BLD: 6.02 K/UL — SIGNIFICANT CHANGE UP (ref 3.8–10.5)
WBC # FLD AUTO: 6.02 K/UL — SIGNIFICANT CHANGE UP (ref 3.8–10.5)
WBC UR QL: 48 /HPF — HIGH (ref 0–5)

## 2023-09-09 PROCEDURE — 99285 EMERGENCY DEPT VISIT HI MDM: CPT

## 2023-09-09 PROCEDURE — 99223 1ST HOSP IP/OBS HIGH 75: CPT

## 2023-09-09 PROCEDURE — 88307 TISSUE EXAM BY PATHOLOGIST: CPT | Mod: 26

## 2023-09-09 PROCEDURE — 86077 PHYS BLOOD BANK SERV XMATCH: CPT

## 2023-09-09 PROCEDURE — 99223 1ST HOSP IP/OBS HIGH 75: CPT | Mod: GC

## 2023-09-09 RX ORDER — ACETAMINOPHEN 500 MG
650 TABLET ORAL EVERY 6 HOURS
Refills: 0 | Status: DISCONTINUED | OUTPATIENT
Start: 2023-09-09 | End: 2023-09-14

## 2023-09-09 RX ORDER — HEPARIN SODIUM 5000 [USP'U]/ML
1100 INJECTION INTRAVENOUS; SUBCUTANEOUS
Qty: 25000 | Refills: 0 | Status: DISCONTINUED | OUTPATIENT
Start: 2023-09-09 | End: 2023-09-10

## 2023-09-09 RX ADMIN — HEPARIN SODIUM 1100 UNIT(S)/HR: 5000 INJECTION INTRAVENOUS; SUBCUTANEOUS at 23:39

## 2023-09-09 NOTE — ED ADULT TRIAGE NOTE - CHIEF COMPLAINT QUOTE
Pt c/o bloody urine with clots in urinary catheter. Pt endorsing bladder pain. Pt had catheter placed 1 week ago, last changed and flushed yesterday. Sent to ED by Urologist for surgery. PMHx prostate Ca, CAD on Eliquis. Pt very [place]

## 2023-09-09 NOTE — ED ADULT NURSE NOTE - NSFALLUNIVINTERV_ED_ALL_ED
Bed/Stretcher in lowest position, wheels locked, appropriate side rails in place/Call bell, personal items and telephone in reach/Instruct patient to call for assistance before getting out of bed/chair/stretcher/Non-slip footwear applied when patient is off stretcher/[place] to call system/Physically safe environment - no spills, clutter or unnecessary equipment/Purposeful proactive rounding/Room/bathroom lighting operational, light cord in reach

## 2023-09-09 NOTE — ED PROVIDER NOTE - OBJECTIVE STATEMENT
83M with h/o HTN, HLD, DM, CAD on ASA/Plavix, PAD, CKD, prostate ca s/p radiation, anemia, COPD not on home O2, PAD who presents with persistent hematuria on the instruction of is OP urologist Dr. Cesar for "admission". He was here last with with UR & swenson was placed. Reportedly followed up yeesterday with Dr. Cesar's office and had it flushed with persistence of "a lot of clots", so was sent here for further eval.    Pt denies abd pain, nausea/vomiting, dizziness/lightheadedness, lack of drainage from swenson.

## 2023-09-09 NOTE — CONSULT NOTE ADULT - SUBJECTIVE AND OBJECTIVE BOX
History & Physical    Patient: Abhishek Muhammad MRN: [id number]  CSN: [id number]    YOB: 1952  Age: 78 y.o. Sex: male      DOA: 5/8/2018    Chief Complaint:   Chief Complaint   Patient presents with    Fatigue    Leg Pain          HPI:     Abhishek Muhammad is a 78 y.o.  male who has sever pulm [id number], Morbid obesity, CHF  3v cad presents to ER with worsening weakness and confusion   Wife has had to support him and prevent 2 falls   He is also having loss of urine and stool control  With worsening confusion since being Discharge from [facility] post TAVR and femoral artery cut down   Patient has dyspnea with minimal exertion   Recently had bumex adjusted /cut back due to worsening creatine   Creatine last month 1.03 now 2. 55 with relative hyperkalemia   Patient was initially transferred for OHS to [address] [place] [address] but deemed not a canidate due to multiple comorbidities  Troponin today in ER elevated     Past Medical History:   Diagnosis Date    Aortic stenosis 9/29/2017    Bilateral artificial lens implant 1997, 1999    Chronic anticoagulation 9/29/2017    Chronic atrial fibrillation ([address] 9/29/2017    Coronal hypospadias 9/29/2017    Coronary artery disease involving native coronary artery of native heart without angina pectoris 9/28/2017    Diabetes mellitus with no complication ([address] 8/16/6783    Last Assessment & Plan:  No apparent diabetic retinopathy. . Pt told to keep regular primary care and eye appointments to reduce the risk of visual loss from diabetic eye complications, and to follow up immediately for changes in vision.  Diabetic retinopathy associated with type 2 diabetes mellitus ([address] 9/29/2017    Fluid overload     Gout 9/29/2017    Lesion of soft palate 7/29/2017    ENT planned biopsy    Pseudophakia of both eyes 9/29/2017    Last Assessment & Plan:  Vision stable. IOL's well centered. Pt pleased with visual result post-op OD 1999 and OS 1997.   To call if any visual changes.  Retinal hole or tear 6/12/2017    Last Assessment & Plan:  [facility] well surrounded. Stable. Will follow    Tricuspid regurgitation 9/29/2017       Past Surgical History:   Procedure Laterality Date    HX CATARACT REMOVAL Bilateral 1997, 1999    b/l lens implant    HX CHOLECYSTECTOMY      HX KNEE REPLACEMENT      HX SHOULDER REPLACEMENT         Family History   Problem Relation Age of Onset    Family history unknown: Yes       Social History     Social History    Marital status:      Spouse name: N/A    Number of children: N/A    Years of education: N/A     Social History Main Topics    Smoking status: Former Smoker    Smokeless tobacco: Never Used    Alcohol use Yes    Drug use: None    Sexual activity: Yes     Other Topics Concern    None     Social History Narrative       Prior to Admission medications    Medication Sig Start Date End Date Taking? Authorizing Provider   spironolactone (ALDACTONE) 25 mg tablet Take 25 mg by mouth two (2) times a day. Yes Navdeep Willoughby MD   bumetanide (BUMEX) 2 mg tablet Take 2 mg by mouth two (2) times a day. Yes Navdeep Willoughby MD   ferrous sulfate 325 mg (65 mg iron) tablet Take 325 mg by mouth Daily (before breakfast). Yes Navdeep Willoughby MD   tamsulosin (FLOMAX) 0.4 mg capsule Take 0.4 mg by mouth daily. Yes Navdeep Willoughby MD   aspirin delayed-release 81 mg tablet Take 1 Tab by mouth daily. 10/6/17  Yes Quentin Devlin MD   carvedilol (COREG) 12.5 mg tablet Take 1 Tab by mouth two (2) times daily (with meals). Patient taking differently: Take 3.125 mg by mouth two (2) times daily (with meals). 10/6/17  Yes Quentin Devlin MD   pravastatin (PRAVACHOL) 40 mg tablet Take 1 Tab by mouth nightly. 10/6/17  Yes Quentin Devlin MD   gabapentin (NEURONTIN) 600 mg tablet Take 600 mg by mouth three (3) times daily. Yes Historical Provider   tiotropium (SPIRIVA) 18 mcg inhalation capsule Take 1 Cap by inhalation daily.  10/6/17   Quentin Devlin MD traMADol (ULTRAM) 50 mg tablet Take 1 Tab by mouth every six (6) hours as needed. Max Daily Amount: 200 mg. Patient taking differently: Take 50 mg by mouth every eight (8) hours as needed. 10/6/17   Jocelyn Wesley MD   cetirizine (ZYRTEC) 10 mg tablet Take 10 mg by mouth daily as needed. Historical Provider   warfarin (COUMADIN) 5 mg tablet Take 7.5 mg by mouth daily. Every day except wednesday    Historical Provider       No Known Allergies      Review of Systems  GENERAL: Patient alert, awake and oriented times 3, able to communicate full sentences and not in distress. HEENT: No change in vision, no earache, tinnitus, sore throat or sinus congestion. NECK: No pain or stiffness. PULMONARY:+ shortness of breath, cough or wheeze. Cardiovascular: no pnd or orthopnea, no CP  GASTROINTESTINAL: No abdominal pain, nausea, vomiting or diarrhea, melena or bright red blood per rectum. GENITOURINARY: +urinary frequency, urgency, hesitancy or dysuria. MUSCULOSKELETAL: No joint or muscle pain, no back pain, no recent trauma. DERMATOLOGIC: No rash, no itching, no lesions. Dry rash lower legs   ENDOCRINE: No polyuria, polydipsia, no heat or cold intolerance.+ recent change in weight. Down 60 pounds inf luid  HEMATOLOGICAL: No anemia or easy bruising or bleeding. NEUROLOGIC: No headache, seizures, numbness, tingling + weakness. Physical Exam:     Physical Exam:  Visit Vitals    /66 (BP 1 Location: Left arm, BP Patient Position: At rest)    Pulse 88    Temp 98.8 °F (37.1 °C)    Resp 22    Ht 5' 11\" (1.803 m)    Wt 118.4 kg (261 lb 1.6 oz)    SpO2 97%    BMI 36.42 kg/m2    O2 Flow Rate (L/min): 2 l/min O2 Device: Nasal cannula    Temp (24hrs), Av.9 °F (37.2 °C), Min:98.8 °F (37.1 °C), Max:98.9 °F (37.2 °C)             General:  Alert, cooperative, no distress, appears stated age.  Oriented to self only not year or place or month               Head: Normocephalic, without obvious abnormality, atraumatic. Eyes:  Conjunctivae/corneas clear. PERRL, EOMs intact. Nose: Nares normal. No drainage or sinus tenderness. Neck: Supple, symmetrical, trachea midline, no adenopathy, thyroid: no enlargement, no carotid bruit and no JVD. Lungs:   Clear to auscultation bilaterally. Crackles at base    Heart:  Regular rate and rhythm, S1, S2 normal.     Abdomen: Soft, non-tender. Bowel sounds normal.    Extremities: Extremities normal, atraumatic, no cyanosis or edema. Pulses: 2+ and symmetric all extremities. plus 2    Skin:  + rashes or lesions   Neurologic: [id number] focal motor  Generalized weakness lower leg and upper leg  Slow to follow commands or sensory deficit. Labs Reviewed:    Lab results reviewed. For significant abnormal values and values requiring intervention, see assessment and plan. Recent Results (from the past 24 hour(s))   METABOLIC PANEL, COMPREHENSIVE    Collection Time: 05/08/18  2:30 AM   Result Value Ref Range    Sodium 133 (L) 136 - 145 mmol/L    Potassium 5.0 3.5 - 5.5 mmol/L    Chloride 94 (L) 100 - 108 mmol/L    CO2 28 21 - 32 mmol/L    Anion gap 11 3.0 - 18 mmol/L    Glucose 145 (H) 74 - 99 mg/dL     (H) 7.0 - 18 MG/DL    Creatinine 2.55 (H) 0.6 - 1.3 MG/DL    BUN/Creatinine ratio 43 (H) 12 - 20      GFR est AA 31 (L) >60 ml/min/1.73m2    GFR est non-AA 25 (L) >60 ml/min/1.73m2    Calcium 9.2 8.5 - 10.1 MG/DL    Bilirubin, total 2.0 (H) 0.2 - 1.0 MG/DL    ALT (SGPT) 38 16 - 61 U/L    AST (SGOT) 53 (H) 15 - 37 U/L    Alk.  phosphatase 102 45 - 117 U/L    Protein, total 6.7 6.4 - 8.2 g/dL    Albumin 2.6 (L) 3.4 - 5.0 g/dL    Globulin 4.1 (H) 2.0 - 4.0 g/dL    A-G Ratio 0.6 (L) 0.8 - 1.7     CBC WITH AUTOMATED DIFF    Collection Time: 05/08/18  2:30 AM   Result Value Ref Range    WBC 16.2 (H) 4.6 - 13.2 K/uL    RBC 4.36 (L) 4.70 - 5.50 M/uL    HGB 13.3 13.0 - 16.0 g/dL    HCT 39.7 36.0 - 48.0 %    MCV 91.1 74.0 - 97.0 FL    MCH 30.5 24.0 - 34.0 PG    MCHC 33.5 31.0 - 37.0 g/dL    RDW 15.2 (H) 11.6 - 14.5 %    PLATELET 826 112 - 345 K/uL    MPV 10.5 9.2 - 11.8 FL    NEUTROPHILS 93 (H) 40 - 73 %    LYMPHOCYTES 4 (L) 21 - 52 %    MONOCYTES 3 3 - 10 %    EOSINOPHILS 0 0 - 5 %    BASOPHILS 0 0 - 2 %    ABS. NEUTROPHILS 15.0 (H) 1.8 - 8.0 K/UL    ABS. LYMPHOCYTES 0.7 (L) 0.9 - 3.6 K/UL    ABS. MONOCYTES 0.5 0.05 - 1.2 K/UL    ABS. EOSINOPHILS 0.0 0.0 - 0.4 K/UL    ABS. BASOPHILS 0.0 0.0 - 0.06 K/UL    DF AUTOMATED     CARDIAC PANEL,(CK, CKMB & TROPONIN)    Collection Time: 05/08/18  2:30 AM   Result Value Ref Range    CK 39 39 - 308 U/L    CK - MB 4.1 (H) <3.6 ng/ml    CK-MB Index 10.5 (H) 0.0 - 4.0 %    Troponin-I, Qt. 0.88 (H) 0.00 - 0.06 NG/ML   AMMONIA    Collection Time: 05/08/18  2:30 AM   Result Value Ref Range    Ammonia 26 11 - 32 UMOL/L   URINALYSIS W/ RFLX MICROSCOPIC    Collection Time: 05/08/18  3:54 AM   Result Value Ref Range    Color YELLOW      Appearance CLEAR      Specific gravity 1.015 1.005 - 1.030      pH (UA) 5.0 5.0 - 8.0      Protein NEGATIVE  NEG mg/dL    Glucose NEGATIVE  NEG mg/dL    Ketone NEGATIVE  NEG mg/dL    Bilirubin NEGATIVE  NEG      Blood NEGATIVE  NEG      Urobilinogen 1.0 0.2 - 1.0 EU/dL    Nitrites NEGATIVE  NEG      Leukocyte Esterase TRACE (A) NEG     URINE MICROSCOPIC ONLY    Collection Time: 05/08/18  3:54 AM   Result Value Ref Range    WBC 3 to 5 0 - 5 /hpf    RBC NEGATIVE  0 - 5 /hpf    Epithelial cells 1+ 0 - 5 /lpf    Bacteria NEGATIVE  NEG /hpf    Mucus 1+ (A) NEG /lpf    Hyaline cast 1 to 3 0 - 2 /lpf   POC G3    Collection Time: 05/08/18  4:39 AM   Result Value Ref Range    Device: NASAL CANNULA      Flow rate (POC) 2 L/M    FIO2 (POC) 0.28 %    pH (POC) 7.493 (H) 7.35 - 7.45      pCO2 (POC) 32.3 (L) 35.0 - 45.0 MMHG    pO2 (POC) 89 80 - 100 MMHG    HCO3 (POC) 24.8 22 - 26 MMOL/L    sO2 (POC) 98 (H) 92 - 97 %    Base excess (POC) 2 mmol/L    Allens test (POC) N/A      Total resp.  rate 23      Site RIGHT RADIAL      Patient 83M with h/o HTN, HLD, DM, CAD on ASA/Plavix, PAD, CKD, prostate ca s/p radiation, anemia, COPD not on home O2, PAD, h/o TURBT (per chart review, last in ) who presents with persistent hematuria. Pt went to [facility] and was transferred to [facility]. Pt denies fevers, chills, back pain. العلي catheter in place and draining red urine, no clots observed.       PAST MEDICAL & SURGICAL HISTORY:  Hypertension      Hypercholesteremia       diagnosed with Prostate Ca  s/p radiation and brachytherapy w/ seed implantation 5 years ago s/p TURP      BPH      approximately   MI/Coronary Artery Disease      PAD--Left leg stent   done at hospital in [place]  --[facility] in [place], s/p balloon on LLE in       Hearing Decreased left ear      Diabetes mellitus, type 2  controlled with diet      CKD (chronic kidney disease) stage 3, GFR 30-59 ml/min      Urinary retention      Pulmonary edema  May 2016-resolved      Bladder mass  s/p resection      Injury of head, initial encounter  2016 - s/p fall in hospital      Rectal bleeding  2018      Gout      COPD (chronic obstructive pulmonary disease)  on chest xray      Anemia      CAD (coronary artery disease)  coronary stent x 1 - - pt stopped Pletal and Aspirin 325mg , called Dr. mack Garvey , to start ASA 81 mg today including am of sx, office of Tali alonso notified      Occlusion and stenosis of unspecified carotid artery  right carotid artery      Femoral artery occlusion, left  superficial- - surgically resolved      Malignant neoplasm of bladder, unspecified      Frequent UTI      [place] (hard of hearing)  right      Hematuria      Cataract surgery 30 years ago      right wrist surgery with hardware 27 years ago      2002 left leg stent      Acute myocardial infarction  as per pt in , no coronary stent      History of colon surgery        Exposure to radiation  for prostate cancer (seed implant)      S/P TURP (status post transurethral resection of prostate)      History of bladder surgery      S/P cardiac catheterization   with 1 FEMI stent      Bladder mass  cystoscopy, TURBT, transurethral incision of bladder neck- 2018      FH: carotid endarterectomy      S/P carotid endarterectomy  right 2019      H/O vascular surgery  left suprficial femoral artery stent- 2019      History of vascular access device  2020  insertion in left upper arm ( PICC) , removal after 2 weeks of abx          MEDICATIONS  (STANDING):    MEDICATIONS  (PRN):      Allergies    Novocain (Faint)    Intolerances        ROS: Negative except per HPI.     SOCIAL HISTORY:  Smoking history   ETOH history    OBJECTIVE:    Vital Signs Last 24 Hrs  T(C): 36.8 (09 Sep 2023 17:47), Max: 37.1 (09 Sep 2023 12:58)  T(F): 98.2 (09 Sep 2023 17:47), Max: 98.7 (09 Sep 2023 12:58)  HR: 69 (09 Sep 2023 17:47) (56 - 70)  BP: 137/47 (09 Sep 2023 17:47) (137/47 - 161/71)  BP(mean): --  RR: 16 (09 Sep 2023 17:47) (16 - 18)  SpO2: 96% (09 Sep 2023 17:47) (96% - 100%)    Parameters below as of 09 Sep 2023 17:47  Patient On (Oxygen Delivery Method): room air        I&O's Summary      LABS:                        11.1   6.02  )-----------( 259      ( 09 Sep 2023 14:33 )             36.0         139  |  104  |  53<H>  ----------------------------<  130<H>  5.1   |  20<L>  |  3.52<H>    Ca    9.4      09 Sep 2023 14:33  Mg     2.40         TPro  8.3  /  Alb  4.5  /  TBili  0.5  /  DBili  x   /  AST  28  /  ALT  23  /  AlkPhos  161<H>      PT/INR - ( 09 Sep 2023 14:33 )   PT: 12.5 sec;   INR: 1.11 ratio         PTT - ( 09 Sep 2023 14:33 )  PTT:36.4 sec  Urinalysis Basic - ( 09 Sep 2023 14:33 )    Color: Red / Appearance: Turbid / S.013 / pH: x  Gluc: 130 mg/dL / Ketone: Negative mg/dL  / Bili: Small / Urobili: 0.2 mg/dL   Blood: x / Protein: 300 mg/dL / Nitrite: Positive   Leuk Esterase: Moderate / RBC: 8616 /HPF / WBC 48 /HPF   Sq Epi: x / Non Sq Epi: 2 /HPF / Bacteria: Negative /HPF      CAPILLARY BLOOD GLUCOSE      POCT Blood Glucose.: 94 mg/dL (09 Sep 2023 12:59)    LIVER FUNCTIONS - ( 09 Sep 2023 14:33 )  Alb: 4.5 g/dL / Pro: 8.3 g/dL / ALK PHOS: 161 U/L / ALT: 23 U/L / AST: 28 U/L / GGT: x             Cultures:      PHYSICAL EXAM:   General: NAD.   Cardio: RR  Pulm: Normal chest rise and expansion. Non-labored breathing.  GI: abd soft, nontender, nondistended  : No CVA tenderness b/l. العلي with red urine, no clots observed  Extremities: Warm, dry with 3 sec cap refill. No edema or swelling noted b/l.  No overlying skin changes noted. No calf tenderness b/l.       RADIOLOGY & ADDITIONAL STUDIES:   temp. 98.9      Specimen type (POC) ARTERIAL      Performed by Errol Acosta     and EKG    Procedures/imaging: see electronic medical records for all procedures/Xrays and details which were not copied into this note but were reviewed prior to creation of Plan      Assessment/Plan     Principal Problem:    MINERVA (acute kidney injury) ([facility] (5/8/2018)  Will hold Spirolactone   And Bumex for 24hrs  Renal consult  Start low dose normal saline 75 cc/hr  Monitor for fluid overload     Active Problems:    CHF (congestive heart failure) ([facility] (5/8/2018)  Holding diuretics    Uremia (5/8/2018)  Renal consult  Check ultrasound       Dyspnea (5/8/2018)  Not on home oxygen     Pulmonary HTN  Heparin or coumadin  Pending test for sleep apnea     CAD multivessel  Not OHS canidate due to pulm HTN and severe obesity     Aortic Stenosis s/p TAVR      Morbid Obesity    DM     DVT/GI Prophylaxis: Coumadin/heparin  DNR discussed with wife     Discussed with patient at bedside about hospital admission and my plan care, who understood and agree with my plan care.     Arie Holloway MD  5/8/2018 6:10 AM

## 2023-09-09 NOTE — ED PROVIDER NOTE - NSICDXPASTMEDICALHX_GEN_ALL_CORE_FT
PAST MEDICAL HISTORY:  6/09 diagnosed with Prostate Ca s/p radiation and brachytherapy w/ seed implantation 5 years ago s/p TURP    Anemia     approximately 2001  MI/Coronary Artery Disease     Bladder mass s/p resection    BPH     CAD (coronary artery disease) coronary stent x 1 - 2016- pt stopped Pletal and Aspirin 325mg , called Dr. mack Garvey , to start ASA 81 mg today including am of sx, office of Tali alonso notified    CKD (chronic kidney disease) stage 3, GFR 30-59 ml/min     COPD (chronic obstructive pulmonary disease) on chest xray    Diabetes mellitus, type 2 controlled with diet    Femoral artery occlusion, left superficial- 2019- surgically resolved    Frequent UTI     Gout     Hearing Decreased left ear     Hematuria     [place] (hard of hearing) right    Hypercholesteremia     Hypertension     Injury of head, initial encounter 12/2016 - s/p fall in hospital    Malignant neoplasm of bladder, unspecified     Occlusion and stenosis of unspecified carotid artery right carotid artery    PAD--Left leg stent 2002 done at hospital in [place]  --[facility] in [place], s/p balloon on LLE in 2006    Pulmonary edema May 2016-resolved    Rectal bleeding November 2018    Urinary retention

## 2023-09-09 NOTE — ED PROVIDER NOTE - PROGRESS NOTE DETAILS
Demetrio Juarez MD PGY-3  Pt to be transferred to [facility] per urology for definitive care as surgery planned there on Monday. Transfer center contacted, will have to assess. Attending MD Bell.  Appreciate urology assistance and transfer center.  Pt accepted for transfer to [place] for admission for planned urologic procedure.

## 2023-09-09 NOTE — ED PROVIDER NOTE - OBJECTIVE STATEMENT
83-year-old man presenting transferred from [facility].  Planning on cystoscopy with clot evacuation with Dr. Cesar at this hospital in the next few days.  Presented to the wrong hospital and was transferred here for admission for procedure.

## 2023-09-09 NOTE — H&P ADULT - HISTORY OF PRESENT ILLNESS
83 y.o. M with PMHx HTN, HLD, DM, CAD on ASA/Plavix, PAD, CKD, prostate ca s/p radiation, anemia, COPD not on home O2, PAD, h/o TURBT (per chart review, last in 2020) who presents with persistent hematuria. Pt has a chronic swenson, went to [facility] and was transferred to [facility] for medical optimazation and follow up with urology. Pt denies fevers, chills, back pain. Swenson catheter in place and draining red urine, no clots observed. Otherwise pt denies any chest pain, palpitations, shortness of breath, fever, chills, headache, visual changes, nausea, vomiting diarrhea. NKDA

## 2023-09-09 NOTE — ED ADULT NURSE NOTE - NSFALLRISKINTERV_ED_ALL_ED
Assistance OOB with selected safe patient handling equipment if applicable/Communicate fall risk and risk factors to all staff, patient, and family/Monitor gait and stability/Provide patient with walking aids/Provide visual cue: yellow wristband, yellow gown, etc/Reinforce activity limits and safety measures with patient and family/Toileting schedule using arm’s reach rule for commode and bathroom/Call bell, personal items and telephone in reach/Instruct patient to call for assistance before getting out of bed/chair/stretcher/Non-slip footwear applied when patient is off stretcher/Printer to call system/Physically safe environment - no spills, clutter or unnecessary equipment/Purposeful Proactive Rounding/Room/bathroom lighting operational, light cord in reach

## 2023-09-09 NOTE — H&P ADULT - ATTENDING COMMENTS
Patient is an 83 year old male with hx of HTN, HLD, DM, CAD on ASA/Plavix, Afib on Eliquis, PAD, CKD, anemia, COPD prn O2, prostate ca s/p radiation, hx of turbt, chronic swenson, presenting with hematuria. Patient was sent to hospital, by urology, for medical optimization, scheduled for cytoscopy on Monday, 9/11. Patient reports that he uses O2 as needed when his O2 is low on his pulse ox. He reports intermittent shortness of breath on ambulation. Patient other denies fevers, chills, chest pain, palpitations, cough, abdominal pain, nausea, vomiting, diarrhea.     ROS and PE as above.   Labs and imaging reviewed.     Impression:  #Hematuria  #Pre-op clearance  #HTN  #CAD  #ppx measures    - Patient scheduled for cystoscopy with urology tentatively for 9/11  - He reports taking Eliquis twice daily. He is unsure if he has Afib. Given that the patient is scheduled for Cystoscopy and has elevated creatinine function, will start patient on heparin ggt   - Please follow up EKG   - Cardiac consult in am for clearance   - Patient is unaware of home meds, please follow up with pharmacy in AM to complete med rec and continue appropriate medications.   - Medical clearance pending cardiac eval and pending med rec    - rest of management as above Patient is an 83 year old male with hx of HTN, HLD, DM, CAD on ASA/Plavix, Afib on Eliquis, PAD, CKD, anemia, COPD prn O2, prostate ca s/p radiation, hx of turbt, chronic swenson, presenting with hematuria. Patient was sent to hospital, by urology, for medical optimization, scheduled for cytoscopy on Monday, 9/11. Patient reports that he uses O2 as needed when his O2 is low on his pulse ox. He reports intermittent shortness of breath on ambulation. Patient other denies fevers, chills, chest pain, palpitations, cough, abdominal pain, nausea, vomiting, diarrhea.     ROS and PE as above.   Labs and imaging reviewed.     Impression:  #Hematuria  #Pre-op clearance  #HTN  #CAD  #ppx measures    - Patient scheduled for cystoscopy with urology tentatively for 9/11  - He reports taking Eliquis twice daily. He is unsure if he has Afib. Given that the patient is scheduled for Cystoscopy and has elevated creatinine function, will start patient on heparin ggt   - Monitor urine for increase of hematuria while on AC   - Please follow up EKG   - Cardiac consult in am for clearance   - Patient is unaware of home meds, please follow up with pharmacy in AM to complete med rec and continue appropriate medications.   - Medical clearance pending cardiac eval and pending med rec    - rest of management as above Patient is an 83 year old male with hx of HTN, HLD, DM, CAD on ASA/Plavix, Afib on Eliquis, PAD, CKD, anemia, COPD prn O2, prostate ca s/p radiation, hx of turbt, chronic swenson, presenting with hematuria. Patient was sent to hospital, by urology, for medical optimization, scheduled for cytoscopy on Monday, 9/11. Patient reports that he uses O2 as needed when his O2 is low on his pulse ox. He reports intermittent shortness of breath on ambulation. Patient other denies fevers, chills, chest pain, palpitations, cough, abdominal pain, nausea, vomiting, diarrhea.     ROS and PE as above.   Labs and imaging reviewed.     Impression:  #Hematuria  #Pre-op clearance  #HTN  #CAD  #CKD  #ppx measures    - Patient scheduled for cystoscopy with urology tentatively for 9/11  - He reports taking Eliquis twice daily. He is unsure if he has Afib. Given that the patient is scheduled for Cystoscopy and has elevated creatinine function, will start patient on heparin ggt   - Monitor urine for increase of hematuria while on AC   - Please follow up EKG   - Cardiac consult in am for clearance   - Patient is unaware of home meds, please follow up with pharmacy in AM to complete med rec and continue appropriate medications.   - Unknown baseline Cr, March 2021 Cr 2.6. Monitor for now   - Medical clearance pending cardiac eval and pending med rec    - rest of management as above

## 2023-09-09 NOTE — H&P ADULT - NSHPREVIEWOFSYSTEMS_GEN_ALL_CORE
REVIEW OF SYSTEMS:    CONSTITUTIONAL: No weakness, fevers or chills  EYES/ENT: No visual changes;  No vertigo or throat pain   NECK: No pain or stiffness  RESPIRATORY: No cough, wheezing, hemoptysis; No shortness of breath  CARDIOVASCULAR: No chest pain or palpitations  GASTROINTESTINAL: No abdominal or epigastric pain. No nausea, vomiting, or hematemesis; No diarrhea or constipation. No melena or hematochezia.  GENITOURINARY: +hematuria No dysuria, frequency   NEUROLOGICAL: No numbness or weakness  SKIN: No itching, burning, rashes, or lesions   All other review of systems is negative unless indicated above.

## 2023-09-09 NOTE — H&P ADULT - NSICDXPASTMEDICALHX_GEN_ALL_CORE_FT
PAST MEDICAL HISTORY:  6/09 diagnosed with Prostate Ca s/p radiation and brachytherapy w/ seed implantation 5 years ago s/p TURP    Anemia     approximately 2001  MI/Coronary Artery Disease     Bladder mass s/p resection    BPH     CAD (coronary artery disease) coronary stent x 1 - 2016- pt stopped Pletal and Aspirin 325mg , called Dr. mack Garvey , to start ASA 81 mg today including am of sx, office of Tali alonso notified    CKD (chronic kidney disease) stage 3, GFR 30-59 ml/min     COPD (chronic obstructive pulmonary disease) on chest xray    Diabetes mellitus, type 2 controlled with diet    Femoral artery occlusion, left superficial- 2019- surgically resolved    Frequent UTI     Gout     Hearing Decreased left ear     Hematuria     [place] (hard of hearing) right    Hypercholesteremia     Hypertension     Injury of head, initial encounter 12/2016 - s/p fall in hospital    Malignant neoplasm of bladder, unspecified     Occlusion and stenosis of unspecified carotid artery right carotid artery    PAD--Left leg stent 2002 done at hospital in [place]  --[facility] in [place], s/p balloon on LLE in 2006    Pulmonary edema May 2016-resolved    Rectal bleeding November 2018    Urinary retention

## 2023-09-09 NOTE — ED ADULT NURSE REASSESSMENT NOTE - NS ED NURSE REASSESS COMMENT FT1
pt A&ox4 denies chest pain and SOB. no complaints of pain. breathing is spontaneous and unlabored. pt being transferred to [place] ER via [facility]. report given to MEJIA Green at [facility].

## 2023-09-09 NOTE — H&P ADULT - PROBLEM SELECTOR PLAN 1
pw hematura, chronic swenson  follows urology, Dr. Cesar OP   Tentatively planned for cystoscopy with Dr. Cesar Mon 9/11  monitor H/H

## 2023-09-09 NOTE — CHART NOTE - NSCHARTNOTEFT_GEN_A_CORE
Patient sees Dr. Cesar for gross hematuria. Planned to have cystoscopy with clot evacuation.   Needs to go to [facility] where Dr. Cesar operates.  Patient reportedly with blood tinged urine and stable.     Please ensure that patient understands that he will be admitted at [place] for further evaluation and preop under Dr. Roman Cesar.  Discussed with Dr. Cesar.    [facility]  [address] [place] [address]  [phone]

## 2023-09-09 NOTE — ED ADULT NURSE NOTE - OBJECTIVE STATEMENT
Break RN: pt received to room 4, a&ox4 and ambulatory with cane at baseline, presents with urinary catheter in place draining bloody urine with clots, reports pain earlier today that has since subsided. abdomen is soft and nontender to palpations. denies back pain, fevers, chills, n/v/d. pt with history of malignant neoplasm of bladder. states catheter was flushed and replaced yesterday by outpatient urologist who advised patient to come to ED for surgery, cystoscopy with clot evacuation - pt is to be transferred to [facility] where the patients surgeon operates. pt in NAD at this time. respirations even and nonlabored on RA. 20G IV placed in LAC, labs drawn and sent, UA sent. comfort and safety maintained. pt awaiting EMS for transfer.

## 2023-09-09 NOTE — ED PROVIDER NOTE - CLINICAL SUMMARY MEDICAL DECISION MAKING FREE TEXT BOX
Patient transferred here for continuity of care with his urologist with reported planned procedure - will consult urology regarding admission to medicine vs urology.

## 2023-09-09 NOTE — ED ADULT NURSE NOTE - NSICDXPASTMEDICALHX_GEN_ALL_CORE_FT
PAST MEDICAL HISTORY:  6/09 diagnosed with Prostate Ca s/p radiation and brachytherapy w/ seed implantation 5 years ago s/p TURP    Anemia     approximately 2001  MI/Coronary Artery Disease     Bladder mass s/p resection    BPH     CAD (coronary artery disease) coronary stent x 1 - 2016- pt stopped Pletal and Aspirin 325mg , called Dr. mack Garvey , to start ASA 81 mg today including am of sx, office of Tali alonso notified    CKD (chronic kidney disease) stage 3, GFR 30-59 ml/min     COPD (chronic obstructive pulmonary disease) on chest xray    Diabetes mellitus, type 2 controlled with diet    Femoral artery occlusion, left superficial- 2019- surgically resolved    Frequent UTI     Gout     Hearing Decreased left ear     Hematuria     [place] (hard of hearing) right    Hypercholesteremia     Hypertension     Injury of head, initial encounter 12/2016 - s/p fall in hospital    Malignant neoplasm of bladder, unspecified     Occlusion and stenosis of unspecified carotid artery right carotid artery    PAD--Left leg stent 2002 done at hospital in [place]  --[facility] in [place], s/p balloon on LLE in 2006    Pulmonary edema May 2016-resolved    Rectal bleeding November 2018    Urinary retention

## 2023-09-09 NOTE — ED ADULT NURSE NOTE - CHIEF COMPLAINT QUOTE
Pt c/o bloody urine with clots in urinary catheter. Pt endorsing bladder pain. Pt had catheter placed 1 week ago, last changed and flushed yesterday. Sent to ED by Urologist for surgery. PMHx prostate Ca, CAD on Eliquis. Pt very [place]

## 2023-09-09 NOTE — H&P ADULT - ASSESSMENT
83 y.o. M with PMHx HTN, HLD, DM, CAD on ASA/Plavix, PAD, CKD, prostate ca s/p radiation, anemia, COPD not on home O2, PAD, h/o TURBT (per chart review, last in 2020) who presents with persistent hematuria. Admitted to medicine for medical optimaization, scheduled for OR cystoscopy on Monday 9/11.     Primary team to complete med rec in the AM

## 2023-09-09 NOTE — ED PROVIDER NOTE - WET READ LAUNCH FT
There are no Wet Read(s) to document. Normal rate, regular rhythm.  Heart sounds S1, S2.  No murmurs, rubs or gallops.

## 2023-09-09 NOTE — ED PROVIDER NOTE - PHYSICAL EXAMINATION
GEN: Awake, AOx3, NAD.  HEENT: NCAT  ---EYES: no scleral icterus, EOMI, PERRLA  CARDIO: RRR. Normal S1/S2, no m/r/g. No JVD.  RESP: CTAB, no w/r/r but very distant  ABD: Soft, NTND.   : No CVAT. Clear red urine in swenson bag, still draining  MSK: No obvious deformity or ROM deficit.  SKIN: Warm, dry. No rashes. Nail beds without cyanosis or clubbing.  NEURO: Moves all four extremities spontaneously  PSYCH: Appropriate mood & affect.

## 2023-09-09 NOTE — CONSULT NOTE ADULT - ASSESSMENT
83M with h/o HTN, HLD, DM, CAD on ASA/Plavix, PAD, CKD, prostate ca s/p radiation, anemia, COPD not on home O2, PAD, h/o TURBT (per chart review, last in 2020) who presents with hematuria  VSS, swenson in place draining urine    PLAN   - Recommend medical admission for optimization   - Tentatively planned for cystoscopy with Dr. Cesar Mon 9/11  - Continue swenson catheter   - Monitor UO   - Discussed with attending on call Dr. Bran     This note and it's recommendations are preliminary until co-signed by attending physician         83M with h/o HTN, HLD, DM, CAD on ASA/Plavix, PAD, CKD, prostate ca s/p radiation, anemia, COPD not on home O2, PAD, h/o TURBT (per chart review, last in 2020) who presents with hematuria  VSS, swenson in place draining urine    PLAN   - Recommend medical admission for optimization   - Tentatively planned for cystoscopy with Dr. Cesar Mon 9/11  - Continue swenson catheter   - Monitor UO   - Would hold AC, continue ASA  - Discussed with attending on call Dr. Bran     This note and it's recommendations are preliminary until co-signed by attending physician

## 2023-09-09 NOTE — ED PROVIDER NOTE - ATTENDING CONTRIBUTION TO CARE
Attending MD Bell:  I performed a history and physical exam of the patient and discussed their management with the resident. I reviewed the resident's note and agree with the documented findings and plan of care. My medical decision making and observations are found above.

## 2023-09-09 NOTE — ED PROVIDER NOTE - NSICDXPASTMEDICALHX_GEN_ALL_CORE_FT
PAST MEDICAL HISTORY:  6/09 diagnosed with Prostate Ca s/p radiation and brachytherapy w/ seed implantation 5 years ago s/p TURP    Anemia     approximately 2001  MI/Coronary Artery Disease     Bladder mass s/p resection    BPH     CAD (coronary artery disease) coronary stent x 1 - 2016- pt stopped Pletal and Aspirin 325mg , called Dr. mack Garvey , to start ASA 81 mg today including am of sx, office of Tali alonso notified    CKD (chronic kidney disease) stage 3, GFR 30-59 ml/min     COPD (chronic obstructive pulmonary disease) on chest xray    Diabetes mellitus, type 2 controlled with diet    Femoral artery occlusion, left superficial- 2019- surgically resolved    Frequent UTI     Gout     Hearing Decreased left ear     Hematuria     [place] (hard of hearing) right    Hypercholesteremia     Hypertension     Injury of head, initial encounter 12/2016 - s/p fall in hospital    Malignant neoplasm of bladder, unspecified     Occlusion and stenosis of unspecified carotid artery right carotid artery    PAD--Left leg stent 2002 done at hospital in [place]  --[facility] in [place], s/p balloon on LLE in 2006    Pulmonary edema May 2016-resolved    Rectal bleeding November 2018    Urinary retention

## 2023-09-09 NOTE — H&P ADULT - PROBLEM SELECTOR PLAN 2
RCRI 6.0 %  30-day risk of death, MI, or cardiac arrest    Lang score 0.1 %  Risk of myocardial infarction or cardiac arrest, intraoperatively or up to 30 days post-op RCRI 6.0 %  30-day risk of death, MI, or cardiac arrest    Lang score 0.1 %  Risk of myocardial infarction or cardiac arrest, intraoperatively or up to 30 days post-op    consulted Cardio for cards clearance

## 2023-09-09 NOTE — CONSULT NOTE ADULT - NS ATTEND AMEND GEN_ALL_CORE FT
83M with h/o HTN, HLD, DM, CAD on ASA/Plavix, PAD, CKD, prostate ca s/p radiation, anemia, COPD not on home O2, PAD, h/o TURBT (per chart review, last in 2020) who presents with hematuria. He is scheduled for cysto clot evacuation with Dr. Cesar tomorrow and will need to be medically optimized.    Plan  - Trend H/H  - Monitor urine color  - Irrigate swenson if not draining  - Obtain medical optimization  - Hold eliquis  - Continue ASA  - NPO pMN for OR tomorrow

## 2023-09-09 NOTE — ED PROVIDER NOTE - PROGRESS NOTE DETAILS
Spoke with special surgery PA requesting admit to medicine for preop optimization, spoke with hospitalist who will admit.

## 2023-09-09 NOTE — ED PROVIDER NOTE - CLINICAL SUMMARY MEDICAL DECISION MAKING FREE TEXT BOX
83M with h/o HTN, HLD, DM, CAD on ASA/Plavix, PAD, CKD, prostate ca s/p radiation, anemia, COPD not on home O2, PAD who presents with hematuria for "admission". No other associated symptoms. Given history of prosate cancer, unclear bladder issues, will check basic labs & consult urology.  - Labs, T&S, PTT/INR/PT  - Uro C/S 83M with h/o HTN, HLD, DM, CAD on ASA/Plavix, PAD, CKD, prostate ca s/p radiation, anemia, COPD not on home O2, PAD who presents with hematuria for "admission". No other associated symptoms. Given history of prosate cancer, unclear bladder issues, will check basic labs & consult urology.  - Labs, T&S, PTT/INR/PT  - Uro C/S     Attending MD Bell.  Agree with above.  Pt is an 83-year-old male with pmhx of BPH who now presents to ED with complaint of recurrent hematuria.  Pt seen in ED for similar complaint 6 days ago with urinary retention.  Pt had been on ciprofloxacin at that time 2/2 preceding hematuria but developed urinary retention and was seen in Ed for urinary retention after switching to bactrim x 3 days.  Retention at that time felt to be secondary to blood clots/hematuria.  Pt now represents to ED with complaint of yony hematuria from swenson site.  States that he was told by urology to present to ED for admission.  Unclear reason 2/2 completion of abxs course, no flank pain, no fever, draining hematuria from swenson without pain.  Urology paged and to determine etiology of call/recs.  Planned renal function, urine, discuss with urology.  No acute urologic complaints beyond hematuria.

## 2023-09-09 NOTE — H&P ADULT - NSHPPHYSICALEXAM_GEN_ALL_CORE
VITALS:     GENERAL: NAD, lying in bed comfortably, +swenson   HEAD:  Atraumatic, Normocephalic  EYES: EOMI, PERRLA, conjunctiva and sclera clear  ENT: Moist mucous membranes  NECK: Supple, No JVD  CHEST/LUNG: Clear to auscultation bilaterally; No rales, rhonchi, wheezing, or rubs. Unlabored respirations  HEART: Regular rate and rhythm; No murmurs, rubs, or gallops  ABDOMEN: Bowel sounds present; Soft, Nontender, Nondistended. No hepatomegaly  EXTREMITIES:  2+ Peripheral Pulses, brisk capillary refill. No clubbing, cyanosis, or edema  NERVOUS SYSTEM:  Alert & Oriented X3, speech clear. No deficits   MSK: FROM all 4 extremities, full and equal strength  SKIN: No rashes or lesions

## 2023-09-10 ENCOUNTER — TRANSCRIPTION ENCOUNTER (OUTPATIENT)
Age: 83
End: 2023-09-10

## 2023-09-10 LAB
ANION GAP SERPL CALC-SCNC: 1 MMOL/L — LOW (ref 5–17)
ANION GAP SERPL CALC-SCNC: 3 MMOL/L — LOW (ref 5–17)
APTT BLD: 160.4 SEC — CRITICAL HIGH (ref 24.5–35.6)
APTT BLD: 58.2 SEC — HIGH (ref 24.5–35.6)
APTT BLD: 92.9 SEC — HIGH (ref 24.5–35.6)
BUN SERPL-MCNC: 45 MG/DL — HIGH (ref 7–18)
BUN SERPL-MCNC: 45 MG/DL — HIGH (ref 7–18)
CALCIUM SERPL-MCNC: 8.9 MG/DL — SIGNIFICANT CHANGE UP (ref 8.4–10.5)
CALCIUM SERPL-MCNC: 9.4 MG/DL — SIGNIFICANT CHANGE UP (ref 8.4–10.5)
CHLORIDE SERPL-SCNC: 109 MMOL/L — HIGH (ref 96–108)
CHLORIDE SERPL-SCNC: 111 MMOL/L — HIGH (ref 96–108)
CO2 SERPL-SCNC: 26 MMOL/L — SIGNIFICANT CHANGE UP (ref 22–31)
CO2 SERPL-SCNC: 26 MMOL/L — SIGNIFICANT CHANGE UP (ref 22–31)
CREAT SERPL-MCNC: 3.03 MG/DL — HIGH (ref 0.5–1.3)
CREAT SERPL-MCNC: 3.27 MG/DL — HIGH (ref 0.5–1.3)
EGFR: 18 ML/MIN/1.73M2 — LOW
EGFR: 20 ML/MIN/1.73M2 — LOW
GLUCOSE SERPL-MCNC: 79 MG/DL — SIGNIFICANT CHANGE UP (ref 70–99)
GLUCOSE SERPL-MCNC: 94 MG/DL — SIGNIFICANT CHANGE UP (ref 70–99)
HCT VFR BLD CALC: 35.7 % — LOW (ref 39–50)
HCT VFR BLD CALC: 35.7 % — LOW (ref 39–50)
HCT VFR BLD CALC: 36.2 % — LOW (ref 39–50)
HCT VFR BLD CALC: 37.3 % — LOW (ref 39–50)
HGB BLD-MCNC: 11.1 G/DL — LOW (ref 13–17)
HGB BLD-MCNC: 11.3 G/DL — LOW (ref 13–17)
HGB BLD-MCNC: 11.3 G/DL — LOW (ref 13–17)
HGB BLD-MCNC: 11.7 G/DL — LOW (ref 13–17)
INR BLD: 1.1 RATIO — SIGNIFICANT CHANGE UP (ref 0.85–1.18)
MAGNESIUM SERPL-MCNC: 2.5 MG/DL — SIGNIFICANT CHANGE UP (ref 1.6–2.6)
MCHC RBC-ENTMCNC: 30.6 PG — SIGNIFICANT CHANGE UP (ref 27–34)
MCHC RBC-ENTMCNC: 30.9 PG — SIGNIFICANT CHANGE UP (ref 27–34)
MCHC RBC-ENTMCNC: 31.1 GM/DL — LOW (ref 32–36)
MCHC RBC-ENTMCNC: 31.2 GM/DL — LOW (ref 32–36)
MCHC RBC-ENTMCNC: 31.3 PG — SIGNIFICANT CHANGE UP (ref 27–34)
MCHC RBC-ENTMCNC: 31.4 GM/DL — LOW (ref 32–36)
MCHC RBC-ENTMCNC: 31.4 PG — SIGNIFICANT CHANGE UP (ref 27–34)
MCHC RBC-ENTMCNC: 31.7 GM/DL — LOW (ref 32–36)
MCV RBC AUTO: 98.3 FL — SIGNIFICANT CHANGE UP (ref 80–100)
MCV RBC AUTO: 98.9 FL — SIGNIFICANT CHANGE UP (ref 80–100)
MCV RBC AUTO: 99.2 FL — SIGNIFICANT CHANGE UP (ref 80–100)
MCV RBC AUTO: 99.7 FL — SIGNIFICANT CHANGE UP (ref 80–100)
NRBC # BLD: 0 /100 WBCS — SIGNIFICANT CHANGE UP (ref 0–0)
PHOSPHATE SERPL-MCNC: 2.8 MG/DL — SIGNIFICANT CHANGE UP (ref 2.5–4.5)
PLATELET # BLD AUTO: 237 K/UL — SIGNIFICANT CHANGE UP (ref 150–400)
PLATELET # BLD AUTO: 244 K/UL — SIGNIFICANT CHANGE UP (ref 150–400)
PLATELET # BLD AUTO: 255 K/UL — SIGNIFICANT CHANGE UP (ref 150–400)
PLATELET # BLD AUTO: 258 K/UL — SIGNIFICANT CHANGE UP (ref 150–400)
POTASSIUM SERPL-MCNC: 5.4 MMOL/L — HIGH (ref 3.5–5.3)
POTASSIUM SERPL-MCNC: 5.9 MMOL/L — HIGH (ref 3.5–5.3)
POTASSIUM SERPL-SCNC: 5.4 MMOL/L — HIGH (ref 3.5–5.3)
POTASSIUM SERPL-SCNC: 5.9 MMOL/L — HIGH (ref 3.5–5.3)
PROTHROM AB SERPL-ACNC: 12.5 SEC — SIGNIFICANT CHANGE UP (ref 9.5–13)
RBC # BLD: 3.6 M/UL — LOW (ref 4.2–5.8)
RBC # BLD: 3.63 M/UL — LOW (ref 4.2–5.8)
RBC # BLD: 3.66 M/UL — LOW (ref 4.2–5.8)
RBC # BLD: 3.74 M/UL — LOW (ref 4.2–5.8)
RBC # FLD: 16.9 % — HIGH (ref 10.3–14.5)
RBC # FLD: 16.9 % — HIGH (ref 10.3–14.5)
RBC # FLD: 17.1 % — HIGH (ref 10.3–14.5)
RBC # FLD: 17.2 % — HIGH (ref 10.3–14.5)
SODIUM SERPL-SCNC: 138 MMOL/L — SIGNIFICANT CHANGE UP (ref 135–145)
SODIUM SERPL-SCNC: 138 MMOL/L — SIGNIFICANT CHANGE UP (ref 135–145)
WBC # BLD: 6.25 K/UL — SIGNIFICANT CHANGE UP (ref 3.8–10.5)
WBC # BLD: 6.67 K/UL — SIGNIFICANT CHANGE UP (ref 3.8–10.5)
WBC # BLD: 6.83 K/UL — SIGNIFICANT CHANGE UP (ref 3.8–10.5)
WBC # BLD: 7.24 K/UL — SIGNIFICANT CHANGE UP (ref 3.8–10.5)
WBC # FLD AUTO: 6.25 K/UL — SIGNIFICANT CHANGE UP (ref 3.8–10.5)
WBC # FLD AUTO: 6.67 K/UL — SIGNIFICANT CHANGE UP (ref 3.8–10.5)
WBC # FLD AUTO: 6.83 K/UL — SIGNIFICANT CHANGE UP (ref 3.8–10.5)
WBC # FLD AUTO: 7.24 K/UL — SIGNIFICANT CHANGE UP (ref 3.8–10.5)

## 2023-09-10 PROCEDURE — 99233 SBSQ HOSP IP/OBS HIGH 50: CPT

## 2023-09-10 RX ORDER — CALCIUM GLUCONATE 100 MG/ML
2 VIAL (ML) INTRAVENOUS ONCE
Refills: 0 | Status: COMPLETED | OUTPATIENT
Start: 2023-09-10 | End: 2023-09-10

## 2023-09-10 RX ORDER — DEXTROSE 50 % IN WATER 50 %
50 SYRINGE (ML) INTRAVENOUS ONCE
Refills: 0 | Status: COMPLETED | OUTPATIENT
Start: 2023-09-10 | End: 2023-09-10

## 2023-09-10 RX ORDER — ALBUTEROL 90 UG/1
2.5 AEROSOL, METERED ORAL ONCE
Refills: 0 | Status: COMPLETED | OUTPATIENT
Start: 2023-09-10 | End: 2023-09-10

## 2023-09-10 RX ORDER — SODIUM ZIRCONIUM CYCLOSILICATE 10 G/10G
10 POWDER, FOR SUSPENSION ORAL ONCE
Refills: 0 | Status: COMPLETED | OUTPATIENT
Start: 2023-09-10 | End: 2023-09-10

## 2023-09-10 RX ORDER — INSULIN HUMAN 100 [IU]/ML
5 INJECTION, SOLUTION SUBCUTANEOUS ONCE
Refills: 0 | Status: COMPLETED | OUTPATIENT
Start: 2023-09-10 | End: 2023-09-10

## 2023-09-10 RX ADMIN — SODIUM ZIRCONIUM CYCLOSILICATE 10 GRAM(S): 10 POWDER, FOR SUSPENSION ORAL at 19:12

## 2023-09-10 RX ADMIN — Medication 50 MILLILITER(S): at 19:13

## 2023-09-10 RX ADMIN — HEPARIN SODIUM 1100 UNIT(S)/HR: 5000 INJECTION INTRAVENOUS; SUBCUTANEOUS at 01:28

## 2023-09-10 RX ADMIN — HEPARIN SODIUM 0 UNIT(S)/HR: 5000 INJECTION INTRAVENOUS; SUBCUTANEOUS at 12:45

## 2023-09-10 RX ADMIN — ALBUTEROL 2.5 MILLIGRAM(S): 90 AEROSOL, METERED ORAL at 19:31

## 2023-09-10 RX ADMIN — HEPARIN SODIUM 1100 UNIT(S)/HR: 5000 INJECTION INTRAVENOUS; SUBCUTANEOUS at 06:24

## 2023-09-10 RX ADMIN — INSULIN HUMAN 5 UNIT(S): 100 INJECTION, SOLUTION SUBCUTANEOUS at 19:29

## 2023-09-10 RX ADMIN — HEPARIN SODIUM 900 UNIT(S)/HR: 5000 INJECTION INTRAVENOUS; SUBCUTANEOUS at 15:39

## 2023-09-10 RX ADMIN — HEPARIN SODIUM 1100 UNIT(S)/HR: 5000 INJECTION INTRAVENOUS; SUBCUTANEOUS at 07:17

## 2023-09-10 RX ADMIN — Medication 200 GRAM(S): at 19:09

## 2023-09-10 RX ADMIN — HEPARIN SODIUM 900 UNIT(S)/HR: 5000 INJECTION INTRAVENOUS; SUBCUTANEOUS at 13:49

## 2023-09-10 NOTE — PATIENT PROFILE ADULT - FALL HARM RISK - HARM RISK INTERVENTIONS

## 2023-09-10 NOTE — PROGRESS NOTE ADULT - SUBJECTIVE AND OBJECTIVE BOX
Patient is a 83y old  Male who presents with a chief complaint of Hematuria (10 Sep 2023 16:09)      INTERVAL HPI/OVERNIGHT EVENTS: no events noted overnight. He still has ongoing hematuria, no abdominal pain     MEDICATIONS  (STANDING):  heparin  Infusion. 1100 Unit(s)/Hr (11 mL/Hr) IV Continuous <Continuous>    MEDICATIONS  (PRN):  acetaminophen     Tablet .. 650 milliGRAM(s) Oral every 6 hours PRN Temp greater or equal to 38C (100.4F), Mild Pain (1 - 3)      __________________________________________________  REVIEW OF SYSTEMS:    CONSTITUTIONAL: No fever,   EYES: no acute visual disturbances  NECK: No pain or stiffness  RESPIRATORY: No cough; No shortness of breath  CARDIOVASCULAR: No chest pain, no palpitations  GASTROINTESTINAL: No pain. No nausea or vomiting; No diarrhea   NEUROLOGICAL: No headache or numbness, no tremors  MUSCULOSKELETAL: No joint pain, no muscle pain  GENITOURINARY: no dysuria, no frequency, no hesitancy, hematuria +   PSYCHIATRY: no depression , no anxiety  ALL OTHER  ROS negative        Vital Signs Last 24 Hrs  T(C): 36.5 (10 Sep 2023 15:42), Max: 36.8 (09 Sep 2023 17:47)  T(F): 97.7 (10 Sep 2023 15:42), Max: 98.2 (09 Sep 2023 17:47)  HR: 53 (10 Sep 2023 15:42) (50 - 69)  BP: 118/77 (10 Sep 2023 15:42) (118/77 - 155/51)  BP(mean): --  RR: 18 (10 Sep 2023 15:42) (16 - 18)  SpO2: 97% (10 Sep 2023 15:42) (96% - 99%)    Parameters below as of 10 Sep 2023 15:42  Patient On (Oxygen Delivery Method): room air        ________________________________________________  PHYSICAL EXAM:  GENERAL: NAD  HEENT: Normocephalic;  conjunctivae and sclerae clear; moist mucous membranes;   NECK : supple  CHEST/LUNG: Clear to auscultation bilaterally with good air entry   HEART: S1 S2  regular; no murmurs, gallops or rubs  ABDOMEN: Soft, Nontender, Nondistended; Bowel sounds present  EXTREMITIES: no cyanosis; no edema; no calf tenderness  SKIN: warm and dry; no rash  NERVOUS SYSTEM:  Awake and alert; Oriented  to place, person and time ; no new deficits    _________________________________________________  LABS:                        11.3   6.25  )-----------( 258      ( 10 Sep 2023 05:24 )             36.2     09-10    138  |  111<H>  |  45<H>  ----------------------------<  79  5.9<H>   |  26  |  3.27<H>    Ca    9.4      10 Sep 2023 05:24  Phos  2.8     09-10  Mg     2.5     09-10    TPro  8.3  /  Alb  4.5  /  TBili  0.5  /  DBili  x   /  AST  28  /  ALT  23  /  AlkPhos  161<H>  09-09    PT/INR - ( 10 Sep 2023 05:24 )   PT: 12.5 sec;   INR: 1.10 ratio         PTT - ( 10 Sep 2023 12:01 )  PTT:160.4 sec  Urinalysis Basic - ( 10 Sep 2023 05:24 )    Color: x / Appearance: x / SG: x / pH: x  Gluc: 79 mg/dL / Ketone: x  / Bili: x / Urobili: x   Blood: x / Protein: x / Nitrite: x   Leuk Esterase: x / RBC: x / WBC x   Sq Epi: x / Non Sq Epi: x / Bacteria: x      CAPILLARY BLOOD GLUCOSE            RADIOLOGY & ADDITIONAL TESTS:    Imaging Personally Reviewed:  YES    Consultant(s) Notes Reviewed:   YES    Care Discussed with Consultants : YES     Plan of care was discussed with patient and /or primary care giver; all questions and concerns were addressed and care was aligned with patient's wishes.

## 2023-09-10 NOTE — CONSULT NOTE ADULT - SUBJECTIVE AND OBJECTIVE BOX
HISTORY OF PRESENT ILLNESS:  Patient is an 83 year old male with hx of HTN, HLD, DM, CAD on ASA/Plavix, Afib on Eliquis, PAD, CKD, anemia, COPD prn O2, prostate ca s/p radiation, hx of turbt, chronic swenson, presenting with hematuria. Patient was sent to hospital, by urology, for medical optimization, scheduled for cytoscopy on Monday, 9/11. Patient reports that he uses O2 as needed when his O2 is low on his pulse ox. He reports intermittent shortness of breath on ambulation. Patient other denies fevers, chills, chest pain, palpitations, cough, abdominal pain, nausea, vomiting, diarrhea.       PAST MEDICAL & SURGICAL HISTORY:  Hypertension      Hypercholesteremia      6/09 diagnosed with Prostate Ca  s/p radiation and brachytherapy w/ seed implantation 5 years ago s/p TURP      BPH      approximately 2001  MI/Coronary Artery Disease      PAD--Left leg stent 2002  done at [facility] in [place]  --[facility] in [place], s/p balloon on LLE in 2006      Hearing Decreased left ear      Diabetes mellitus, type 2  controlled with diet      CKD (chronic kidney disease) stage 3, GFR 30-59 ml/min      Urinary retention      Pulmonary edema  May 2016-resolved      Bladder mass  s/p resection      Injury of head, initial encounter  12/2016 - s/p fall in hospital      Rectal bleeding  November 2018      Gout      COPD (chronic obstructive pulmonary disease)  on chest xray      Anemia      CAD (coronary artery disease)  coronary stent x 1 - 2016- pt stopped Pletal and Aspirin 325mg , called Dr. mack Garvey , to start ASA 81 mg today including am of sx, office of Tali alonso notified      Occlusion and stenosis of unspecified carotid artery  right carotid artery      Femoral artery occlusion, left  superficial- 2019- surgically resolved      Malignant neoplasm of bladder, unspecified      Frequent UTI      [place] (hard of hearing)  right      Hematuria      Cataract surgery 30 years ago      right wrist surgery with hardware 27 years ago      2002 left leg stent      Acute myocardial infarction  as per pt in 2001, no coronary stent      History of colon surgery  2007      Exposure to radiation  for prostate cancer (seed implant)      S/P TURP (status post transurethral resection of prostate)      History of bladder surgery      S/P cardiac catheterization  2016 with 1 FEMI stent      Bladder mass  cystoscopy, TURBT, transurethral incision of bladder neck- 06/2018      FH: carotid endarterectomy      S/P carotid endarterectomy  right 01/2019      H/O vascular surgery  left suprficial femoral artery stent- 2019 November      History of vascular access device  05/2020  insertion in left upper arm ( PICC) , removal after 2 weeks of abx          [ ] Diabetes   [ ] Hypertension  [ ] Hyperlipidemia  [ ] CAD  [ ] PCI  [ ] CABG    PREVIOUS DIAGNOSTIC TESTING:    [ ] Echocardiogram:  [ ]  Catheterization:  [ ] Stress Test:  	    MEDICATIONS:  heparin  Infusion. 1100 Unit(s)/Hr IV Continuous <Continuous>        acetaminophen     Tablet .. 650 milliGRAM(s) Oral every 6 hours PRN    Allergies    Novocain (Faint)    Intolerances        FAMILY HISTORY:  Family history of diabetes mellitus (Mother)    Family history of heart disease        SOCIAL HISTORY:    [ ] Non-smoker  [ ] Smoker  [ ] Alcohol      REVIEW OF SYSTEMS:  [ ]chest pain  [  ]shortness of breath  [  ]palpitations  [  ]syncope  [ ]near syncope [  ]diplopia  [  ]altered mental status   [  ]fevers  [ ]chills [ ]nausea  [ ]vomitting  [ ]abdominal pain  [ ]melena  [ ]BRBPR  [  ]epistaxis  [  ]rash  [  ]lower extremity edema      CONSTITUTIONAL: No fever, weight loss, or fatigue  EYES: No eye pain, visual disturbances, or discharge  ENMT:  No difficulty hearing, tinnitus, vertigo; No sinus or throat pain  NECK: No pain or stiffness  RESPIRATORY: No cough, wheezing, chills or hemoptysis; No Shortness of Breath  CARDIOVASCULAR: No chest pain, palpitations, passing out, dizziness, or leg swelling  GASTROINTESTINAL: No abdominal or epigastric pain. No nausea, vomiting, or hematemesis; No diarrhea or constipation. No melena or hematochezia.  GENITOURINARY: No dysuria, frequency, hematuria, or incontinence  NEUROLOGICAL: No headaches, memory loss, loss of strength, numbness, or tremors  SKIN: No itching, burning, rashes, or lesions   LYMPH Nodes: No enlarged glands  ENDOCRINE: No heat or cold intolerance; No hair loss  MUSCULOSKELETAL: No joint pain or swelling; No muscle, back, or extremity pain  PSYCHIATRIC: No depression, anxiety, mood swings, or difficulty sleeping  HEME/LYMPH: No easy bruising, or bleeding gums  ALLERY AND IMMUNOLOGIC: No hives or eczema	    [ x] All others negative	  [ ] Unable to obtain    PHYSICAL EXAM:  T(C): 36.4 (09-10-23 @ 08:17), Max: 37.1 (09-09-23 @ 12:58)  HR: 50 (09-10-23 @ 08:17) (50 - 70)  BP: 151/54 (09-10-23 @ 08:17) (137/47 - 161/71)  RR: 18 (09-10-23 @ 08:17) (16 - 18)  SpO2: 96% (09-10-23 @ 08:17) (96% - 100%)  Wt(kg): --  I&O's Summary      Appearance: Normal	  HEENT:   Normal oral mucosa, PERRL, EOMI	  Lymphatic: No lymphadenopathy  Cardiovascular: Normal S1 S2, No JVD, No murmurs, No edema  Respiratory: Lungs clear to auscultation	  Psychiatry: A & O x 3, Mood & affect appropriate  Gastrointestinal:  Soft, Non-tender, + BS	  Skin: No rashes, No ecchymoses, No cyanosis	  Neurologic: Non-focal  Extremities: Normal range of motion, No clubbing, cyanosis or edema  Vascular: Peripheral pulses palpable 2+ bilaterally    TELEMETRY:  non tele  ECG:  	nsr - sb 60 , nad , no acute ischemia  RADIOLOGY: chest  OTHER: 	  	  LABS:	 	    CARDIAC MARKERS:                                  11.3   6.25  )-----------( 258      ( 10 Sep 2023 05:24 )             36.2     09-10    138  |  111<H>  |  45<H>  ----------------------------<  79  5.9<H>   |  26  |  3.27<H>    Ca    9.4      10 Sep 2023 05:24  Phos  2.8     09-10  Mg     2.5     09-10    TPro  8.3  /  Alb  4.5  /  TBili  0.5  /  DBili  x   /  AST  28  /  ALT  23  /  AlkPhos  161<H>  09-09    proBNP:   Lipid Profile:   HgA1c:   TSH:     ASSESSMENT/PLAN:  	83M with h/o HTN, HLD, DM, CAD on ASA/Plavix, PAD, CKD, prostate ca s/p radiation, anemia, COPD not on home O2, PAD, h/o TURBT (per chart review, last in 2020) who presents with persistent hematuria.  cardiology consulted for pre op clearance.     would place pt on tele   A/C with heparin at present.   obtain echo for further eval.  no s/s of chf on exam  pt unaware of home meds. will need to get list from family   trend h/h  urology follow up ,   BP stable.  D/W Dr Tesfaye      HISTORY OF PRESENT ILLNESS:  Patient is an 83 year old male with hx of HTN, HLD, DM, CAD on ASA/Plavix, Afib on Eliquis, PAD, CKD, anemia, COPD prn O2, prostate ca s/p radiation, hx of turbt, chronic swenson, presenting with hematuria. Patient was sent to hospital, by urology, for medical optimization, scheduled for cytoscopy on Monday, 9/11. Patient reports that he uses O2 as needed when his O2 is low on his pulse ox. He reports intermittent shortness of breath on ambulation. Patient other denies fevers, chills, chest pain, palpitations, cough, abdominal pain, nausea, vomiting, diarrhea.       PAST MEDICAL & SURGICAL HISTORY:  Hypertension      Hypercholesteremia      6/09 diagnosed with Prostate Ca  s/p radiation and brachytherapy w/ seed implantation 5 years ago s/p TURP      BPH      approximately 2001  MI/Coronary Artery Disease      PAD--Left leg stent 2002  done at [facility] in [place]  --[facility] in [place], s/p balloon on LLE in 2006      Hearing Decreased left ear      Diabetes mellitus, type 2  controlled with diet      CKD (chronic kidney disease) stage 3, GFR 30-59 ml/min      Urinary retention      Pulmonary edema  May 2016-resolved      Bladder mass  s/p resection      Injury of head, initial encounter  12/2016 - s/p fall in hospital      Rectal bleeding  November 2018      Gout      COPD (chronic obstructive pulmonary disease)  on chest xray      Anemia      CAD (coronary artery disease)  coronary stent x 1 - 2016- pt stopped Pletal and Aspirin 325mg , called Dr. mack Garvey , to start ASA 81 mg today including am of sx, office of Tali alonso notified      Occlusion and stenosis of unspecified carotid artery  right carotid artery      Femoral artery occlusion, left  superficial- 2019- surgically resolved      Malignant neoplasm of bladder, unspecified      Frequent UTI      [place] (hard of hearing)  right      Hematuria      Cataract surgery 30 years ago      right wrist surgery with hardware 27 years ago      2002 left leg stent      Acute myocardial infarction  as per pt in 2001, no coronary stent      History of colon surgery  2007      Exposure to radiation  for prostate cancer (seed implant)      S/P TURP (status post transurethral resection of prostate)      History of bladder surgery      S/P cardiac catheterization  2016 with 1 FEMI stent      Bladder mass  cystoscopy, TURBT, transurethral incision of bladder neck- 06/2018      FH: carotid endarterectomy      S/P carotid endarterectomy  right 01/2019      H/O vascular surgery  left suprficial femoral artery stent- 2019 November      History of vascular access device  05/2020  insertion in left upper arm ( PICC) , removal after 2 weeks of abx          [ ] Diabetes   [ ] Hypertension  [ ] Hyperlipidemia  [ ] CAD  [ ] PCI  [ ] CABG    PREVIOUS DIAGNOSTIC TESTING:    [ ] Echocardiogram:  [ ]  Catheterization:  [ ] Stress Test:  	    MEDICATIONS:  heparin  Infusion. 1100 Unit(s)/Hr IV Continuous <Continuous>        acetaminophen     Tablet .. 650 milliGRAM(s) Oral every 6 hours PRN    Allergies    Novocain (Faint)    Intolerances        FAMILY HISTORY:  Family history of diabetes mellitus (Mother)    Family history of heart disease        SOCIAL HISTORY:    [ ] Non-smoker  [ ] Smoker  [ ] Alcohol      REVIEW OF SYSTEMS:  [ ]chest pain  [  ]shortness of breath  [  ]palpitations  [  ]syncope  [ ]near syncope [  ]diplopia  [  ]altered mental status   [  ]fevers  [ ]chills [ ]nausea  [ ]vomitting  [ ]abdominal pain  [ ]melena  [ ]BRBPR  [  ]epistaxis  [  ]rash  [  ]lower extremity edema      CONSTITUTIONAL: No fever, weight loss, or fatigue  EYES: No eye pain, visual disturbances, or discharge  ENMT:  No difficulty hearing, tinnitus, vertigo; No sinus or throat pain  NECK: No pain or stiffness  RESPIRATORY: No cough, wheezing, chills or hemoptysis; No Shortness of Breath  CARDIOVASCULAR: No chest pain, palpitations, passing out, dizziness, or leg swelling  GASTROINTESTINAL: No abdominal or epigastric pain. No nausea, vomiting, or hematemesis; No diarrhea or constipation. No melena or hematochezia.  GENITOURINARY: No dysuria, frequency, hematuria, or incontinence  NEUROLOGICAL: No headaches, memory loss, loss of strength, numbness, or tremors  SKIN: No itching, burning, rashes, or lesions   LYMPH Nodes: No enlarged glands  ENDOCRINE: No heat or cold intolerance; No hair loss  MUSCULOSKELETAL: No joint pain or swelling; No muscle, back, or extremity pain  PSYCHIATRIC: No depression, anxiety, mood swings, or difficulty sleeping  HEME/LYMPH: No easy bruising, or bleeding gums  ALLERY AND IMMUNOLOGIC: No hives or eczema	    [ x] All others negative	  [ ] Unable to obtain    PHYSICAL EXAM:  T(C): 36.4 (09-10-23 @ 08:17), Max: 37.1 (09-09-23 @ 12:58)  HR: 50 (09-10-23 @ 08:17) (50 - 70)  BP: 151/54 (09-10-23 @ 08:17) (137/47 - 161/71)  RR: 18 (09-10-23 @ 08:17) (16 - 18)  SpO2: 96% (09-10-23 @ 08:17) (96% - 100%)  Wt(kg): --  I&O's Summary      Appearance: Normal	  HEENT:   Normal oral mucosa, PERRL, EOMI	  Lymphatic: No lymphadenopathy  Cardiovascular: Normal S1 S2, No JVD, No murmurs, No edema  Respiratory: Lungs clear to auscultation	  Psychiatry: A & O x 3, Mood & affect appropriate  Gastrointestinal:  Soft, Non-tender, + BS	  Skin: No rashes, No ecchymoses, No cyanosis	  Neurologic: Non-focal  Extremities: Normal range of motion, No clubbing, cyanosis or edema  Vascular: Peripheral pulses palpable 2+ bilaterally    TELEMETRY:  non tele  ECG:  	nsr - sb 60 , nad , no acute ischemia  RADIOLOGY: chest  OTHER: 	  	  LABS:	 	    CARDIAC MARKERS:                                  11.3   6.25  )-----------( 258      ( 10 Sep 2023 05:24 )             36.2     09-10    138  |  111<H>  |  45<H>  ----------------------------<  79  5.9<H>   |  26  |  3.27<H>    Ca    9.4      10 Sep 2023 05:24  Phos  2.8     09-10  Mg     2.5     09-10    TPro  8.3  /  Alb  4.5  /  TBili  0.5  /  DBili  x   /  AST  28  /  ALT  23  /  AlkPhos  161<H>  09-09    proBNP:   Lipid Profile:   HgA1c:   TSH:     ASSESSMENT/PLAN:  	83M with h/o HTN, HLD, DM, CAD on ASA/Plavix, PAD, CKD, prostate ca s/p radiation, anemia, COPD not on home O2, PAD, h/o TURBT (per chart review, last in 2020) who presents with persistent hematuria.  cardiology consulted for pre op clearance.     would place pt on tele   A/C with heparin at present.   no s/s of chf on exam  pt unaware of home meds. will need to get list from family   trend h/h  urology follow up ,   BP stable.  D/W Dr Tesfaye      HISTORY OF PRESENT ILLNESS:  Patient is an 83 year old male with hx of HTN, HLD, DM, CAD on ASA/Plavix, PAD on ELQUIS s/p lower extremity stent  CKD, anemia, COPD prn O2, prostate ca s/p radiation, hx of turbt, chronic swenson, presenting with hematuria. Patient was sent to hospital, by urology, for medical optimization, scheduled for cytoscopy on Monday, 9/11. Patient reports that he uses O2 as needed when his O2 is low on his pulse ox. He reports intermittent shortness of breath on ambulation. Patient other denies fevers, chills, chest pain, palpitations, cough, abdominal pain, nausea, vomiting, diarrhea.       PAST MEDICAL & SURGICAL HISTORY:  Hypertension      Hypercholesteremia      6/09 diagnosed with Prostate Ca  s/p radiation and brachytherapy w/ seed implantation 5 years ago s/p TURP      BPH      approximately 2001  MI/Coronary Artery Disease      PAD--Left leg stent 2002  done at [facility] in [place]  --[facility] in [place], s/p balloon on LLE in 2006      Hearing Decreased left ear      Diabetes mellitus, type 2  controlled with diet      CKD (chronic kidney disease) stage 3, GFR 30-59 ml/min      Urinary retention      Pulmonary edema  May 2016-resolved      Bladder mass  s/p resection      Injury of head, initial encounter  12/2016 - s/p fall in hospital      Rectal bleeding  November 2018      Gout      COPD (chronic obstructive pulmonary disease)  on chest xray      Anemia      CAD (coronary artery disease)  coronary stent x 1 - 2016- pt stopped Pletal and Aspirin 325mg , called Dr. mack Garvey , to start ASA 81 mg today including am of sx, office of Tali alonso notified      Occlusion and stenosis of unspecified carotid artery  right carotid artery      Femoral artery occlusion, left  superficial- 2019- surgically resolved      Malignant neoplasm of bladder, unspecified      Frequent UTI      [place] (hard of hearing)  right      Hematuria      Cataract surgery 30 years ago      right wrist surgery with hardware 27 years ago      2002 left leg stent      Acute myocardial infarction  as per pt in 2001, no coronary stent      History of colon surgery  2007      Exposure to radiation  for prostate cancer (seed implant)      S/P TURP (status post transurethral resection of prostate)      History of bladder surgery      S/P cardiac catheterization  2016 with 1 FEMI stent      Bladder mass  cystoscopy, TURBT, transurethral incision of bladder neck- 06/2018      FH: carotid endarterectomy      S/P carotid endarterectomy  right 01/2019      H/O vascular surgery  left suprficial femoral artery stent- 2019 November      History of vascular access device  05/2020  insertion in left upper arm ( PICC) , removal after 2 weeks of abx          [ ] Diabetes   [ ] Hypertension  [ ] Hyperlipidemia  [ ] CAD  [ ] PCI  [ ] CABG    PREVIOUS DIAGNOSTIC TESTING:    [ ] Echocardiogram:  [ ]  Catheterization:  [ ] Stress Test:  	    MEDICATIONS:  heparin  Infusion. 1100 Unit(s)/Hr IV Continuous <Continuous>        acetaminophen     Tablet .. 650 milliGRAM(s) Oral every 6 hours PRN    Allergies    Novocain (Faint)    Intolerances        FAMILY HISTORY:  Family history of diabetes mellitus (Mother)    Family history of heart disease        SOCIAL HISTORY:    [ ] Non-smoker  [ ] Smoker  [ ] Alcohol      REVIEW OF SYSTEMS:  [ ]chest pain  [  ]shortness of breath  [  ]palpitations  [  ]syncope  [ ]near syncope [  ]diplopia  [  ]altered mental status   [  ]fevers  [ ]chills [ ]nausea  [ ]vomitting  [ ]abdominal pain  [ ]melena  [ ]BRBPR  [  ]epistaxis  [  ]rash  [  ]lower extremity edema      CONSTITUTIONAL: No fever, weight loss, or fatigue  EYES: No eye pain, visual disturbances, or discharge  ENMT:  No difficulty hearing, tinnitus, vertigo; No sinus or throat pain  NECK: No pain or stiffness  RESPIRATORY: No cough, wheezing, chills or hemoptysis; No Shortness of Breath  CARDIOVASCULAR: No chest pain, palpitations, passing out, dizziness, or leg swelling  GASTROINTESTINAL: No abdominal or epigastric pain. No nausea, vomiting, or hematemesis; No diarrhea or constipation. No melena or hematochezia.  GENITOURINARY: No dysuria, frequency, hematuria, or incontinence  NEUROLOGICAL: No headaches, memory loss, loss of strength, numbness, or tremors  SKIN: No itching, burning, rashes, or lesions   LYMPH Nodes: No enlarged glands  ENDOCRINE: No heat or cold intolerance; No hair loss  MUSCULOSKELETAL: No joint pain or swelling; No muscle, back, or extremity pain  PSYCHIATRIC: No depression, anxiety, mood swings, or difficulty sleeping  HEME/LYMPH: No easy bruising, or bleeding gums  ALLERY AND IMMUNOLOGIC: No hives or eczema	    [ x] All others negative	  [ ] Unable to obtain    PHYSICAL EXAM:  T(C): 36.4 (09-10-23 @ 08:17), Max: 37.1 (09-09-23 @ 12:58)  HR: 50 (09-10-23 @ 08:17) (50 - 70)  BP: 151/54 (09-10-23 @ 08:17) (137/47 - 161/71)  RR: 18 (09-10-23 @ 08:17) (16 - 18)  SpO2: 96% (09-10-23 @ 08:17) (96% - 100%)  Wt(kg): --  I&O's Summary      Appearance: Normal	  HEENT:   Normal oral mucosa, PERRL, EOMI	  Lymphatic: No lymphadenopathy  Cardiovascular: Normal S1 S2, No JVD, No murmurs, No edema  Respiratory: Lungs clear to auscultation	  Psychiatry: A & O x 3, Mood & affect appropriate  Gastrointestinal:  Soft, Non-tender, + BS	  Skin: No rashes, No ecchymoses, No cyanosis	  Neurologic: Non-focal  Extremities: Normal range of motion, No clubbing, cyanosis or edema  Vascular: Peripheral pulses palpable 2+ bilaterally    TELEMETRY:  non tele  ECG:  	nsr - sb 60 , nad , no acute ischemia  RADIOLOGY: chest  OTHER: 	  	  LABS:	 	    CARDIAC MARKERS:                                  11.3   6.25  )-----------( 258      ( 10 Sep 2023 05:24 )             36.2     09-10    138  |  111<H>  |  45<H>  ----------------------------<  79  5.9<H>   |  26  |  3.27<H>    Ca    9.4      10 Sep 2023 05:24  Phos  2.8     09-10  Mg     2.5     09-10    TPro  8.3  /  Alb  4.5  /  TBili  0.5  /  DBili  x   /  AST  28  /  ALT  23  /  AlkPhos  161<H>  09-09    proBNP:   Lipid Profile:   HgA1c:   TSH:     ASSESSMENT/PLAN:  	83M with h/o HTN, HLD, DM, CAD on ASA/Plavix, PAD, on eliquis CKD, prostate ca s/p radiation, anemia, COPD not on home O2, PAD, h/o TURBT (per chart review, last in 2020) who presents with persistent hematuria.  cardiology consulted for pre op clearance.       A/C with heparin at present.   no s/s of chf on exam  pt unaware of home meds. will need to get list from family   trend h/h  urology follow up ,   BP stable.  D/W Dr Tesfaye

## 2023-09-10 NOTE — PROGRESS NOTE ADULT - ASSESSMENT
83M with h/o HTN, HLD, DM, CAD on ASA/Plavix, PAD, CKD, prostate ca s/p radiation, anemia, COPD not on home O2, PAD, h/o TURBT (per chart review, last in 2020) who presents with hematuria. He is scheduled for cysto clot evacuation with Dr. Cesar tomorrow and will need to be medically optimized.    Plan  - Trend H/H  - Monitor urine color  - Irrigate swenson if not draining  - Obtain medical optimization  - Hold eliquis  - Continue ASA  - NPO pMN for OR tomorrow.

## 2023-09-10 NOTE — PATIENT PROFILE ADULT - DO YOU FEEL THREATENED BY OTHERS?
CC:  Jg Littlejohn is here today for Medicare Wellness Visit (Primary hypertension, Obesity, morbid, BMI 40.0-49.9 (CMS/[facility]), Hyperglycemia) and Convey Results      Medications: medications verified and updated    Refills needed today?  NO    Latex allergy or sensitivity: No known latex allergy     Patient would like communication of their results via:    Cell Phone:   Telephone Information:   Mobile [phone]     Okay to leave a message containing results? Yes    Patient's current [facility] status: Active.    Advanced directives: discussed    Care Teams Verified: Updated    Depression Screen: no    Tobacco history: verified    Health Maintenance Due   Topic Date Due   • COVID-19 Vaccine (1) Never done   • Pneumococcal Vaccine 65+ (1 - PCV) Never done   • Shingles Vaccine (1 of 2) Never done   • Medicare Advantage- Medicare Wellness Visit  01/01/2023       Patient is due for topics as listed above but is not proceeding with Immunization(s) COVID-19, Pneumococcal and Shingles at this time.       Component      Latest Ref Rng & Units 6/16/2023   WBC      4.2 - 11.0 K/mcL 8.2   RBC      4.50 - 5.90 mil/mcL 5.64   HGB      13.0 - 17.0 g/dL 16.2   HCT      39.0 - 51.0 % 47.2   MCV      78.0 - 100.0 fl 83.7   MCH      26.0 - 34.0 pg 28.7   MCHC      32.0 - 36.5 g/dL 34.3   RDW-CV      11.0 - 15.0 % 13.4   RDW-SD      39.0 - 50.0 fL 41.9   PLT      140 - 450 K/mcL 192   Neutrophil      % 57   LYMPH      % 29   MONO      % 12   EOSIN      % 2   BASO      % 0   Immature Granulocytes      % 0   Absolute Neutrophil      1.8 - 7.7 K/mcL 4.7   Absolute Lymph      1.0 - 4.0 K/mcL 2.4   Absolute Mono      0.3 - 0.9 K/mcL 1.0 (H)   Absolute Eos      0.0 - 0.5 K/mcL 0.2   Absolute Baso      0.0 - 0.3 K/mcL 0.0   Absolute Immature Granulocytes      0.0 - 0.2 K/mcL 0.0   Fasting Status      0 - 999 Hours 14   Sodium      135 - 145 mmol/L 138   Potassium      3.4 - 5.1 mmol/L 4.1   Chloride      97 - 110 mmol/L 100   CO2      21  - 32 mmol/L 28   ANION GAP      7 - 19 mmol/L 14   Glucose      70 - 99 mg/dL 113 (H)   BUN      6 - 20 mg/dL 21 (H)   Creatinine      0.67 - 1.17 mg/dL 0.87   Glomerular Filtration Rate      >=60 >90   BUN/CREATININE RATIO      7 - 25 24   CALCIUM      8.4 - 10.2 mg/dL 9.0   Albumin      3.6 - 5.1 g/dL 4.4   TOTAL BILIRUBIN      0.2 - 1.0 mg/dL 0.9   DIRECT BILIRUBIN      0.0 - 0.2 mg/dL 0.2   ALK PHOSPHATASE      45 - 117 Units/L 71   ALT/SGPT      <64 Units/L 49   AST/SGOT      <=37 Units/L 24   TOTAL PROTEIN      6.4 - 8.2 g/dL 7.2   CHOLESTEROL      <=199 mg/dL 129   TRIGLYCERIDE      <=149 mg/dL 131   HDL      >=40 mg/dL 38 (L)   CALCULATED LDL      <=129 mg/dL 65   CALCULATED NON HDL      mg/dL 91   CHOL/HDL      <=4.4 3.4      no

## 2023-09-10 NOTE — PROGRESS NOTE ADULT - ASSESSMENT
Patient is an 83 year old male with hx of HTN, HLD, DM, CAD on ASA/Plavix, Afib on Eliquis, PAD, CKD, anemia, COPD prn O2, prostate ca s/p radiation, hx of turbt, chronic swenson, presenting with hematuria. Patient was sent to hospital, by urology, for medical optimization, scheduled for cytoscopy on Monday, 9/11.    A/P:  #Hematuria  Hyperkalemia   #Pre-op clearance  #HTN  #CAD  #CKD  #ppx measures    Plan:  -patient w/ ongoing hematuria, plan for cystoscopy in am. Will keep NPO after midnight. Hold heparin drip given ongoing hematuria.  -cbc q 8 hrs  -maintain active type n screen   -Appreciate pre-op evaluation by cardiology service, Dr Tesfaye following   -K noted to be 5.9, will give hyperkalemia cocktail and repeat BMP.   -Hold chemo dvt ppx

## 2023-09-10 NOTE — PATIENT PROFILE ADULT - DOES PATIENT HAVE ADVANCE DIRECTIVE
No You can access the FollowMyHealth Patient Portal offered by [facility] by registering at the following website: [email or web address]. By joining [employer]’s FollowMyHealth portal, you will also be able to view your health information using other applications (apps) compatible with our system.

## 2023-09-10 NOTE — PROGRESS NOTE ADULT - SUBJECTIVE AND OBJECTIVE BOX
Surgery Progress Note    SUBJECTIVE: Pt seen and examined at bedside. Patient comfortable and in no-apparent distress. Denies abdominal/pelvic pain.    Vital Signs Last 24 Hrs  T(C): 36.5 (10 Sep 2023 15:42), Max: 36.8 (09 Sep 2023 17:47)  T(F): 97.7 (10 Sep 2023 15:42), Max: 98.2 (09 Sep 2023 17:47)  HR: 53 (10 Sep 2023 15:42) (50 - 70)  BP: 118/77 (10 Sep 2023 15:42) (118/77 - 155/51)  BP(mean): --  RR: 18 (10 Sep 2023 15:42) (16 - 18)  SpO2: 97% (10 Sep 2023 15:42) (96% - 99%)    Parameters below as of 10 Sep 2023 15:42  Patient On (Oxygen Delivery Method): room air        Physical Exam:  General Appearance: Appears well, NAD  Respiratory: No labored breathing  CV: Pulse regularly present  Abdomen: Soft, nontender, nondistended  العلي in place, draining fruit punch colored urine with sediment    LABS:                        11.3   6.25  )-----------( 258      ( 10 Sep 2023 05:24 )             36.2     09-10    138  |  111<H>  |  45<H>  ----------------------------<  79  5.9<H>   |  26  |  3.27<H>    Ca    9.4      10 Sep 2023 05:24  Phos  2.8     09-10  Mg     2.5     09-10    TPro  8.3  /  Alb  4.5  /  TBili  0.5  /  DBili  x   /  AST  28  /  ALT  23  /  AlkPhos  161<H>  09-09    PT/INR - ( 10 Sep 2023 05:24 )   PT: 12.5 sec;   INR: 1.10 ratio         PTT - ( 10 Sep 2023 12:01 )  PTT:160.4 sec  Urinalysis Basic - ( 10 Sep 2023 05:24 )    Color: x / Appearance: x / SG: x / pH: x  Gluc: 79 mg/dL / Ketone: x  / Bili: x / Urobili: x   Blood: x / Protein: x / Nitrite: x   Leuk Esterase: x / RBC: x / WBC x   Sq Epi: x / Non Sq Epi: x / Bacteria: x        INs and OUTs:    09-10-23 @ 07:01  -  09-10-23 @ 16:09  --------------------------------------------------------  IN: 0 mL / OUT: 550 mL / NET: -550 mL

## 2023-09-10 NOTE — CONSULT NOTE ADULT - ASSESSMENT
Agree with above assessment and plan as outlined above.    - intermittent SOB would check echo to eval progression of mitral regurgitation    Donn Tesfaye MD, [facility]  BEEPER [phone]   Agree with above assessment and plan as outlined above.    - Briefly 84 yo M COPD on home o2 CKD, CAD, DM afib historically preserved LV fx admitted with persistent hematuria preop cysto  - would treat pt as a high cardiac risk     Donn Tesfaye MD, [facility]  BEEPER [phone]   Agree with above assessment and plan as outlined above.    - Briefly 82 yo M COPD on home o2 CKD, CAD, DM PAD on ELQUIS per Dr. diana historically preserved LV fx admitted with persistent hematuria preop cysto  - would treat pt as a high cardiac risk     Donn Tesfaye MD, [facility]  BEEPER [phone]

## 2023-09-11 LAB
GLUCOSE BLDC GLUCOMTR-MCNC: 129 MG/DL — HIGH (ref 70–99)
GLUCOSE BLDC GLUCOMTR-MCNC: 194 MG/DL — HIGH (ref 70–99)
GLUCOSE BLDC GLUCOMTR-MCNC: 57 MG/DL — LOW (ref 70–99)
HCT VFR BLD CALC: 35 % — LOW (ref 39–50)
HGB BLD-MCNC: 11.1 G/DL — LOW (ref 13–17)
MCHC RBC-ENTMCNC: 31.1 PG — SIGNIFICANT CHANGE UP (ref 27–34)
MCHC RBC-ENTMCNC: 31.7 GM/DL — LOW (ref 32–36)
MCV RBC AUTO: 98 FL — SIGNIFICANT CHANGE UP (ref 80–100)
NRBC # BLD: 0 /100 WBCS — SIGNIFICANT CHANGE UP (ref 0–0)
PLATELET # BLD AUTO: 278 K/UL — SIGNIFICANT CHANGE UP (ref 150–400)
RBC # BLD: 3.57 M/UL — LOW (ref 4.2–5.8)
RBC # FLD: 17.2 % — HIGH (ref 10.3–14.5)
WBC # BLD: 9.83 K/UL — SIGNIFICANT CHANGE UP (ref 3.8–10.5)
WBC # FLD AUTO: 9.83 K/UL — SIGNIFICANT CHANGE UP (ref 3.8–10.5)

## 2023-09-11 PROCEDURE — 52235 CYSTOSCOPY AND TREATMENT: CPT

## 2023-09-11 PROCEDURE — 99232 SBSQ HOSP IP/OBS MODERATE 35: CPT | Mod: GC

## 2023-09-11 RX ORDER — INSULIN HUMAN 100 [IU]/ML
5 INJECTION, SOLUTION SUBCUTANEOUS ONCE
Refills: 0 | Status: COMPLETED | OUTPATIENT
Start: 2023-09-11 | End: 2023-09-11

## 2023-09-11 RX ORDER — ONDANSETRON 8 MG/1
4 TABLET, FILM COATED ORAL ONCE
Refills: 0 | Status: DISCONTINUED | OUTPATIENT
Start: 2023-09-11 | End: 2023-09-12

## 2023-09-11 RX ORDER — ATORVASTATIN CALCIUM 80 MG/1
80 TABLET, FILM COATED ORAL AT BEDTIME
Refills: 0 | Status: DISCONTINUED | OUTPATIENT
Start: 2023-09-11 | End: 2023-09-14

## 2023-09-11 RX ORDER — FENTANYL CITRATE 50 UG/ML
10 INJECTION INTRAVENOUS
Refills: 0 | Status: DISCONTINUED | OUTPATIENT
Start: 2023-09-11 | End: 2023-09-12

## 2023-09-11 RX ORDER — SODIUM CHLORIDE 9 MG/ML
1000 INJECTION INTRAMUSCULAR; INTRAVENOUS; SUBCUTANEOUS
Refills: 0 | Status: DISCONTINUED | OUTPATIENT
Start: 2023-09-11 | End: 2023-09-12

## 2023-09-11 RX ORDER — DEXTROSE 50 % IN WATER 50 %
50 SYRINGE (ML) INTRAVENOUS ONCE
Refills: 0 | Status: COMPLETED | OUTPATIENT
Start: 2023-09-11 | End: 2023-09-11

## 2023-09-11 RX ORDER — SODIUM ZIRCONIUM CYCLOSILICATE 10 G/10G
10 POWDER, FOR SUSPENSION ORAL ONCE
Refills: 0 | Status: COMPLETED | OUTPATIENT
Start: 2023-09-11 | End: 2023-09-11

## 2023-09-11 RX ORDER — ASPIRIN/CALCIUM CARB/MAGNESIUM 324 MG
81 TABLET ORAL DAILY
Refills: 0 | Status: DISCONTINUED | OUTPATIENT
Start: 2023-09-11 | End: 2023-09-14

## 2023-09-11 RX ORDER — AMLODIPINE BESYLATE 2.5 MG/1
5 TABLET ORAL DAILY
Refills: 0 | Status: DISCONTINUED | OUTPATIENT
Start: 2023-09-11 | End: 2023-09-14

## 2023-09-11 RX ADMIN — INSULIN HUMAN 5 UNIT(S): 100 INJECTION, SOLUTION SUBCUTANEOUS at 12:56

## 2023-09-11 RX ADMIN — Medication 50 MILLILITER(S): at 12:56

## 2023-09-11 RX ADMIN — Medication 50 MILLILITER(S): at 21:48

## 2023-09-11 NOTE — CONSULT NOTE ADULT - ASSESSMENT
Patient is a 83y Male whom presented to the hospital with Hematuria.  Has H/O HTN, hyperlipidemia, DM2 CAD, PROSTATE CA s/p radiation TURBT. has a chronic swenson.  presented with hematuria.  Has underlying  CKD baseline SCR ~ 2.6-3.0  follows closely at the renal office.  SCR noted to be 3.5 but improving to 3.0  He is scheduled for a cystoscopy today.  noted to have persistent hyperkalemia s/p lokelma this morning.    # RUSTY improving. has underlying CKD IV. SCR drifting to baseline  # hyperkalemia. s/p a dose of lokelma this morning  can RPT BMP after OR and give standing dose of lokelma 10gram two times per day  if with persistent hyperkalemia  # hematuria- for cystoscopy today.    thanks for the consult

## 2023-09-11 NOTE — PROGRESS NOTE ADULT - SUBJECTIVE AND OBJECTIVE BOX
Subjective  Denies fevers, chills, nausea, vomiting, SOB, CP. NPO for OR today.    Objective    Vital signs  T(F): , Max: 97.9 (09-10-23 @ 21:07)  HR: 62 (09-11-23 @ 08:34)  BP: 143/57 (09-11-23 @ 08:34)  SpO2: 97% (09-11-23 @ 08:34)  Wt(kg): --    Output     OUT:    Indwelling Catheter - Urethral (mL): 750 mL    Voided (mL): 550 mL  Total OUT: 1300 mL    Total NET: -1300 mL          Gen: NAD  Abd: soft, nontender, nondistended  : swenson secured in place, draining light pink urine    Labs      09-11 @ 05:26    WBC 9.83  / Hct 35.0  / SCr --       09-10 @ 19:31    WBC 6.67  / Hct 35.7  / SCr 3.03           Urine Cx: ?  Blood Cx: ?    Imaging

## 2023-09-11 NOTE — PROGRESS NOTE ADULT - PROBLEM SELECTOR PLAN 5
Started on Heparin drip, due to CKD and procedure on 9/11 D/C Eliquis-->Started on Heparin drip, due to CKD and procedure on 9/11--> Held today in view of scheduled procedure.

## 2023-09-11 NOTE — PROGRESS NOTE ADULT - ASSESSMENT
83M with h/o HTN, HLD, DM, CAD on ASA/Plavix, PAD, CKD, prostate ca s/p radiation, anemia, COPD not on home O2, PAD, h/o TURBT (per chart review, last in 2020) who presents with hematuria. He is scheduled for cysto clot evacuation with Dr. Cesar tomorrow and will need to be medically optimized.    Plan  - Trend H/H -> stable  - Monitor urine color -> color is light pink vs clear yellow today. Will still plan for OR clot evac today given significant clot burden noted on cystoscopy Friday in the office  - Irrigate swenson if not draining  - Obtain medical optimization  - Hold eliquis  - Continue ASA  - AM labs reviewed, patient NPO for OR today

## 2023-09-11 NOTE — PROGRESS NOTE ADULT - PROBLEM SELECTOR PLAN 2
RCRI 6.0 %  30-day risk of death, MI, or cardiac arrest    Lang score 0.1 %  Risk of myocardial infarction or cardiac arrest, intraoperatively or up to 30 days post-op    consulted Cardio Dr. Tesfaye for cards clearance- Patient is at high cardiac risk

## 2023-09-11 NOTE — PROGRESS NOTE ADULT - ASSESSMENT
83 y.o. M with PMHx HTN, HLD, DM, CAD on ASA/Plavix, PAD, CKD, prostate ca s/p radiation, anemia, COPD not on home O2, PAD, h/o TURBT (per chart review, last in 2020) who presents with persistent hematuria. Admitted to medicine for medical optimaization, scheduled for OR cystoscopy on Monday 9/11.

## 2023-09-11 NOTE — PROGRESS NOTE ADULT - NS ATTEST RISK PROBLEM GEN_ALL_CORE FT
Pre-operative evaluation for cystoscopy with co-morbidities, hyperkalemia which is high-risk    Personally Reviewed: BMP, CBC, PT, PTT  Reviewed Notes: Cardiology  Discussed with Nephrology, Dr. Rosario regarding hypokalemia management  Order: BMP, CBC

## 2023-09-11 NOTE — CONSULT NOTE ADULT - SUBJECTIVE AND OBJECTIVE BOX
[facility] : Progress Note :: [phone], (office [phone]),   Dr Rosario / Dr Pagan / Dr Lang / Dr Serna / Dr Jose JIMÉNEZ / Dr Charles / Dr Yeung / Dr Sancho rivera  _____________________________________________________________________________________________  Patient is a 83y Male whom presented to the hospital with Hematuria.  Has H/O HTN, hyperlipidemia, DM2 CAD, PROSTATE CA s/p radiation TURBT. has a chronic swenson.  presented with hematuria.  Has underlying  CKD baseline SCR ~ 2.6-3.0  follows closely at the renal office.  SCR noted to be 3.5 but improving to 3.0  He is scheduled for a cystoscopy today.  noted to have persistent hyperkalemia s/p lokelma this morning.      PAST MEDICAL & SURGICAL HISTORY:  Hypertension      Hypercholesteremia      6/09 diagnosed with Prostate Ca  s/p radiation and brachytherapy w/ seed implantation 5 years ago s/p TURP      BPH      approximately 2001  MI/Coronary Artery Disease      PAD--Left leg stent 2002  done at hospital in [place]  --[facility] in [place], s/p balloon on LLE in 2006      Hearing Decreased left ear      Diabetes mellitus, type 2  controlled with diet      CKD (chronic kidney disease) stage 3, GFR 30-59 ml/min      Urinary retention      Pulmonary edema  May 2016-resolved      Bladder mass  s/p resection      Injury of head, initial encounter  12/2016 - s/p fall in hospital      Rectal bleeding  November 2018      Gout      COPD (chronic obstructive pulmonary disease)  on chest xray      Anemia      CAD (coronary artery disease)  coronary stent x 1 - 2016- pt stopped Pletal and Aspirin 325mg , called Dr. mack Garvey , to start ASA 81 mg today including am of sx, office of Tali alonso notified      Occlusion and stenosis of unspecified carotid artery  right carotid artery      Femoral artery occlusion, left  superficial- 2019- surgically resolved      Malignant neoplasm of bladder, unspecified      Frequent UTI      [place] (hard of hearing)  right      Hematuria      Cataract surgery 30 years ago      right wrist surgery with hardware 27 years ago      2002 left leg stent      Acute myocardial infarction  as per pt in 2001, no coronary stent      History of colon surgery  2007      Exposure to radiation  for prostate cancer (seed implant)      S/P TURP (status post transurethral resection of prostate)      History of bladder surgery      S/P cardiac catheterization  2016 with 1 FEMI stent      Bladder mass  cystoscopy, TURBT, transurethral incision of bladder neck- 06/2018      FH: carotid endarterectomy      S/P carotid endarterectomy  right 01/2019      H/O vascular surgery  left suprficial femoral artery stent- 2019 November      History of vascular access device  05/2020  insertion in left upper arm ( PICC) , removal after 2 weeks of abx        Novocain (Faint)    Home Medications Reviewed  Hospital Medications:   MEDICATIONS  (STANDING):  sodium zirconium cyclosilicate 10 Gram(s) Oral once    SOCIAL HISTORY:  Denies ETOh,Smoking,   FAMILY HISTORY:  Family history of diabetes mellitus (Mother)    Family history of heart disease          VITALS:  T(F): 97.5 (09-11-23 @ 08:34), Max: 97.9 (09-10-23 @ 21:07)  HR: 62 (09-11-23 @ 08:34)  BP: 143/57 (09-11-23 @ 08:34)  RR: 16 (09-11-23 @ 08:34)  SpO2: 97% (09-11-23 @ 08:34)  Wt(kg): --    09-10 @ 07:01  -  09-11 @ 07:00  --------------------------------------------------------  IN: 0 mL / OUT: 1300 mL / NET: -1300 mL      Height (cm): 167 (09-11 @ 08:34)  Weight (kg): 63.5 (09-11 @ 08:34)  BMI (kg/m2): 22.8 (09-11 @ 08:34)  BSA (m2): 1.71 (09-11 @ 08:34)  PHYSICAL EXAM:  Constitutional: NAD  HEENT: anicteric sclera, oropharynx clear,  Neck: No JVD  Respiratory: CTAB, no wheezes, rales or rhonchi  Cardiovascular: S1, S2, RRR  Gastrointestinal: BS+, soft, NT/ND  Extremities:  No peripheral edema  Neurological: A/O x 3, no focal deficits  : No CVA tenderness. swenson with blood tinged urine       LABS:  09-10    138  |  109<H>  |  45<H>  ----------------------------<  94  5.4<H>   |  26  |  3.03<H>    Ca    8.9      10 Sep 2023 19:31  Phos  2.8     09-10  Mg     2.5     09-10    TPro  8.3  /  Alb  4.5  /  TBili  0.5  /  DBili      /  AST  28  /  ALT  23  /  AlkPhos  161<H>  09-09    Creatinine Trend: 3.03 <--, 3.27 <--, 3.52 <--                        11.1   9.83  )-----------( 278      ( 11 Sep 2023 05:26 )             35.0     Urine Studies:  Urinalysis Basic - ( 10 Sep 2023 19:31 )    Color:  / Appearance:  / SG:  / pH:   Gluc: 94 mg/dL / Ketone:   / Bili:  / Urobili:    Blood:  / Protein:  / Nitrite:    Leuk Esterase:  / RBC:  / WBC    Sq Epi:  / Non Sq Epi:  / Bacteria:         RADIOLOGY & ADDITIONAL STUDIES:

## 2023-09-11 NOTE — PROGRESS NOTE ADULT - SUBJECTIVE AND OBJECTIVE BOX
PGY-1 Progress Note discussed with attending    PAGER #: [[phone]] TILL 5:00 PM  PLEASE CONTACT ON CALL TEAM:  - On Call Team (Please refer to Janneth) FROM 5:00 PM - 8:30PM  - Nightfloat Team FROM 8:30 -7:30 AM    CHIEF COMPLAINT & BRIEF HOSPITAL COURSE:      INTERVAL HPI/OVERNIGHT EVENTS:       REVIEW OF SYSTEMS:  CONSTITUTIONAL: No fever, weight loss, or fatigue  RESPIRATORY: No cough, wheezing, chills or hemoptysis; No shortness of breath  CARDIOVASCULAR: No chest pain, palpitations, dizziness, or leg swelling  GASTROINTESTINAL: No abdominal pain. No nausea, vomiting, or hematemesis; No diarrhea or constipation. No melena or hematochezia.  GENITOURINARY: No dysuria or hematuria, urinary frequency  NEUROLOGICAL: No headaches, memory loss, loss of strength, numbness, or tremors  SKIN: No itching, burning, rashes, or lesions     MEDICATIONS  (STANDING):    MEDICATIONS  (PRN):  acetaminophen     Tablet .. 650 milliGRAM(s) Oral every 6 hours PRN Temp greater or equal to 38C (100.4F), Mild Pain (1 - 3)      Vital Signs Last 24 Hrs  T(C): 36.4 (11 Sep 2023 08:34), Max: 36.6 (10 Sep 2023 21:07)  T(F): 97.5 (11 Sep 2023 08:34), Max: 97.9 (10 Sep 2023 21:07)  HR: 62 (11 Sep 2023 08:34) (50 - 62)  BP: 143/57 (11 Sep 2023 08:34) (118/77 - 154/53)  BP(mean): --  RR: 16 (11 Sep 2023 08:34) (16 - 18)  SpO2: 97% (11 Sep 2023 08:34) (97% - 97%)    Parameters below as of 11 Sep 2023 05:11  Patient On (Oxygen Delivery Method): room air        PHYSICAL EXAMINATION:  GENERAL: NAD, well built  HEAD:  Atraumatic, Normocephalic  EYES:  conjunctiva and sclera clear  NECK: Supple, No JVD, Normal thyroid  CHEST/LUNG: Clear to auscultation. Clear to percussion bilaterally; No rales, rhonchi, wheezing, or rubs  HEART: Regular rate and rhythm; No murmurs, rubs, or gallops  ABDOMEN: Soft, Nontender, Nondistended; Bowel sounds present, no pain or masses on palpation  NERVOUS SYSTEM:  Alert & Oriented X3  : voiding well  EXTREMITIES:  2+ Peripheral Pulses, No clubbing, cyanosis, or edema  SKIN: warm dry                          11.1   9.83  )-----------( 278      ( 11 Sep 2023 05:26 )             35.0     09-10    138  |  109<H>  |  45<H>  ----------------------------<  94  5.4<H>   |  26  |  3.03<H>    Ca    8.9      10 Sep 2023 19:31  Phos  2.8     09-10  Mg     2.5     09-10    TPro  8.3  /  Alb  4.5  /  TBili  0.5  /  DBili  x   /  AST  28  /  ALT  23  /  AlkPhos  161<H>  09-09    LIVER FUNCTIONS - ( 09 Sep 2023 14:33 )  Alb: 4.5 g/dL / Pro: 8.3 g/dL / ALK PHOS: 161 U/L / ALT: 23 U/L / AST: 28 U/L / GGT: x               PT/INR - ( 10 Sep 2023 05:24 )   PT: 12.5 sec;   INR: 1.10 ratio         PTT - ( 10 Sep 2023 19:31 )  PTT:58.2 sec    I&O's Summary    10 Sep 2023 07:01  -  11 Sep 2023 07:00  --------------------------------------------------------  IN: 0 mL / OUT: 1300 mL / NET: -1300 mL            CAPILLARY BLOOD GLUCOSE      RADIOLOGY & ADDITIONAL TESTS:                   PGY-1 Progress Note discussed with attending    PAGER #: [[phone]] TILL 5:00 PM  PLEASE CONTACT ON CALL TEAM:  - On Call Team (Please refer to Janneth) FROM 5:00 PM - 8:30PM  - Nightfloat Team FROM 8:30 -7:30 AM    CHIEF COMPLAINT & BRIEF HOSPITAL COURSE: Gross, painless hematuria, admitted for medical optimization and  workup      INTERVAL HPI/OVERNIGHT EVENTS:   No acute overnight events.  Vitals stable, examined bedside-  Patient has no active complaints currently.  Endorses 2 episodes of watery diarrhea this morning denies blood in stool. DEnies abdominal pain, nausea, vomiting, fatigue, dizziness, p[alpitations, SOB  S/P lokelma- K 5.9-->5.4  NPO -  Scheduled for cystoscopy today        REVIEW OF SYSTEMS:  CONSTITUTIONAL: No fever, weight loss, or fatigue  RESPIRATORY: No cough, wheezing, chills or hemoptysis; No shortness of breath  CARDIOVASCULAR: No chest pain, palpitations, dizziness, or leg swelling  GASTROINTESTINAL: No abdominal pain. No nausea, vomiting, or hematemesis; constipation. No melena or hematochezia. 2 episodes of watery diarrhea  GENITOURINARY: No dysuria +hematuria, urinary frequency  NEUROLOGICAL: No headaches, memory loss, loss of strength, numbness, or tremors  SKIN: No itching, burning, rashes, or lesions     MEDICATIONS  (STANDING):    MEDICATIONS  (PRN):  acetaminophen     Tablet .. 650 milliGRAM(s) Oral every 6 hours PRN Temp greater or equal to 38C (100.4F), Mild Pain (1 - 3)      Vital Signs Last 24 Hrs  T(C): 36.4 (11 Sep 2023 08:34), Max: 36.6 (10 Sep 2023 21:07)  T(F): 97.5 (11 Sep 2023 08:34), Max: 97.9 (10 Sep 2023 21:07)  HR: 62 (11 Sep 2023 08:34) (50 - 62)  BP: 143/57 (11 Sep 2023 08:34) (118/77 - 154/53)  BP(mean): --  RR: 16 (11 Sep 2023 08:34) (16 - 18)  SpO2: 97% (11 Sep 2023 08:34) (97% - 97%)    Parameters below as of 11 Sep 2023 05:11  Patient On (Oxygen Delivery Method): room air        PHYSICAL EXAMINATION:  GENERAL: NAD, well built  HEAD:  Atraumatic, Normocephalic  EYES:  conjunctiva and sclera clear  NECK: Supple, No JVD, Normal thyroid  CHEST/LUNG: Clear to auscultation. Clear to percussion bilaterally; No rales, rhonchi, wheezing, or rubs  HEART: Regular rate and rhythm; No murmurs, rubs, or gallops  ABDOMEN: Soft, Nontender, Nondistended; Bowel sounds increased , no pain or masses on palpation  NERVOUS SYSTEM:  Alert & Oriented X3  : voiding well, العلي in place- draining red color urinne  EXTREMITIES:  2+ Peripheral Pulses, No clubbing, cyanosis, or edema  SKIN: warm dry                          11.1   9.83  )-----------( 278      ( 11 Sep 2023 05:26 )             35.0     09-10    138  |  109<H>  |  45<H>  ----------------------------<  94  5.4<H>   |  26  |  3.03<H>    Ca    8.9      10 Sep 2023 19:31  Phos  2.8     09-10  Mg     2.5     09-10    TPro  8.3  /  Alb  4.5  /  TBili  0.5  /  DBili  x   /  AST  28  /  ALT  23  /  AlkPhos  161<H>  09-09    LIVER FUNCTIONS - ( 09 Sep 2023 14:33 )  Alb: 4.5 g/dL / Pro: 8.3 g/dL / ALK PHOS: 161 U/L / ALT: 23 U/L / AST: 28 U/L / GGT: x               PT/INR - ( 10 Sep 2023 05:24 )   PT: 12.5 sec;   INR: 1.10 ratio         PTT - ( 10 Sep 2023 19:31 )  PTT:58.2 sec    I&O's Summary    10 Sep 2023 07:01  -  11 Sep 2023 07:00  --------------------------------------------------------  IN: 0 mL / OUT: 1300 mL / NET: -1300 mL            CAPILLARY BLOOD GLUCOSE      RADIOLOGY & ADDITIONAL TESTS:                   PGY-1 Progress Note discussed with attending    PAGER #: [[phone]] TILL 5:00 PM  PLEASE CONTACT ON CALL TEAM:  - On Call Team (Please refer to Janneth) FROM 5:00 PM - 8:30PM  - Nightfloat Team FROM 8:30 -7:30 AM    CHIEF COMPLAINT & BRIEF HOSPITAL COURSE: Gross, painless hematuria, admitted for medical optimization and  workup      INTERVAL HPI/OVERNIGHT EVENTS:   No acute overnight events.  Vitals stable, examined bedside-  Patient has no active complaints currently.  Endorses 2 episodes of watery diarrhea this morning denies blood in stool. DEnies abdominal pain, nausea, vomiting, fatigue, dizziness, p[alpitations, SOB  S/P lokelma- K 5.9-->5.4  NPO -  Scheduled for cystoscopy today.  Obtained patients pharmacy and called multiple times with no response- [address] [facility]- [phone]  Patient repeatedly endorses that he has been admitted to [facility] multiple times and that the hospital records have his medrec        REVIEW OF SYSTEMS:  CONSTITUTIONAL: No fever, weight loss, or fatigue  RESPIRATORY: No cough, wheezing, chills or hemoptysis; No shortness of breath  CARDIOVASCULAR: No chest pain, palpitations, dizziness, or leg swelling  GASTROINTESTINAL: No abdominal pain. No nausea, vomiting, or hematemesis; constipation. No melena or hematochezia. 2 episodes of watery diarrhea  GENITOURINARY: No dysuria +hematuria, urinary frequency  NEUROLOGICAL: No headaches, memory loss, loss of strength, numbness, or tremors  SKIN: No itching, burning, rashes, or lesions     MEDICATIONS  (STANDING):    MEDICATIONS  (PRN):  acetaminophen     Tablet .. 650 milliGRAM(s) Oral every 6 hours PRN Temp greater or equal to 38C (100.4F), Mild Pain (1 - 3)      Vital Signs Last 24 Hrs  T(C): 36.4 (11 Sep 2023 08:34), Max: 36.6 (10 Sep 2023 21:07)  T(F): 97.5 (11 Sep 2023 08:34), Max: 97.9 (10 Sep 2023 21:07)  HR: 62 (11 Sep 2023 08:34) (50 - 62)  BP: 143/57 (11 Sep 2023 08:34) (118/77 - 154/53)  BP(mean): --  RR: 16 (11 Sep 2023 08:34) (16 - 18)  SpO2: 97% (11 Sep 2023 08:34) (97% - 97%)    Parameters below as of 11 Sep 2023 05:11  Patient On (Oxygen Delivery Method): room air        PHYSICAL EXAMINATION:  GENERAL: NAD, well built  HEAD:  Atraumatic, Normocephalic  EYES:  conjunctiva and sclera clear  NECK: Supple, No JVD, Normal thyroid  CHEST/LUNG: Clear to auscultation. Clear to percussion bilaterally; No rales, rhonchi, wheezing, or rubs  HEART: Regular rate and rhythm; No murmurs, rubs, or gallops  ABDOMEN: Soft, Nontender, Nondistended; Bowel sounds increased , no pain or masses on palpation  NERVOUS SYSTEM:  Alert & Oriented X3  : voiding well, العلي in place- draining red color urinne  EXTREMITIES:  2+ Peripheral Pulses, No clubbing, cyanosis, or edema  SKIN: warm dry                          11.1   9.83  )-----------( 278      ( 11 Sep 2023 05:26 )             35.0     09-10    138  |  109<H>  |  45<H>  ----------------------------<  94  5.4<H>   |  26  |  3.03<H>    Ca    8.9      10 Sep 2023 19:31  Phos  2.8     09-10  Mg     2.5     09-10    TPro  8.3  /  Alb  4.5  /  TBili  0.5  /  DBili  x   /  AST  28  /  ALT  23  /  AlkPhos  161<H>  09-09    LIVER FUNCTIONS - ( 09 Sep 2023 14:33 )  Alb: 4.5 g/dL / Pro: 8.3 g/dL / ALK PHOS: 161 U/L / ALT: 23 U/L / AST: 28 U/L / GGT: x               PT/INR - ( 10 Sep 2023 05:24 )   PT: 12.5 sec;   INR: 1.10 ratio         PTT - ( 10 Sep 2023 19:31 )  PTT:58.2 sec    I&O's Summary    10 Sep 2023 07:01  -  11 Sep 2023 07:00  --------------------------------------------------------  IN: 0 mL / OUT: 1300 mL / NET: -1300 mL            CAPILLARY BLOOD GLUCOSE      RADIOLOGY & ADDITIONAL TESTS:

## 2023-09-11 NOTE — PROGRESS NOTE ADULT - SUBJECTIVE AND OBJECTIVE BOX
C A R D I O L O G Y  **********************************     DATE OF SERVICE 9/11/23    Patient denies CP SOB ROS (-)    norvasc 5 mg PO daily  lipitor 80 QHS               11.1   9.83  )-----------( 278      ( 11 Sep 2023 05:26 )             35.0     09-10    138  |  109<H>  |  45<H>  ----------------------------<  94  5.4<H>   |  26  |  3.03<H>    Ca    8.9      10 Sep 2023 19:31  Phos  2.8     09-10  Mg     2.5     09-10    TPro  8.3  /  Alb  4.5  /  TBili  0.5  /  DBili  x   /  AST  28  /  ALT  23  /  AlkPhos  161<H>  09-09    LIVER FUNCTIONS - ( 09 Sep 2023 14:33 )  Alb: 4.5 g/dL / Pro: 8.3 g/dL / ALK PHOS: 161 U/L / ALT: 23 U/L / AST: 28 U/L / GGT: x               PT/INR - ( 10 Sep 2023 05:24 )   PT: 12.5 sec;   INR: 1.10 ratio         PTT - ( 10 Sep 2023 19:31 )  PTT:58.2 sec    Vital Signs Last 24 Hrs  T(C): 36.4 (11 Sep 2023 08:34), Max: 36.6 (10 Sep 2023 21:07)  T(F): 97.5 (11 Sep 2023 08:34), Max: 97.9 (10 Sep 2023 21:07)  HR: 62 (11 Sep 2023 08:34) (50 - 62)  BP: 143/57 (11 Sep 2023 08:34) (118/77 - 154/53)  BP(mean): --  RR: 16 (11 Sep 2023 08:34) (16 - 18)  SpO2: 97% (11 Sep 2023 08:34) (97% - 97%)    HEENT:  (-)icterus (-)pallor  CV: N S1 S2 1/6 ALON (+)2 Pulses B/l  Resp:  Clear to ausculatation B/L, normal effort  GI: (+) BS Soft, NT, ND  Lymph:  (-)Edema, (-)obvious lymphadenopathy  Skin: Warm to touch, Normal turgor  Psych: Appropriate mood and affect    ASSESSMENT/PLAN: 	83y  Male   82 yo M COPD on home o2 CKD, CAD, DM afib historically preserved LV fx admitted with persistent hematuria preop cysto    # Preop risk  - Would treat pt as high cardiac risk but optimized from my prospective   - no clinical CHF or unstable cardiac syndromes    Donn Tesfaye MD, [facility]  BEEPER [phone]   C A R D I O L O G Y  **********************************     DATE OF SERVICE 9/11/23    Patient denies CP SOB ROS (-)    norvasc 5 mg PO daily  lipitor 80 QHS               11.1   9.83  )-----------( 278      ( 11 Sep 2023 05:26 )             35.0     09-10    138  |  109<H>  |  45<H>  ----------------------------<  94  5.4<H>   |  26  |  3.03<H>    Ca    8.9      10 Sep 2023 19:31  Phos  2.8     09-10  Mg     2.5     09-10    TPro  8.3  /  Alb  4.5  /  TBili  0.5  /  DBili  x   /  AST  28  /  ALT  23  /  AlkPhos  161<H>  09-09    LIVER FUNCTIONS - ( 09 Sep 2023 14:33 )  Alb: 4.5 g/dL / Pro: 8.3 g/dL / ALK PHOS: 161 U/L / ALT: 23 U/L / AST: 28 U/L / GGT: x               PT/INR - ( 10 Sep 2023 05:24 )   PT: 12.5 sec;   INR: 1.10 ratio         PTT - ( 10 Sep 2023 19:31 )  PTT:58.2 sec    Vital Signs Last 24 Hrs  T(C): 36.4 (11 Sep 2023 08:34), Max: 36.6 (10 Sep 2023 21:07)  T(F): 97.5 (11 Sep 2023 08:34), Max: 97.9 (10 Sep 2023 21:07)  HR: 62 (11 Sep 2023 08:34) (50 - 62)  BP: 143/57 (11 Sep 2023 08:34) (118/77 - 154/53)  BP(mean): --  RR: 16 (11 Sep 2023 08:34) (16 - 18)  SpO2: 97% (11 Sep 2023 08:34) (97% - 97%)    HEENT:  (-)icterus (-)pallor  CV: N S1 S2 1/6 ALON (+)2 Pulses B/l  Resp:  Clear to ausculatation B/L, normal effort  GI: (+) BS Soft, NT, ND  Lymph:  (-)Edema, (-)obvious lymphadenopathy  Skin: Warm to touch, Normal turgor  Psych: Appropriate mood and affect    ASSESSMENT/PLAN: 	83y  Male   82 yo M COPD on home o2 CKD, CAD, DM PAD on [place] LV fx admitted with persistent hematuria preop cysto    # Preop risk  - Would treat pt as high cardiac risk but optimized from my prospective   - no clinical CHF or unstable cardiac syndromes    # PAD   - Follows with Dr. Rivera s/p lower extremity stent on [facility]    Donn Tesfaye MD, [facility]  BEEPER [phone]

## 2023-09-12 DIAGNOSIS — N18.3 CHRONIC KIDNEY DISEASE, STAGE 3 (MODERATE): ICD-10-CM

## 2023-09-12 DIAGNOSIS — C61 MALIGNANT NEOPLASM OF PROSTATE: ICD-10-CM

## 2023-09-12 DIAGNOSIS — N32.89 OTHER SPECIFIED DISORDERS OF BLADDER: ICD-10-CM

## 2023-09-12 DIAGNOSIS — E11.9 TYPE 2 DIABETES MELLITUS WITHOUT COMPLICATIONS: ICD-10-CM

## 2023-09-12 DIAGNOSIS — Z87.09 PERSONAL HISTORY OF OTHER DISEASES OF THE RESPIRATORY SYSTEM: ICD-10-CM

## 2023-09-12 DIAGNOSIS — I73.9 PERIPHERAL VASCULAR DISEASE, UNSPECIFIED: ICD-10-CM

## 2023-09-12 LAB
ALBUMIN SERPL ELPH-MCNC: 3.1 G/DL — LOW (ref 3.5–5)
ALP SERPL-CCNC: 165 U/L — HIGH (ref 40–120)
ALT FLD-CCNC: 26 U/L DA — SIGNIFICANT CHANGE UP (ref 10–60)
ANION GAP SERPL CALC-SCNC: 5 MMOL/L — SIGNIFICANT CHANGE UP (ref 5–17)
AST SERPL-CCNC: 21 U/L — SIGNIFICANT CHANGE UP (ref 10–40)
BASOPHILS # BLD AUTO: 0.04 K/UL — SIGNIFICANT CHANGE UP (ref 0–0.2)
BASOPHILS NFR BLD AUTO: 0.4 % — SIGNIFICANT CHANGE UP (ref 0–2)
BILIRUB SERPL-MCNC: 0.3 MG/DL — SIGNIFICANT CHANGE UP (ref 0.2–1.2)
BUN SERPL-MCNC: 42 MG/DL — HIGH (ref 7–18)
CALCIUM SERPL-MCNC: 8.9 MG/DL — SIGNIFICANT CHANGE UP (ref 8.4–10.5)
CHLORIDE SERPL-SCNC: 112 MMOL/L — HIGH (ref 96–108)
CO2 SERPL-SCNC: 20 MMOL/L — LOW (ref 22–31)
CREAT SERPL-MCNC: 2.45 MG/DL — HIGH (ref 0.5–1.3)
EGFR: 25 ML/MIN/1.73M2 — LOW
EOSINOPHIL # BLD AUTO: 0.42 K/UL — SIGNIFICANT CHANGE UP (ref 0–0.5)
EOSINOPHIL NFR BLD AUTO: 3.9 % — SIGNIFICANT CHANGE UP (ref 0–6)
GLUCOSE SERPL-MCNC: 145 MG/DL — HIGH (ref 70–99)
HCT VFR BLD CALC: 39.5 % — SIGNIFICANT CHANGE UP (ref 39–50)
HGB BLD-MCNC: 12.2 G/DL — LOW (ref 13–17)
IMM GRANULOCYTES NFR BLD AUTO: 0.5 % — SIGNIFICANT CHANGE UP (ref 0–0.9)
LYMPHOCYTES # BLD AUTO: 1.43 K/UL — SIGNIFICANT CHANGE UP (ref 1–3.3)
LYMPHOCYTES # BLD AUTO: 13.3 % — SIGNIFICANT CHANGE UP (ref 13–44)
MAGNESIUM SERPL-MCNC: 2.1 MG/DL — SIGNIFICANT CHANGE UP (ref 1.6–2.6)
MCHC RBC-ENTMCNC: 30.9 GM/DL — LOW (ref 32–36)
MCHC RBC-ENTMCNC: 31.3 PG — SIGNIFICANT CHANGE UP (ref 27–34)
MCV RBC AUTO: 101.3 FL — HIGH (ref 80–100)
MONOCYTES # BLD AUTO: 0.82 K/UL — SIGNIFICANT CHANGE UP (ref 0–0.9)
MONOCYTES NFR BLD AUTO: 7.6 % — SIGNIFICANT CHANGE UP (ref 2–14)
NEUTROPHILS # BLD AUTO: 8.01 K/UL — HIGH (ref 1.8–7.4)
NEUTROPHILS NFR BLD AUTO: 74.3 % — SIGNIFICANT CHANGE UP (ref 43–77)
NRBC # BLD: 0 /100 WBCS — SIGNIFICANT CHANGE UP (ref 0–0)
PHOSPHATE SERPL-MCNC: 3.4 MG/DL — SIGNIFICANT CHANGE UP (ref 2.5–4.5)
PLATELET # BLD AUTO: 262 K/UL — SIGNIFICANT CHANGE UP (ref 150–400)
POTASSIUM SERPL-MCNC: 4.8 MMOL/L — SIGNIFICANT CHANGE UP (ref 3.5–5.3)
POTASSIUM SERPL-SCNC: 4.8 MMOL/L — SIGNIFICANT CHANGE UP (ref 3.5–5.3)
PROT SERPL-MCNC: 7.4 G/DL — SIGNIFICANT CHANGE UP (ref 6–8.3)
RBC # BLD: 3.9 M/UL — LOW (ref 4.2–5.8)
RBC # FLD: 17.5 % — HIGH (ref 10.3–14.5)
SODIUM SERPL-SCNC: 137 MMOL/L — SIGNIFICANT CHANGE UP (ref 135–145)
WBC # BLD: 10.77 K/UL — HIGH (ref 3.8–10.5)
WBC # FLD AUTO: 10.77 K/UL — HIGH (ref 3.8–10.5)

## 2023-09-12 PROCEDURE — 99232 SBSQ HOSP IP/OBS MODERATE 35: CPT | Mod: FS,GC

## 2023-09-12 RX ORDER — TAMSULOSIN HYDROCHLORIDE 0.4 MG/1
1 CAPSULE ORAL
Refills: 0 | DISCHARGE

## 2023-09-12 RX ORDER — FINASTERIDE 5 MG/1
5 TABLET, FILM COATED ORAL DAILY
Refills: 0 | Status: DISCONTINUED | OUTPATIENT
Start: 2023-09-12 | End: 2023-09-14

## 2023-09-12 RX ORDER — CARVEDILOL PHOSPHATE 80 MG/1
3.12 CAPSULE, EXTENDED RELEASE ORAL EVERY 12 HOURS
Refills: 0 | Status: DISCONTINUED | OUTPATIENT
Start: 2023-09-12 | End: 2023-09-14

## 2023-09-12 RX ORDER — APIXABAN 2.5 MG/1
2.5 TABLET, FILM COATED ORAL
Refills: 0 | Status: DISCONTINUED | OUTPATIENT
Start: 2023-09-12 | End: 2023-09-14

## 2023-09-12 RX ORDER — CLOPIDOGREL BISULFATE 75 MG/1
1 TABLET, FILM COATED ORAL
Qty: 0 | Refills: 0 | DISCHARGE

## 2023-09-12 RX ORDER — ISOSORBIDE MONONITRATE 60 MG/1
30 TABLET, EXTENDED RELEASE ORAL DAILY
Refills: 0 | Status: DISCONTINUED | OUTPATIENT
Start: 2023-09-12 | End: 2023-09-14

## 2023-09-12 RX ORDER — FOLIC ACID 0.8 MG
1 TABLET ORAL DAILY
Refills: 0 | Status: DISCONTINUED | OUTPATIENT
Start: 2023-09-12 | End: 2023-09-14

## 2023-09-12 RX ORDER — ALBUTEROL 90 UG/1
2 AEROSOL, METERED ORAL EVERY 6 HOURS
Refills: 0 | Status: DISCONTINUED | OUTPATIENT
Start: 2023-09-12 | End: 2023-09-14

## 2023-09-12 RX ORDER — ALLOPURINOL 300 MG
1 TABLET ORAL
Refills: 0 | DISCHARGE

## 2023-09-12 RX ORDER — CILOSTAZOL 100 MG/1
50 TABLET ORAL
Refills: 0 | Status: DISCONTINUED | OUTPATIENT
Start: 2023-09-12 | End: 2023-09-14

## 2023-09-12 RX ORDER — AMLODIPINE BESYLATE 2.5 MG/1
1 TABLET ORAL
Refills: 0 | DISCHARGE

## 2023-09-12 RX ORDER — TAMSULOSIN HYDROCHLORIDE 0.4 MG/1
0.4 CAPSULE ORAL AT BEDTIME
Refills: 0 | Status: DISCONTINUED | OUTPATIENT
Start: 2023-09-12 | End: 2023-09-14

## 2023-09-12 RX ORDER — ALLOPURINOL 300 MG
100 TABLET ORAL DAILY
Refills: 0 | Status: DISCONTINUED | OUTPATIENT
Start: 2023-09-12 | End: 2023-09-14

## 2023-09-12 RX ORDER — ISOSORBIDE MONONITRATE 60 MG/1
1 TABLET, EXTENDED RELEASE ORAL
Refills: 0 | DISCHARGE

## 2023-09-12 RX ORDER — FINASTERIDE 5 MG/1
1 TABLET, FILM COATED ORAL
Refills: 0 | DISCHARGE

## 2023-09-12 RX ADMIN — AMLODIPINE BESYLATE 5 MILLIGRAM(S): 2.5 TABLET ORAL at 05:37

## 2023-09-12 RX ADMIN — ISOSORBIDE MONONITRATE 30 MILLIGRAM(S): 60 TABLET, EXTENDED RELEASE ORAL at 17:55

## 2023-09-12 RX ADMIN — ATORVASTATIN CALCIUM 80 MILLIGRAM(S): 80 TABLET, FILM COATED ORAL at 21:54

## 2023-09-12 RX ADMIN — Medication 100 MILLIGRAM(S): at 17:55

## 2023-09-12 RX ADMIN — Medication 1 MILLIGRAM(S): at 17:55

## 2023-09-12 RX ADMIN — SODIUM ZIRCONIUM CYCLOSILICATE 10 GRAM(S): 10 POWDER, FOR SUSPENSION ORAL at 00:33

## 2023-09-12 RX ADMIN — TAMSULOSIN HYDROCHLORIDE 0.4 MILLIGRAM(S): 0.4 CAPSULE ORAL at 21:54

## 2023-09-12 RX ADMIN — CARVEDILOL PHOSPHATE 3.12 MILLIGRAM(S): 80 CAPSULE, EXTENDED RELEASE ORAL at 17:55

## 2023-09-12 RX ADMIN — CILOSTAZOL 50 MILLIGRAM(S): 100 TABLET ORAL at 17:55

## 2023-09-12 RX ADMIN — FINASTERIDE 5 MILLIGRAM(S): 5 TABLET, FILM COATED ORAL at 17:55

## 2023-09-12 RX ADMIN — Medication 81 MILLIGRAM(S): at 12:24

## 2023-09-12 RX ADMIN — APIXABAN 2.5 MILLIGRAM(S): 2.5 TABLET, FILM COATED ORAL at 17:55

## 2023-09-12 NOTE — PROGRESS NOTE ADULT - PROBLEM SELECTOR PLAN 1
pw hematura, chronic swenson  follows urology, Dr. Cesar OP   s/p cystoscopy with Dr. Cesar Mon 9/11  continue to monitor H/H - stable-12.2/39.5    UROLOGY CONSULTED- s/p cystoscopy, resection of bladder mass s/p resection, fulguration of bladder mass and cystoscopy    FU surgical pathology  FU urology recs

## 2023-09-12 NOTE — PROGRESS NOTE ADULT - SUBJECTIVE AND OBJECTIVE BOX
Subjective  Patient is s/p cysto clot yanni w/ fulguration. Patient denies fevers, chills, nausea, vomiting, SOB, CP. Tolerating diet. Patient had CBI clamped this AM and color is now light pink.    Objective    Vital signs  T(F): , Max: 98.1 (09-12-23 @ 05:06)  HR: 68 (09-12-23 @ 05:06)  BP: 136/63 (09-12-23 @ 05:06)  SpO2: 97% (09-12-23 @ 05:06)  Wt(kg): --    Output         Gen: NAD  Abd: soft, nontender, nondistended  : swenson secured in place, draining light pink urine    Labs      09-12 @ 05:24    WBC 10.77 / Hct 39.5  / SCr 2.45     09-11 @ 05:26    WBC 9.83  / Hct 35.0  / SCr --             Urine Cx: ?  Blood Cx: ?    Imaging

## 2023-09-12 NOTE — PROGRESS NOTE ADULT - SUBJECTIVE AND OBJECTIVE BOX
PGY-1 Progress Note discussed with attending    PAGER #: [[phone]] TILL 5:00 PM  PLEASE CONTACT ON CALL TEAM:  - On Call Team (Please refer to Janneth) FROM 5:00 PM - 8:30PM  - Nightfloat Team FROM 8:30 -7:30 AM    CHIEF COMPLAINT & BRIEF HOSPITAL COURSE:      INTERVAL HPI/OVERNIGHT EVENTS:       REVIEW OF SYSTEMS:  CONSTITUTIONAL: No fever, weight loss, or fatigue  RESPIRATORY: No cough, wheezing, chills or hemoptysis; No shortness of breath  CARDIOVASCULAR: No chest pain, palpitations, dizziness, or leg swelling  GASTROINTESTINAL: No abdominal pain. No nausea, vomiting, or hematemesis; No diarrhea or constipation. No melena or hematochezia.  GENITOURINARY: No dysuria or hematuria, urinary frequency  NEUROLOGICAL: No headaches, memory loss, loss of strength, numbness, or tremors  SKIN: No itching, burning, rashes, or lesions     MEDICATIONS  (STANDING):  amLODIPine   Tablet 5 milliGRAM(s) Oral daily  aspirin  chewable 81 milliGRAM(s) Oral daily  atorvastatin 80 milliGRAM(s) Oral at bedtime    MEDICATIONS  (PRN):  acetaminophen     Tablet .. 650 milliGRAM(s) Oral every 6 hours PRN Temp greater or equal to 38C (100.4F), Mild Pain (1 - 3)      Vital Signs Last 24 Hrs  T(C): 36.7 (12 Sep 2023 05:06), Max: 36.7 (12 Sep 2023 05:06)  T(F): 98.1 (12 Sep 2023 05:06), Max: 98.1 (12 Sep 2023 05:06)  HR: 68 (12 Sep 2023 05:06) (54 - 68)  BP: 136/63 (12 Sep 2023 05:06) (136/63 - 177/61)  BP(mean): 98 (11 Sep 2023 22:45) (86 - 98)  RR: 18 (12 Sep 2023 05:06) (14 - 18)  SpO2: 97% (12 Sep 2023 05:06) (97% - 100%)    Parameters below as of 12 Sep 2023 05:06  Patient On (Oxygen Delivery Method): room air        PHYSICAL EXAMINATION:  GENERAL: NAD, well built  HEAD:  Atraumatic, Normocephalic  EYES:  conjunctiva and sclera clear  NECK: Supple, No JVD, Normal thyroid  CHEST/LUNG: Clear to auscultation. Clear to percussion bilaterally; No rales, rhonchi, wheezing, or rubs  HEART: Regular rate and rhythm; No murmurs, rubs, or gallops  ABDOMEN: Soft, Nontender, Nondistended; Bowel sounds present, no pain or masses on palpation  NERVOUS SYSTEM:  Alert & Oriented X3  : voiding well  EXTREMITIES:  2+ Peripheral Pulses, No clubbing, cyanosis, or edema  SKIN: warm dry                          12.2   10.77 )-----------( 262      ( 12 Sep 2023 05:24 )             39.5     09-12    137  |  112<H>  |  42<H>  ----------------------------<  145<H>  4.8   |  20<L>  |  2.45<H>    Ca    8.9      12 Sep 2023 05:24  Phos  3.4     09-12  Mg     2.1     09-12    TPro  7.4  /  Alb  3.1<L>  /  TBili  0.3  /  DBili  x   /  AST  21  /  ALT  26  /  AlkPhos  165<H>  09-12    LIVER FUNCTIONS - ( 12 Sep 2023 05:24 )  Alb: 3.1 g/dL / Pro: 7.4 g/dL / ALK PHOS: 165 U/L / ALT: 26 U/L DA / AST: 21 U/L / GGT: x               PTT - ( 10 Sep 2023 19:31 )  PTT:58.2 sec    I&O's Summary    11 Sep 2023 07:01  -  12 Sep 2023 07:00  --------------------------------------------------------  IN: 3000 mL / OUT: 45051 mL / NET: -9750 mL            CAPILLARY BLOOD GLUCOSE      RADIOLOGY & ADDITIONAL TESTS:                   PGY-1 Progress Note discussed with attending    PAGER #: [[phone]] TILL 5:00 PM  PLEASE CONTACT ON CALL TEAM:  - On Call Team (Please refer to Janneth) FROM 5:00 PM - 8:30PM  - Nightfloat Team FROM 8:30 -7:30 AM    CHIEF COMPLAINT & BRIEF HOSPITAL COURSE: Persistent hematuria      INTERVAL HPI/OVERNIGHT EVENTS:   Vitals stable, no acute events  Patient has no active complaints, regular bowel movements, Swenson intact with clear urine, able to tolerate normal diet. Denies nausea.        REVIEW OF SYSTEMS:  CONSTITUTIONAL: No fever, weight loss, or fatigue  RESPIRATORY: No cough, wheezing, chills or hemoptysis; No shortness of breath  CARDIOVASCULAR: No chest pain, palpitations, dizziness, or leg swelling  GASTROINTESTINAL: No abdominal pain. No nausea, vomiting, or hematemesis; No diarrhea or constipation. No melena or hematochezia.  GENITOURINARY: No dysuria or hematuria, urinary frequency  NEUROLOGICAL: No headaches, memory loss, loss of strength, numbness, or tremors  SKIN: No itching, burning, rashes, or lesions     MEDICATIONS  (STANDING):  amLODIPine   Tablet 5 milliGRAM(s) Oral daily  aspirin  chewable 81 milliGRAM(s) Oral daily  atorvastatin 80 milliGRAM(s) Oral at bedtime    MEDICATIONS  (PRN):  acetaminophen     Tablet .. 650 milliGRAM(s) Oral every 6 hours PRN Temp greater or equal to 38C (100.4F), Mild Pain (1 - 3)      Vital Signs Last 24 Hrs  T(C): 36.7 (12 Sep 2023 05:06), Max: 36.7 (12 Sep 2023 05:06)  T(F): 98.1 (12 Sep 2023 05:06), Max: 98.1 (12 Sep 2023 05:06)  HR: 68 (12 Sep 2023 05:06) (54 - 68)  BP: 136/63 (12 Sep 2023 05:06) (136/63 - 177/61)  BP(mean): 98 (11 Sep 2023 22:45) (86 - 98)  RR: 18 (12 Sep 2023 05:06) (14 - 18)  SpO2: 97% (12 Sep 2023 05:06) (97% - 100%)    Parameters below as of 12 Sep 2023 05:06  Patient On (Oxygen Delivery Method): room air        PHYSICAL EXAMINATION:  GENERAL: NAD, well built  HEAD:  Atraumatic, Normocephalic  EYES:  conjunctiva and sclera clear  NECK: Supple, No JVD, Normal thyroid  CHEST/LUNG: Clear to auscultation. Clear to percussion bilaterally; No rales, rhonchi, wheezing, or rubs  HEART: Regular rate and rhythm; No murmurs, rubs, or gallops  ABDOMEN: Soft, Nontender, Nondistended; Bowel sounds present, no pain or masses on palpation  NERVOUS SYSTEM:  Alert & Oriented X3  : voiding well, swenson intact , draining clear urine  EXTREMITIES:  2+ Peripheral Pulses, No clubbing, cyanosis, or edema  SKIN: warm dry                          12.2   10.77 )-----------( 262      ( 12 Sep 2023 05:24 )             39.5     09-12    137  |  112<H>  |  42<H>  ----------------------------<  145<H>  4.8   |  20<L>  |  2.45<H>    Ca    8.9      12 Sep 2023 05:24  Phos  3.4     09-12  Mg     2.1     09-12    TPro  7.4  /  Alb  3.1<L>  /  TBili  0.3  /  DBili  x   /  AST  21  /  ALT  26  /  AlkPhos  165<H>  09-12    LIVER FUNCTIONS - ( 12 Sep 2023 05:24 )  Alb: 3.1 g/dL / Pro: 7.4 g/dL / ALK PHOS: 165 U/L / ALT: 26 U/L DA / AST: 21 U/L / GGT: x               PTT - ( 10 Sep 2023 19:31 )  PTT:58.2 sec    I&O's Summary    11 Sep 2023 07:01  -  12 Sep 2023 07:00  --------------------------------------------------------  IN: 3000 mL / OUT: 67074 mL / NET: -9750 mL            CAPILLARY BLOOD GLUCOSE      RADIOLOGY & ADDITIONAL TESTS:

## 2023-09-12 NOTE — PROGRESS NOTE ADULT - PROBLEM SELECTOR PLAN 2
RCRI 6.0 %  30-day risk of death, MI, or cardiac arrest    Lang score 0.1 %  Risk of myocardial infarction or cardiac arrest, intraoperatively or up to 30 days post-op    consulted Cardio Dr. Tesfaye for cards clearance- Patient is at high cardiac risk pw hematura, chronic swenson  follows urology, Dr. Cesar OP   s/p cystoscopy with Dr. Cesar Mon 9/11  continue to monitor H/H - stable-12.2/39.5    UROLOGY CONSULTED- s/p cystoscopy, resection of bladder mass

## 2023-09-12 NOTE — PROGRESS NOTE ADULT - ASSESSMENT
83 y.o. M with PMHx HTN, HLD, DM, CAD on ASA/Plavix, PAD, CKD, prostate ca s/p radiation, anemia, COPD not on home O2, PAD, h/o TURBT (per chart review, last in 2020) who presents with persistent hematuria. Admitted to medicine for medical optimization, s/p cystoscopy and resection of bladder mass on Monday 9/11.

## 2023-09-12 NOTE — PROGRESS NOTE ADULT - PROBLEM SELECTOR PROBLEM 4
CAD (coronary artery disease) Peripheral artery disease CKD (chronic kidney disease) stage 3, GFR 30-59 ml/min

## 2023-09-12 NOTE — PROGRESS NOTE ADULT - ASSESSMENT
# RUSTY improving. has underlying CKD IV. SCR drifting to baseline  BMP in AM   # hyperkalemia. improved after lokelma  # hematuria- s/p cystoscopy today.

## 2023-09-12 NOTE — PROGRESS NOTE ADULT - PROBLEM SELECTOR PLAN 7
s/p trans urethral resection ( 2020)  start home meds-  Finasteride  Tamsulosin start on home meds-  amlodipine 5mg OD  carvedilol  Imdur

## 2023-09-12 NOTE — PROGRESS NOTE ADULT - ASSESSMENT
83M with h/o HTN, HLD, DM, CAD on ASA/Plavix, PAD, CKD, prostate ca s/p radiation, anemia, COPD not on home O2, PAD, h/o TURBT (per chart review, last in 2020) who presents with hematuria. He is scheduled for cysto clot evacuation with Dr. Cesar tomorrow and will need to be medically optimized.    Plan  - Trend H/H -> stable  - Patient found to have old residual clot and inflammatory changes vs possible CIS that was excised and fulgurated  - Monitor urine color -> color is light pink vs clear yellow today off CBI  - Irrigate swenson if not draining  - Obtain medical optimization  - Can restart eliquis today  - Continue ASA  - Reg diet 83M with h/o HTN, HLD, DM, CAD on ASA/Plavix, PAD, CKD, prostate ca s/p radiation, anemia, COPD not on home O2, PAD, h/o TURBT (per chart review, last in 2020) who presents with hematuria. He is scheduled for cysto clot evacuation with Dr. Cesar tomorrow and will need to be medically optimized.    Plan  - Trend H/H -> stable  - Patient found to have old residual clot and inflammatory changes vs possible CIS that was excised and fulgurated  - Monitor urine color -> color is light pink vs clear yellow today off CBI, will remove catheter if patient's urine color is still clear later this AM and then primary team can restart eliquis  - Irrigate swenson if not draining  - Continue ASA  - Reg diet  - Discussed with Dr. Cesar

## 2023-09-12 NOTE — PROGRESS NOTE ADULT - SUBJECTIVE AND OBJECTIVE BOX
C A R D I O L O G Y  **********************************     DATE OF SERVICE: 09-12-23    Patient denies chest pain or shortness of breath.   Review of symptoms otherwise negative.    acetaminophen     Tablet .. 650 milliGRAM(s) Oral every 6 hours PRN  amLODIPine   Tablet 5 milliGRAM(s) Oral daily  aspirin  chewable 81 milliGRAM(s) Oral daily  atorvastatin 80 milliGRAM(s) Oral at bedtime                            12.2   10.77 )-----------( 262      ( 12 Sep 2023 05:24 )             39.5       Hemoglobin: 12.2 g/dL (09-12 @ 05:24)  Hemoglobin: 11.1 g/dL (09-11 @ 05:26)  Hemoglobin: 11.1 g/dL (09-10 @ 19:31)  Hemoglobin: 11.3 g/dL (09-10 @ 18:30)  Hemoglobin: 11.3 g/dL (09-10 @ 05:24)      09-12    137  |  112<H>  |  42<H>  ----------------------------<  145<H>  4.8   |  20<L>  |  2.45<H>    Ca    8.9      12 Sep 2023 05:24  Phos  3.4     09-12  Mg     2.1     09-12    TPro  7.4  /  Alb  3.1<L>  /  TBili  0.3  /  DBili  x   /  AST  21  /  ALT  26  /  AlkPhos  165<H>  09-12    Creatinine Trend: 2.45<--, 3.03<--, 3.27<--, 3.52<--, 3.38<--    COAGS:           T(C): 36.6 (09-12-23 @ 13:34), Max: 36.7 (09-12-23 @ 05:06)  HR: 71 (09-12-23 @ 13:34) (61 - 71)  BP: 130/73 (09-12-23 @ 13:34) (130/73 - 177/61)  RR: 18 (09-12-23 @ 13:34) (14 - 18)  SpO2: 99% (09-12-23 @ 13:34) (97% - 100%)  Wt(kg): --    I&O's Summary    11 Sep 2023 07:01  -  12 Sep 2023 07:00  --------------------------------------------------------  IN: 3000 mL / OUT: 92340 mL / NET: -9750 mL    12 Sep 2023 07:01  -  12 Sep 2023 14:25  --------------------------------------------------------  IN: 0 mL / OUT: 100 mL / NET: -100 mL      HEENT:  (-)icterus (-)pallor  CV: N S1 S2 1/6 ALON (+)2 Pulses B/l  Resp:  Clear to ausculatation B/L, normal effort  GI: (+) BS Soft, NT, ND  Lymph:  (-)Edema, (-)obvious lymphadenopathy  Skin: Warm to touch, Normal turgor  Psych: Appropriate mood and affect    ASSESSMENT/PLAN: 	83y  Male   82 yo M COPD on home o2 CKD, CAD, DM afib historically preserved LV fx admitted with persistent hematuria preop cysto    # Preop risk  - s/p cysto  - tolerated procedure   - Would treat pt as high cardiac risk for procedures but optimized from my prospective   - no clinical CHF or unstable cardiac syndromes    Donn Tesfaye MD, [facility]  BEEPER [phone]   C A R D I O L O G Y  **********************************     DATE OF SERVICE: 09-12-23    Patient denies chest pain or shortness of breath.   Review of symptoms otherwise negative.    acetaminophen     Tablet .. 650 milliGRAM(s) Oral every 6 hours PRN  amLODIPine   Tablet 5 milliGRAM(s) Oral daily  aspirin  chewable 81 milliGRAM(s) Oral daily  atorvastatin 80 milliGRAM(s) Oral at bedtime                            12.2   10.77 )-----------( 262      ( 12 Sep 2023 05:24 )             39.5       Hemoglobin: 12.2 g/dL (09-12 @ 05:24)  Hemoglobin: 11.1 g/dL (09-11 @ 05:26)  Hemoglobin: 11.1 g/dL (09-10 @ 19:31)  Hemoglobin: 11.3 g/dL (09-10 @ 18:30)  Hemoglobin: 11.3 g/dL (09-10 @ 05:24)      09-12    137  |  112<H>  |  42<H>  ----------------------------<  145<H>  4.8   |  20<L>  |  2.45<H>    Ca    8.9      12 Sep 2023 05:24  Phos  3.4     09-12  Mg     2.1     09-12    TPro  7.4  /  Alb  3.1<L>  /  TBili  0.3  /  DBili  x   /  AST  21  /  ALT  26  /  AlkPhos  165<H>  09-12    Creatinine Trend: 2.45<--, 3.03<--, 3.27<--, 3.52<--, 3.38<--    COAGS:           T(C): 36.6 (09-12-23 @ 13:34), Max: 36.7 (09-12-23 @ 05:06)  HR: 71 (09-12-23 @ 13:34) (61 - 71)  BP: 130/73 (09-12-23 @ 13:34) (130/73 - 177/61)  RR: 18 (09-12-23 @ 13:34) (14 - 18)  SpO2: 99% (09-12-23 @ 13:34) (97% - 100%)  Wt(kg): --    I&O's Summary    11 Sep 2023 07:01  -  12 Sep 2023 07:00  --------------------------------------------------------  IN: 3000 mL / OUT: 69720 mL / NET: -9750 mL    12 Sep 2023 07:01  -  12 Sep 2023 14:25  --------------------------------------------------------  IN: 0 mL / OUT: 100 mL / NET: -100 mL      HEENT:  (-)icterus (-)pallor  CV: N S1 S2 1/6 ALON (+)2 Pulses B/l  Resp:  Clear to ausculatation B/L, normal effort  GI: (+) BS Soft, NT, ND  Lymph:  (-)Edema, (-)obvious lymphadenopathy  Skin: Warm to touch, Normal turgor  Psych: Appropriate mood and affect    ASSESSMENT/PLAN: 	83y  Male   84 yo M COPD on home o2 CKD, CAD, DM afib historically preserved LV fx admitted with persistent hematuria s/p cysto    # Preop risk  - s/p cysto  - tolerated procedure   - Would treat pt as high cardiac risk for procedures but optimized from my prospective   - no clinical CHF or unstable cardiac syndromes    Donn Tesfaye MD, [facility]  BEEPER [phone]   C A R D I O L O G Y  **********************************     DATE OF SERVICE: 09-12-23    Patient denies chest pain or shortness of breath.   Review of symptoms otherwise negative.    acetaminophen     Tablet .. 650 milliGRAM(s) Oral every 6 hours PRN  amLODIPine   Tablet 5 milliGRAM(s) Oral daily  aspirin  chewable 81 milliGRAM(s) Oral daily  atorvastatin 80 milliGRAM(s) Oral at bedtime                            12.2   10.77 )-----------( 262      ( 12 Sep 2023 05:24 )             39.5       Hemoglobin: 12.2 g/dL (09-12 @ 05:24)  Hemoglobin: 11.1 g/dL (09-11 @ 05:26)  Hemoglobin: 11.1 g/dL (09-10 @ 19:31)  Hemoglobin: 11.3 g/dL (09-10 @ 18:30)  Hemoglobin: 11.3 g/dL (09-10 @ 05:24)      09-12    137  |  112<H>  |  42<H>  ----------------------------<  145<H>  4.8   |  20<L>  |  2.45<H>    Ca    8.9      12 Sep 2023 05:24  Phos  3.4     09-12  Mg     2.1     09-12    TPro  7.4  /  Alb  3.1<L>  /  TBili  0.3  /  DBili  x   /  AST  21  /  ALT  26  /  AlkPhos  165<H>  09-12    Creatinine Trend: 2.45<--, 3.03<--, 3.27<--, 3.52<--, 3.38<--    COAGS:           T(C): 36.6 (09-12-23 @ 13:34), Max: 36.7 (09-12-23 @ 05:06)  HR: 71 (09-12-23 @ 13:34) (61 - 71)  BP: 130/73 (09-12-23 @ 13:34) (130/73 - 177/61)  RR: 18 (09-12-23 @ 13:34) (14 - 18)  SpO2: 99% (09-12-23 @ 13:34) (97% - 100%)  Wt(kg): --    I&O's Summary    11 Sep 2023 07:01  -  12 Sep 2023 07:00  --------------------------------------------------------  IN: 3000 mL / OUT: 45685 mL / NET: -9750 mL    12 Sep 2023 07:01  -  12 Sep 2023 14:25  --------------------------------------------------------  IN: 0 mL / OUT: 100 mL / NET: -100 mL      HEENT:  (-)icterus (-)pallor  CV: N S1 S2 1/6 ALON (+)2 Pulses B/l  Resp:  Clear to ausculatation B/L, normal effort  GI: (+) BS Soft, NT, ND  Lymph:  (-)Edema, (-)obvious lymphadenopathy  Skin: Warm to touch, Normal turgor  Psych: Appropriate mood and affect    ASSESSMENT/PLAN: 	83y  Male   84 yo M COPD on home o2 CKD, CAD, DM  historically preserved LV fx admitted with persistent hematuria s/p cysto    # Preop risk  - s/p cysto  - tolerated procedure   - Would treat pt as high cardiac risk for procedures but optimized from my prospective   - no clinical CHF or unstable cardiac syndromes    # PAD  - elquis held    Donn Tesfaye MD, [facility]  BEEPER [phone]

## 2023-09-12 NOTE — PROGRESS NOTE ADULT - SUBJECTIVE AND OBJECTIVE BOX
[facility] : Progress Note :: [phone], (office [phone]),   Dr Rosario / Dr Pagan / Dr Lang / Dr Serna / Dr Jose JIMÉNEZ / Dr Charles / Dr Yeung / Dr Sancho rivera  _____________________________________________________________________________________________    s/p cysto with .  improving SCR.     Novocain (Faint)    Hospital Medications:   MEDICATIONS  (STANDING):  allopurinol 100 milliGRAM(s) Oral daily  amLODIPine   Tablet 5 milliGRAM(s) Oral daily  apixaban 2.5 milliGRAM(s) Oral two times a day  aspirin  chewable 81 milliGRAM(s) Oral daily  atorvastatin 80 milliGRAM(s) Oral at bedtime  carvedilol 3.125 milliGRAM(s) Oral every 12 hours  cilostazol 50 milliGRAM(s) Oral two times a day  finasteride 5 milliGRAM(s) Oral daily  folic acid 1 milliGRAM(s) Oral daily  isosorbide   mononitrate ER Tablet (IMDUR) 30 milliGRAM(s) Oral daily  tamsulosin 0.4 milliGRAM(s) Oral at bedtime      VITALS:  T(F): 97.9 (09-12-23 @ 13:34), Max: 98.1 (09-12-23 @ 05:06)  HR: 71 (09-12-23 @ 13:34)  BP: 130/73 (09-12-23 @ 13:34)  RR: 18 (09-12-23 @ 13:34)  SpO2: 99% (09-12-23 @ 13:34)  Wt(kg): --    09-11 @ 07:01  -  09-12 @ 07:00  --------------------------------------------------------  IN: 3000 mL / OUT: 00947 mL / NET: -9750 mL    09-12 @ 07:01  -  09-12 @ 17:53  --------------------------------------------------------  IN: 0 mL / OUT: 100 mL / NET: -100 mL        PHYSICAL EXAM:  Constitutional: NAD  HEENT: anicteric sclera, oropharynx clear.  Neck: No JVD  Respiratory: CTAB, no wheezes, rales or rhonchi  Cardiovascular: S1, S2, RRR  Gastrointestinal: BS+, soft, NT/ND  Extremities:  No peripheral edema  Neurological: A/O x 3, no focal deficits.  : No CVA tenderness. swenson.   Skin: No rashes      LABS:  09-12    137  |  112<H>  |  42<H>  ----------------------------<  145<H>  4.8   |  20<L>  |  2.45<H>    Ca    8.9      12 Sep 2023 05:24  Phos  3.4     09-12  Mg     2.1     09-12    TPro  7.4  /  Alb  3.1<L>  /  TBili  0.3  /  DBili      /  AST  21  /  ALT  26  /  AlkPhos  165<H>  09-12    Creatinine Trend: 2.45 <--, 3.03 <--, 3.27 <--, 3.52 <--                        12.2   10.77 )-----------( 262      ( 12 Sep 2023 05:24 )             39.5     Urine Studies:  Urinalysis Basic - ( 12 Sep 2023 05:24 )    Color:  / Appearance:  / SG:  / pH:   Gluc: 145 mg/dL / Ketone:   / Bili:  / Urobili:    Blood:  / Protein:  / Nitrite:    Leuk Esterase:  / RBC:  / WBC    Sq Epi:  / Non Sq Epi:  / Bacteria:         RADIOLOGY & ADDITIONAL STUDIES:

## 2023-09-12 NOTE — PROGRESS NOTE ADULT - SUBJECTIVE AND OBJECTIVE BOX
Feeling well, tolerating diet, no pain.     T(C): 36.5 (09-11-23 @ 22:45), Max: 36.5 (09-11-23 @ 20:40)  HR: 66 (09-11-23 @ 22:45) (54 - 67)  BP: 167/71 (09-11-23 @ 22:45) (143/57 - 177/61)  RR: 16 (09-11-23 @ 22:45) (14 - 18)  SpO2: 98% (09-11-23 @ 22:45) (97% - 100%)    CONSTITUTIONAL: Well groomed, no apparent distress  RESP: No respiratory distress, no use of accessory muscles;  CV: RRR,  العلي in place clear urine.     s/p Cystoscopy, resection of bladder mass, fulguration. Recovering adequately. Continue current care.

## 2023-09-12 NOTE — PROGRESS NOTE ADULT - PROBLEM SELECTOR PLAN 5
started on Eliquis start on home meds-  amlodipine 5mg OD  carvedilol  Imdur Start aspirin, atorvastatin 80

## 2023-09-12 NOTE — PROGRESS NOTE ADULT - PROBLEM SELECTOR PLAN 3
start amlodipine 5mg OD Start aspirin, atorvastatin 80 RCRI 6.0 %  30-day risk of death, MI, or cardiac arrest    Lang score 0.1 %  Risk of myocardial infarction or cardiac arrest, intraoperatively or up to 30 days post-op    consulted Cardio Dr. Tesfaye for cards clearance- Patient is at high cardiac risk

## 2023-09-13 ENCOUNTER — TRANSCRIPTION ENCOUNTER (OUTPATIENT)
Age: 83
End: 2023-09-13

## 2023-09-13 DIAGNOSIS — E87.5 HYPERKALEMIA: ICD-10-CM

## 2023-09-13 LAB
A1C WITH ESTIMATED AVERAGE GLUCOSE RESULT: 5.9 % — HIGH (ref 4–5.6)
ALBUMIN SERPL ELPH-MCNC: 2.7 G/DL — LOW (ref 3.5–5)
ALP SERPL-CCNC: 134 U/L — HIGH (ref 40–120)
ALT FLD-CCNC: 19 U/L DA — SIGNIFICANT CHANGE UP (ref 10–60)
ANION GAP SERPL CALC-SCNC: 3 MMOL/L — LOW (ref 5–17)
ANION GAP SERPL CALC-SCNC: 4 MMOL/L — LOW (ref 5–17)
ANION GAP SERPL CALC-SCNC: 8 MMOL/L — SIGNIFICANT CHANGE UP (ref 5–17)
AST SERPL-CCNC: 20 U/L — SIGNIFICANT CHANGE UP (ref 10–40)
BASOPHILS # BLD AUTO: 0.03 K/UL — SIGNIFICANT CHANGE UP (ref 0–0.2)
BASOPHILS NFR BLD AUTO: 0.3 % — SIGNIFICANT CHANGE UP (ref 0–2)
BILIRUB SERPL-MCNC: 0.3 MG/DL — SIGNIFICANT CHANGE UP (ref 0.2–1.2)
BUN SERPL-MCNC: 57 MG/DL — HIGH (ref 7–18)
BUN SERPL-MCNC: 59 MG/DL — HIGH (ref 7–18)
BUN SERPL-MCNC: 62 MG/DL — HIGH (ref 7–18)
CALCIUM SERPL-MCNC: 8.2 MG/DL — LOW (ref 8.4–10.5)
CALCIUM SERPL-MCNC: 8.5 MG/DL — SIGNIFICANT CHANGE UP (ref 8.4–10.5)
CALCIUM SERPL-MCNC: 9 MG/DL — SIGNIFICANT CHANGE UP (ref 8.4–10.5)
CHLORIDE SERPL-SCNC: 106 MMOL/L — SIGNIFICANT CHANGE UP (ref 96–108)
CHLORIDE SERPL-SCNC: 108 MMOL/L — SIGNIFICANT CHANGE UP (ref 96–108)
CHLORIDE SERPL-SCNC: 110 MMOL/L — HIGH (ref 96–108)
CO2 SERPL-SCNC: 21 MMOL/L — LOW (ref 22–31)
CO2 SERPL-SCNC: 22 MMOL/L — SIGNIFICANT CHANGE UP (ref 22–31)
CO2 SERPL-SCNC: 24 MMOL/L — SIGNIFICANT CHANGE UP (ref 22–31)
CREAT SERPL-MCNC: 2.91 MG/DL — HIGH (ref 0.5–1.3)
CREAT SERPL-MCNC: 3.04 MG/DL — HIGH (ref 0.5–1.3)
CREAT SERPL-MCNC: 3.38 MG/DL — HIGH (ref 0.5–1.3)
EGFR: 17 ML/MIN/1.73M2 — LOW
EGFR: 20 ML/MIN/1.73M2 — LOW
EGFR: 21 ML/MIN/1.73M2 — LOW
EOSINOPHIL # BLD AUTO: 0.55 K/UL — HIGH (ref 0–0.5)
EOSINOPHIL NFR BLD AUTO: 5.7 % — SIGNIFICANT CHANGE UP (ref 0–6)
ESTIMATED AVERAGE GLUCOSE: 123 MG/DL — HIGH (ref 68–114)
GLUCOSE BLDC GLUCOMTR-MCNC: 120 MG/DL — HIGH (ref 70–99)
GLUCOSE BLDC GLUCOMTR-MCNC: 139 MG/DL — HIGH (ref 70–99)
GLUCOSE BLDC GLUCOMTR-MCNC: 146 MG/DL — HIGH (ref 70–99)
GLUCOSE BLDC GLUCOMTR-MCNC: 36 MG/DL — CRITICAL LOW (ref 70–99)
GLUCOSE BLDC GLUCOMTR-MCNC: 37 MG/DL — CRITICAL LOW (ref 70–99)
GLUCOSE BLDC GLUCOMTR-MCNC: 38 MG/DL — CRITICAL LOW (ref 70–99)
GLUCOSE BLDC GLUCOMTR-MCNC: 44 MG/DL — CRITICAL LOW (ref 70–99)
GLUCOSE BLDC GLUCOMTR-MCNC: 510 MG/DL — CRITICAL HIGH (ref 70–99)
GLUCOSE BLDC GLUCOMTR-MCNC: 88 MG/DL — SIGNIFICANT CHANGE UP (ref 70–99)
GLUCOSE BLDC GLUCOMTR-MCNC: 89 MG/DL — SIGNIFICANT CHANGE UP (ref 70–99)
GLUCOSE BLDC GLUCOMTR-MCNC: 94 MG/DL — SIGNIFICANT CHANGE UP (ref 70–99)
GLUCOSE SERPL-MCNC: 131 MG/DL — HIGH (ref 70–99)
GLUCOSE SERPL-MCNC: 202 MG/DL — HIGH (ref 70–99)
GLUCOSE SERPL-MCNC: 88 MG/DL — SIGNIFICANT CHANGE UP (ref 70–99)
HCT VFR BLD CALC: 31.3 % — LOW (ref 39–50)
HCT VFR BLD CALC: 32.1 % — LOW (ref 39–50)
HCT VFR BLD CALC: 34.4 % — LOW (ref 39–50)
HGB BLD-MCNC: 10 G/DL — LOW (ref 13–17)
HGB BLD-MCNC: 10.8 G/DL — LOW (ref 13–17)
HGB BLD-MCNC: 9.7 G/DL — LOW (ref 13–17)
IMM GRANULOCYTES NFR BLD AUTO: 0.4 % — SIGNIFICANT CHANGE UP (ref 0–0.9)
LYMPHOCYTES # BLD AUTO: 1.36 K/UL — SIGNIFICANT CHANGE UP (ref 1–3.3)
LYMPHOCYTES # BLD AUTO: 14 % — SIGNIFICANT CHANGE UP (ref 13–44)
MAGNESIUM SERPL-MCNC: 2 MG/DL — SIGNIFICANT CHANGE UP (ref 1.6–2.6)
MCHC RBC-ENTMCNC: 31 GM/DL — LOW (ref 32–36)
MCHC RBC-ENTMCNC: 31 PG — SIGNIFICANT CHANGE UP (ref 27–34)
MCHC RBC-ENTMCNC: 31.1 PG — SIGNIFICANT CHANGE UP (ref 27–34)
MCHC RBC-ENTMCNC: 31.2 GM/DL — LOW (ref 32–36)
MCHC RBC-ENTMCNC: 31.4 GM/DL — LOW (ref 32–36)
MCHC RBC-ENTMCNC: 31.4 PG — SIGNIFICANT CHANGE UP (ref 27–34)
MCV RBC AUTO: 100 FL — SIGNIFICANT CHANGE UP (ref 80–100)
MCV RBC AUTO: 100 FL — SIGNIFICANT CHANGE UP (ref 80–100)
MCV RBC AUTO: 99.7 FL — SIGNIFICANT CHANGE UP (ref 80–100)
MONOCYTES # BLD AUTO: 0.66 K/UL — SIGNIFICANT CHANGE UP (ref 0–0.9)
MONOCYTES NFR BLD AUTO: 6.8 % — SIGNIFICANT CHANGE UP (ref 2–14)
NEUTROPHILS # BLD AUTO: 7.05 K/UL — SIGNIFICANT CHANGE UP (ref 1.8–7.4)
NEUTROPHILS NFR BLD AUTO: 72.8 % — SIGNIFICANT CHANGE UP (ref 43–77)
NRBC # BLD: 0 /100 WBCS — SIGNIFICANT CHANGE UP (ref 0–0)
PHOSPHATE SERPL-MCNC: 3.7 MG/DL — SIGNIFICANT CHANGE UP (ref 2.5–4.5)
PLATELET # BLD AUTO: 243 K/UL — SIGNIFICANT CHANGE UP (ref 150–400)
PLATELET # BLD AUTO: 248 K/UL — SIGNIFICANT CHANGE UP (ref 150–400)
PLATELET # BLD AUTO: 250 K/UL — SIGNIFICANT CHANGE UP (ref 150–400)
POTASSIUM SERPL-MCNC: 4.8 MMOL/L — SIGNIFICANT CHANGE UP (ref 3.5–5.3)
POTASSIUM SERPL-MCNC: 6 MMOL/L — HIGH (ref 3.5–5.3)
POTASSIUM SERPL-MCNC: 6.6 MMOL/L — CRITICAL HIGH (ref 3.5–5.3)
POTASSIUM SERPL-SCNC: 4.8 MMOL/L — SIGNIFICANT CHANGE UP (ref 3.5–5.3)
POTASSIUM SERPL-SCNC: 6 MMOL/L — HIGH (ref 3.5–5.3)
POTASSIUM SERPL-SCNC: 6.6 MMOL/L — CRITICAL HIGH (ref 3.5–5.3)
PROT SERPL-MCNC: 6.5 G/DL — SIGNIFICANT CHANGE UP (ref 6–8.3)
RBC # BLD: 3.13 M/UL — LOW (ref 4.2–5.8)
RBC # BLD: 3.22 M/UL — LOW (ref 4.2–5.8)
RBC # BLD: 3.44 M/UL — LOW (ref 4.2–5.8)
RBC # FLD: 17.2 % — HIGH (ref 10.3–14.5)
RBC # FLD: 17.3 % — HIGH (ref 10.3–14.5)
RBC # FLD: 17.5 % — HIGH (ref 10.3–14.5)
SODIUM SERPL-SCNC: 135 MMOL/L — SIGNIFICANT CHANGE UP (ref 135–145)
SODIUM SERPL-SCNC: 135 MMOL/L — SIGNIFICANT CHANGE UP (ref 135–145)
SODIUM SERPL-SCNC: 136 MMOL/L — SIGNIFICANT CHANGE UP (ref 135–145)
SURGICAL PATHOLOGY STUDY: SIGNIFICANT CHANGE UP
WBC # BLD: 13.94 K/UL — HIGH (ref 3.8–10.5)
WBC # BLD: 8.71 K/UL — SIGNIFICANT CHANGE UP (ref 3.8–10.5)
WBC # BLD: 9.69 K/UL — SIGNIFICANT CHANGE UP (ref 3.8–10.5)
WBC # FLD AUTO: 13.94 K/UL — HIGH (ref 3.8–10.5)
WBC # FLD AUTO: 8.71 K/UL — SIGNIFICANT CHANGE UP (ref 3.8–10.5)
WBC # FLD AUTO: 9.69 K/UL — SIGNIFICANT CHANGE UP (ref 3.8–10.5)

## 2023-09-13 PROCEDURE — 99232 SBSQ HOSP IP/OBS MODERATE 35: CPT | Mod: GC

## 2023-09-13 RX ORDER — DEXTROSE 50 % IN WATER 50 %
50 SYRINGE (ML) INTRAVENOUS ONCE
Refills: 0 | Status: DISCONTINUED | OUTPATIENT
Start: 2023-09-13 | End: 2023-09-14

## 2023-09-13 RX ORDER — SODIUM CHLORIDE 9 MG/ML
1000 INJECTION INTRAMUSCULAR; INTRAVENOUS; SUBCUTANEOUS
Refills: 0 | Status: DISCONTINUED | OUTPATIENT
Start: 2023-09-13 | End: 2023-09-13

## 2023-09-13 RX ORDER — DEXTROSE 50 % IN WATER 50 %
50 SYRINGE (ML) INTRAVENOUS ONCE
Refills: 0 | Status: DISCONTINUED | OUTPATIENT
Start: 2023-09-13 | End: 2023-09-13

## 2023-09-13 RX ORDER — DEXTROSE 50 % IN WATER 50 %
50 SYRINGE (ML) INTRAVENOUS ONCE
Refills: 0 | Status: COMPLETED | OUTPATIENT
Start: 2023-09-13 | End: 2023-09-13

## 2023-09-13 RX ORDER — SODIUM CHLORIDE 9 MG/ML
1000 INJECTION, SOLUTION INTRAVENOUS
Refills: 0 | Status: DISCONTINUED | OUTPATIENT
Start: 2023-09-13 | End: 2023-09-14

## 2023-09-13 RX ORDER — INSULIN HUMAN 100 [IU]/ML
5 INJECTION, SOLUTION SUBCUTANEOUS ONCE
Refills: 0 | Status: COMPLETED | OUTPATIENT
Start: 2023-09-13 | End: 2023-09-13

## 2023-09-13 RX ORDER — SODIUM ZIRCONIUM CYCLOSILICATE 10 G/10G
10 POWDER, FOR SUSPENSION ORAL
Refills: 0 | Status: DISCONTINUED | OUTPATIENT
Start: 2023-09-13 | End: 2023-09-14

## 2023-09-13 RX ORDER — INSULIN HUMAN 100 [IU]/ML
5 INJECTION, SOLUTION SUBCUTANEOUS ONCE
Refills: 0 | Status: DISCONTINUED | OUTPATIENT
Start: 2023-09-13 | End: 2023-09-13

## 2023-09-13 RX ADMIN — FINASTERIDE 5 MILLIGRAM(S): 5 TABLET, FILM COATED ORAL at 12:04

## 2023-09-13 RX ADMIN — SODIUM ZIRCONIUM CYCLOSILICATE 10 GRAM(S): 10 POWDER, FOR SUSPENSION ORAL at 08:00

## 2023-09-13 RX ADMIN — CILOSTAZOL 50 MILLIGRAM(S): 100 TABLET ORAL at 06:00

## 2023-09-13 RX ADMIN — Medication 650 MILLIGRAM(S): at 01:35

## 2023-09-13 RX ADMIN — Medication 50 MILLILITER(S): at 18:40

## 2023-09-13 RX ADMIN — AMLODIPINE BESYLATE 5 MILLIGRAM(S): 2.5 TABLET ORAL at 06:00

## 2023-09-13 RX ADMIN — Medication 50 MILLILITER(S): at 15:05

## 2023-09-13 RX ADMIN — CARVEDILOL PHOSPHATE 3.12 MILLIGRAM(S): 80 CAPSULE, EXTENDED RELEASE ORAL at 06:01

## 2023-09-13 RX ADMIN — SODIUM CHLORIDE 50 MILLILITER(S): 9 INJECTION INTRAMUSCULAR; INTRAVENOUS; SUBCUTANEOUS at 17:22

## 2023-09-13 RX ADMIN — Medication 81 MILLIGRAM(S): at 12:05

## 2023-09-13 RX ADMIN — Medication 1 MILLIGRAM(S): at 12:04

## 2023-09-13 RX ADMIN — CARVEDILOL PHOSPHATE 3.12 MILLIGRAM(S): 80 CAPSULE, EXTENDED RELEASE ORAL at 17:17

## 2023-09-13 RX ADMIN — APIXABAN 2.5 MILLIGRAM(S): 2.5 TABLET, FILM COATED ORAL at 17:17

## 2023-09-13 RX ADMIN — Medication 50 MILLILITER(S): at 08:02

## 2023-09-13 RX ADMIN — CILOSTAZOL 50 MILLIGRAM(S): 100 TABLET ORAL at 17:17

## 2023-09-13 RX ADMIN — Medication 650 MILLIGRAM(S): at 01:04

## 2023-09-13 RX ADMIN — INSULIN HUMAN 5 UNIT(S): 100 INJECTION, SOLUTION SUBCUTANEOUS at 08:00

## 2023-09-13 RX ADMIN — SODIUM CHLORIDE 50 MILLILITER(S): 9 INJECTION, SOLUTION INTRAVENOUS at 18:41

## 2023-09-13 RX ADMIN — Medication 100 MILLIGRAM(S): at 12:04

## 2023-09-13 RX ADMIN — INSULIN HUMAN 5 UNIT(S): 100 INJECTION, SOLUTION SUBCUTANEOUS at 15:10

## 2023-09-13 RX ADMIN — APIXABAN 2.5 MILLIGRAM(S): 2.5 TABLET, FILM COATED ORAL at 06:01

## 2023-09-13 RX ADMIN — TAMSULOSIN HYDROCHLORIDE 0.4 MILLIGRAM(S): 0.4 CAPSULE ORAL at 21:27

## 2023-09-13 RX ADMIN — ISOSORBIDE MONONITRATE 30 MILLIGRAM(S): 60 TABLET, EXTENDED RELEASE ORAL at 12:05

## 2023-09-13 RX ADMIN — SODIUM ZIRCONIUM CYCLOSILICATE 10 GRAM(S): 10 POWDER, FOR SUSPENSION ORAL at 17:17

## 2023-09-13 RX ADMIN — ATORVASTATIN CALCIUM 80 MILLIGRAM(S): 80 TABLET, FILM COATED ORAL at 21:27

## 2023-09-13 NOTE — PROGRESS NOTE ADULT - SUBJECTIVE AND OBJECTIVE BOX
Subjective  Denies fevers, chills, nausea, vomiting, SOB, CP. Tolerating diet. Patient voiding without issue and color is a light pink vs clear yellow.    Objective    Vital signs  T(F): , Max: 98.8 (09-13-23 @ 05:06)  HR: 65 (09-13-23 @ 05:06)  BP: 128/68 (09-13-23 @ 05:06)  SpO2: 98% (09-13-23 @ 05:06)  Wt(kg): --    Output     OUT:    Voided (mL): 100 mL  Total OUT: 100 mL    Total NET: -100 mL          Gen: NAD  Abd: soft, nontender, nondistended  : voiding without issues, PVR 15cc and color is a light pink vs clear yellow     Labs      09-13 @ 05:23    WBC 9.69  / Hct 32.1  / SCr 2.91     09-12 @ 05:24    WBC 10.77 / Hct 39.5  / SCr 2.45           Urine Cx: ?  Blood Cx: ?    Imaging

## 2023-09-13 NOTE — PROGRESS NOTE ADULT - SUBJECTIVE AND OBJECTIVE BOX
[facility] : Progress Note :: [phone], (office [phone]),   Dr Rosario / Dr Pagan / Dr Lang / Dr Serna / Dr Jose JIMÉNEZ / Dr Charles / Dr Yeung / Dr Sancho rivera  _____________________________________________________________________________________________    noted with hyperkalemia     Novocain (Faint)    Hospital Medications:   MEDICATIONS  (STANDING):  allopurinol 100 milliGRAM(s) Oral daily  amLODIPine   Tablet 5 milliGRAM(s) Oral daily  apixaban 2.5 milliGRAM(s) Oral two times a day  aspirin  chewable 81 milliGRAM(s) Oral daily  atorvastatin 80 milliGRAM(s) Oral at bedtime  carvedilol 3.125 milliGRAM(s) Oral every 12 hours  cilostazol 50 milliGRAM(s) Oral two times a day  finasteride 5 milliGRAM(s) Oral daily  folic acid 1 milliGRAM(s) Oral daily  isosorbide   mononitrate ER Tablet (IMDUR) 30 milliGRAM(s) Oral daily  sodium zirconium cyclosilicate 10 Gram(s) Oral two times a day  tamsulosin 0.4 milliGRAM(s) Oral at bedtime      VITALS:  T(F): 98.2 (09-13-23 @ 11:58), Max: 98.8 (09-13-23 @ 05:06)  HR: 68 (09-13-23 @ 11:58)  BP: 126/71 (09-13-23 @ 11:58)  RR: 18 (09-13-23 @ 11:58)  SpO2: 97% (09-13-23 @ 11:58)  Wt(kg): --    09-12 @ 07:01  -  09-13 @ 07:00  --------------------------------------------------------  IN: 0 mL / OUT: 100 mL / NET: -100 mL        PHYSICAL EXAM:  Constitutional: NAD  HEENT: anicteric sclera, oropharynx clear,   Neck: No JVD  Respiratory: CTAB, no wheezes, rales or rhonchi  Cardiovascular: S1, S2, RRR  Gastrointestinal: BS+, soft, NT/ND  Extremities: No peripheral edema  Neurological: A/O x 3, no focal deficits      LABS:  09-13    136  |  110<H>  |  59<H>  ----------------------------<  131<H>  6.0<H>   |  22  |  3.04<H>    Ca    9.0      13 Sep 2023 10:26  Phos  3.7     09-13  Mg     2.0     09-13    TPro  6.5  /  Alb  2.7<L>  /  TBili  0.3  /  DBili      /  AST  20  /  ALT  19  /  AlkPhos  134<H>  09-13    Creatinine Trend: 3.04 <--, 2.91 <--, 2.45 <--, 3.03 <--, 3.27 <--, 3.52 <--                        10.8   8.71  )-----------( 243      ( 13 Sep 2023 10:26 )             34.4     Urine Studies:  Urinalysis Basic - ( 13 Sep 2023 10:26 )    Color:  / Appearance:  / SG:  / pH:   Gluc: 131 mg/dL / Ketone:   / Bili:  / Urobili:    Blood:  / Protein:  / Nitrite:    Leuk Esterase:  / RBC:  / WBC    Sq Epi:  / Non Sq Epi:  / Bacteria:         RADIOLOGY & ADDITIONAL STUDIES:

## 2023-09-13 NOTE — PROGRESS NOTE ADULT - PROBLEM SELECTOR PLAN 2
pw hematura, chronic swenson  follows urology, Dr. Cesar OP   s/p cystoscopy with Dr. Cesar Mon 9/11  continue to monitor H/H - stable-12.2/39.5    UROLOGY CONSULTED- s/p cystoscopy, resection of bladder mass pw hematura, chronic swenson  follows urology, Dr. Cesar OP   s/p cystoscopy with Dr. Cesar Mon 9/11  continue to monitor H/H - stable-12.2/39.5    UROLOGY CONSULTED- s/p cystoscopy, resection of bladder mass- to follow up OP as above

## 2023-09-13 NOTE — DISCHARGE NOTE PROVIDER - NSDCCAREPROVSEEN_GEN_ALL_CORE_FT
Nery, Marci Tesfaye, Donn Phillip, Candy Jo, Jayde White, Ottoniel Mari, Tatum Jean, Tara Le, Elina Rosario, Kaushik Tejeda, Gloria Diggs, Tomer Peoples, Osei Daly, Yokasta Plummer, Jayy Diallo, Larry Cespedes, Kenzie Cesar, Roman Frank, Asiya Hinton, Karly

## 2023-09-13 NOTE — CHART NOTE - NSCHARTNOTEFT_GEN_A_CORE
Patient is s/p 2  doses of insulin 5- dextrose.    K 6 am 6.6--> 10 AM 6.0--> 6:50 PM 4.8, will check in AM    RFTs trending upwards-  BUN  57-->59-->62  creat 2.91-->3.04-->3.38  patient is currently on gentle IVF NS @ 50 ml/h Patient is s/p 2  doses of insulin 5- dextrose.    K 6 am 6.6--> 10 AM 6.0--> 6:50 PM 4.8, will check in AM    RFTs trending upwards-  BUN  57-->59-->62  creat 2.91-->3.04-->3.38  patient is currently on gentle IVF NS @ 50 ml/h    f/u bladder scan    WILL SIGN OUT TO NIGHT TEAM

## 2023-09-13 NOTE — PROGRESS NOTE ADULT - ASSESSMENT
83M with h/o HTN, HLD, DM, CAD on ASA/Plavix, PAD, CKD, prostate ca s/p radiation, anemia, COPD not on home O2, PAD, h/o TURBT (per chart review, last in 2020) who presents with hematuria. He is scheduled for cysto clot evacuation with Dr. Cesar tomorrow and will need to be medically optimized.    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 patient can be discharged from urologic perspective and f/u with Dr. Cesar in the office.  - Reg diet  - Discussed with Dr. Cesar

## 2023-09-13 NOTE — DISCHARGE NOTE PROVIDER - PROVIDER TOKENS
PROVIDER:[TOKEN:[[id number]],FOLLOWUP:[2 weeks]] PROVIDER:[TOKEN:[[id number]],FOLLOWUP:[2 weeks]],PROVIDER:[TOKEN:[[id number]]],PROVIDER:[TOKEN:[[id number]]]

## 2023-09-13 NOTE — PROGRESS NOTE ADULT - SUBJECTIVE AND OBJECTIVE BOX
PGY-1 Progress Note discussed with attending    PAGER #: [[phone]] TILL 5:00 PM  PLEASE CONTACT ON CALL TEAM:  - On Call Team (Please refer to Janneth) FROM 5:00 PM - 8:30PM  - Nightfloat Team FROM 8:30 -7:30 AM    CHIEF COMPLAINT & BRIEF HOSPITAL COURSE:      INTERVAL HPI/OVERNIGHT EVENTS:       REVIEW OF SYSTEMS:  CONSTITUTIONAL: No fever, weight loss, or fatigue  RESPIRATORY: No cough, wheezing, chills or hemoptysis; No shortness of breath  CARDIOVASCULAR: No chest pain, palpitations, dizziness, or leg swelling  GASTROINTESTINAL: No abdominal pain. No nausea, vomiting, or hematemesis; No diarrhea or constipation. No melena or hematochezia.  GENITOURINARY: No dysuria or hematuria, urinary frequency  NEUROLOGICAL: No headaches, memory loss, loss of strength, numbness, or tremors  SKIN: No itching, burning, rashes, or lesions     MEDICATIONS  (STANDING):  allopurinol 100 milliGRAM(s) Oral daily  amLODIPine   Tablet 5 milliGRAM(s) Oral daily  apixaban 2.5 milliGRAM(s) Oral two times a day  aspirin  chewable 81 milliGRAM(s) Oral daily  atorvastatin 80 milliGRAM(s) Oral at bedtime  carvedilol 3.125 milliGRAM(s) Oral every 12 hours  cilostazol 50 milliGRAM(s) Oral two times a day  dextrose 50% Injectable 50 milliLiter(s) IV Push once  finasteride 5 milliGRAM(s) Oral daily  folic acid 1 milliGRAM(s) Oral daily  insulin regular  human recombinant 5 Unit(s) IV Push once  isosorbide   mononitrate ER Tablet (IMDUR) 30 milliGRAM(s) Oral daily  sodium zirconium cyclosilicate 10 Gram(s) Oral two times a day  tamsulosin 0.4 milliGRAM(s) Oral at bedtime    MEDICATIONS  (PRN):  acetaminophen     Tablet .. 650 milliGRAM(s) Oral every 6 hours PRN Temp greater or equal to 38C (100.4F), Mild Pain (1 - 3)  albuterol    90 MICROgram(s) HFA Inhaler 2 Puff(s) Inhalation every 6 hours PRN Shortness of Breath and/or Wheezing      Vital Signs Last 24 Hrs  T(C): 36.8 (13 Sep 2023 11:58), Max: 37.1 (13 Sep 2023 05:06)  T(F): 98.2 (13 Sep 2023 11:58), Max: 98.8 (13 Sep 2023 05:06)  HR: 68 (13 Sep 2023 11:58) (63 - 71)  BP: 126/71 (13 Sep 2023 11:58) (122/52 - 130/73)  BP(mean): --  RR: 18 (13 Sep 2023 11:58) (18 - 18)  SpO2: 97% (13 Sep 2023 11:58) (97% - 99%)    Parameters below as of 13 Sep 2023 11:58  Patient On (Oxygen Delivery Method): room air        PHYSICAL EXAMINATION:  GENERAL: NAD, well built  HEAD:  Atraumatic, Normocephalic  EYES:  conjunctiva and sclera clear  NECK: Supple, No JVD, Normal thyroid  CHEST/LUNG: Clear to auscultation. Clear to percussion bilaterally; No rales, rhonchi, wheezing, or rubs  HEART: Regular rate and rhythm; No murmurs, rubs, or gallops  ABDOMEN: Soft, Nontender, Nondistended; Bowel sounds present, no pain or masses on palpation  NERVOUS SYSTEM:  Alert & Oriented X3  : voiding well  EXTREMITIES:  2+ Peripheral Pulses, No clubbing, cyanosis, or edema  SKIN: warm dry                          10.8   8.71  )-----------( 243      ( 13 Sep 2023 10:26 )             34.4     09-13    136  |  110<H>  |  59<H>  ----------------------------<  131<H>  6.0<H>   |  22  |  3.04<H>    Ca    9.0      13 Sep 2023 10:26  Phos  3.7     09-13  Mg     2.0     09-13    TPro  6.5  /  Alb  2.7<L>  /  TBili  0.3  /  DBili  x   /  AST  20  /  ALT  19  /  AlkPhos  134<H>  09-13    LIVER FUNCTIONS - ( 13 Sep 2023 05:23 )  Alb: 2.7 g/dL / Pro: 6.5 g/dL / ALK PHOS: 134 U/L / ALT: 19 U/L DA / AST: 20 U/L / GGT: x                   I&O's Summary    12 Sep 2023 07:01  -  13 Sep 2023 07:00  --------------------------------------------------------  IN: 0 mL / OUT: 100 mL / NET: -100 mL            CAPILLARY BLOOD GLUCOSE      RADIOLOGY & ADDITIONAL TESTS:                   PGY-1 Progress Note discussed with attending    PAGER #: [[phone]] TILL 5:00 PM  PLEASE CONTACT ON CALL TEAM:  - On Call Team (Please refer to Janneth) FROM 5:00 PM - 8:30PM  - Nightfloat Team FROM 8:30 -7:30 AM    CHIEF COMPLAINT & BRIEF HOSPITAL COURSE:      INTERVAL HPI/OVERNIGHT EVENTS:   No acute        REVIEW OF SYSTEMS:  CONSTITUTIONAL: No fever, weight loss, or fatigue  RESPIRATORY: No cough, wheezing, chills or hemoptysis; No shortness of breath  CARDIOVASCULAR: No chest pain, palpitations, dizziness, or leg swelling  GASTROINTESTINAL: No abdominal pain. No nausea, vomiting, or hematemesis; No diarrhea or constipation. No melena or hematochezia.  GENITOURINARY: No dysuria or hematuria, urinary frequency  NEUROLOGICAL: No headaches, memory loss, loss of strength, numbness, or tremors  SKIN: No itching, burning, rashes, or lesions     MEDICATIONS  (STANDING):  allopurinol 100 milliGRAM(s) Oral daily  amLODIPine   Tablet 5 milliGRAM(s) Oral daily  apixaban 2.5 milliGRAM(s) Oral two times a day  aspirin  chewable 81 milliGRAM(s) Oral daily  atorvastatin 80 milliGRAM(s) Oral at bedtime  carvedilol 3.125 milliGRAM(s) Oral every 12 hours  cilostazol 50 milliGRAM(s) Oral two times a day  dextrose 50% Injectable 50 milliLiter(s) IV Push once  finasteride 5 milliGRAM(s) Oral daily  folic acid 1 milliGRAM(s) Oral daily  insulin regular  human recombinant 5 Unit(s) IV Push once  isosorbide   mononitrate ER Tablet (IMDUR) 30 milliGRAM(s) Oral daily  sodium zirconium cyclosilicate 10 Gram(s) Oral two times a day  tamsulosin 0.4 milliGRAM(s) Oral at bedtime    MEDICATIONS  (PRN):  acetaminophen     Tablet .. 650 milliGRAM(s) Oral every 6 hours PRN Temp greater or equal to 38C (100.4F), Mild Pain (1 - 3)  albuterol    90 MICROgram(s) HFA Inhaler 2 Puff(s) Inhalation every 6 hours PRN Shortness of Breath and/or Wheezing      Vital Signs Last 24 Hrs  T(C): 36.8 (13 Sep 2023 11:58), Max: 37.1 (13 Sep 2023 05:06)  T(F): 98.2 (13 Sep 2023 11:58), Max: 98.8 (13 Sep 2023 05:06)  HR: 68 (13 Sep 2023 11:58) (63 - 71)  BP: 126/71 (13 Sep 2023 11:58) (122/52 - 130/73)  BP(mean): --  RR: 18 (13 Sep 2023 11:58) (18 - 18)  SpO2: 97% (13 Sep 2023 11:58) (97% - 99%)    Parameters below as of 13 Sep 2023 11:58  Patient On (Oxygen Delivery Method): room air        PHYSICAL EXAMINATION:  GENERAL: NAD, well built  HEAD:  Atraumatic, Normocephalic  EYES:  conjunctiva and sclera clear  NECK: Supple, No JVD, Normal thyroid  CHEST/LUNG: Clear to auscultation. Clear to percussion bilaterally; No rales, rhonchi, wheezing, or rubs  HEART: Regular rate and rhythm; No murmurs, rubs, or gallops  ABDOMEN: Soft, Nontender, Nondistended; Bowel sounds present, no pain or masses on palpation  NERVOUS SYSTEM:  Alert & Oriented X3  : voiding well  EXTREMITIES:  2+ Peripheral Pulses, No clubbing, cyanosis, or edema  SKIN: warm dry                          10.8   8.71  )-----------( 243      ( 13 Sep 2023 10:26 )             34.4     09-13    136  |  110<H>  |  59<H>  ----------------------------<  131<H>  6.0<H>   |  22  |  3.04<H>    Ca    9.0      13 Sep 2023 10:26  Phos  3.7     09-13  Mg     2.0     09-13    TPro  6.5  /  Alb  2.7<L>  /  TBili  0.3  /  DBili  x   /  AST  20  /  ALT  19  /  AlkPhos  134<H>  09-13    LIVER FUNCTIONS - ( 13 Sep 2023 05:23 )  Alb: 2.7 g/dL / Pro: 6.5 g/dL / ALK PHOS: 134 U/L / ALT: 19 U/L DA / AST: 20 U/L / GGT: x                   I&O's Summary    12 Sep 2023 07:01  -  13 Sep 2023 07:00  --------------------------------------------------------  IN: 0 mL / OUT: 100 mL / NET: -100 mL            CAPILLARY BLOOD GLUCOSE      RADIOLOGY & ADDITIONAL TESTS:                   PGY-1 Progress Note discussed with attending    PAGER #: [[phone]] TILL 5:00 PM  PLEASE CONTACT ON CALL TEAM:  - On Call Team (Please refer to Janneth) FROM 5:00 PM - 8:30PM  - Nightfloat Team FROM 8:30 -7:30 AM    CHIEF COMPLAINT & BRIEF HOSPITAL COURSE:      INTERVAL HPI/OVERNIGHT EVENTS:   No acute  overnight events. Vitals stable.  Patient was planned for discharge today-  Patient denies bleeding- gums, hematuria, melena, hematochezia.  AM labs K- 6.6, Hb dropped to 10, repeat at 10 am was stable  EKG unchanged from previous read  Cocktail ( insulin 5 U, dextrose given)  Repeat K 10 AM- 6.0  Cocktail administered, repeat CMP ordered for 8 pm  BUN 57 and creatinine 2.91 also trended upwards- concern for urinary retention s/p removal of العلي ( 8/12/23)        REVIEW OF SYSTEMS:  CONSTITUTIONAL: No fever, weight loss, or fatigue  RESPIRATORY: No cough, wheezing, chills or hemoptysis; No shortness of breath  CARDIOVASCULAR: No chest pain, palpitations, dizziness, or leg swelling  GASTROINTESTINAL: No abdominal pain. No nausea, vomiting, or hematemesis; No diarrhea or constipation. No melena or hematochezia.  GENITOURINARY: No dysuria or hematuria, urinary frequency  NEUROLOGICAL: No headaches, memory loss, loss of strength, numbness, or tremors  SKIN: No itching, burning, rashes, or lesions     MEDICATIONS  (STANDING):  allopurinol 100 milliGRAM(s) Oral daily  amLODIPine   Tablet 5 milliGRAM(s) Oral daily  apixaban 2.5 milliGRAM(s) Oral two times a day  aspirin  chewable 81 milliGRAM(s) Oral daily  atorvastatin 80 milliGRAM(s) Oral at bedtime  carvedilol 3.125 milliGRAM(s) Oral every 12 hours  cilostazol 50 milliGRAM(s) Oral two times a day  dextrose 50% Injectable 50 milliLiter(s) IV Push once  finasteride 5 milliGRAM(s) Oral daily  folic acid 1 milliGRAM(s) Oral daily  insulin regular  human recombinant 5 Unit(s) IV Push once  isosorbide   mononitrate ER Tablet (IMDUR) 30 milliGRAM(s) Oral daily  sodium zirconium cyclosilicate 10 Gram(s) Oral two times a day  tamsulosin 0.4 milliGRAM(s) Oral at bedtime    MEDICATIONS  (PRN):  acetaminophen     Tablet .. 650 milliGRAM(s) Oral every 6 hours PRN Temp greater or equal to 38C (100.4F), Mild Pain (1 - 3)  albuterol    90 MICROgram(s) HFA Inhaler 2 Puff(s) Inhalation every 6 hours PRN Shortness of Breath and/or Wheezing      Vital Signs Last 24 Hrs  T(C): 36.8 (13 Sep 2023 11:58), Max: 37.1 (13 Sep 2023 05:06)  T(F): 98.2 (13 Sep 2023 11:58), Max: 98.8 (13 Sep 2023 05:06)  HR: 68 (13 Sep 2023 11:58) (63 - 71)  BP: 126/71 (13 Sep 2023 11:58) (122/52 - 130/73)  BP(mean): --  RR: 18 (13 Sep 2023 11:58) (18 - 18)  SpO2: 97% (13 Sep 2023 11:58) (97% - 99%)    Parameters below as of 13 Sep 2023 11:58  Patient On (Oxygen Delivery Method): room air        PHYSICAL EXAMINATION:  GENERAL: NAD, well built  HEAD:  Atraumatic, Normocephalic  EYES:  conjunctiva and sclera clear  NECK: Supple, No JVD, Normal thyroid  CHEST/LUNG: Clear to auscultation. Clear to percussion bilaterally; No rales, rhonchi, wheezing, or rubs  HEART: Regular rate and rhythm; No murmurs, rubs, or gallops  ABDOMEN: Soft, Nontender, Nondistended; Bowel sounds present, no pain or masses on palpation  NERVOUS SYSTEM:  Alert & Oriented X3  : voiding well  EXTREMITIES:  2+ Peripheral Pulses, No clubbing, cyanosis, or edema  SKIN: warm dry                          10.8   8.71  )-----------( 243      ( 13 Sep 2023 10:26 )             34.4     09-13    136  |  110<H>  |  59<H>  ----------------------------<  131<H>  6.0<H>   |  22  |  3.04<H>    Ca    9.0      13 Sep 2023 10:26  Phos  3.7     09-13  Mg     2.0     09-13    TPro  6.5  /  Alb  2.7<L>  /  TBili  0.3  /  DBili  x   /  AST  20  /  ALT  19  /  AlkPhos  134<H>  09-13    LIVER FUNCTIONS - ( 13 Sep 2023 05:23 )  Alb: 2.7 g/dL / Pro: 6.5 g/dL / ALK PHOS: 134 U/L / ALT: 19 U/L DA / AST: 20 U/L / GGT: x                   I&O's Summary    12 Sep 2023 07:01  -  13 Sep 2023 07:00  --------------------------------------------------------  IN: 0 mL / OUT: 100 mL / NET: -100 mL            CAPILLARY BLOOD GLUCOSE      RADIOLOGY & ADDITIONAL TESTS:

## 2023-09-13 NOTE — PROGRESS NOTE ADULT - PROBLEM SELECTOR PLAN 3
RCRI 6.0 %  30-day risk of death, MI, or cardiac arrest    Lang score 0.1 %  Risk of myocardial infarction or cardiac arrest, intraoperatively or up to 30 days post-op    consulted Cardio Dr. Tesfaye for cards clearance- Patient is at high cardiac risk s/p 2 doses lokelma and 1 dose cocktail during admission    9/13- 6 am - K 6.6-->cocktail given--> 10 am K 6---> cocktail given at 3:30 PM---> f/u repeat CMP at 8 pm    Nephro recs- discharge on lokelma 10 mg OD if persistent hyperkalemia

## 2023-09-13 NOTE — DISCHARGE NOTE PROVIDER - CARE PROVIDER_API CALL
Roman Cesar  Urology  [address] [place] [address]  Phone: [phone]  Fax: [phone]  Follow Up Time: 2 weeks   Roman Cesar  Urology  [address] [place] [address]  Phone: [phone]  Fax: [phone]  Follow Up Time: 2 weeks    Kaushik Rosario  Internal Medicine  [address] [place] [id number]  Phone: [phone]  Fax: [phone]  Follow Up Time:     Prince Rivera  Vascular Surgery  [address] [facility], [address]  [place] [address]  Phone: [phone]  Fax: [phone]  Follow Up Time:

## 2023-09-13 NOTE — PROGRESS NOTE ADULT - SUBJECTIVE AND OBJECTIVE BOX
[facility]  **********************************     C A R D I O L O G Y  **********************************     DATE OF SERVICE: 09-13-23    Patient denies chest pain or shortness of breath.   Review of systems otherwise (-)  	  MEDICATIONS:  MEDICATIONS  (STANDING):  allopurinol 100 milliGRAM(s) Oral daily  amLODIPine   Tablet 5 milliGRAM(s) Oral daily  apixaban 2.5 milliGRAM(s) Oral two times a day  aspirin  chewable 81 milliGRAM(s) Oral daily  atorvastatin 80 milliGRAM(s) Oral at bedtime  carvedilol 3.125 milliGRAM(s) Oral every 12 hours  cilostazol 50 milliGRAM(s) Oral two times a day  dextrose 50% Injectable 50 milliLiter(s) IV Push once  finasteride 5 milliGRAM(s) Oral daily  folic acid 1 milliGRAM(s) Oral daily  insulin regular  human recombinant 5 Unit(s) IV Push once  isosorbide   mononitrate ER Tablet (IMDUR) 30 milliGRAM(s) Oral daily  sodium zirconium cyclosilicate 10 Gram(s) Oral two times a day  tamsulosin 0.4 milliGRAM(s) Oral at bedtime      LABS:	 	    CARDIAC MARKERS:                                    10.8   8.71  )-----------( 243      ( 13 Sep 2023 10:26 )             34.4     Hemoglobin: 10.8 g/dL (09-13 @ 10:26)  Hemoglobin: 10.0 g/dL (09-13 @ 05:23)  Hemoglobin: 12.2 g/dL (09-12 @ 05:24)  Hemoglobin: 11.1 g/dL (09-11 @ 05:26)  Hemoglobin: 11.1 g/dL (09-10 @ 19:31)      09-13    136  |  110<H>  |  59<H>  ----------------------------<  131<H>  6.0<H>   |  22  |  3.04<H>    Ca    9.0      13 Sep 2023 10:26  Phos  3.7     09-13  Mg     2.0     09-13    TPro  6.5  /  Alb  2.7<L>  /  TBili  0.3  /  DBili  x   /  AST  20  /  ALT  19  /  AlkPhos  134<H>  09-13    Creatinine Trend: 3.04<--, 2.91<--, 2.45<--, 3.03<--, 3.27<--, 3.52<--    COAGS:       proBNP:   Lipid Profile:   HgA1c:   TSH:       PHYSICAL EXAM:  T(C): 36.8 (09-13-23 @ 11:58), Max: 37.1 (09-13-23 @ 05:06)  HR: 68 (09-13-23 @ 11:58) (63 - 71)  BP: 126/71 (09-13-23 @ 11:58) (122/52 - 130/73)  RR: 18 (09-13-23 @ 11:58) (18 - 18)  SpO2: 97% (09-13-23 @ 11:58) (97% - 99%)  Wt(kg): --  I&O's Summary    12 Sep 2023 07:01  -  13 Sep 2023 07:00  --------------------------------------------------------  IN: 0 mL / OUT: 100 mL / NET: -100 mL          Gen: Appears well in NAD  HEENT:  (-)icterus (-)pallor  CV: N S1 S2 1/6 ALON (+)2 Pulses B/l  Resp:  Clear to ausculatation B/L, normal effort  GI: (+) BS Soft, NT, ND  Lymph:  (-)Edema, (-)obvious lymphadenopathy  Skin: Warm to touch, Normal turgor  Psych: Appropriate mood and affect      TELEMETRY: 	          ASSESSMENT/PLAN: 	83y  Male        HEENT:  (-)icterus (-)pallor  CV: N S1 S2 1/6 ALON (+)2 Pulses B/l  Resp:  Clear to ausculatation B/L, normal effort  GI: (+) BS Soft, NT, ND  Lymph:  (-)Edema, (-)obvious lymphadenopathy  Skin: Warm to touch, Normal turgor  Psych: Appropriate mood and affect    ASSESSMENT/PLAN: 	83y  Male   84 yo M COPD on home o2 CKD, CAD, DM afib historically preserved LV fx admitted with persistent hematuria s/p cysto    # Preop risk  - s/p cysto  - tolerated procedure   - no clinical CHF or unstable cardiac syndromes  - tolerating eliquis     Donn Tesfaye MD, [facility]  BEEPER [phone]   [facility]  **********************************     C A R D I O L O G Y  **********************************     DATE OF SERVICE: 09-13-23    Patient denies chest pain or shortness of breath.   Review of systems otherwise (-)  	  MEDICATIONS:  MEDICATIONS  (STANDING):  allopurinol 100 milliGRAM(s) Oral daily  amLODIPine   Tablet 5 milliGRAM(s) Oral daily  apixaban 2.5 milliGRAM(s) Oral two times a day  aspirin  chewable 81 milliGRAM(s) Oral daily  atorvastatin 80 milliGRAM(s) Oral at bedtime  carvedilol 3.125 milliGRAM(s) Oral every 12 hours  cilostazol 50 milliGRAM(s) Oral two times a day  dextrose 50% Injectable 50 milliLiter(s) IV Push once  finasteride 5 milliGRAM(s) Oral daily  folic acid 1 milliGRAM(s) Oral daily  insulin regular  human recombinant 5 Unit(s) IV Push once  isosorbide   mononitrate ER Tablet (IMDUR) 30 milliGRAM(s) Oral daily  sodium zirconium cyclosilicate 10 Gram(s) Oral two times a day  tamsulosin 0.4 milliGRAM(s) Oral at bedtime      LABS:	 	    CARDIAC MARKERS:                                    10.8   8.71  )-----------( 243      ( 13 Sep 2023 10:26 )             34.4     Hemoglobin: 10.8 g/dL (09-13 @ 10:26)  Hemoglobin: 10.0 g/dL (09-13 @ 05:23)  Hemoglobin: 12.2 g/dL (09-12 @ 05:24)  Hemoglobin: 11.1 g/dL (09-11 @ 05:26)  Hemoglobin: 11.1 g/dL (09-10 @ 19:31)      09-13    136  |  110<H>  |  59<H>  ----------------------------<  131<H>  6.0<H>   |  22  |  3.04<H>    Ca    9.0      13 Sep 2023 10:26  Phos  3.7     09-13  Mg     2.0     09-13    TPro  6.5  /  Alb  2.7<L>  /  TBili  0.3  /  DBili  x   /  AST  20  /  ALT  19  /  AlkPhos  134<H>  09-13    Creatinine Trend: 3.04<--, 2.91<--, 2.45<--, 3.03<--, 3.27<--, 3.52<--    COAGS:       proBNP:   Lipid Profile:   HgA1c:   TSH:       PHYSICAL EXAM:  T(C): 36.8 (09-13-23 @ 11:58), Max: 37.1 (09-13-23 @ 05:06)  HR: 68 (09-13-23 @ 11:58) (63 - 71)  BP: 126/71 (09-13-23 @ 11:58) (122/52 - 130/73)  RR: 18 (09-13-23 @ 11:58) (18 - 18)  SpO2: 97% (09-13-23 @ 11:58) (97% - 99%)  Wt(kg): --  I&O's Summary    12 Sep 2023 07:01  -  13 Sep 2023 07:00  --------------------------------------------------------  IN: 0 mL / OUT: 100 mL / NET: -100 mL          Gen: Appears well in NAD  HEENT:  (-)icterus (-)pallor  CV: N S1 S2 1/6 ALON (+)2 Pulses B/l  Resp:  Clear to ausculatation B/L, normal effort  GI: (+) BS Soft, NT, ND  Lymph:  (-)Edema, (-)obvious lymphadenopathy  Skin: Warm to touch, Normal turgor  Psych: Appropriate mood and affect        HEENT:  (-)icterus (-)pallor  CV: N S1 S2 1/6 ALON (+)2 Pulses B/l  Resp:  Clear to ausculatation B/L, normal effort  GI: (+) BS Soft, NT, ND  Lymph:  (-)Edema, (-)obvious lymphadenopathy  Skin: Warm to touch, Normal turgor  Psych: Appropriate mood and affect    ASSESSMENT/PLAN: 	83y  Male   82 yo M COPD on home o2 CKD, CAD, DM  historically preserved LV fx admitted with persistent hematuria s/p cysto    # Preop risk  - s/p cysto  - tolerated procedure   - no clinical CHF or unstable cardiac syndromes      # PAD  - restarted on regina Tesfaye MD, [facility]  BEEPER [phone]

## 2023-09-13 NOTE — PROGRESS NOTE ADULT - PROBLEM SELECTOR PLAN 4
S cr baseline- 2.6  to 3 S cr baseline- 2.6  to 3  Creatinine 2.91 (9/13)- f/u bladder scan RCRI 6.0 %  30-day risk of death, MI, or cardiac arrest    Lang score 0.1 %  Risk of myocardial infarction or cardiac arrest, intraoperatively or up to 30 days post-op    consulted Cardio Dr. Tesfaye for cards clearance- Patient is at high cardiac risk

## 2023-09-13 NOTE — PROGRESS NOTE ADULT - PROBLEM SELECTOR PLAN 8
Not on any home medication as per pharmacy medrec start on home meds-  amlodipine 5mg OD  carvedilol  Imdur

## 2023-09-13 NOTE — PROGRESS NOTE ADULT - PROBLEM SELECTOR PLAN 10
Not on any home meds  Satting well on RA s/p trans urethral resection ( 2020)  start home meds-  Finasteride  Tamsulosin

## 2023-09-13 NOTE — PROGRESS NOTE ADULT - PROBLEM SELECTOR PLAN 9
s/p trans urethral resection ( 2020)  start home meds-  Finasteride  Tamsulosin Not on any home medication as per pharmacy medrec

## 2023-09-13 NOTE — PROGRESS NOTE ADULT - PROBLEM SELECTOR PLAN 1
s/p resection, fulguration of bladder mass and cystoscopy    FU surgical pathology  FU urology recs s/p old residual clot and inflammatory changes vs possible CIS- bladder mass that was excised and fulgurated on cystoscopy  surgical pathology-Benign urothelial mucosa and nonkeratinizing squamous epithelium  with reactive changes  urology recs- follow up with Dr. Cesar OP

## 2023-09-13 NOTE — DISCHARGE NOTE PROVIDER - CARE PROVIDERS DIRECT ADDRESSES
[email or web address]

## 2023-09-13 NOTE — DISCHARGE NOTE PROVIDER - NSDCMRMEDTOKEN_GEN_ALL_CORE_FT
albuterol 90 mcg/inh inhalation aerosol: 2 puff(s) inhaled every 6 hours, As needed, Shortness of Breath and/or Wheezing  allopurinol 100 mg oral tablet: 1 tab(s) orally once a day  amLODIPine 10 mg oral tablet: 1 tab(s) orally once a day  aspirin 81 mg oral delayed release tablet: 1 tab(s) orally once a day  atorvastatin 80 mg oral tablet: 1 tab(s) orally once a day (at bedtime)  carvedilol 3.125 mg oral tablet: 1 tab(s) orally every 12 hours  cilostazol 50 mg oral tablet: 1 tab(s) orally 2 times a day  Eliquis 2.5 mg oral tablet: 1 tab(s) orally 2 times a day  finasteride 5 mg oral tablet: 1 tab(s) orally once a day  folic acid 1 mg oral tablet: 1 tab(s) orally once a day  isosorbide mononitrate 30 mg oral tablet, extended release: 1 tab(s) orally once a day  tamsulosin 0.4 mg oral capsule: 1 cap(s) orally once a day (at bedtime)   albuterol 90 mcg/inh inhalation aerosol: 2 puff(s) inhaled every 6 hours, As needed, Shortness of Breath and/or Wheezing  allopurinol 100 mg oral tablet: 1 tab(s) orally once a day  amLODIPine 10 mg oral tablet: 1 tab(s) orally once a day  aspirin 81 mg oral delayed release tablet: 1 tab(s) orally once a day  atorvastatin 80 mg oral tablet: 1 tab(s) orally once a day (at bedtime)  carvedilol 3.125 mg oral tablet: 1 tab(s) orally every 12 hours  cilostazol 50 mg oral tablet: 1 tab(s) orally 2 times a day  Eliquis 2.5 mg oral tablet: 1 tab(s) orally 2 times a day  finasteride 5 mg oral tablet: 1 tab(s) orally once a day  folic acid 1 mg oral tablet: 1 tab(s) orally once a day  isosorbide mononitrate 30 mg oral tablet, extended release: 1 tab(s) orally once a day  sodium zirconium cyclosilicate 10 g oral powder for reconstitution: 10 gram(s) orally once a day  tamsulosin 0.4 mg oral capsule: 1 cap(s) orally once a day (at bedtime)   albuterol 90 mcg/inh inhalation aerosol: 2 puff(s) inhaled every 6 hours, As needed, Shortness of Breath and/or Wheezing  allopurinol 100 mg oral tablet: 1 tab(s) orally once a day  amLODIPine 10 mg oral tablet: 1 tab(s) orally once a day  aspirin 81 mg oral delayed release tablet: 1 tab(s) orally once a day  atorvastatin 80 mg oral tablet: 1 tab(s) orally once a day (at bedtime)  carvedilol 3.125 mg oral tablet: 1 tab(s) orally every 12 hours  cilostazol 50 mg oral tablet: 1 tab(s) orally 2 times a day  finasteride 5 mg oral tablet: 1 tab(s) orally once a day  folic acid 1 mg oral tablet: 1 tab(s) orally once a day  isosorbide mononitrate 30 mg oral tablet, extended release: 1 tab(s) orally once a day  sodium zirconium cyclosilicate 10 g oral powder for reconstitution: 10 gram(s) orally once a day  tamsulosin 0.4 mg oral capsule: 1 cap(s) orally once a day (at bedtime)   albuterol 90 mcg/inh inhalation aerosol: 2 puff(s) inhaled every 6 hours, As needed, Shortness of Breath and/or Wheezing  allopurinol 100 mg oral tablet: 1 tab(s) orally once a day  amLODIPine 10 mg oral tablet: 1 tab(s) orally once a day  apixaban 2.5 mg oral tablet: 1 tab(s) orally 2 times a day  aspirin 81 mg oral delayed release tablet: 1 tab(s) orally once a day  atorvastatin 80 mg oral tablet: 1 tab(s) orally once a day (at bedtime)  carvedilol 3.125 mg oral tablet: 1 tab(s) orally every 12 hours  finasteride 5 mg oral tablet: 1 tab(s) orally once a day  folic acid 1 mg oral tablet: 1 tab(s) orally once a day  isosorbide mononitrate 30 mg oral tablet, extended release: 1 tab(s) orally once a day  sodium zirconium cyclosilicate 10 g oral powder for reconstitution: 10 gram(s) orally once a day  tamsulosin 0.4 mg oral capsule: 1 cap(s) orally once a day (at bedtime)

## 2023-09-13 NOTE — PROGRESS NOTE ADULT - ASSESSMENT
# RUSTY improving. has underlying CKD IV. SCR drifting to baseline  BMP in AM   # hyperkalemia. s/p medical management . Lokelma 10 grams BID today  rpt BMP if K+ better, can be discharged on lokelma 10grams daily.  F/U in the renal office next week with rpt labs    # hematuria- s/p cystoscopy

## 2023-09-13 NOTE — CHART NOTE - NSCHARTNOTEFT_GEN_A_CORE
Was notified by the attending that the patient's POC glucose is ~30~40s.  Went to examine the patient who is mentating at his baseline. However, he said he is fine, but he felt a bit dizzy ~15 minutes ago.    PCA indicated that she told a RN (does not remember who), who is not the main RN for the patient.    Ordered Ydkcfglz28% 50mL x2.  Also added D5 to the IVF Was notified by the attending that the patient's POC glucose is ~30~40s.  Went to examine the patient who is mentating at his baseline. He said he is fine, but he felt a bit dizzy ~15 minutes ago.    PCA indicated that she told a RN (does not remember who), who is not the main RN for the patient.    FS gluc 38.  Ordered Qpdyiibo70% 50mL x2.  Also added D5 to the IVF Was notified by the attending that the patient's POC glucose is ~30~40s.  Went to examine the patient who is mentating at his baseline. He said he is fine, but he felt a bit dizzy ~15 minutes ago.    PCA indicated that she told a RN (does not remember who), who is not the main RN for the patient.    FS gluc 38.  Ordered Kwkzwxpl17% 50mL x2.  Also added D5 to the IVF    Glucose 94 after apple juice with sugar added and Dextrose 50% x1

## 2023-09-13 NOTE — DISCHARGE NOTE PROVIDER - NSDCCPCAREPLAN_GEN_ALL_CORE_FT
PRINCIPAL DISCHARGE DIAGNOSIS  Diagnosis: Hematuria  Assessment and Plan of Treatment: You came in with bloody urine from your swenson catheter. Urology was consulted. Cardiologist Dr. Tesfaye was consulted for cardiac medical optimization. You were scheduled for, and underwent cystoscopy on 9/11/2023 with Dr. Cesar. Swenson catheter was removed. After discharge, please follow up with urologist Dr. Cesar.        SECONDARY DISCHARGE DIAGNOSES  Diagnosis: Bladder mass  Assessment and Plan of Treatment: From cystoscopy, pathology sample was sent to rule out malignancy. Old blood clots were removed from cystoscopy. You are to follow outpatient urologist Dr. Cesar for the result.    Diagnosis: Hyperkalemia  Assessment and Plan of Treatment: You had hyperkalemia (high potassium level). Insulin and dextrose were given. Lokelma were given. It trended down. Hyperkalemia is likely due to worsening kidney function. You were started on LOKELMA 10mg daily. Please continue this medication daily for now. YOU MUST GO TO YOUR NEPHROLOGIST DR SANCHEZ APPOINTMENT NEXT WEEK.    Diagnosis: Acute kidney injury superimposed on CKD  Assessment and Plan of Treatment: You presented with with RUSTY(acute kidney injury) on CKD (chronic kidney disease) stage 3. Baseline serum creatinine was ~2.6. Nephrologist Dr. Rosario followed you while inpatient. Serum creatinine value came back to baseline but it is trending up again. YOU MUST GO TO YOUR NEPHROLOGIST APPOINTMENT NEXT WEEK.    Diagnosis: Hypoglycemia  Assessment and Plan of Treatment: You developed low blood sugar when you were receiving insulin and dextrose to treat your hyperkalemia(high potassium). You were given sugar orally and intravenously. Your sugar levels normalized. Please follow up with your primary care doctor within 2 weeks after discharge.    Diagnosis: Peripheral artery disease  Assessment and Plan of Treatment: You have history of PAD and home medication was cilostazol. You are also on Eliquis. Please follow up with your vascular doctor and confirm that you are supposed to take Aspirin+Cilostazol+Eliquis.    Diagnosis: CAD (coronary artery disease)  Assessment and Plan of Treatment: You have a history of CAD, on aspirin, atorvastatin 80mg. Please continue your medications on discharge.    Diagnosis: HTN (hypertension)  Assessment and Plan of Treatment: you have history of HTN, and home amlodipine, carvedilol. and Imdur were continued. Please continue your home medication after discharge.    Diagnosis: Prostate cancer  Assessment and Plan of Treatment: You have a history of prostate cancer. Finasteride and Tamsulosin were continued. Continue your home medication and regularly follow up with your urologist.    Diagnosis: History of COPD  Assessment and Plan of Treatment: You have history of COPD, not on home meds. You were without any symptoms on room air. Please regularly follow up with your lung doctor.     PRINCIPAL DISCHARGE DIAGNOSIS  Diagnosis: Hematuria  Assessment and Plan of Treatment: You came in with bloody urine from your swenson catheter. Urology was consulted. Cardiologist Dr. Tesfaye was consulted for cardiac medical optimization. You were scheduled for, and underwent cystoscopy on 9/11/2023 with Dr. Cesar. You had bladder clot removal and fulgaration of mass. Swenson catheter was removed. After discharge, please follow up with urologist Dr. Cesar.      SECONDARY DISCHARGE DIAGNOSES  Diagnosis: Bladder mass  Assessment and Plan of Treatment: From cystoscopy, pathology sample was sent to rule out malignancy. Old blood clots were removed from cystoscopy. You are to follow outpatient urologist Dr. Cesar for the result.    Diagnosis: Hyperkalemia  Assessment and Plan of Treatment: You had hyperkalemia (high potassium level). Insulin and dextrose were given. Lokelma were given. It trended down. Hyperkalemia is likely due to worsening kidney function. You were started on LOKELMA 10mg daily. Please continue this medication daily for now. YOU MUST GO TO YOUR NEPHROLOGIST DR SANCHEZ APPOINTMENT NEXT WEEK.    Diagnosis: Acute kidney injury superimposed on CKD  Assessment and Plan of Treatment: You presented with with RUSTY(acute kidney injury) on CKD (chronic kidney disease) stage 3. Baseline serum creatinine was ~2.6. Nephrologist Dr. Rosario followed you while inpatient. Serum creatinine value came back to baseline but it is trending up again. YOU MUST GO TO YOUR NEPHROLOGIST APPOINTMENT NEXT WEEK.    Diagnosis: CAD (coronary artery disease)  Assessment and Plan of Treatment: You have a history of CAD, on aspirin, atorvastatin 80mg. Please continue your medications on discharge.    Diagnosis: Peripheral artery disease  Assessment and Plan of Treatment: You have history of PAD and home medication was cilostazol, aspirin and Eliquis. You are also on Eliquis. Please follow up with your vascular doctor and confirm that you are supposed to take Aspirin+Eliquis.    Diagnosis: HTN (hypertension)  Assessment and Plan of Treatment: you have history of HTN, and home amlodipine, carvedilol. and Imdur were continued. Please continue your home medication after discharge.    Diagnosis: Prostate cancer  Assessment and Plan of Treatment: You have a history of prostate cancer. Finasteride and Tamsulosin were continued. Continue your home medication and regularly follow up with your urologist.    Diagnosis: History of COPD  Assessment and Plan of Treatment: You have history of COPD, not on home meds. You were without any symptoms on room air. Please regularly follow up with your lung doctor.    Diagnosis: Hypoglycemia  Assessment and Plan of Treatment: You developed low blood sugar when you were receiving insulin and dextrose to treat your hyperkalemia(high potassium). You were given sugar orally and intravenously. Your sugar levels normalized. Please follow up with your primary care doctor within 2 weeks after discharge.

## 2023-09-13 NOTE — DISCHARGE NOTE PROVIDER - HOSPITAL COURSE
83 y.o. M with PMHx HTN, HLD, DM, CAD on ASA/Plavix, PAD, CKD, prostate ca s/p radiation, anemia, COPD not on home O2, PAD, h/o TURBT (per chart review, last in 2020) who presents with persistent hematuria. Admitted to medicine for medical optimization, s/p cystoscopy and resection of bladder mass on Monday 9/11.     Patient came in with hematuria from a chronic swenson. Urology was consulted. Cardiologist Dr. Tesfaye was consulted for cardiac medical optimization. Patient was scheduled for, and underwent cystoscopy on 9/11/2023 with Dr. Cesar. Swenson catheter was removed. ______________________    From the bladder mass and old clots removed, surgical pathology was sent. Patient is to follow outpatient urology for the result.    Patient had hyperkalemia. Insulin and dextrose were given. Lokelma were given. ________    Patient presented with RUSTY on CKD stage 3. Baseline serum creatinine was 2.6~3. Nephrologist Dr. Rosario followed the patient. Serum creatinine value came back to baseline.    Patient has history of CAD, on aspirin, atorvastatin 80.    Patient has history of PAD and home medication was cilostazol.    Patient has history of HTN, and home amlodipine, carvedilol. and Imdur were continued.    Patient has history of DM, not on any medication. Insulin sliding scale was used for glucose monitoring during admission.    Patient has history of prostate cancer. Finasteride and Tamsulosin were continued.    Patient has history of COPD, not on home meds. Patient was without any symptoms on room air.    Patient is stable for discharge. Patient has been advised to follow up as outpatient. Case has been discussed with the attending. 83 y.o. M with PMHx HTN, HLD, DM, CAD on ASA/Plavix, PAD, CKD, prostate ca s/p radiation, anemia, COPD not on home O2, PAD, h/o TURBT (per chart review, last in 2020) who presents with persistent hematuria. Admitted to medicine for medical optimization, s/p cystoscopy and resection of bladder mass on Monday 9/11.     Patient came in with hematuria from a chronic swenson. Urology was consulted. Cardiologist Dr. Tesfaye was consulted for cardiac medical optimization. Patient was scheduled for, and underwent cystoscopy on 9/11/2023 with Dr. Cesar. Swenson catheter was removed. ______________________    From the bladder mass and old clots removed, surgical pathology was sent. Patient is to follow outpatient urology for the result.    Patient had hyperkalemia. Insulin and dextrose were given. Lokelma were given. It trended down. Patient was started on Lokelma 10mg daily.    Patient presented with RUSTY on CKD stage 3. Baseline serum creatinine was 2.6~3. Nephrologist Dr. Rosario followed the patient. Serum creatinine value came back to baseline.    Patient has history of CAD, on aspirin, atorvastatin 80mg.    Patient has history of PAD and home medication was cilostazol.     Patient has history of HTN, and home amlodipine, carvedilol. and Imdur were continued.    Patient has history of DM, not on any medication. Insulin sliding scale was used for glucose monitoring during admission.    Patient has history of prostate cancer. Finasteride and Tamsulosin were continued.    Patient has history of COPD, not on home meds. Patient was without any symptoms on room air.    Patient is stable for discharge. Patient has been advised to follow up as outpatient. Case has been discussed with the attending. 83 y.o. M with PMHx HTN, HLD, DM, CAD on ASA/Plavix, PAD, CKD, prostate ca s/p radiation, anemia, COPD not on home O2, PAD, h/o TURBT (per chart review, last in 2020) who presents with persistent hematuria. Admitted to medicine for medical optimization, s/p cystoscopy and resection of bladder mass on Monday 9/11.     Patient came in with hematuria from a chronic swenson. Urology was consulted. Cardiologist Dr. Tesfaye was consulted for cardiac medical optimization. Patient was scheduled for, and underwent cystoscopy on 9/11/2023 with Dr. Cesar. Swenson catheter was removed. ___________ Hematuria resolved. Patient is to follow up outpatient.    From the bladder mass and old clots removed, surgical pathology was sent. Patient is to follow outpatient urology for the result.    Patient had hyperkalemia. Insulin and dextrose were given. Lokelma were given. It trended down. Patient was started on Lokelma 10mg daily.    Patient presented with RUSTY on CKD stage 3. Baseline serum creatinine was 2.6~3. Nephrologist Dr. Rosario followed the patient. Serum creatinine value came back to baseline.    Patient has history of CAD, on aspirin, atorvastatin 80mg.    Patient has history of PAD and home medication was cilostazol.     Patient has history of HTN, and home amlodipine, carvedilol. and Imdur were continued.    Patient has history of DM, not on any medication. Insulin sliding scale was used for glucose monitoring during admission.    Patient has history of prostate cancer. Finasteride and Tamsulosin were continued.    Patient has history of COPD, not on home meds. Patient was without any symptoms on room air.    Patient is stable for discharge. Patient has been advised to follow up as outpatient. Case has been discussed with the attending. 83 y.o. M with PMHx HTN, HLD, DM, CAD on ASA/Plavix, PAD, CKD, prostate ca s/p radiation, anemia, COPD not on home O2, PAD, h/o TURBT (per chart review, last in 2020) who presents with persistent hematuria.     Patient came in with hematuria from a chronic swenson. Urology was consulted. Cardiologist Dr. Tesfaye was consulted for cardiac medical optimization. Patient was scheduled for, and underwent cystoscopy on 9/11/2023 with Dr. Cesar. Swenson catheter was removed. ___________ Hematuria resolved. Patient is to follow up outpatient.    From the bladder mass and old clots removed, surgical pathology was sent. Patient is to follow outpatient urology for the result. Preliminary result shows Benign tumor    Patient had hyperkalemia. Insulin and dextrose were given. Lokelma were given. It trended down. Patient was started on Lokelma 10mg daily.    Patient presented with RUSTY on CKD stage 3. Baseline serum creatinine was 2.6~3. Nephrologist Dr. Rosario followed the patient.  Patient has history of CAD, on aspirin, atorvastatin 80mg.    Patient has history of PAD and home medication was cilostazol.     Patient has history of HTN, and home amlodipine, carvedilol. and Imdur were continued.    Patient has history of DM, not on any medication. Insulin sliding scale was used for glucose monitoring during admission.    Patient has history of prostate cancer. Finasteride and Tamsulosin were continued.    Patient has history of COPD, not on home meds. Patient was without any symptoms on room air.    Patient is stable for discharge. Patient has been advised to follow up as outpatient. Case has been discussed with the attending.

## 2023-09-13 NOTE — DISCHARGE NOTE PROVIDER - ATTENDING DISCHARGE PHYSICAL EXAMINATION:
"S- Transfer to ICU from med/surg.    B- Sepsis d/t pneumonia    A- Brief systems assessment: Pt is A to self otherwise confused. Somewhat agitated upon transfer. Blood pressures in the 90s/50s most of the shift. Highest BP of 115/50 this shift. Remains on 2 L 02 via nasal cannula with 02 sats in high 90s. Prn neb given this evening for some short of breathe. Lung sounds diminished throughout. Congested nonproductive cough. Some urine incontinence but does get up to the bathroom via x 1 assist.    R- Transfer to ICU per physician orders. Continue to monitor pt and update physician as needed.      Code status: Full Code  Skin: See flowsheets  Fall Risk: Yes- Department fall risk interventions implemented.  Isolation and Signage: Droplet  Medication drips upon transfer: S/L  Blue Bin checked and medications transfer out with patientYesABLE\"}    " Patient was seen and examined at bedside. Denies any complains, wants to be discharged     ICU Vital Signs Last 24 Hrs  T(C): 36.2 (14 Sep 2023 12:43), Max: 37.3 (14 Sep 2023 05:14)  T(F): 97.2 (14 Sep 2023 12:43), Max: 99.2 (14 Sep 2023 05:14)  HR: 74 (14 Sep 2023 12:43) (66 - 74)  BP: 113/91 (14 Sep 2023 12:43) (113/91 - 119/73)  BP(mean): --  ABP: --  ABP(mean): --  RR: 18 (14 Sep 2023 12:43) (18 - 18)  SpO2: 96% (14 Sep 2023 12:43) (95% - 97%)    O2 Parameters below as of 14 Sep 2023 12:43  Patient On (Oxygen Delivery Method): room air    P/E:  NAD  AAOx2-3, no focal deficit   CTABL  S1S2 WNL  Abd soft, non tender, BS present   BLLE no edema or calf tenderness     Labs noted     A/P:  K improved today. Has mild RUSTY but patient want to be discharged. Spoke with Dr. Rosario, has a followup appointment with Dr. Moser in a week. Will cont Lokelma on discharge  Spoke with Dr. Tesfaye. patient does not have afib. Spoke with Dr. Rivera, patient is on aspirin and eliquis for severe PAD. Will cont aspirin, and Eliquis on discharge. No active bleeding  Biopsy result showing benign lesion. Will follow up with Dr. Cesar as outpatient   Stable to be discharged.   CM and SW for safe discharge

## 2023-09-14 ENCOUNTER — TRANSCRIPTION ENCOUNTER (OUTPATIENT)
Age: 83
End: 2023-09-14

## 2023-09-14 VITALS
DIASTOLIC BLOOD PRESSURE: 91 MMHG | HEART RATE: 74 BPM | OXYGEN SATURATION: 96 % | TEMPERATURE: 97 F | RESPIRATION RATE: 18 BRPM | SYSTOLIC BLOOD PRESSURE: 113 MMHG

## 2023-09-14 LAB
ALBUMIN SERPL ELPH-MCNC: 2.5 G/DL — LOW (ref 3.5–5)
ALP SERPL-CCNC: 128 U/L — HIGH (ref 40–120)
ALT FLD-CCNC: 16 U/L DA — SIGNIFICANT CHANGE UP (ref 10–60)
ANION GAP SERPL CALC-SCNC: 7 MMOL/L — SIGNIFICANT CHANGE UP (ref 5–17)
AST SERPL-CCNC: 18 U/L — SIGNIFICANT CHANGE UP (ref 10–40)
BASOPHILS # BLD AUTO: 0.04 K/UL — SIGNIFICANT CHANGE UP (ref 0–0.2)
BASOPHILS NFR BLD AUTO: 0.3 % — SIGNIFICANT CHANGE UP (ref 0–2)
BILIRUB SERPL-MCNC: 0.4 MG/DL — SIGNIFICANT CHANGE UP (ref 0.2–1.2)
BUN SERPL-MCNC: 69 MG/DL — HIGH (ref 7–18)
CALCIUM SERPL-MCNC: 8.2 MG/DL — LOW (ref 8.4–10.5)
CHLORIDE SERPL-SCNC: 108 MMOL/L — SIGNIFICANT CHANGE UP (ref 96–108)
CO2 SERPL-SCNC: 20 MMOL/L — LOW (ref 22–31)
CREAT SERPL-MCNC: 3.57 MG/DL — HIGH (ref 0.5–1.3)
EGFR: 16 ML/MIN/1.73M2 — LOW
EOSINOPHIL # BLD AUTO: 0.31 K/UL — SIGNIFICANT CHANGE UP (ref 0–0.5)
EOSINOPHIL NFR BLD AUTO: 2.6 % — SIGNIFICANT CHANGE UP (ref 0–6)
GLUCOSE SERPL-MCNC: 98 MG/DL — SIGNIFICANT CHANGE UP (ref 70–99)
HCT VFR BLD CALC: 30.5 % — LOW (ref 39–50)
HGB BLD-MCNC: 9.4 G/DL — LOW (ref 13–17)
IMM GRANULOCYTES NFR BLD AUTO: 0.5 % — SIGNIFICANT CHANGE UP (ref 0–0.9)
LYMPHOCYTES # BLD AUTO: 1.59 K/UL — SIGNIFICANT CHANGE UP (ref 1–3.3)
LYMPHOCYTES # BLD AUTO: 13.2 % — SIGNIFICANT CHANGE UP (ref 13–44)
MAGNESIUM SERPL-MCNC: 1.9 MG/DL — SIGNIFICANT CHANGE UP (ref 1.6–2.6)
MCHC RBC-ENTMCNC: 30.5 PG — SIGNIFICANT CHANGE UP (ref 27–34)
MCHC RBC-ENTMCNC: 30.8 GM/DL — LOW (ref 32–36)
MCV RBC AUTO: 99 FL — SIGNIFICANT CHANGE UP (ref 80–100)
MONOCYTES # BLD AUTO: 0.83 K/UL — SIGNIFICANT CHANGE UP (ref 0–0.9)
MONOCYTES NFR BLD AUTO: 6.9 % — SIGNIFICANT CHANGE UP (ref 2–14)
NEUTROPHILS # BLD AUTO: 9.23 K/UL — HIGH (ref 1.8–7.4)
NEUTROPHILS NFR BLD AUTO: 76.5 % — SIGNIFICANT CHANGE UP (ref 43–77)
NRBC # BLD: 0 /100 WBCS — SIGNIFICANT CHANGE UP (ref 0–0)
PHOSPHATE SERPL-MCNC: 4 MG/DL — SIGNIFICANT CHANGE UP (ref 2.5–4.5)
PLATELET # BLD AUTO: 251 K/UL — SIGNIFICANT CHANGE UP (ref 150–400)
POTASSIUM SERPL-MCNC: 4.9 MMOL/L — SIGNIFICANT CHANGE UP (ref 3.5–5.3)
POTASSIUM SERPL-SCNC: 4.9 MMOL/L — SIGNIFICANT CHANGE UP (ref 3.5–5.3)
PROT SERPL-MCNC: 6.1 G/DL — SIGNIFICANT CHANGE UP (ref 6–8.3)
RBC # BLD: 3.08 M/UL — LOW (ref 4.2–5.8)
RBC # FLD: 17.2 % — HIGH (ref 10.3–14.5)
SODIUM SERPL-SCNC: 135 MMOL/L — SIGNIFICANT CHANGE UP (ref 135–145)
WBC # BLD: 12.06 K/UL — HIGH (ref 3.8–10.5)
WBC # FLD AUTO: 12.06 K/UL — HIGH (ref 3.8–10.5)

## 2023-09-14 PROCEDURE — 99239 HOSP IP/OBS DSCHRG MGMT >30: CPT

## 2023-09-14 RX ORDER — CILOSTAZOL 100 MG/1
1 TABLET ORAL
Refills: 0 | DISCHARGE

## 2023-09-14 RX ORDER — APIXABAN 2.5 MG/1
1 TABLET, FILM COATED ORAL
Refills: 0 | DISCHARGE

## 2023-09-14 RX ORDER — APIXABAN 2.5 MG/1
1 TABLET, FILM COATED ORAL
Qty: 0 | Refills: 0 | DISCHARGE
Start: 2023-09-14

## 2023-09-14 RX ORDER — SODIUM ZIRCONIUM CYCLOSILICATE 10 G/10G
10 POWDER, FOR SUSPENSION ORAL
Qty: 140 | Refills: 0
Start: 2023-09-14 | End: 2023-09-27

## 2023-09-14 RX ADMIN — APIXABAN 2.5 MILLIGRAM(S): 2.5 TABLET, FILM COATED ORAL at 05:43

## 2023-09-14 RX ADMIN — SODIUM ZIRCONIUM CYCLOSILICATE 10 GRAM(S): 10 POWDER, FOR SUSPENSION ORAL at 05:43

## 2023-09-14 RX ADMIN — CILOSTAZOL 50 MILLIGRAM(S): 100 TABLET ORAL at 05:43

## 2023-09-14 RX ADMIN — CARVEDILOL PHOSPHATE 3.12 MILLIGRAM(S): 80 CAPSULE, EXTENDED RELEASE ORAL at 05:43

## 2023-09-14 RX ADMIN — AMLODIPINE BESYLATE 5 MILLIGRAM(S): 2.5 TABLET ORAL at 05:43

## 2023-09-14 NOTE — PROGRESS NOTE ADULT - PROVIDER SPECIALTY LIST ADULT
Cardiology
Surgery
Urology
Cardiology
Internal Medicine
Nephrology
Cardiology
Cardiology
Internal Medicine
Internal Medicine
Nephrology
Urology
Nephrology
Urology
Urology
Internal Medicine

## 2023-09-14 NOTE — DISCHARGE NOTE NURSING/CASE MANAGEMENT/SOCIAL WORK - NSDCVIVACCINE_GEN_ALL_CORE_FT
influenza, injectable, quadrivalent, preservative free; 15-Oct-2018 18:11; Marcelina Gutiérrez (RN); [employer]; 49de (Exp. Date: 30-Jun-2019); IntraMuscular; Deltoid Left.; 0.5 milliLiter(s); VIS (VIS Published: 07-Aug-2015, VIS Presented: 15-Oct-2018);

## 2023-09-14 NOTE — PROGRESS NOTE ADULT - SUBJECTIVE AND OBJECTIVE BOX
C A R D I O L O G Y  **********************************     DATE OF SERVICE: 09-14-23    Patient denies chest pain or shortness of breath.   Review of symptoms otherwise negative.    acetaminophen     Tablet .. 650 milliGRAM(s) Oral every 6 hours PRN  albuterol    90 MICROgram(s) HFA Inhaler 2 Puff(s) Inhalation every 6 hours PRN  allopurinol 100 milliGRAM(s) Oral daily  amLODIPine   Tablet 5 milliGRAM(s) Oral daily  apixaban 2.5 milliGRAM(s) Oral two times a day  aspirin  chewable 81 milliGRAM(s) Oral daily  atorvastatin 80 milliGRAM(s) Oral at bedtime  carvedilol 3.125 milliGRAM(s) Oral every 12 hours  cilostazol 50 milliGRAM(s) Oral two times a day  dextrose 5% + sodium chloride 0.9%. 1000 milliLiter(s) IV Continuous <Continuous>  dextrose 50% Injectable 50 milliLiter(s) IV Push once  dextrose 50% Injectable 50 milliLiter(s) IV Push once  finasteride 5 milliGRAM(s) Oral daily  folic acid 1 milliGRAM(s) Oral daily  isosorbide   mononitrate ER Tablet (IMDUR) 30 milliGRAM(s) Oral daily  sodium zirconium cyclosilicate 10 Gram(s) Oral two times a day  tamsulosin 0.4 milliGRAM(s) Oral at bedtime                            9.4    12.06 )-----------( 251      ( 14 Sep 2023 05:38 )             30.5       Hemoglobin: 9.4 g/dL (09-14 @ 05:38)  Hemoglobin: 9.7 g/dL (09-13 @ 18:50)  Hemoglobin: 10.8 g/dL (09-13 @ 10:26)  Hemoglobin: 10.0 g/dL (09-13 @ 05:23)  Hemoglobin: 12.2 g/dL (09-12 @ 05:24)      09-14    135  |  108  |  69<H>  ----------------------------<  98  4.9   |  20<L>  |  3.57<H>    Ca    8.2<L>      14 Sep 2023 05:38  Phos  4.0     09-14  Mg     1.9     09-14    TPro  6.1  /  Alb  2.5<L>  /  TBili  0.4  /  DBili  x   /  AST  18  /  ALT  16  /  AlkPhos  128<H>  09-14    Creatinine Trend: 3.57<--, 3.38<--, 3.04<--, 2.91<--, 2.45<--, 3.03<--    COAGS:           T(C): 36.2 (09-14-23 @ 12:43), Max: 37.3 (09-14-23 @ 05:14)  HR: 74 (09-14-23 @ 12:43) (66 - 74)  BP: 113/91 (09-14-23 @ 12:43) (113/91 - 128/72)  RR: 18 (09-14-23 @ 12:43) (18 - 18)  SpO2: 96% (09-14-23 @ 12:43) (95% - 98%)  Wt(kg): --    I&O's Summary        Gen: Appears well in NAD  HEENT:  (-)icterus (-)pallor  CV: N S1 S2 1/6 ALON (+)2 Pulses B/l  Resp:  Clear to ausculatation B/L, normal effort  GI: (+) BS Soft, NT, ND  Lymph:  (-)Edema, (-)obvious lymphadenopathy  Skin: Warm to touch, Normal turgor  Psych: Appropriate mood and affect      TELEMETRY: 	          ASSESSMENT/PLAN: 	83y  Male        HEENT:  (-)icterus (-)pallor  CV: N S1 S2 1/6 ALON (+)2 Pulses B/l  Resp:  Clear to ausculatation B/L, normal effort  GI: (+) BS Soft, NT, ND  Lymph:  (-)Edema, (-)obvious lymphadenopathy  Skin: Warm to touch, Normal turgor  Psych: Appropriate mood and affect    ASSESSMENT/PLAN: 	83y  Male   84 yo M COPD on home o2 CKD, CAD, DM afib historically preserved LV fx admitted with persistent hematuria s/p cysto    # Preop risk  - s/p cysto  - tolerated procedure   - no clinical CHF or unstable cardiac syndromes  - tolerating regina Tesfaye MD, FACC  [facility] [phone]   C A R D I O L O G Y  **********************************     DATE OF SERVICE: 09-14-23    Patient denies chest pain or shortness of breath.   Review of symptoms otherwise negative.    acetaminophen     Tablet .. 650 milliGRAM(s) Oral every 6 hours PRN  albuterol    90 MICROgram(s) HFA Inhaler 2 Puff(s) Inhalation every 6 hours PRN  allopurinol 100 milliGRAM(s) Oral daily  amLODIPine   Tablet 5 milliGRAM(s) Oral daily  apixaban 2.5 milliGRAM(s) Oral two times a day  aspirin  chewable 81 milliGRAM(s) Oral daily  atorvastatin 80 milliGRAM(s) Oral at bedtime  carvedilol 3.125 milliGRAM(s) Oral every 12 hours  cilostazol 50 milliGRAM(s) Oral two times a day  dextrose 5% + sodium chloride 0.9%. 1000 milliLiter(s) IV Continuous <Continuous>  dextrose 50% Injectable 50 milliLiter(s) IV Push once  dextrose 50% Injectable 50 milliLiter(s) IV Push once  finasteride 5 milliGRAM(s) Oral daily  folic acid 1 milliGRAM(s) Oral daily  isosorbide   mononitrate ER Tablet (IMDUR) 30 milliGRAM(s) Oral daily  sodium zirconium cyclosilicate 10 Gram(s) Oral two times a day  tamsulosin 0.4 milliGRAM(s) Oral at bedtime                            9.4    12.06 )-----------( 251      ( 14 Sep 2023 05:38 )             30.5       Hemoglobin: 9.4 g/dL (09-14 @ 05:38)  Hemoglobin: 9.7 g/dL (09-13 @ 18:50)  Hemoglobin: 10.8 g/dL (09-13 @ 10:26)  Hemoglobin: 10.0 g/dL (09-13 @ 05:23)  Hemoglobin: 12.2 g/dL (09-12 @ 05:24)      09-14    135  |  108  |  69<H>  ----------------------------<  98  4.9   |  20<L>  |  3.57<H>    Ca    8.2<L>      14 Sep 2023 05:38  Phos  4.0     09-14  Mg     1.9     09-14    TPro  6.1  /  Alb  2.5<L>  /  TBili  0.4  /  DBili  x   /  AST  18  /  ALT  16  /  AlkPhos  128<H>  09-14    Creatinine Trend: 3.57<--, 3.38<--, 3.04<--, 2.91<--, 2.45<--, 3.03<--    COAGS:           T(C): 36.2 (09-14-23 @ 12:43), Max: 37.3 (09-14-23 @ 05:14)  HR: 74 (09-14-23 @ 12:43) (66 - 74)  BP: 113/91 (09-14-23 @ 12:43) (113/91 - 128/72)  RR: 18 (09-14-23 @ 12:43) (18 - 18)  SpO2: 96% (09-14-23 @ 12:43) (95% - 98%)  Wt(kg): --    I&O's Summary        Gen: Appears well in NAD  HEENT:  (-)icterus (-)pallor  CV: N S1 S2 1/6 ALON (+)2 Pulses B/l  Resp:  Clear to ausculatation B/L, normal effort  GI: (+) BS Soft, NT, ND  Lymph:  (-)Edema, (-)obvious lymphadenopathy  Skin: Warm to touch, Normal turgor  Psych: Appropriate mood and affect          HEENT:  (-)icterus (-)pallor  CV: N S1 S2 1/6 ALON (+)2 Pulses B/l  Resp:  Clear to ausculatation B/L, normal effort  GI: (+) BS Soft, NT, ND  Lymph:  (-)Edema, (-)obvious lymphadenopathy  Skin: Warm to touch, Normal turgor  Psych: Appropriate mood and affect    ASSESSMENT/PLAN: 	83y  Male   84 yo M COPD on home o2 CKD, CAD, DM ahistorically preserved LV fx admitted with persistent hematuria s/p cysto    # Preop risk  - s/p cysto  - tolerated procedure   - no clinical CHF or unstable cardiac syndromes  - tolerating eliquis     # PAD  - 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 Rivera would like to cont if tolertated  - D/W Dr. winn, pt has no hx of PAF    Donn Tesfaye MD, FACC  [facility] [phone]

## 2023-09-14 NOTE — ASU PATIENT PROFILE, ADULT - PSH
Patient: Norma Shore Date: 2023   : 1971 Attending: Jose Lincoln MD   51 year old female        Chief complaint:  OHTx, ESRD on HD as OP (TTS), Pneumonia, Covid-19 infection    Subjective/Interim Events:    S/p PD catheter insertion 23.  Seen in dialysis. Requesting to be dialyzed in the bed.  Has some pain at the pd catheter insertion site, took oxycodone this am.  Received some nss due to hypotension.    Reviewed: Vital signs, labs, imaging studies, medications, notes    Vital Last Value 24 Hour Range   Temperature 98 °F (36.7 °C) (23 1517) Temp  Min: 97 °F (36.1 °C)  Max: 98 °F (36.7 °C)   Pulse (!) 111 (23 1625) Pulse  Min: 69  Max: 112   Respiratory 18 (23 1625) Resp  Min: 18  Max: 20   Non-Invasive  Blood Pressure (!) 83/49 (23 1625) BP  Min: 67/41  Max: 93/57   Arterial   Blood Pressure (!) 69/36 (23 0900) No data recorded   PulseOximetry 92 % (23 1625) SpO2  Min: 92 %  Max: 96 %     Vital Today Admitted   Weight 59.1 kg (130 lb 4.7 oz) (23 1252) Weight: 67 kg (147 lb 11.3 oz) (23 1428)   Height N/A Height: 5' 3\" (160 cm) (23 1732)   BMI N/A BMI (Calculated): 24.6 (23 1732)     Weight over the past 48 Hours:  Patient Vitals for the past 48 hrs:   Weight   23 0548 58.9 kg (129 lb 13.6 oz)   23 1129 55.6 kg (122 lb 9.2 oz)   23 1420 55 kg (121 lb 4.1 oz)   23 0824 54.5 kg (120 lb 2.4 oz)   23 1120 53.7 kg (118 lb 6.2 oz)   23 1252 59.1 kg (130 lb 4.7 oz)       Intake/Output:    Last Stool Occurrence: 1 (23 0700)    No intake/output data recorded.    I/O last 3 completed shifts:  In: 720 [P.O.:720]  Out: 900 [Other:900]      Intake/Output Summary (Last 24 hours) at 2023 1736  Last data filed at 2023 1114  Gross per 24 hour   Intake 480 ml   Output 900 ml   Net -420 ml     Medications Prior to Admission   Medication Sig Dispense Refill   • ipratropium-albuterol (DUONEB)  0.5-2.5 (3) MG/3ML nebulizer solution Take 3 mLs by nebulization every 6 hours as needed for Wheezing or Shortness of Breath. 360 mL 3   • nystatin (MYCOSTATIN) 532200 UNIT/ML suspension Swish and swallow 5 mLs 4 times daily. 200 mL 1   • albuterol 108 (90 Base) MCG/ACT inhaler Inhale 2 puffs into the lungs every 4 hours as needed for Shortness of Breath or Wheezing. 8.5 g 0   • predniSONE (DELTASONE) 5 MG tablet TAKE 1 TABLET BY MOUTH EVERY DAY 90 tablet 1   • gabapentin (NEURONTIN) 100 MG capsule TAKE 1 CAPSULE BY MOUTH AT 9 AM AND TAKE 2 CAPSULES AT 9  capsule 1   • amoxicillin-clavulanate (AUGMENTIN) 500-125 MG per tablet TAKE 1 TABLET BY MOUTH EVERY DAY 30 tablet 3   • Methoxy PEG-Epoetin Beta (MIRCERA IJ) Inject 75 mcg into the vein every 14 days. At dialysis. Tuesday.     • amoxicillin (AMOXIL) 500 MG capsule TAKE FOUR CAPSULES BY MOUTH ONE HOUR BEFORE DENTAL APPOINTMENT as needed 12 capsule 3   • traMADol (ULTRAM) 50 MG tablet Take 1 tablet by mouth every 8 hours as needed for Pain. 30 tablet 1   • mupirocin (BACTROBAN) 2 % ointment Apply 1 application. topically in the morning and 1 application. in the evening. 22 g 1   • midodrine (PROAMATINE) 5 MG tablet TAKE 1 TABLET BY MOUTH THREE TIMES A DAY (Patient taking differently: Take 5 mg by mouth 3 times daily. On Non-dialysis days) 270 tablet 1   • levothyroxine 100 MCG tablet TAKE 1 TABLET BY MOUTH EVERY DAY 90 tablet 1   • pantoprazole (PROTONIX) 40 MG tablet TAKE 1 TABLET BY MOUTH TWICE A  tablet 1   • montelukast (SINGULAIR) 10 MG tablet TAKE 1 TABLET BY MOUTH EVERY DAY IN THE MORNING 90 tablet 3   • TACROlimus (PROGRAF) 1 MG capsule Take 4 capsules by mouth every morning AND 3 capsules every evening. 640 capsule 3   • triamcinolone (ARISTOSPAN) 0.1 % lotion Apply to affected areas on scalp and ears twice daily as needed 60 mL 3   • tixagevimab & cilgavimab (EVUSHELD) 150 & 150 MG/1.5ML Solution      • pravastatin (PRAVACHOL) 40 MG tablet  TAKE 1 TABLET BY MOUTH EVERY DAY AT NIGHT 90 tablet 3   • senna (SENOKOT) 8.6 MG tablet Take 1 tablet by mouth 2 times daily as needed for Constipation. 30 tablet 1   • sevelamer carbonate (RENVELA) 800 MG tablet Take 2,400 mg by mouth 3 times daily (with meals).      • CALCITRIOL PO Take 1 mcg by mouth 3 days a week. At dialysis. Tuesday, Thursday, and Saturday.     • EPINEPHrine (EPIPEN JR) 0.15 MG/0.3ML auto-injector Inject 0.3 mLs into the muscle 1 time as needed for Anaphylaxis. 1 each 5   • Multiple Vitamins-Minerals (RENAPLEX-D) Tab Take 1 tablet by mouth daily.   3   • midodrine (PROAMATINE) 5 MG tablet Take 15 mg by mouth as directed. Indications: Only on Dialysis days Take three tablets (=15mg) before dialysis on Tuesday, Thursday, and Saturday. Take another 15mg during dialysis if needed.     • loperamide (IMODIUM) 2 MG capsule TAKE 1 CAPSULE THREE TIMES A DAY AS NEEDED FOR DIARRHEA 30 capsule 0   • TYLENOL EXTRA STRENGTH 500 MG PO TABS Take 1,000 mg by mouth every 6 hours as needed for Pain.  0 0       ALLERGIES:   Allergen Reactions   • Aspirin ANAPHYLAXIS     Per pt   • Ondansetron Other (See Comments)     Muscle spasms , lost feeling in legs.   • Triazolam Other (See Comments)     agitation   • Vancomycin ANAPHYLAXIS     Kidney and heart failure   • Benzodiazepines Other (See Comments)     extrapyramidal reactions;no feeling in legs,feels doom. Hyper.  Tolerates Versed without reaction.   • Cephalosporins PRURITUS     9/11/2023 received scheduled cefepime & ceftaroline in 2023, no reaction listed in allergies. [facility]    • Chlorpromazine Other (See Comments)     agitation   • Haldol Other (See Comments)     agitation   • Hydroxyzine Hcl      spasticity   • Ketorolac Tromethamine Other (See Comments)     agitation   • Metoclopramide Hcl      Hallucination.    • Prochlorperazine Nausea & Vomiting     Muscle spasms, lost feeling in legs   • Tromethamine    • Uloric [Febuxostat]      Kidney  failure       Medications/Infusions: Scheduled:   Current Facility-Administered Medications   Medication Dose Route Frequency Provider Last Rate Last Admin   • warfarin (COUMADIN) tablet 6 mg  6 mg Oral Once Jose Lincoln MD       • docusate sodium (COLACE) capsule 100 mg  100 mg Oral BID Angely Britton PA-C   100 mg at 09/14/23 0750   • melatonin tablet 6 mg  6 mg Oral Nightly Jose L Agarwal MD   6 mg at 09/13/23 2033   • Droxidopa Cap 300 mg  300 mg Oral TID  Gonzales Dickinson MD   300 mg at 09/14/23 1637   • midodrine (PROAMATINE) tablet 20 mg  20 mg Oral TID  Gonzales Dickinson MD   20 mg at 09/14/23 1634   • epoetin austin (PROCRIT,EPOGEN) 85020 UNIT/ML injection 20,000 Units  20,000 Units Subcutaneous Once per day on Tue Thu Sat Domingo Erickson MD   20,000 Units at 09/12/23 1854   • pantoprazole (PROTONIX) EC tablet 40 mg  40 mg Oral Nightly Jose L Agarwal MD   40 mg at 09/13/23 2033   • WARFARIN - PHARMACIST MONITORED Misc   Does not apply See Admin Instructions Jose L Agarwal MD       • sevelamer carbonate (RENVELA) tablet 1,600 mg  1,600 mg Oral TID  Lisa Galo, NP   1,600 mg at 09/14/23 1634   • TACROlimus (PROGRAF) capsule 4.5 mg  4.5 mg Oral Nightly Tx Gonzales Jones MD PHD   4.5 mg at 09/13/23 2033    And   • TACROlimus (PROGRAF) capsule 5 mg  5 mg Oral Daily Tx Gonzales Jones MD PHD   5 mg at 09/14/23 0750   • levothyroxine (SYNTHROID, LEVOTHROID) tablet 100 mcg  100 mcg Oral Daily Mona Dong MD   100 mcg at 09/14/23 0631   • montelukast (SINGULAIR) tablet 10 mg  10 mg Oral QAM Mona Dong MD   10 mg at 09/14/23 0750   • pravastatin (PRAVACHOL) tablet 40 mg  40 mg Oral Nightly Tx Mona Dong MD   40 mg at 09/13/23 2033   • predniSONE (DELTASONE) tablet 5 mg  5 mg Oral Daily with breakfast Mona Dong MD   5 mg at 09/14/23 0750   • polyethylene glycol (MIRALAX) packet 17 g  17 g Oral Daily Mona Dong MD   17 g at  09/11/23 0832   • calcitRIOL (ROCALTROL) capsule 0.25 mcg  0.25 mcg Oral Once per day on Tue Thu Sat Emery Guerrero MD   0.25 mcg at 09/14/23 1227   • diphenhydrAMINE (BENADRYL) capsule 25 mg  25 mg Oral Nightly Mona Dong MD   25 mg at 09/13/23 2032   • gabapentin (NEURONTIN) capsule 100 mg  100 mg Oral Daily Emery Guerrero MD   100 mg at 09/14/23 0750   • gabapentin (NEURONTIN) capsule 200 mg  200 mg Oral Nightly Emery Guerrero MD   200 mg at 09/13/23 2032   • senna (SENOKOT) 8.6 mg  1 tablet Oral Nightly Mona Dong MD   8.6 mg at 09/13/23 2032   • sodium chloride (PF) 0.9 % injection 10 mL  10 mL Injection 2 times per day Andres Nixon MD   10 mL at 09/14/23 0751   • fluticasone-vilanterol (BREO ELLIPTA) 100-25 MCG/ACT inhaler 1 puff  1 puff Inhalation Daily Resp Maria C Sanchez MD   1 puff at 09/14/23 0758       Continuous Infusions:  Current Facility-Administered Medications   Medication Dose Route Frequency Provider Last Rate Last Admin   • sodium chloride 0.9% infusion   Intravenous Continuous PRN Georgina Billy NP   Completed at 08/01/23 2000   • sodium chloride 0.9% infusion   Intravenous Continuous PRN Georgina Billy NP   Stopped at 08/20/23 0451       Physical Exam  General appearance: Alert, NAD.   Head: Normocephalic, wo abnormality, Pallor+. Anicteric.   Neck:  Lt lJ tunneled HD PC in situ. No redness or drainage at exit site. Good flow.  Lungs: No IWOB at rest. On 2 L/NC. Lungs clear to auscultation anteriorly and laterally  Heart: Regular rate and rhythm, S1, S2 normal,   Abdomen: Soft, NT, +BS  Extremities: No edema  Neurologic/PSYCH: AAO x 3. Moves all 4,    Laboratory Results:    Recent Labs   Lab 09/14/23  0639 09/13/23  0554 09/12/23  0411 09/11/23  0510   SODIUM 134* 133* 135 136   POTASSIUM 4.3 4.6 4.1 4.3   CHLORIDE 100 98 99 101   CO2 28 25 32 30   ANIONGAP 10 15 8 9   BUN 24* 39* 22* 44*   CREATININE 2.46* 3.18* 2.06* 3.36*   CALCIUM 9.6 9.3 9.6  9.9   GLUCOSE 108* 110* 106* 115*   WBC 4.6 4.5  --  3.6*   HCT 28.7* 28.7*  --  27.9*     Recent Labs   Lab 09/14/23  0639 09/13/23  0554 09/12/23  0411 09/11/23  0510   HGB 8.5* 8.7*  --  8.6*    177  --  157   INR 1.3 1.3 1.3 1.5   MG  --   --   --  2.5*   PHOS  --   --   --  3.7   ALKPT 233* 260*  --  272*   BILIRUBIN 0.4 0.8  --  0.4   AST 19 26  --  29   GPT 29 36  --  40   ALBUMIN 3.6 3.7  --  4.0   TACRO 7.5  --   --   --        Urine Panel  Lab Results   Component Value Date    UKET NEGATIVE 05/08/2018    USPG 1.014 05/08/2018    UPROT 30 (A) 05/08/2018    UWBC LARGE (A) 05/08/2018    URBC MODERATE (A) 05/08/2018    UBILI NEGATIVE 05/08/2018    UPH 5.5 05/08/2018    UROB 0.2 05/08/2018    UBACTR NONE SEEN 05/08/2018         Diagnoses; assessment and plan:   1. End-stage renal disease, on HD Q TThS at [facility].  L internal jugular permcath for access.  Previously on CVVH. Then on nightly SLED, 10 hrs at night and SCUF 8 hrs day time. IHD started 8/21/23  Current IHD schedule is Q TThS.  HD for 2.5 hrs is in progress. UF 0.5-1L as tolerated. Stable.    2. Anemia of chronic disease. Continue TARIK.  Transfused 1 unit PRBC 8/9/23.  TARIK increased.    3. Hypotension, acute on chronic. Pressors have been weaned off. BP remains low ,asymptomatic. On scheduled midodrine and droxidopa.    4. Secondary hyperparathyroidism. Continue calcitriol    5. SARS COV 2 infection related pneumonia, respiratory failure. Improved.     6. History of orthotopic heart transplant 2005. Immunosuppression per Heart Failure Team. Her MMF has been placed on hold.    7. Liver cirrhosis.    8. Severe tricuspid valve regurgitation. Was scheduled for replacement 7/19/23. Not a candidate for valve surgery now due to hypotension and co-morbidities.     9. Acute hypoxic respiratory failure with worsening bilateral pulmonary infiltrates. Concern for worsening pneumonia/opportunistic infection/fluid or sequele of covid  pneumoinia.  BAL cultures negative to date. Antibiotics completed. Received Cytovene due to increased CMV titer.     10. Hyperphosphatemia. Continue Renvela 1600 mg TID.    11. Hypervolemia: ECHO with EF 65% and normal RV fx, svr TR. Hypervolemia has improved with various RRT modalities. Fluid restriction DCd per pt request, she says she knows how to manage fluids.    12. History of hyperkalemia. Pt refuses dietary K restriction. States she can manage it on her own.        Drain the PD catheter for ascites daily per surgery rec..  Next HD on 9/16.      MD Eve           2002 left leg stent    Acute myocardial infarction  as per pt in 2001, no coronary stent  Bladder mass  cystoscopy, TURBT, transurethral incision of bladder neck- 06/2018  Cataract surgery 30 years ago    Exposure to radiation  for prostate cancer (seed implant)  FH: carotid endarterectomy    H/O vascular surgery  left suprficial femoral artery stent- 2019 November  History of bladder surgery    History of colon surgery  2007  History of vascular access device  05/2020  insertion in left upper arm ( PICC) , removal after 2 weeks of abx  right wrist surgery with hardware 27 years ago    S/P cardiac catheterization  2016 with 1 FEMI stent  S/P carotid endarterectomy  right 01/2019  S/P TURP (status post transurethral resection of prostate)

## 2023-09-14 NOTE — DISCHARGE NOTE NURSING/CASE MANAGEMENT/SOCIAL WORK - NSDCPEFALRISK_GEN_ALL_CORE
For information on Fall & Injury Prevention, visit: [email or web address] OR  [email or web address] OR  https://www.cdc.gov/steadi/patient.html

## 2023-09-14 NOTE — PROGRESS NOTE ADULT - SUBJECTIVE AND OBJECTIVE BOX
[facility] : Progress Note :: [phone], (office [phone]),   Dr Rosario / Dr Pagan / Dr Lang / Dr Serna / Dr Jose JIMÉNEZ / Dr Charles / Dr Yeung / Dr Sancho rivera  _____________________________________________________________________________________________    hyperkalemia resolved     Novocain (Faint)    Hospital Medications:   MEDICATIONS  (STANDING):  allopurinol 100 milliGRAM(s) Oral daily  amLODIPine   Tablet 5 milliGRAM(s) Oral daily  apixaban 2.5 milliGRAM(s) Oral two times a day  aspirin  chewable 81 milliGRAM(s) Oral daily  atorvastatin 80 milliGRAM(s) Oral at bedtime  carvedilol 3.125 milliGRAM(s) Oral every 12 hours  cilostazol 50 milliGRAM(s) Oral two times a day  dextrose 5% + sodium chloride 0.9%. 1000 milliLiter(s) (50 mL/Hr) IV Continuous <Continuous>  dextrose 50% Injectable 50 milliLiter(s) IV Push once  dextrose 50% Injectable 50 milliLiter(s) IV Push once  finasteride 5 milliGRAM(s) Oral daily  folic acid 1 milliGRAM(s) Oral daily  isosorbide   mononitrate ER Tablet (IMDUR) 30 milliGRAM(s) Oral daily  sodium zirconium cyclosilicate 10 Gram(s) Oral two times a day  tamsulosin 0.4 milliGRAM(s) Oral at bedtime        VITALS:  T(F): 99.2 (09-14-23 @ 05:14), Max: 99.2 (09-14-23 @ 05:14)  HR: 71 (09-14-23 @ 05:14)  BP: 119/73 (09-14-23 @ 05:14)  RR: 18 (09-14-23 @ 05:14)  SpO2: 96% (09-14-23 @ 05:14)  Wt(kg): --      PHYSICAL EXAM:  Constitutional: NAD  HEENT: anicteric sclera, oropharynx clear,  Neck: No JVD  Respiratory: CTAB, no wheezes, rales or rhonchi  Cardiovascular: S1, S2, RRR  Gastrointestinal: BS+, soft, NT/ND  Extremities:  No peripheral edema  Neurological: A/O x 3, no focal deficits.  : No CVA tenderness. No swenson.       LABS:  09-14    135  |  108  |  69<H>  ----------------------------<  98  4.9   |  20<L>  |  3.57<H>    Ca    8.2<L>      14 Sep 2023 05:38  Phos  4.0     09-14  Mg     1.9     09-14    TPro  6.1  /  Alb  2.5<L>  /  TBili  0.4  /  DBili      /  AST  18  /  ALT  16  /  AlkPhos  128<H>  09-14    Creatinine Trend: 3.57 <--, 3.38 <--, 3.04 <--, 2.91 <--, 2.45 <--, 3.03 <--, 3.27 <--, 3.52 <--                        9.4    12.06 )-----------( 251      ( 14 Sep 2023 05:38 )             30.5     Urine Studies:  Urinalysis Basic - ( 14 Sep 2023 05:38 )    Color:  / Appearance:  / SG:  / pH:   Gluc: 98 mg/dL / Ketone:   / Bili:  / Urobili:    Blood:  / Protein:  / Nitrite:    Leuk Esterase:  / RBC:  / WBC    Sq Epi:  / Non Sq Epi:  / Bacteria:         RADIOLOGY & ADDITIONAL STUDIES:

## 2023-09-14 NOTE — PROGRESS NOTE ADULT - ASSESSMENT
# RUSTY improving. Has underlying CKD IV. SCR fluctuating but stable.  # hyperkalemia improved with lokelma.  lokelma 10grams daily.  F/U in the renal office next week with rpt labs    # hematuria- s/p cystoscopy

## 2023-09-15 LAB
HCT VFR BLD CALC: 42.2 % — SIGNIFICANT CHANGE UP (ref 39–50)
HGB BLD-MCNC: 13.6 G/DL — SIGNIFICANT CHANGE UP (ref 13–17)
MCHC RBC-ENTMCNC: 28.1 PG — SIGNIFICANT CHANGE UP (ref 27–34)
MCHC RBC-ENTMCNC: 32.2 GM/DL — SIGNIFICANT CHANGE UP (ref 32–36)
MCV RBC AUTO: 87.2 FL — SIGNIFICANT CHANGE UP (ref 80–100)
NRBC # BLD: 0 /100 WBCS — SIGNIFICANT CHANGE UP (ref 0–0)
PLATELET # BLD AUTO: 389 K/UL — SIGNIFICANT CHANGE UP (ref 150–400)
RBC # BLD: 4.84 M/UL — SIGNIFICANT CHANGE UP (ref 4.2–5.8)
RBC # FLD: 13.2 % — SIGNIFICANT CHANGE UP (ref 10.3–14.5)
WBC # BLD: 24.19 K/UL — HIGH (ref 3.8–10.5)
WBC # FLD AUTO: 24.19 K/UL — HIGH (ref 3.8–10.5)

## 2023-09-15 PROCEDURE — 85610 PROTHROMBIN TIME: CPT

## 2023-09-15 PROCEDURE — 85025 COMPLETE CBC W/AUTO DIFF WBC: CPT

## 2023-09-15 PROCEDURE — 86850 RBC ANTIBODY SCREEN: CPT

## 2023-09-15 PROCEDURE — 88307 TISSUE EXAM BY PATHOLOGIST: CPT

## 2023-09-15 PROCEDURE — 80048 BASIC METABOLIC PNL TOTAL CA: CPT

## 2023-09-15 PROCEDURE — 93005 ELECTROCARDIOGRAM TRACING: CPT

## 2023-09-15 PROCEDURE — 86870 RBC ANTIBODY IDENTIFICATION: CPT

## 2023-09-15 PROCEDURE — 83735 ASSAY OF MAGNESIUM: CPT

## 2023-09-15 PROCEDURE — 86900 BLOOD TYPING SEROLOGIC ABO: CPT

## 2023-09-15 PROCEDURE — 85027 COMPLETE CBC AUTOMATED: CPT

## 2023-09-15 PROCEDURE — 83036 HEMOGLOBIN GLYCOSYLATED A1C: CPT

## 2023-09-15 PROCEDURE — 80053 COMPREHEN METABOLIC PANEL: CPT

## 2023-09-15 PROCEDURE — 84100 ASSAY OF PHOSPHORUS: CPT

## 2023-09-15 PROCEDURE — 82962 GLUCOSE BLOOD TEST: CPT

## 2023-09-15 PROCEDURE — 99285 EMERGENCY DEPT VISIT HI MDM: CPT | Mod: 25

## 2023-09-15 PROCEDURE — 86901 BLOOD TYPING SEROLOGIC RH(D): CPT

## 2023-09-15 PROCEDURE — 85730 THROMBOPLASTIN TIME PARTIAL: CPT

## 2023-09-15 PROCEDURE — 36415 COLL VENOUS BLD VENIPUNCTURE: CPT

## 2023-09-15 PROCEDURE — 94640 AIRWAY INHALATION TREATMENT: CPT

## 2023-09-28 ENCOUNTER — APPOINTMENT (OUTPATIENT)
Dept: UROLOGY | Facility: CLINIC | Age: 83
End: 2023-09-28

## 2023-10-04 ENCOUNTER — APPOINTMENT (OUTPATIENT)
Dept: UROLOGY | Facility: CLINIC | Age: 83
End: 2023-10-04

## 2023-11-03 ENCOUNTER — APPOINTMENT (OUTPATIENT)
Dept: VASCULAR SURGERY | Facility: CLINIC | Age: 83
End: 2023-11-03

## 2023-12-20 NOTE — DISCHARGE NOTE ADULT - ADMISSION DATE +STARTOFVISITDATE
Nothing concerning seen on either xray.  She has some mild arthritis in the lower back but appropriate for her age.     Statement Selected

## 2024-01-05 ENCOUNTER — APPOINTMENT (OUTPATIENT)
Dept: VASCULAR SURGERY | Facility: CLINIC | Age: 84
End: 2024-01-05
Payer: MEDICARE

## 2024-01-05 PROCEDURE — 93926 LOWER EXTREMITY STUDY: CPT

## 2024-01-05 PROCEDURE — 99213 OFFICE O/P EST LOW 20 MIN: CPT

## 2024-01-05 PROCEDURE — 93979 VASCULAR STUDY: CPT

## 2024-01-05 NOTE — HISTORY OF PRESENT ILLNESS
[FreeTextEntry1] : Patient is an 83-year-old with severe peripheral vascular disease status post bilateral iliac stent placement and left SFA and popliteal artery intervention who presents to the office today for routine follow-up.  Patient reports significant left lower extremity pain and claudication symptoms.  Patient currently ambulates with the use of a cane.  Patient states ambulation is limited to the home. Patient reports Eliquis and cilostazol were discontinued due to gross hematuria.  Denies tissue loss or ulceration.

## 2024-01-05 NOTE — REVIEW OF SYSTEMS
[As noted in HPI] : as noted in HPI [Leg Claudication] : intermittent leg claudication [Limb Pain] : limb pain [As Noted in HPI] : as noted in HPI [Negative] : Endocrine

## 2024-01-05 NOTE — PHYSICAL EXAM
[Normal Breath Sounds] : Normal breath sounds [Normal Rate and Rhythm] : normal rate and rhythm [2+] : left 2+ [No Rash or Lesion] : No rash or lesion [Skin Ulcer] : no ulcer [Alert] : alert [Calm] : calm [de-identified] : No acute distress [de-identified] : Intact

## 2024-01-05 NOTE — ASSESSMENT
[FreeTextEntry1] : 83-year-old with severe peripheral vascular disease status post bilateral iliac stent placement and left SFA and popliteal artery intervention   Patient with left lower extremity rest pain and claudication symptoms.  Duplex demonstrates widely patent stents in bilateral common iliacs and left EIA.  Left proximal SFA with moderate to severe in-stent stenosis.  Patient with no evidence of limb threatening ischemia at this time.  Given increase in in-stent stenosis and patient's symptoms, we will schedule for left lower extremity intervention.  In the interim patient to continue aspirin and atorvastatin. [Arterial/Venous Disease] : arterial/venous disease [Medication Management] : medication management

## 2024-01-30 PROBLEM — I73.9 PAD (PERIPHERAL ARTERY DISEASE): Status: ACTIVE | Noted: 2018-12-14

## 2024-01-31 ENCOUNTER — LABORATORY RESULT (OUTPATIENT)
Age: 84
End: 2024-01-31

## 2024-01-31 ENCOUNTER — APPOINTMENT (OUTPATIENT)
Dept: ENDOVASCULAR SURGERY | Facility: CLINIC | Age: 84
End: 2024-01-31
Payer: MEDICARE

## 2024-01-31 ENCOUNTER — RESULT REVIEW (OUTPATIENT)
Age: 84
End: 2024-01-31

## 2024-01-31 VITALS
RESPIRATION RATE: 16 BRPM | HEART RATE: 70 BPM | WEIGHT: 140 LBS | OXYGEN SATURATION: 99 % | HEIGHT: 66 IN | TEMPERATURE: 97.4 F | DIASTOLIC BLOOD PRESSURE: 46 MMHG | SYSTOLIC BLOOD PRESSURE: 132 MMHG | BODY MASS INDEX: 22.5 KG/M2

## 2024-01-31 DIAGNOSIS — I73.9 PERIPHERAL VASCULAR DISEASE, UNSPECIFIED: ICD-10-CM

## 2024-01-31 PROCEDURE — 37221Z: CUSTOM | Mod: 59,LT

## 2024-01-31 PROCEDURE — 37221Z: CUSTOM | Mod: 82,59,LT

## 2024-01-31 PROCEDURE — 37225Z: CUSTOM | Mod: LT

## 2024-01-31 PROCEDURE — 37186Z: CUSTOM | Mod: 59,LT

## 2024-01-31 PROCEDURE — 37225Z: CUSTOM | Mod: 82,LT

## 2024-01-31 PROCEDURE — 37229Z: CUSTOM | Mod: 59,LT

## 2024-01-31 PROCEDURE — 37229Z: CUSTOM | Mod: 82,59,LT

## 2024-01-31 PROCEDURE — 75625 CONTRAST EXAM ABDOMINL AORTA: CPT

## 2024-01-31 RX ORDER — ROSUVASTATIN CALCIUM 40 MG/1
40 TABLET, FILM COATED ORAL
Qty: 90 | Refills: 0 | Status: DISCONTINUED | COMMUNITY
Start: 2021-06-02 | End: 2024-01-31

## 2024-01-31 RX ORDER — ALLOPURINOL 100 MG/1
100 TABLET ORAL
Refills: 0 | Status: DISCONTINUED | COMMUNITY
End: 2024-01-31

## 2024-01-31 RX ORDER — TAMSULOSIN HYDROCHLORIDE 0.4 MG/1
0.4 CAPSULE ORAL
Refills: 0 | Status: ACTIVE | COMMUNITY

## 2024-01-31 RX ORDER — NITROFURANTOIN (MONOHYDRATE/MACROCRYSTALS) 25; 75 MG/1; MG/1
100 CAPSULE ORAL TWICE DAILY
Qty: 14 | Refills: 0 | Status: DISCONTINUED | COMMUNITY
Start: 2019-09-09 | End: 2024-01-31

## 2024-01-31 RX ORDER — BUDESONIDE AND FORMOTEROL FUMARATE DIHYDRATE 160; 4.5 UG/1; UG/1
160-4.5 AEROSOL RESPIRATORY (INHALATION)
Refills: 0 | Status: ACTIVE | COMMUNITY

## 2024-01-31 RX ORDER — ASPIRIN 81 MG
81 TABLET, DELAYED RELEASE (ENTERIC COATED) ORAL
Refills: 0 | Status: ACTIVE | COMMUNITY

## 2024-01-31 RX ORDER — CLOPIDOGREL BISULFATE 75 MG/1
75 TABLET, FILM COATED ORAL DAILY
Qty: 90 | Refills: 3 | Status: DISCONTINUED | COMMUNITY
Start: 2020-09-21 | End: 2024-01-31

## 2024-01-31 RX ORDER — CIPROFLOXACIN HYDROCHLORIDE 500 MG/1
500 TABLET, FILM COATED ORAL TWICE DAILY
Qty: 6 | Refills: 0 | Status: DISCONTINUED | COMMUNITY
Start: 2023-08-24 | End: 2024-01-31

## 2024-01-31 RX ORDER — CIPROFLOXACIN HYDROCHLORIDE 500 MG/1
500 TABLET, FILM COATED ORAL TWICE DAILY
Qty: 10 | Refills: 0 | Status: DISCONTINUED | COMMUNITY
Start: 2022-08-15 | End: 2024-01-31

## 2024-01-31 RX ORDER — ISOSORBIDE MONONITRATE 30 MG/1
30 TABLET, EXTENDED RELEASE ORAL
Refills: 0 | Status: DISCONTINUED | COMMUNITY
End: 2024-01-31

## 2024-01-31 RX ORDER — SEVELAMER CARBONATE 800 MG/1
800 TABLET, FILM COATED ORAL
Qty: 90 | Refills: 0 | Status: DISCONTINUED | COMMUNITY
Start: 2021-03-31 | End: 2024-01-31

## 2024-01-31 RX ORDER — CALCIUM CITRATE/VITAMIN D3 250MG-5MCG
TABLET ORAL
Refills: 0 | Status: DISCONTINUED | COMMUNITY
End: 2024-01-31

## 2024-01-31 RX ORDER — PANTOPRAZOLE 40 MG/1
40 TABLET, DELAYED RELEASE ORAL
Qty: 90 | Refills: 0 | Status: DISCONTINUED | COMMUNITY
Start: 2021-03-11 | End: 2024-01-31

## 2024-01-31 RX ORDER — TROSPIUM CHLORIDE 60 MG/1
60 CAPSULE, EXTENDED RELEASE ORAL
Qty: 90 | Refills: 1 | Status: DISCONTINUED | COMMUNITY
Start: 2018-05-08 | End: 2024-01-31

## 2024-01-31 RX ORDER — PROMETHAZINE HYDROCHLORIDE AND DEXTROMETHORPHAN HYDROBROMIDE ORAL SOLUTION 15; 6.25 MG/5ML; MG/5ML
6.25-15 SOLUTION ORAL
Qty: 240 | Refills: 0 | Status: DISCONTINUED | COMMUNITY
Start: 2021-02-19 | End: 2024-01-31

## 2024-01-31 RX ORDER — CARVEDILOL 3.12 MG/1
3.12 TABLET, FILM COATED ORAL
Refills: 0 | Status: DISCONTINUED | COMMUNITY
End: 2024-01-31

## 2024-01-31 RX ORDER — ERGOCALCIFEROL 1.25 MG/1
1.25 MG CAPSULE, LIQUID FILLED ORAL
Qty: 12 | Refills: 0 | Status: DISCONTINUED | COMMUNITY
Start: 2020-05-07 | End: 2024-01-31

## 2024-01-31 RX ORDER — TIOTROPIUM BROMIDE 18 UG/1
18 CAPSULE ORAL; RESPIRATORY (INHALATION)
Qty: 30 | Refills: 0 | Status: DISCONTINUED | COMMUNITY
Start: 2021-03-03 | End: 2024-01-31

## 2024-02-09 NOTE — ASSESSMENT
[Arterial/Venous Disease] : arterial/venous disease [Other: _____] : [unfilled] [FreeTextEntry1] : symptoms of intermittent claudacation - plan of aortagram and possible treatment

## 2024-02-09 NOTE — PAST MEDICAL HISTORY
[Increasing age ( >40 years old)] : Increasing age ( >40 years old) [Malignancy] : Malignancy [No therapy indicated for cases scheduled for less than one hour] : No therapy indicated for cases scheduled for less than one hour. [Altered mobility] : Altered mobility [FreeTextEntry1] : Malignant Hyperthermis (MH) Screening Tool and Risk of Bleeding Assessement Mr. HEENA KEYES  denies family history of unexpected death following Anesthesia or Exercise. Denies Family history of Malignant Hyperthermia, Muscle or Neuromuscular disorder and High Temperature following exercise.  Mr. HEENA KEYES denies history of Muscle Spasm, Dark or Chocolate - Colored urine and Unanticipated fever immediately following anesthesia or serious exercise.  Mr. KEYES  also denies bleeding tendencies/ Risks of Bleeding

## 2024-02-09 NOTE — HISTORY OF PRESENT ILLNESS
[FreeTextEntry1] : alert and oriented ambulates with a cane feels ok accompanied by friend Priscilla phillip [phone] back on Eliquis -last dose 2 days ago Cr1.5 12/14/2023 [FreeTextEntry5] : yesterday at 7pm [FreeTextEntry6] : Dr Reveles

## 2024-02-09 NOTE — PROCEDURE
[Groin check for bleeding/hematoma, vitals checked, and Doppler/pulse checked on admission, then every 15 minutes for an hour and every 30 minutes for 3 hours thereafter.] : Groin check for bleeding/hematoma, vitals checked, and Doppler/pulse checked on admission, then every 15 minutes for an hour and every 30 minutes for 3 hours thereafter.  [Resume Diet] : resume diet [D/C IV on discharge] : D/C IV on discharge [Resume diet] : resume diet [FreeTextEntry1] : Aortagram left leg angiogram, athrectomy, angioplasty and stent

## 2024-02-16 ENCOUNTER — APPOINTMENT (OUTPATIENT)
Dept: VASCULAR SURGERY | Facility: CLINIC | Age: 84
End: 2024-02-16
Payer: MEDICARE

## 2024-02-16 PROCEDURE — 99214 OFFICE O/P EST MOD 30 MIN: CPT

## 2024-02-16 PROCEDURE — 93979 VASCULAR STUDY: CPT

## 2024-02-16 PROCEDURE — 93926 LOWER EXTREMITY STUDY: CPT

## 2024-02-16 NOTE — PHYSICAL EXAM
[Normal Breath Sounds] : Normal breath sounds [Normal Heart Sounds] : normal heart sounds [Skin Ulcer] : ulcer [de-identified] : Left foot is cool to touch with small superficial cracks on the left metatarsal head area

## 2024-02-16 NOTE — ASSESSMENT
[FreeTextEntry1] : Patient with severe peripheral vascular disease status post bilateral lower extremity intervention in the past.  No evidence of significant ischemia.  Continue Plavix and Eliquis.  Patient complaining of right lower extremity claudication.  Will arrange the patient to undergo PVRs with exercise to 3 weeks to evaluate for repeat right leg angiogram.  Patient with no history of CVA.  Will arrange for carotid duplex in 2 to 3 weeks also since the patient has history of moderate stenosis with no close follow-up.

## 2024-02-26 NOTE — PROGRESS NOTE ADULT - ASSESSMENT
80 year old male with medical history of  HTN, HLD, DM, CAD on ASA/Plavix, PAD, CKD, prostate ca s/p radiation, anemia, COPD not on home O2, PAD, presents with SOB, dry cough, and dizziness x 2 days. Patient admitted for COPD v CHF exacerbation.     Problem/Plan - 1:  ·  Problem: CHF exacerbation.  Plan: CXR - b/l pulmonary edema - now improved  c/w IV Lasix 40 BID  Strict Is&Os and daily weights  Trops trending down, elevation likely 2/2 demand ischemia  BNP 3706   echo noted  Cardio Dr. Boles.      Problem/Plan - 2:  ·  Problem: COPD (chronic obstructive pulmonary disease).  Plan: Saturating 53% on RA  Imaging c/w congestion w/ R loculated fluid collection in fissure  c/w albuterol, spiriva per pulm  Monitor O2 saturation, titrate O2 as needed  Pulm Dr. Urias.      Problem/Plan - 3:  ·  Problem: RUSTY (acute kidney injury).  Plan: 3.19, baseline Cr 1.93 improving  Voiding freely, no post void residual  Nephro Dr Rosario.      Problem/Plan - 4:  ·  Problem: Anemia.   Iron studies likely ANNA  C/w oral iron supplement  Monitor for s/s bleeding.      Problem/Plan - 5:  ·  Problem: Elevated alkaline phosphatase level.  Plan: Stable 230s - GGT slight elevation. US abd suggestive of cholelithiasis.      Problem/Plan - 6:  Problem: CAD (coronary artery disease). Plan: Only on Plavix at home  c/w aspirin, clopidogrel, atorvastatin  Added low dose carvedilol.     Problem/Plan - 7:  ·  Problem: Diabetes mellitus, type 2.  Plan: A1C 6.9   Started SSI FSACHS  Nutrition consult  Lifestyle modification.      Problem/Plan - 8:  ·  Problem: Essential hypertension.  Plan: BP remains stable, monitor BP  c/w amlodipine  Carvedilol added  No ACEi/ARB due to RUSTY.      Problem/Plan - 9:  ·  Problem: HLD (hyperlipidemia).  Plan: Cholesterol 193, HDL 43, , non , TG 94  Cw atorvastatin to 80 mg PO QHS.      Problem/Plan - 10:  Problem: Prophylactic measure. Plan; RISK                                                          Points  [] Previous VTE                                           3  [] Thrombophilia                                        2  [] Lower limb paralysis                              2   [] Current Cancer                                       2   [x] Immobilization > 24 hrs                        1  [] ICU/CCU stay > 24 hours                       1  [x] Age > 60                                                   1  DVT ppx: Subq Heparin  GI ppx: Protonix  Diet: Regular DASH  Electrolytes replaced PRN    Dispo: Pending clinical course  FULL CODE.     Dispo:  Pt likely need assessment for home o2. Pt states that he would like a portable O2 tank for home.       Brief Operative Note    Patient: Yousuf Pickard 29 year old male    MRN: [id number]    Surgeon(s): Gerardo Smith MD  Phone Number: [phone]                       Surgeon(s) and Role:     * Gerardo Smith MD - Primary    Assistant(s): Macey HAMMOND    Pre-Op Diagnosis: cervical spondylosis, stenosis, radiculopathy     Post-Op Diagnosis: same     Procedure: Procedure(s):  C5-C7 anterior cervical discectomy and fusion    Anesthesia Type: General                                   Complications: None    Description: see report    Findings: d/o complex    Specimens Removed: No specimens collected     Estimated Blood Loss: 25cc    Assistant Tasks:  retraction, closing     Implants:   Implant Name Type Inv. Item Serial No.  Lot No. LRB No. Used Action   SPONGE ABS [id number] 6.25X8CM PORCINE GELTN STRL DISP SURGFM - [id number] Other Implant SPONGE ABS [id number] 6.25X8CM PORCINE GELTN STRL DISP SURGFM  [employer] [id number] N/A 1 Implanted   SUBSTITUTE BNGF 1.2CC VITOSS BIOACTIVE VOID FILLER BA2X - [id number] Other Implant SUBSTITUTE BNGF 1.2CC VITOSS BIOACTIVE VOID FILLER BA2X  [place] [employer] [id number] N/A 1 Implanted   SPACER SPNL 14MM 16X7MM VERTE-STACK PEEK CRV INTRBD FUS - [id number] Spacer Spine SPACER SPNL 14MM 16X7MM VERTE-STACK PEEK CRV INTRBD FUS  [employer] Spinal \T\ Biolog[employer] 21PW N/A 1 Implanted   SPACER SPNL 14MM 16X8MM VERTE-STACK PEEK CRV INTRBD FUS - [id number] Spacer Spine SPACER SPNL 14MM 16X8MM VERTE-STACK PEEK CRV INTRBD FUS  [employer] Spinal \T\ Biologics [id number] N/A 1 Implanted   PLATE BN 39MM SPINE CRV ANT 2 LVL TI NS ZEVO - [id number] Plate PLATE BN 39MM SPINE CRV ANT 2 LVL TI NS ZEVO  [employer] Spinal \T\ Biologics  N/A 1 Implanted   SCREW BN 3.5MM 15MM ZEVO SELF TAP FRANCIE TI SPINE CRV ANT - [id number] Screw SCREW BN 3.5MM 15MM ZEVO SELF TAP FRANCIE TI SPINE CRV ANT  [employer] Spinal \T\ Biologics  N/A 6 Implanted         I was present for the key portions of the procedure  and was immediately available for the non-key portions

## 2024-04-03 ENCOUNTER — APPOINTMENT (OUTPATIENT)
Dept: UROLOGY | Facility: CLINIC | Age: 84
End: 2024-04-03
Payer: MEDICARE

## 2024-04-03 VITALS
OXYGEN SATURATION: 93 % | HEART RATE: 59 BPM | BODY MASS INDEX: 22.5 KG/M2 | HEIGHT: 66 IN | DIASTOLIC BLOOD PRESSURE: 48 MMHG | TEMPERATURE: 97.2 F | WEIGHT: 140 LBS | SYSTOLIC BLOOD PRESSURE: 125 MMHG

## 2024-04-03 DIAGNOSIS — N13.8 BENIGN PROSTATIC HYPERPLASIA WITH LOWER URINARY TRACT SYMPMS: ICD-10-CM

## 2024-04-03 DIAGNOSIS — N32.0 BLADDER-NECK OBSTRUCTION: ICD-10-CM

## 2024-04-03 DIAGNOSIS — N40.1 BENIGN PROSTATIC HYPERPLASIA WITH LOWER URINARY TRACT SYMPMS: ICD-10-CM

## 2024-04-03 DIAGNOSIS — C67.9 MALIGNANT NEOPLASM OF BLADDER, UNSPECIFIED: ICD-10-CM

## 2024-04-03 DIAGNOSIS — N30.90 CYSTITIS, UNSPECIFIED W/OUT HEMATURIA: ICD-10-CM

## 2024-04-03 DIAGNOSIS — R35.0 FREQUENCY OF MICTURITION: ICD-10-CM

## 2024-04-03 PROCEDURE — 99214 OFFICE O/P EST MOD 30 MIN: CPT

## 2024-04-03 NOTE — ASSESSMENT
[FreeTextEntry1] : pvr 160 ml  will check ua cx cytology  reviewed repeat cysto  pt has deferred in past  will decide after urine  cont tamsulosin

## 2024-04-03 NOTE — HISTORY OF PRESENT ILLNESS
[FreeTextEntry1] : cc f/u 84 yo male h/o bladder ca recurrent uti  last turbt 12/20 benign nonkeratinizing squamous tissue with changes c/w prior surgical therapy    baseline urgency uui dribbling  occasional hesitancy difficulty emptying  no dysuria or hematuria  on tamsulosin  last csyto 6/21 saw dr baez reported blood in urine cysto inflammation in bladder  pt on ac

## 2024-04-03 NOTE — PHYSICAL EXAM
[Normal Appearance] : normal appearance [Well Groomed] : well groomed [General Appearance - In No Acute Distress] : no acute distress [Edema] : no peripheral edema [Respiration, Rhythm And Depth] : normal respiratory rhythm and effort [Exaggerated Use Of Accessory Muscles For Inspiration] : no accessory muscle use [Abdomen Soft] : soft [Abdomen Tenderness] : non-tender [Costovertebral Angle Tenderness] : no ~M costovertebral angle tenderness [Normal Station and Gait] : the gait and station were normal for the patient's age [Urinary Bladder Findings] : the bladder was normal on palpation [] : no rash [No Focal Deficits] : no focal deficits [Oriented To Time, Place, And Person] : oriented to person, place, and time [Mood] : the mood was normal [Affect] : the affect was normal [No Palpable Adenopathy] : no palpable adenopathy

## 2024-04-05 LAB
APPEARANCE: CLEAR
BACTERIA: ABNORMAL /HPF
BILIRUBIN URINE: NEGATIVE
BLOOD URINE: ABNORMAL
CAST: 3 /LPF
COLOR: ABNORMAL
EPITHELIAL CELLS: 2 /HPF
GLUCOSE QUALITATIVE U: NEGATIVE MG/DL
KETONES URINE: NEGATIVE MG/DL
LEUKOCYTE ESTERASE URINE: ABNORMAL
MICROSCOPIC-UA: NORMAL
NITRITE URINE: NEGATIVE
PH URINE: 7.5
PROTEIN URINE: 30 MG/DL
RED BLOOD CELLS URINE: 1 /HPF
REVIEW: NORMAL
SPECIFIC GRAVITY URINE: 1.01
URINE CYTOLOGY: NORMAL
UROBILINOGEN URINE: 0.2 MG/DL
WHITE BLOOD CELLS URINE: 102 /HPF

## 2024-04-07 RX ORDER — AMOXICILLIN AND CLAVULANATE POTASSIUM 875; 125 MG/1; MG/1
875-125 TABLET, COATED ORAL
Qty: 14 | Refills: 0 | Status: ACTIVE | COMMUNITY
Start: 2024-04-07 | End: 1900-01-01

## 2024-04-12 LAB — BACTERIA UR CULT: ABNORMAL

## 2024-04-12 RX ORDER — AMOXICILLIN AND CLAVULANATE POTASSIUM 875; 125 MG/1; MG/1
875-125 TABLET, COATED ORAL
Qty: 6 | Refills: 0 | Status: ACTIVE | COMMUNITY
Start: 2024-04-12 | End: 1900-01-01

## 2024-04-26 ENCOUNTER — APPOINTMENT (OUTPATIENT)
Dept: VASCULAR SURGERY | Facility: CLINIC | Age: 84
End: 2024-04-26

## 2024-05-30 NOTE — DISCHARGE NOTE ADULT - DATE:
Pt seen, still with some sob    MEDICATIONS  (STANDING):  albuterol    0.083% 2.5 milliGRAM(s) Nebulizer every 6 hours  albuterol    90 MICROgram(s) HFA Inhaler 1 Puff(s) Inhalation every 4 hours  aspirin enteric coated 81 milliGRAM(s) Oral daily  atorvastatin 40 milliGRAM(s) Oral at bedtime  budesonide 160 MICROgram(s)/formoterol 4.5 MICROgram(s) Inhaler 2 Puff(s) Inhalation two times a day  clotrimazole Lozenge 1 Lozenge Oral <User Schedule>  dextrose 10% Bolus 125 milliLiter(s) IV Bolus once  dextrose 5%. 1000 milliLiter(s) (50 mL/Hr) IV Continuous <Continuous>  dextrose 5%. 1000 milliLiter(s) (100 mL/Hr) IV Continuous <Continuous>  dextrose 50% Injectable 25 Gram(s) IV Push once  dextrose 50% Injectable 12.5 Gram(s) IV Push once  enoxaparin Injectable 40 milliGRAM(s) SubCutaneous every 24 hours  glucagon  Injectable 1 milliGRAM(s) IntraMuscular once  insulin glargine Injectable (LANTUS) 5 Unit(s) SubCutaneous every morning  insulin lispro (ADMELOG) corrective regimen sliding scale   SubCutaneous three times a day before meals  insulin lispro (ADMELOG) corrective regimen sliding scale   SubCutaneous at bedtime  insulin lispro Injectable (ADMELOG) 2 Unit(s) SubCutaneous three times a day before meals  lisinopril 10 milliGRAM(s) Oral daily  methylPREDNISolone sodium succinate Injectable 40 milliGRAM(s) IV Push every 6 hours  metoprolol succinate ER 25 milliGRAM(s) Oral daily  tiotropium 2.5 MICROgram(s) Inhaler 2 Puff(s) Inhalation daily    MEDICATIONS  (PRN):  acetaminophen     Tablet .. 650 milliGRAM(s) Oral every 6 hours PRN Temp greater or equal to 38C (100.4F), Mild Pain (1 - 3)  aluminum hydroxide/magnesium hydroxide/simethicone Suspension 30 milliLiter(s) Oral every 4 hours PRN Dyspepsia  benzonatate 100 milliGRAM(s) Oral every 8 hours PRN Cough  dextrose Oral Gel 15 Gram(s) Oral once PRN Blood Glucose LESS THAN 70 milliGRAM(s)/deciliter  guaiFENesin Oral Liquid (Sugar-Free) 100 milliGRAM(s) Oral every 6 hours PRN Cough  melatonin 3 milliGRAM(s) Oral at bedtime PRN Insomnia  ondansetron Injectable 4 milliGRAM(s) IV Push every 8 hours PRN Nausea and/or Vomiting      ROS  +SOB    Vital Signs Last 24 Hrs  T(C): 36.6 (30 May 2024 07:33), Max: 36.6 (30 May 2024 07:33)  T(F): 97.9 (30 May 2024 07:33), Max: 97.9 (30 May 2024 07:33)  HR: 87 (30 May 2024 07:33) (84 - 91)  BP: 116/78 (30 May 2024 07:33) (116/78 - 123/65)  BP(mean): --  RR: 18 (30 May 2024 07:33) (18 - 18)  SpO2: 95% (30 May 2024 07:33) (95% - 97%)    Parameters below as of 30 May 2024 07:33  Patient On (Oxygen Delivery Method): nasal cannula        PE  NAD  Awake, alert  Anicteric, MMM  RRR  CTAB  Abd soft, NT, ND  No c/c/e  No rash grossly  FROM        05-30    130<L>  |  97  |  33<H>  ----------------------------<  107<H>  4.1   |  26  |  0.63    Ca    8.8      30 May 2024 07:16  Phos  3.1     05-30  Mg     1.8     05-30         10/15/18

## 2024-05-31 ENCOUNTER — APPOINTMENT (OUTPATIENT)
Dept: VASCULAR SURGERY | Facility: CLINIC | Age: 84
End: 2024-05-31
Payer: MEDICARE

## 2024-05-31 PROCEDURE — G0506: CPT

## 2024-05-31 PROCEDURE — 93880 EXTRACRANIAL BILAT STUDY: CPT

## 2024-05-31 PROCEDURE — 99214 OFFICE O/P EST MOD 30 MIN: CPT | Mod: 25

## 2024-05-31 PROCEDURE — 93923 UPR/LXTR ART STDY 3+ LVLS: CPT

## 2024-05-31 NOTE — ASSESSMENT
[FreeTextEntry1] : Patient with severe peripheral vascular disease status post bilateral lower extremity intervention in the past.  No evidence of significant ischemia.  Continue Plavix and Eliquis.  Patient complaining of right lower extremity claudication.  PVR shows significant insufficiency in the right lower extremity but at this time the patient's symptoms are not disabling and therefore we will continue conservative management with Eliquis and Plavix.   Patient with history of carotid disease, status post carotid endarterectomy.  There is slight left-sided facial droop which the patient has not noticed himself.  Based on this we will arrange for CT of the head to rule out any silent CVA.  Carotid duplex shows moderate left-sided carotid disease with widely patent right carotid endarterectomy.  Continue Plavix.  Will follow-up after CT of the head.

## 2024-05-31 NOTE — PHYSICAL EXAM
[Normal Breath Sounds] : Normal breath sounds [Normal Heart Sounds] : normal heart sounds [Skin Ulcer] : ulcer [de-identified] : Left foot is cool to touch with small superficial cracks on the left metatarsal head area

## 2024-06-01 LAB
ALBUMIN SERPL ELPH-MCNC: 4.1 G/DL
ALP BLD-CCNC: 190 U/L
ALT SERPL-CCNC: 17 U/L
ANION GAP SERPL CALC-SCNC: 12 MMOL/L
AST SERPL-CCNC: 22 U/L
BILIRUB SERPL-MCNC: 0.9 MG/DL
BUN SERPL-MCNC: 42 MG/DL
CALCIUM SERPL-MCNC: 9.4 MG/DL
CHLORIDE SERPL-SCNC: 103 MMOL/L
CO2 SERPL-SCNC: 25 MMOL/L
CREAT SERPL-MCNC: 2.2 MG/DL
EGFR: 29 ML/MIN/1.73M2
GLUCOSE SERPL-MCNC: 104 MG/DL
POTASSIUM SERPL-SCNC: 5.5 MMOL/L
PROT SERPL-MCNC: 8 G/DL
SODIUM SERPL-SCNC: 141 MMOL/L

## 2024-06-04 ENCOUNTER — APPOINTMENT (OUTPATIENT)
Dept: CT IMAGING | Facility: CLINIC | Age: 84
End: 2024-06-04
Payer: MEDICARE

## 2024-06-04 PROCEDURE — 70450 CT HEAD/BRAIN W/O DYE: CPT

## 2024-06-05 NOTE — ED ADULT NURSE NOTE - NSIMPLEMENTINTERV_GEN_ALL_ED
None
Implemented All Universal Safety Interventions:  [place] to call system. Call bell, personal items and telephone within reach. Instruct patient to call for assistance. Room bathroom lighting operational. Non-slip footwear when patient is off stretcher. Physically safe environment: no spills, clutter or unnecessary equipment. Stretcher in lowest position, wheels locked, appropriate side rails in place.

## 2024-06-14 ENCOUNTER — APPOINTMENT (OUTPATIENT)
Dept: VASCULAR SURGERY | Facility: CLINIC | Age: 84
End: 2024-06-14
Payer: MEDICARE

## 2024-06-14 VITALS
BODY MASS INDEX: 21.86 KG/M2 | DIASTOLIC BLOOD PRESSURE: 54 MMHG | SYSTOLIC BLOOD PRESSURE: 132 MMHG | HEART RATE: 62 BPM | HEIGHT: 66 IN | WEIGHT: 136 LBS

## 2024-06-14 PROCEDURE — G0506: CPT

## 2024-06-14 PROCEDURE — 99214 OFFICE O/P EST MOD 30 MIN: CPT | Mod: 25

## 2024-06-14 NOTE — ASSESSMENT
[FreeTextEntry1] : Patient with severe peripheral vascular disease status post bilateral lower extremity intervention in the past.  No evidence of significant ischemia.  Continue Plavix and Eliquis.  Duplex of the left lower extremity demonstrates moderate stenosis in the common femoral artery.  Patient is currently asymptomatic.  Patient to continue conservative management with aspirin and Eliquis.  Follow-up 3 months with arterial duplex.  Patient with history of carotid disease, status post carotid endarterectomy.  There is slight left-sided facial droop which the patient has not noticed himself.  CT of the head is negative for acute infarct, hemorrhage, or mass effect.    Patient to follow-up in November with carotid duplex.

## 2024-06-14 NOTE — HISTORY OF PRESENT ILLNESS
[FreeTextEntry1] : Patient is an 84-year-old with severe peripheral vascular disease status post bilateral iliac stent placement and left SFA and popliteal artery intervention.  Patient recently had intervention of the left leg.  Patient reports significant improvement of symptoms of coldness and pain.  No pain in the right lower extremity

## 2024-06-14 NOTE — PHYSICAL EXAM
[Normal Breath Sounds] : Normal breath sounds [Normal Rate and Rhythm] : normal rate and rhythm [2+] : left 2+ [] : of the left leg [No Rash or Lesion] : No rash or lesion [Alert] : alert [Calm] : calm [de-identified] : Appears well, no acute distress noted [de-identified] : Intact

## 2024-06-28 ENCOUNTER — NON-APPOINTMENT (OUTPATIENT)
Age: 84
End: 2024-06-28

## 2024-08-30 ENCOUNTER — APPOINTMENT (OUTPATIENT)
Dept: VASCULAR SURGERY | Facility: CLINIC | Age: 84
End: 2024-08-30
Payer: MEDICARE

## 2024-08-30 PROCEDURE — G2211 COMPLEX E/M VISIT ADD ON: CPT

## 2024-08-30 PROCEDURE — 99214 OFFICE O/P EST MOD 30 MIN: CPT

## 2024-08-30 PROCEDURE — 93978 VASCULAR STUDY: CPT

## 2024-09-01 NOTE — PHYSICAL EXAM
[Normal Breath Sounds] : Normal breath sounds [Normal Rate and Rhythm] : normal rate and rhythm [2+] : left 2+ [] : of the left leg [No Rash or Lesion] : No rash or lesion [Alert] : alert [Calm] : calm [de-identified] : Appears well, no acute distress noted [de-identified] : Intact

## 2024-09-01 NOTE — ASSESSMENT
[FreeTextEntry1] : Patient with severe peripheral vascular disease status post bilateral lower extremity intervention in the past.  No evidence of significant ischemia.  Continue Plavix and Eliquis.  Duplex of the left lower extremity demonstrates moderate stenosis in the common femoral artery.  Patient is currently asymptomatic.  Patient to continue conservative management with aspirin and Eliquis.  Continue Lipitor for treatment of hyperlipidemia  Follow-up 3 months with arterial duplex.  Patient with history of carotid disease, status post carotid endarterectomy.  There is slight left-sided facial droop which the patient has not noticed himself.  CT of the head is negative for acute infarct, hemorrhage, or mass effect.    Patient to follow-up in November with carotid duplex.

## 2024-10-04 ENCOUNTER — APPOINTMENT (OUTPATIENT)
Dept: VASCULAR SURGERY | Facility: CLINIC | Age: 84
End: 2024-10-04

## 2024-10-04 DIAGNOSIS — Z95.828 PRESENCE OF OTHER VASCULAR IMPLANTS AND GRAFTS: ICD-10-CM

## 2024-10-04 DIAGNOSIS — I73.9 PERIPHERAL VASCULAR DISEASE, UNSPECIFIED: ICD-10-CM

## 2024-10-04 PROCEDURE — 99214 OFFICE O/P EST MOD 30 MIN: CPT

## 2024-10-04 PROCEDURE — G2211 COMPLEX E/M VISIT ADD ON: CPT

## 2024-10-04 PROCEDURE — 93926 LOWER EXTREMITY STUDY: CPT | Mod: LT

## 2024-10-04 NOTE — PHYSICAL EXAM
[Normal Breath Sounds] : Normal breath sounds [Normal Rate and Rhythm] : normal rate and rhythm [2+] : left 2+ [] : of the left leg [No Rash or Lesion] : No rash or lesion [Alert] : alert [Calm] : calm [de-identified] : Appears well, no acute distress noted [de-identified] : Intact

## 2024-10-04 NOTE — ASSESSMENT
[Arterial/Venous Disease] : arterial/venous disease [Medication Management] : medication management [FreeTextEntry1] : Patient with severe peripheral vascular disease status post bilateral lower extremity intervention in the past.  No evidence of significant ischemia.  Duplex of the left lower extremity demonstrates mild stenosis in the common femoral artery.  Patient is currently asymptomatic.  Patient to continue conservative management with aspirin and Eliquis.  Hyperlipidemia to be treated pharmacologically with atorvastatin.  Follow-up 3 months with arterial duplex and PVR  Patient with history of carotid disease, status post carotid endarterectomy.     Patient to follow-up in November with carotid duplex.

## 2024-10-04 NOTE — HISTORY OF PRESENT ILLNESS
[FreeTextEntry1] : Patient is an 84-year-old with severe peripheral vascular disease status post bilateral iliac stent placement and left SFA and popliteal artery intervention.  Patient recently had intervention of the left leg.  Patient states symptoms in the left lower extremity has significantly improved.  Patient reports intermittent cramping of the right lower extremity.  Denies rest pain.  Denies tissue loss.

## 2024-10-30 NOTE — ED PROVIDER NOTE - CPE EDP RESP NORM
Arterial Line Insertion    Date/Time: 10/30/2024 2:04 PM    Performed by: Suzanne Puente MD  Authorized by: Suzanne Puente MD    Patient location:  ICU  Consent:     Consent obtained:  Emergent situation  Universal protocol:     Patient identity confirmed:  Arm band  Indications:     Indications: hemodynamic monitoring, multiple ABGs and continuous blood pressure monitoring    Procedure details:     Location / Tip of Catheter:  Radial    Laterality:  Left    Needle gauge:  20 G    Placement technique:  Ultrasound guided    Number of attempts:  2    Successful placement: yes      Transducer: waveform confirmed and dampened amplitude    Post-procedure details:     Post-procedure:  Sutured    Patient tolerance of procedure:  Tolerated well, no immediate complications           normal...

## 2024-11-01 ENCOUNTER — APPOINTMENT (OUTPATIENT)
Dept: VASCULAR SURGERY | Facility: CLINIC | Age: 84
End: 2024-11-01
Payer: MEDICARE

## 2024-11-01 PROCEDURE — G2211 COMPLEX E/M VISIT ADD ON: CPT

## 2024-11-01 PROCEDURE — 99214 OFFICE O/P EST MOD 30 MIN: CPT

## 2024-11-01 PROCEDURE — 93880 EXTRACRANIAL BILAT STUDY: CPT

## 2025-03-07 ENCOUNTER — APPOINTMENT (OUTPATIENT)
Dept: VASCULAR SURGERY | Facility: CLINIC | Age: 85
End: 2025-03-07

## 2025-04-11 ENCOUNTER — APPOINTMENT (OUTPATIENT)
Dept: VASCULAR SURGERY | Facility: CLINIC | Age: 85
End: 2025-04-11

## 2025-05-02 ENCOUNTER — APPOINTMENT (OUTPATIENT)
Dept: VASCULAR SURGERY | Facility: CLINIC | Age: 85
End: 2025-05-02

## 2025-05-12 NOTE — CONSULT NOTE ADULT - CONSULT REQUESTED DATE/TIME
Transitional Care Management   Discharge Date: 25  Contact Date: 2025    Assessment:  TCM Initial Assessment    General:  Assessment completed with: Patient  Patient Subjective: Pt doing better.  Chief Complaint: Ulcerative colitis flareup with intractable diarrhea  Verify patient name and  with patient/ caregiver: Yes    Hospital Stay/Discharge:  Prior to leaving the hospital were your Discharge Instructions reviewed with you?: Yes  Did you receive a copy of your written Discharge Instructions?: Yes  Do you feel better or worse since you left the hospital or emergency department?: Better    Follow - Up Appointment:  Do you have a follow-up appointment?: No  Are there any barriers to getting to your follow-up appointment?: No    Home Health/DME:  Prior to leaving the hospital was Home Health (HH) arranged for you?: No     Prior to leaving the hospital or emergency department was Durable Medical Equipment (DME), medical supplies, or infusions arranged for you?: No  Are DME/medical supply/infusions needs identified by staff during this assessment?: No     Medications/Diet:       Were you given a different diet per your Discharge Instructions?: No     Questions/Concerns:  Do you have any questions or concerns that have not been discussed?: No       Follow-up Appointments:  Your appointments       Date & Time Appointment Department ([place])    May 22, 2025 8:15 AM CDT Exam - Established with William Haines MD [facility] ([facility])        May 29, 2025 8:40 AM CDT Follow-Up OV with Delbert Valencia MD [facility] ([facility])    Please arrive 15 minutes prior to your scheduled appointment time.               [facility]   [facility] [address]  [phone] [facility]  [facility]  [address]  [birth year]2019 11:05 [facility] [id number]  [phone]            Transitional Care Clinic  Was TCC Ordered: No    Primary Care Provider (If no TCC appointment)  Does patient already have a PCP appointment scheduled? No  Care Manager Scheduled PCP office TCM appointment with patient    Specialist  Does the patient have any other follow-up appointment(s) that need to be scheduled? Yes   -If yes: Care Manager reviewed upcoming specialist appointments with patient: Yes   -Does the patient need assistance scheduling appointment(s): No      Book By Date: 5/25/25

## 2025-05-19 NOTE — H&P PST ADULT - ACTIVITY
Activity tolerance limited sec. to balance problem [FreeTextEntry1] : [ ] Continue Levetiracetam 400 mg in the morning and 600 mg in the evening (59 mg/kg/day) [ ] Will follow up with office staff to track results of parental genetic testing [ ] Will write letter requesting [employer] [ ] Follow up in 6 months, at which point we can repeat routine EEG at time of appointment (in preparation for ambulatory EEG one year from today).

## (undated) DEVICE — GLV 6.5 PROTEXIS (WHITE)

## (undated) DEVICE — SPECIMEN CONTAINER 100ML

## (undated) DEVICE — FOLEY TRAY 16FR 5CC LTX UMETER CLOSED

## (undated) DEVICE — VENODYNE/SCD SLEEVE CALF LARGE

## (undated) DEVICE — MEDICATION LABELS W MARKER

## (undated) DEVICE — SOL IRR BAG H2O 3000ML

## (undated) DEVICE — ELCTR PLASMA LOOP MEDIUM ANGLED 24FR 12-30 DEG

## (undated) DEVICE — DRAPE TOWEL BLUE 17" X 24"

## (undated) DEVICE — LAP PAD 18 X 18"

## (undated) DEVICE — FOLEY CATH 3-WAY 22FR 30CC LATEX LUBRICATH

## (undated) DEVICE — TUBING RANGER FLUID IRRIGATION SET DISP

## (undated) DEVICE — ELCTR PLASMA ROLLER 24FR 12-30 DEG

## (undated) DEVICE — TUBING SUCTION 20FT

## (undated) DEVICE — SOL IRR POUR H2O 250ML

## (undated) DEVICE — TUBING TUR 2 PRONG

## (undated) DEVICE — POSITIONER FOAM EGG CRATE ULNAR 2PCS (PINK)

## (undated) DEVICE — DRAPE MAYO STAND 23"

## (undated) DEVICE — DRAPE 1/2 SHEET 40X57"

## (undated) DEVICE — WARMING BLANKET UPPER ADULT